# Patient Record
Sex: MALE | Race: WHITE | NOT HISPANIC OR LATINO | Employment: FULL TIME | URBAN - METROPOLITAN AREA
[De-identification: names, ages, dates, MRNs, and addresses within clinical notes are randomized per-mention and may not be internally consistent; named-entity substitution may affect disease eponyms.]

---

## 2017-01-25 ENCOUNTER — ALLSCRIPTS OFFICE VISIT (OUTPATIENT)
Dept: OTHER | Facility: OTHER | Age: 57
End: 2017-01-25

## 2017-04-19 ENCOUNTER — ALLSCRIPTS OFFICE VISIT (OUTPATIENT)
Dept: OTHER | Facility: OTHER | Age: 57
End: 2017-04-19

## 2017-04-19 DIAGNOSIS — I10 ESSENTIAL (PRIMARY) HYPERTENSION: ICD-10-CM

## 2017-04-19 DIAGNOSIS — E78.5 HYPERLIPIDEMIA: ICD-10-CM

## 2017-04-19 DIAGNOSIS — M10.9 GOUT: ICD-10-CM

## 2017-04-19 DIAGNOSIS — I25.10 ATHEROSCLEROTIC HEART DISEASE OF NATIVE CORONARY ARTERY WITHOUT ANGINA PECTORIS: ICD-10-CM

## 2017-04-19 DIAGNOSIS — E55.9 VITAMIN D DEFICIENCY: ICD-10-CM

## 2017-07-20 ENCOUNTER — ALLSCRIPTS OFFICE VISIT (OUTPATIENT)
Dept: OTHER | Facility: OTHER | Age: 57
End: 2017-07-20

## 2017-11-21 ENCOUNTER — GENERIC CONVERSION - ENCOUNTER (OUTPATIENT)
Dept: OTHER | Facility: OTHER | Age: 57
End: 2017-11-21

## 2017-12-19 ENCOUNTER — ALLSCRIPTS OFFICE VISIT (OUTPATIENT)
Dept: OTHER | Facility: OTHER | Age: 57
End: 2017-12-19

## 2017-12-20 NOTE — PROGRESS NOTES
Assessment  Assessed    1  Arteriosclerosis of coronary artery (414 00) (I25 10)   2  Benign essential hypertension (401 1) (I10)   3  Essential hypertriglyceridemia (272 1) (E78 1)   4  Premature ventricular contraction (427 69) (I49 3)    Plan  Arteriosclerosis of coronary artery    · ECHO STRESS TEST W CONTRAST IF INDICATED; Status:Need Information - FinancialAuthorization; Requested for:[id number]; Perform:[facility]; KPS:[id number]; For:Arteriosclerosis of coronary artery; Ordered By:Ward Cerda; Arteriosclerosis of coronary artery, Premature ventricular contraction    · EKG/ECG- POC; Status:Complete;   Done: 97HPT6100 08:06AM   Perform: In Office; Last Updated Henri Schmidt; 12/19/2017 8:06:29 AM;Ordered; For:Arteriosclerosis of coronary artery, Premature ventricular contraction; Ordered By:Maame Cerda;    Discussion/Summary  Cardiology Discussion Summary Free Text Note Form [facility]: It is my impression that the patient is doing well in the aftermath of coronary stenting (LAD) in August of 2012  He does have some CANNON but this has not advanced  He had a negative stress echo in 2015  His blood pressure is acceptable on his current medical regimen (valsartan/HCTZ and metoprolol)  I reviewed his lipids which were excellent on his current dosage of atorvastatin except HDL of 30  He will continue taking aspirin long term  Note he does have PVCs but these are not symptomatic and do no warrant treatment  I have ordered a stress echo in one year which will be three years out from his last test  We will look at his LV function to make sure it has not changed in light of PVCs and screen him for ischemia  Chief Complaint  Chief Complaint Free Text Note Form: 12 month FU for CAD s/p NTMI August 2012 with stenting of the LAD, HTN and HPL  Emiliana Mcbride also history of PVCS   Chief Complaint Chronic Condition [facility]: Patient is here today for follow up of chronic conditions described in HPI        History of Present Illness  Cardiology [facility] [place] Free Text Note Form [facility]: Since his last visit he gets CANNON walking [place] with his dog  This has not increased  He denies anginal chest pain  He denies edema or palpitations  He does get lightheadedness with rapid change of position  Review of Systems  Cardiology Male ROS:    Cardiac: no chest pain,-- no rhythm problems,-- no fainting/blackouts,-- no heart murmur present,-- no signs of swelling-- and-- no palpitations present  Skin: No complaints of nonhealing sores or skin rash  Genitourinary: No complaints of recurrent urinary tract infections, frequent urination at night, difficult urination, blood in urine, kidney stones, loss of bladder control, no kidney or prostate problems, no erectile dysfunction  Psychological: No complaints of feeling depressed, anxiety, panic attacks, or difficulty concentrating  General: no trouble sleeping,-- no appetite changes-- and-- no lack of energy/fatigue  Respiratory: shortness of breath  HEENT: no serious eye problems,-- no hearing problems,-- no nose problems-- and-- no throat problems  Gastrointestinal: No complaints of liver problems, nausea, vomiting, heartburn, constipation, bloody stools, diarrhea, problems swallowing, adbominal pain, or rectal bleeding  Neurological: No complaints of numbness, tingling, dizziness, weakness, seizures, headaches, syncope or fainting, AM fatigue, daytime sleepiness, no witnessed apnea episodes  Musculoskeletal: arthritis, but-- no back pain   ROS Reviewed:   ROS reviewed  Active Problems  Problems    1  Arteriosclerosis of coronary artery (414 00) (I25 10)   2  Benign essential hypertension (401 1) (I10)   3  Dyslipidemia (272 4) (E78 5)   4  Erectile dysfunction of non-organic origin (302 72) (F52 21)   5  Essential hypertriglyceridemia (272 1) (E78 1)   6  Facial skin lesion (709 9) (L98 9)   7  Fatigue (780 79) (R53 83)   8  Flu vaccine need (V04 81) (Z23)   9   Gout (274 9) (M10 9)   10  Obesity (278 00) (E66 9)   11  Peripheral vascular disease (443 9) (I73 9)   12  Premature ventricular contraction (427 69) (I49 3)   13  Seborrheic keratosis (702 19) (L82 1)   14  Vitamin D deficiency (268 9) (E55 9)    Past Medical History  Problems    1  History of A Fall (E888 9)   2  History of Acute myocardial infarction (410 90) (I21 9)   3  History of Arthralgia (719 40)   4  History of Contusion With Intact Skin Surface Of The Left Medial Thigh (924 00)   5  History of Cough (786 2) (R05)   6  History of fever (V13 89) (Z87 898)   7  History of Lump of skin (782 2) (R22 9)   8  Personal history of gout (V12 29) (Z86 39)  Active Problems And Past Medical History Reviewed: The active problems and past medical history were reviewed and updated today  Surgical History  Problems    1  History of Back Surgery   2  History of Cath Placement Of Stent 1   3  History of Complete Colonoscopy   4  History of Knee Surgery  Surgical History Reviewed: The surgical history was reviewed and updated today  Family History  Mother    1  Family history of Cerebral Edema  Father    2  Family history of Aneurysm Of The Cerebellar Artery  Brother    3  Family history of Aneurysm Of The Cerebellar Artery  Family History    4  Family history of Father  At Age ___  Family History Reviewed: The family history was reviewed and updated today  Social History  Problems    · Denied: History of Alcohol Use (History)   · Being A Social Drinker   · Denied: History of Drug Use   · Never A Smoker  Social History Reviewed: The social history was reviewed and updated today  The social history was reviewed and is unchanged  Current Meds   1  Allopurinol 100 MG Oral Tablet; TAKE ONE TABLET BY MOUTH EVERY DAY AS DIRECTED; Therapy: 34MHW5044 to (Tj Palomo)  Requested for: 2017; Last Rx:2017; Status: ACTIVE - Transmit to Pharmacy - Awaiting Verification Ordered   2   Allopurinol 300 MG Oral Tablet; take one tablet by mouth every day; Therapy: 97DED8511 to (Jazzy Gonzalez)  Requested for: 21Nov2017; Last Rx:21Nov2017 Ordered   3  Aspir-81 TBEC; Take 1 tablet daily Recorded   4  Atorvastatin Calcium 20 MG Oral Tablet; take one tablet by mouth every day; Therapy: 70Iij5134 to (Evaluate:[id number])  Requested for: 21Nov2017; Last Rx:21Nov2017 Ordered   5  Metoprolol Tartrate 25 MG Oral Tablet; take 1/2 tablet by mouth twice daily; Therapy: 50Zig2430 to (Evaluate:14Dde8693)  Requested for: 21Nov2017; Last Rx:21Nov2017 Ordered   6  Valsartan-Hydrochlorothiazide 160-12 5 MG Oral Tablet; take one tablet by mouth every day; Therapy: 20KIE7290 to (Jazzy Gonzalez)  Requested for: 21Nov2017; Last Rx:21Nov2017; Status: ACTIVE - Transmit to Pharmacy - Awaiting Verification Ordered  Medication List Reviewed: The medication list was reviewed and updated today  Allergies  Medication    1  No Known Drug Allergies  Non-Medication    2  No Known Environmental Allergies   3  No Known Food Allergies    Vitals  Vital Signs    Recorded: 64DWK4038 08:06AM   Heart Rate 56, Apical   Pulse Quality Regular, Apical   Respiration 16   Systolic 513, LUE, Sitting   Diastolic 80, LUE, Sitting   Height 5 ft 7 in   Weight 207 lb 6 oz   BMI Calculated 32 48   BSA Calculated 2 05     Physical Exam   Constitutional  General appearance: No acute distress, well appearing and well nourished  Eyes  Conjunctiva and Sclera examination: Conjunctiva pink, sclera anicteric  Ears, Nose, Mouth, and Throat - Oropharynx: Clear, nares are clear, mucous membranes are moist   Neck  Neck and thyroid: Normal, supple, trachea midline, no thyromegaly  Pulmonary  Respiratory effort: No increased work of breathing or signs of respiratory distress  Cardiovascular  Auscultation of heart: Abnormal  -- Regular with premature beats  no murmur rub or gallop  Carotid pulses: Normal, 2+ bilaterally     Peripheral vascular exam: Normal pulses throughout, no tenderness, erythema or swelling  Pedal pulses: Normal, 2+ bilaterally  Examination of extremities for edema and/or varicosities: Normal    Chest - Chest: Normal   Abdomen  Abdomen: Non-tender and no distention  Liver and spleen: No hepatomegaly or splenomegaly         Future Appointments    Date/Time Provider Specialty Site   02/14/2018 04:00 PM Ninfa Orourke DO Family Medicine [facility]     Signatures   Electronically signed by : MU Ortiz ; Dec 19 2017  8:33AM EST                       (Author)

## 2018-01-12 VITALS
SYSTOLIC BLOOD PRESSURE: 128 MMHG | WEIGHT: 202.38 LBS | HEIGHT: 68 IN | BODY MASS INDEX: 30.67 KG/M2 | DIASTOLIC BLOOD PRESSURE: 80 MMHG

## 2018-01-13 VITALS
HEIGHT: 68 IN | BODY MASS INDEX: 31.39 KG/M2 | WEIGHT: 207.13 LBS | SYSTOLIC BLOOD PRESSURE: 112 MMHG | DIASTOLIC BLOOD PRESSURE: 70 MMHG

## 2018-01-14 VITALS
DIASTOLIC BLOOD PRESSURE: 62 MMHG | HEIGHT: 68 IN | BODY MASS INDEX: 29.16 KG/M2 | WEIGHT: 192.38 LBS | SYSTOLIC BLOOD PRESSURE: 128 MMHG

## 2018-01-22 VITALS
DIASTOLIC BLOOD PRESSURE: 62 MMHG | SYSTOLIC BLOOD PRESSURE: 130 MMHG | BODY MASS INDEX: 30.84 KG/M2 | WEIGHT: 196.5 LBS | HEIGHT: 67 IN

## 2018-01-23 VITALS
HEART RATE: 56 BPM | BODY MASS INDEX: 32.55 KG/M2 | HEIGHT: 67 IN | DIASTOLIC BLOOD PRESSURE: 80 MMHG | RESPIRATION RATE: 16 BRPM | WEIGHT: 207.38 LBS | SYSTOLIC BLOOD PRESSURE: 116 MMHG

## 2018-04-06 RX ORDER — ALLOPURINOL 100 MG/1
1 TABLET ORAL DAILY
COMMUNITY
Start: 2016-10-16 | End: 2018-07-08

## 2018-04-06 RX ORDER — VALSARTAN AND HYDROCHLOROTHIAZIDE 160; 12.5 MG/1; MG/1
1 TABLET, FILM COATED ORAL DAILY
COMMUNITY
Start: 2015-11-03 | End: 2018-06-27 | Stop reason: SDUPTHER

## 2018-04-06 RX ORDER — ATORVASTATIN CALCIUM 20 MG/1
1 TABLET, FILM COATED ORAL DAILY
COMMUNITY
Start: 2016-08-08 | End: 2018-06-27 | Stop reason: SDUPTHER

## 2018-04-06 RX ORDER — ASPIRIN 81 MG/1
1 TABLET ORAL DAILY
COMMUNITY

## 2018-04-06 RX ORDER — ALLOPURINOL 300 MG/1
1 TABLET ORAL DAILY
COMMUNITY
Start: 2012-07-17 | End: 2018-06-27 | Stop reason: SDUPTHER

## 2018-04-10 ENCOUNTER — OFFICE VISIT (OUTPATIENT)
Dept: FAMILY MEDICINE CLINIC | Facility: CLINIC | Age: 58
End: 2018-04-10
Payer: COMMERCIAL

## 2018-04-10 VITALS
BODY MASS INDEX: 31.25 KG/M2 | WEIGHT: 206.2 LBS | DIASTOLIC BLOOD PRESSURE: 80 MMHG | SYSTOLIC BLOOD PRESSURE: 126 MMHG | HEIGHT: 68 IN

## 2018-04-10 DIAGNOSIS — I25.10 ARTERIOSCLEROSIS OF CORONARY ARTERY: ICD-10-CM

## 2018-04-10 DIAGNOSIS — E78.5 HYPERLIPIDEMIA, UNSPECIFIED HYPERLIPIDEMIA TYPE: ICD-10-CM

## 2018-04-10 DIAGNOSIS — I10 ESSENTIAL HYPERTENSION: Primary | ICD-10-CM

## 2018-04-10 DIAGNOSIS — M10.9 GOUT, UNSPECIFIED CAUSE, UNSPECIFIED CHRONICITY, UNSPECIFIED SITE: ICD-10-CM

## 2018-04-10 DIAGNOSIS — E55.9 VITAMIN D DEFICIENCY: ICD-10-CM

## 2018-04-10 PROCEDURE — 99214 OFFICE O/P EST MOD 30 MIN: CPT | Performed by: FAMILY MEDICINE

## 2018-04-10 NOTE — PROGRESS NOTES
Assessment/Plan:  Chief Complaint   Patient presents with    Hypertension     patient here for follow up of chronic medical problems  Patient Instructions   He gained weight and his BP is stable  Continue present meds as directed for hx of gout and hyperlipidemia and HTN  F-up with Cardiology as directed and monitor labs  Recheck in 3 months for HTN  Take Vitamin D3 as directed  Low cholesterol diet encouraged  No problem-specific Assessment & Plan notes found for this encounter  Diagnoses and all orders for this visit:    Essential hypertension  -     Comprehensive metabolic panel  -     PSA, total and free; Future    Vitamin D deficiency  -     PSA, total and free; Future    Hyperlipidemia, unspecified hyperlipidemia type  -     Lipid Panel with Direct LDL reflex; Future  -     PSA, total and free; Future    Gout, unspecified cause, unspecified chronicity, unspecified site  -     Uric acid; Future  -     PSA, total and free; Future    Arteriosclerosis of coronary artery  -     PSA, total and free; Future          Subjective:      Patient ID: Thomas Pierre is a 61 y o  male  Here for HTN and chronic medical problems  No cp or sob, or ha  Sees Cardiology as directed for hx of arteriosclerosis of coronary artery and also has a stress test soon for cardiology  Gout stable  The following portions of the patient's history were reviewed and updated as appropriate: allergies, current medications, past social history and problem list     Review of Systems   Constitutional: Negative  HENT: Negative  Eyes: Negative  Respiratory: Negative  Cardiovascular: Negative  Gastrointestinal: Negative  Endocrine: Negative  Genitourinary: Negative  Musculoskeletal: Negative  Gout stable   Skin: Negative  Allergic/Immunologic: Negative  Neurological: Negative  Hematological: Negative  Psychiatric/Behavioral: Negative            Objective:      /80 (BP Location: Left arm, Patient Position: Sitting, Cuff Size: Standard)   Ht 5' 8" (1 727 m)   Wt 93 5 kg (206 lb 3 2 oz)   BMI 31 35 kg/m²          Physical Exam   Constitutional: He is oriented to person, place, and time  He appears well-developed and well-nourished  HENT:   Head: Normocephalic and atraumatic  Right Ear: External ear normal    Left Ear: External ear normal    Nose: Nose normal    Mouth/Throat: Oropharynx is clear and moist    Eyes: Conjunctivae and EOM are normal  Pupils are equal, round, and reactive to light  Neck: Normal range of motion  Neck supple  Cardiovascular: Normal rate, regular rhythm, normal heart sounds and intact distal pulses  Pulmonary/Chest: Effort normal and breath sounds normal    Musculoskeletal: Normal range of motion  Neurological: He is alert and oriented to person, place, and time  He has normal reflexes  Skin: Skin is warm and dry  Psychiatric: He has a normal mood and affect   His behavior is normal

## 2018-04-10 NOTE — PATIENT INSTRUCTIONS
He gained weight and his BP is stable  Continue present meds as directed for hx of gout and hyperlipidemia and HTN  F-up with Cardiology as directed and monitor labs  Recheck in 3 months for HTN  Take Vitamin D3 as directed  Low cholesterol diet encouraged

## 2018-06-22 LAB
ALBUMIN SERPL BCP-MCNC: 4.3 G/DL (ref 3–5.2)
ALP SERPL-CCNC: 72 U/L (ref 43–122)
ALT SERPL W P-5'-P-CCNC: 33 U/L (ref 9–52)
ANION GAP SERPL CALCULATED.3IONS-SCNC: 10 MMOL/L (ref 5–14)
AST SERPL W P-5'-P-CCNC: 29 U/L (ref 17–59)
BILIRUB SERPL-MCNC: 2 MG/DL
BUN SERPL-MCNC: 22 MG/DL (ref 5–25)
CALCIUM SERPL-MCNC: 9.9 MG/DL (ref 8.4–10.2)
CHLORIDE SERPL-SCNC: 101 MEQ/L (ref 97–108)
CHOLEST SERPL-MCNC: 116 MG/DL
CHOLEST/HDLC SERPL: 3.6 {RATIO}
CO2 SERPL-SCNC: 31 MMOL/L (ref 22–30)
CREATINE, SERUM (HISTORICAL): 1 MG/DL (ref 0.7–1.5)
EGFR (HISTORICAL): >60 ML/MIN/1.73 M2
GLUCOSE FASTING (HISTORICAL): 106 MG/DL (ref 70–99)
HDLC SERPL-MCNC: 32 MG/DL
LDL/HDL RATIO (HISTORICAL): 1.8
LDLC SERPL CALC-MCNC: 57 MG/DL
POTASSIUM SERPL-SCNC: 4 MEQ/L (ref 3.6–5)
SODIUM SERPL-SCNC: 142 MEQ/L (ref 137–147)
TOTAL PROTEIN (HISTORICAL): 7.5 G/DL (ref 5.9–8.4)
TRIGL SERPL-MCNC: 135 MG/DL
URIC ACID (HISTORICAL): 5 MG/DL (ref 3.5–8.5)
VLDLC SERPL CALC-MCNC: 27 MG/DL (ref 0–40)

## 2018-06-23 LAB
PROSTATE SPECIFIC ANTIGEN (HISTORICAL): 0.6
PROSTATE SPECIFIC ANTIGEN PERCENT FREE (HISTORICAL): 50
PSA (HISTORICAL): 0.3

## 2018-06-27 DIAGNOSIS — E78.5 HYPERLIPIDEMIA, UNSPECIFIED HYPERLIPIDEMIA TYPE: ICD-10-CM

## 2018-06-27 DIAGNOSIS — M10.9 GOUT, UNSPECIFIED CAUSE, UNSPECIFIED CHRONICITY, UNSPECIFIED SITE: Primary | ICD-10-CM

## 2018-06-27 DIAGNOSIS — I10 ESSENTIAL HYPERTENSION: ICD-10-CM

## 2018-06-28 RX ORDER — ATORVASTATIN CALCIUM 20 MG/1
TABLET, FILM COATED ORAL
Qty: 30 TABLET | Refills: 4 | Status: SHIPPED | OUTPATIENT
Start: 2018-06-28 | End: 2018-11-13 | Stop reason: SDUPTHER

## 2018-06-28 RX ORDER — VALSARTAN AND HYDROCHLOROTHIAZIDE 160; 12.5 MG/1; MG/1
TABLET, FILM COATED ORAL
Qty: 30 TABLET | Refills: 4 | Status: SHIPPED | OUTPATIENT
Start: 2018-06-28 | End: 2018-08-01

## 2018-06-28 RX ORDER — ALLOPURINOL 100 MG/1
100 TABLET ORAL DAILY
Qty: 90 TABLET | Refills: 3 | Status: SHIPPED | OUTPATIENT
Start: 2018-06-28 | End: 2018-07-08

## 2018-07-07 DIAGNOSIS — M10.9 GOUT, UNSPECIFIED CAUSE, UNSPECIFIED CHRONICITY, UNSPECIFIED SITE: Primary | ICD-10-CM

## 2018-07-08 RX ORDER — ALLOPURINOL 300 MG/1
TABLET ORAL
Qty: 30 TABLET | Refills: 4 | Status: SHIPPED | OUTPATIENT
Start: 2018-07-08 | End: 2018-11-13 | Stop reason: SDUPTHER

## 2018-07-30 ENCOUNTER — TELEPHONE (OUTPATIENT)
Dept: FAMILY MEDICINE CLINIC | Facility: CLINIC | Age: 58
End: 2018-07-30

## 2018-07-30 NOTE — TELEPHONE ENCOUNTER
Left message for patient to call the office  We need to change his valsartan/hctz to losartan/hctz 100-12 5 #30 once daily with 5 refills  Patient should also have an appointment to be seen in a month or two at the lastest for a blood pressure check

## 2018-08-01 ENCOUNTER — OFFICE VISIT (OUTPATIENT)
Dept: FAMILY MEDICINE CLINIC | Facility: CLINIC | Age: 58
End: 2018-08-01
Payer: COMMERCIAL

## 2018-08-01 VITALS
BODY MASS INDEX: 30.83 KG/M2 | HEIGHT: 68 IN | SYSTOLIC BLOOD PRESSURE: 130 MMHG | WEIGHT: 203.4 LBS | DIASTOLIC BLOOD PRESSURE: 80 MMHG

## 2018-08-01 DIAGNOSIS — I10 ESSENTIAL HYPERTENSION: Primary | ICD-10-CM

## 2018-08-01 DIAGNOSIS — E78.5 HYPERLIPIDEMIA, UNSPECIFIED HYPERLIPIDEMIA TYPE: ICD-10-CM

## 2018-08-01 DIAGNOSIS — E55.9 VITAMIN D DEFICIENCY: ICD-10-CM

## 2018-08-01 DIAGNOSIS — I25.10 ARTERIOSCLEROSIS OF CORONARY ARTERY: ICD-10-CM

## 2018-08-01 DIAGNOSIS — M10.9 GOUT, UNSPECIFIED CAUSE, UNSPECIFIED CHRONICITY, UNSPECIFIED SITE: ICD-10-CM

## 2018-08-01 PROCEDURE — 99214 OFFICE O/P EST MOD 30 MIN: CPT | Performed by: FAMILY MEDICINE

## 2018-08-01 RX ORDER — LOSARTAN POTASSIUM AND HYDROCHLOROTHIAZIDE 12.5; 1 MG/1; MG/1
1 TABLET ORAL DAILY
Qty: 30 TABLET | Refills: 5 | Status: SHIPPED | OUTPATIENT
Start: 2018-08-01 | End: 2018-12-04

## 2018-08-01 NOTE — PATIENT INSTRUCTIONS
Here for routine office visit for medical issues  BP is stable  Continue present meds as directed for hx of gout and hyperlipidemia  F-up with Cardiology as directed and monitor labs  Recheck in 3 months for HTN  Take Vitamin D3 as directed  Low cholesterol diet encouraged   Changed valsartan HCT to losartan /12 5 once daily and recheck in 1 month for BP check

## 2018-08-01 NOTE — PROGRESS NOTES
Assessment/Plan:  Chief Complaint   Patient presents with    Follow-up    Hypertension    Hyperlipidemia     Patient Instructions   Here for routine office visit for medical issues  BP is stable  Continue present meds as directed for hx of gout and hyperlipidemia  F-up with Cardiology as directed and monitor labs  Recheck in 3 months for HTN  Take Vitamin D3 as directed  Low cholesterol diet encouraged  Changed valsartan HCT to losartan /12 5 once daily and recheck in 1 month for BP check             No problem-specific Assessment & Plan notes found for this encounter  Diagnoses and all orders for this visit:    Essential hypertension  -     losartan-hydrochlorothiazide (HYZAAR) 100-12 5 MG per tablet; Take 1 tablet by mouth daily    Hyperlipidemia, unspecified hyperlipidemia type    Gout, unspecified cause, unspecified chronicity, unspecified site    Vitamin D deficiency    Arteriosclerosis of coronary artery          Subjective:      Patient ID: Keyonna Salgado is a 60 y o  male  Follow-up   Hypertension   Hyperlipidemia     No cp or sob, or ha  Hx of gout - stable, and hx of heart disease and sees Cardiology as directed  Takes Vitamin D as directed  Hypertension     Hyperlipidemia         The following portions of the patient's history were reviewed and updated as appropriate: allergies, current medications, past family history, past medical history, past social history, past surgical history and problem list     Review of Systems   Constitutional: Negative  HENT: Negative  Eyes: Negative  Respiratory: Negative  Cardiovascular: Negative  Gastrointestinal: Negative  Endocrine: Negative  Genitourinary: Negative  Musculoskeletal: Negative  Skin: Negative  Allergic/Immunologic: Negative  Neurological: Negative  Hematological: Negative  Psychiatric/Behavioral: Negative            Objective:      /80   Ht 5' 8" (1 727 m)   Wt 92 3 kg (203 lb 6 4 oz)   BMI 30 93 kg/m²          Physical Exam   Constitutional: He is oriented to person, place, and time  He appears well-developed and well-nourished  HENT:   Head: Normocephalic and atraumatic  Right Ear: External ear normal    Left Ear: External ear normal    Nose: Nose normal    Mouth/Throat: Oropharynx is clear and moist    Eyes: Conjunctivae and EOM are normal  Pupils are equal, round, and reactive to light  Neck: Normal range of motion  Neck supple  Cardiovascular: Normal rate, regular rhythm, normal heart sounds and intact distal pulses  Pulmonary/Chest: Effort normal and breath sounds normal    Musculoskeletal: Normal range of motion  Neurological: He is alert and oriented to person, place, and time  He has normal reflexes  Skin: Skin is warm and dry  Psychiatric: He has a normal mood and affect   His behavior is normal

## 2018-09-05 ENCOUNTER — OFFICE VISIT (OUTPATIENT)
Dept: FAMILY MEDICINE CLINIC | Facility: CLINIC | Age: 58
End: 2018-09-05
Payer: COMMERCIAL

## 2018-09-05 VITALS
WEIGHT: 203.8 LBS | HEIGHT: 68 IN | SYSTOLIC BLOOD PRESSURE: 134 MMHG | DIASTOLIC BLOOD PRESSURE: 80 MMHG | BODY MASS INDEX: 30.89 KG/M2

## 2018-09-05 DIAGNOSIS — E78.5 HYPERLIPIDEMIA, UNSPECIFIED HYPERLIPIDEMIA TYPE: ICD-10-CM

## 2018-09-05 DIAGNOSIS — M10.9 GOUT, UNSPECIFIED CAUSE, UNSPECIFIED CHRONICITY, UNSPECIFIED SITE: ICD-10-CM

## 2018-09-05 DIAGNOSIS — I10 ESSENTIAL HYPERTENSION: Primary | ICD-10-CM

## 2018-09-05 PROCEDURE — 99213 OFFICE O/P EST LOW 20 MIN: CPT | Performed by: FAMILY MEDICINE

## 2018-09-05 PROCEDURE — 3008F BODY MASS INDEX DOCD: CPT | Performed by: FAMILY MEDICINE

## 2018-09-05 PROCEDURE — 3075F SYST BP GE 130 - 139MM HG: CPT | Performed by: FAMILY MEDICINE

## 2018-09-05 PROCEDURE — 3079F DIAST BP 80-89 MM HG: CPT | Performed by: FAMILY MEDICINE

## 2018-09-05 PROCEDURE — 1036F TOBACCO NON-USER: CPT | Performed by: FAMILY MEDICINE

## 2018-09-05 RX ORDER — ALLOPURINOL 100 MG/1
TABLET ORAL
COMMUNITY
Start: 2018-09-02 | End: 2018-11-14

## 2018-09-05 NOTE — PROGRESS NOTES
Assessment/Plan:  Chief Complaint   Patient presents with    Follow-up    Hypertension     on Losartan-HCTZ for a month with no problems  Patient Instructions   Recheck in 3 months for BP check and gout stable and take med as directed, take cholesterol med as directed  No problem-specific Assessment & Plan notes found for this encounter  Diagnoses and all orders for this visit:    Essential hypertension    Hyperlipidemia, unspecified hyperlipidemia type    Gout, unspecified cause, unspecified chronicity, unspecified site    Other orders  -     allopurinol (ZYLOPRIM) 100 mg tablet;           Subjective:      Patient ID: Haily Minor is a 60 y o  male  Here for BP check and doing well  Hypertension         The following portions of the patient's history were reviewed and updated as appropriate: allergies, current medications, past family history, past medical history, past social history, past surgical history and problem list     Review of Systems   Constitutional: Negative  HENT: Negative  Eyes: Negative  Respiratory: Negative  Cardiovascular: Negative  Gastrointestinal: Negative  Endocrine: Negative  Genitourinary: Negative  Musculoskeletal: Negative  Skin: Negative  Allergic/Immunologic: Negative  Neurological: Negative  Hematological: Negative  Psychiatric/Behavioral: Negative  Objective:      /80   Ht 5' 8" (1 727 m)   Wt 92 4 kg (203 lb 12 8 oz)   BMI 30 99 kg/m²          Physical Exam   Constitutional: He is oriented to person, place, and time  He appears well-developed and well-nourished  HENT:   Head: Normocephalic and atraumatic  Right Ear: External ear normal    Left Ear: External ear normal    Nose: Nose normal    Mouth/Throat: Oropharynx is clear and moist    Eyes: Conjunctivae and EOM are normal  Pupils are equal, round, and reactive to light  Neck: Normal range of motion  Neck supple     Cardiovascular: Normal rate, regular rhythm, normal heart sounds and intact distal pulses  Pulmonary/Chest: Effort normal and breath sounds normal    Musculoskeletal: Normal range of motion  Neurological: He is alert and oriented to person, place, and time  He has normal reflexes  Skin: Skin is warm and dry  Psychiatric: He has a normal mood and affect   His behavior is normal

## 2018-09-05 NOTE — PATIENT INSTRUCTIONS
Recheck in 3 months for BP check and gout stable and take med as directed, take cholesterol med as directed

## 2018-11-13 DIAGNOSIS — E78.5 HYPERLIPIDEMIA, UNSPECIFIED HYPERLIPIDEMIA TYPE: ICD-10-CM

## 2018-11-13 DIAGNOSIS — I10 ESSENTIAL HYPERTENSION: ICD-10-CM

## 2018-11-13 DIAGNOSIS — M10.9 GOUT, UNSPECIFIED CAUSE, UNSPECIFIED CHRONICITY, UNSPECIFIED SITE: ICD-10-CM

## 2018-11-14 RX ORDER — ATORVASTATIN CALCIUM 20 MG/1
TABLET, FILM COATED ORAL
Qty: 30 TABLET | Refills: 3 | Status: SHIPPED | OUTPATIENT
Start: 2018-11-14 | End: 2019-03-21 | Stop reason: SDUPTHER

## 2018-11-14 RX ORDER — ALLOPURINOL 300 MG/1
TABLET ORAL
Qty: 30 TABLET | Refills: 3 | Status: SHIPPED | OUTPATIENT
Start: 2018-11-14 | End: 2018-12-04 | Stop reason: DRUGHIGH

## 2018-12-04 ENCOUNTER — OFFICE VISIT (OUTPATIENT)
Dept: FAMILY MEDICINE CLINIC | Facility: CLINIC | Age: 58
End: 2018-12-04
Payer: COMMERCIAL

## 2018-12-04 VITALS
BODY MASS INDEX: 31.58 KG/M2 | HEIGHT: 68 IN | SYSTOLIC BLOOD PRESSURE: 132 MMHG | WEIGHT: 208.4 LBS | DIASTOLIC BLOOD PRESSURE: 80 MMHG

## 2018-12-04 DIAGNOSIS — E78.5 HYPERLIPIDEMIA, UNSPECIFIED HYPERLIPIDEMIA TYPE: ICD-10-CM

## 2018-12-04 DIAGNOSIS — M10.9 GOUT, UNSPECIFIED CAUSE, UNSPECIFIED CHRONICITY, UNSPECIFIED SITE: ICD-10-CM

## 2018-12-04 DIAGNOSIS — E55.9 VITAMIN D DEFICIENCY: ICD-10-CM

## 2018-12-04 DIAGNOSIS — I10 ESSENTIAL HYPERTENSION: Primary | ICD-10-CM

## 2018-12-04 DIAGNOSIS — Z11.59 NEED FOR HEPATITIS C SCREENING TEST: ICD-10-CM

## 2018-12-04 PROCEDURE — 3075F SYST BP GE 130 - 139MM HG: CPT | Performed by: FAMILY MEDICINE

## 2018-12-04 PROCEDURE — 99214 OFFICE O/P EST MOD 30 MIN: CPT | Performed by: FAMILY MEDICINE

## 2018-12-04 PROCEDURE — 3079F DIAST BP 80-89 MM HG: CPT | Performed by: FAMILY MEDICINE

## 2018-12-04 PROCEDURE — 3008F BODY MASS INDEX DOCD: CPT | Performed by: FAMILY MEDICINE

## 2018-12-04 RX ORDER — ALLOPURINOL 300 MG/1
300 TABLET ORAL DAILY
COMMUNITY
End: 2019-03-21

## 2018-12-04 RX ORDER — ALLOPURINOL 100 MG/1
TABLET ORAL
COMMUNITY
Start: 2018-11-23 | End: 2019-08-14 | Stop reason: SDUPTHER

## 2018-12-04 RX ORDER — OLMESARTAN MEDOXOMIL AND HYDROCHLOROTHIAZIDE 40/12.5 40; 12.5 MG/1; MG/1
1 TABLET ORAL DAILY
Qty: 30 TABLET | Refills: 0 | Status: SHIPPED | OUTPATIENT
Start: 2018-12-04 | End: 2019-01-21 | Stop reason: SDUPTHER

## 2018-12-04 NOTE — PATIENT INSTRUCTIONS
Here for routine office visit for medical issues  BP is stable  Continue present meds as directed for hx of gout and hyperlipidemia  F-up with Cardiology as directed and monitor labs  Recheck in 1 months for HTN onnew BP med  Take Vitamin D3 as directed  Low cholesterol diet encouraged  Changed losartan HCT to olmesartan HCT 40/12 5 once daily and recheck in 1 month for BP check

## 2018-12-04 NOTE — PROGRESS NOTES
Assessment/Plan:  Chief Complaint   Patient presents with    Follow-up    Hypertension    Hyperlipidemia     Patient Instructions   Here for routine office visit for medical issues  BP is stable  Continue present meds as directed for hx of gout and hyperlipidemia  F-up with Cardiology as directed and monitor labs  Recheck in 1 months for HTN onnew BP med  Take Vitamin D3 as directed  Low cholesterol diet encouraged  Changed losartan HCT to olmesartan HCT 40/12 5 once daily and recheck in 1 month for BP check  No problem-specific Assessment & Plan notes found for this encounter  Diagnoses and all orders for this visit:    Essential hypertension  -     olmesartan-hydrochlorothiazide (BENICAR HCT) 40-12 5 MG per tablet; Take 1 tablet by mouth daily    Need for hepatitis C screening test  -     Hepatitis C antibody; Future    Hyperlipidemia, unspecified hyperlipidemia type    Gout, unspecified cause, unspecified chronicity, unspecified site    Vitamin D deficiency    Other orders  -     allopurinol (ZYLOPRIM) 300 mg tablet; Take 300 mg by mouth daily          Subjective:      Patient ID: Vadim Rangel is a 60 y o  male  Follow-up   Hypertension   Hyperlipidemia     No cp or sob, or ha  No other complaints, needs new BP med as he is on losartan HCT  Hypertension     Hyperlipidemia         The following portions of the patient's history were reviewed and updated as appropriate: allergies, current medications, past family history, past medical history, past social history, past surgical history and problem list     Review of Systems   Constitutional: Negative  HENT: Negative  Eyes: Negative  Respiratory: Negative  Cardiovascular: Negative  Gastrointestinal: Negative  Endocrine: Negative  Genitourinary: Negative  Musculoskeletal: Negative  Skin: Negative  Allergic/Immunologic: Negative  Neurological: Negative  Hematological: Negative  Psychiatric/Behavioral: Negative  Objective:      /80   Ht 5' 8" (1 727 m)   Wt 94 5 kg (208 lb 6 4 oz)   BMI 31 69 kg/m²          Physical Exam   Constitutional: He is oriented to person, place, and time  He appears well-developed and well-nourished  HENT:   Head: Normocephalic and atraumatic  Right Ear: External ear normal    Left Ear: External ear normal    Nose: Nose normal    Mouth/Throat: Oropharynx is clear and moist    Eyes: Pupils are equal, round, and reactive to light  Conjunctivae and EOM are normal    Neck: Normal range of motion  Neck supple  Cardiovascular: Normal rate, regular rhythm, normal heart sounds and intact distal pulses  Pulmonary/Chest: Effort normal and breath sounds normal    Musculoskeletal: Normal range of motion  Neurological: He is alert and oriented to person, place, and time  He has normal reflexes  Skin: Skin is warm and dry  Psychiatric: He has a normal mood and affect   His behavior is normal

## 2018-12-11 ENCOUNTER — TRANSCRIBE ORDERS (OUTPATIENT)
Dept: ADMINISTRATIVE | Facility: HOSPITAL | Age: 58
End: 2018-12-11

## 2018-12-11 DIAGNOSIS — I25.119 ATHEROSCLEROSIS OF NATIVE CORONARY ARTERY OF NATIVE HEART WITH ANGINA PECTORIS (HCC): Primary | ICD-10-CM

## 2019-01-09 ENCOUNTER — OFFICE VISIT (OUTPATIENT)
Dept: FAMILY MEDICINE CLINIC | Facility: CLINIC | Age: 59
End: 2019-01-09
Payer: COMMERCIAL

## 2019-01-09 VITALS
WEIGHT: 210.2 LBS | BODY MASS INDEX: 31.13 KG/M2 | DIASTOLIC BLOOD PRESSURE: 86 MMHG | HEIGHT: 69 IN | SYSTOLIC BLOOD PRESSURE: 130 MMHG

## 2019-01-09 DIAGNOSIS — Z11.59 ENCOUNTER FOR HEPATITIS C SCREENING TEST FOR LOW RISK PATIENT: ICD-10-CM

## 2019-01-09 DIAGNOSIS — J06.9 UPPER RESPIRATORY TRACT INFECTION, UNSPECIFIED TYPE: ICD-10-CM

## 2019-01-09 DIAGNOSIS — R05.9 COUGH: ICD-10-CM

## 2019-01-09 DIAGNOSIS — J32.9 SINUSITIS, UNSPECIFIED CHRONICITY, UNSPECIFIED LOCATION: ICD-10-CM

## 2019-01-09 DIAGNOSIS — Z23 ENCOUNTER FOR IMMUNIZATION: Primary | ICD-10-CM

## 2019-01-09 DIAGNOSIS — I10 ESSENTIAL HYPERTENSION: ICD-10-CM

## 2019-01-09 PROCEDURE — 3075F SYST BP GE 130 - 139MM HG: CPT | Performed by: FAMILY MEDICINE

## 2019-01-09 PROCEDURE — 3079F DIAST BP 80-89 MM HG: CPT | Performed by: FAMILY MEDICINE

## 2019-01-09 PROCEDURE — 99214 OFFICE O/P EST MOD 30 MIN: CPT | Performed by: FAMILY MEDICINE

## 2019-01-09 PROCEDURE — 1036F TOBACCO NON-USER: CPT | Performed by: FAMILY MEDICINE

## 2019-01-09 PROCEDURE — 3008F BODY MASS INDEX DOCD: CPT | Performed by: FAMILY MEDICINE

## 2019-01-09 RX ORDER — LOSARTAN POTASSIUM AND HYDROCHLOROTHIAZIDE 12.5; 1 MG/1; MG/1
TABLET ORAL
COMMUNITY
Start: 2018-12-22 | End: 2019-01-09

## 2019-01-09 RX ORDER — PROMETHAZINE HYDROCHLORIDE AND CODEINE PHOSPHATE 6.25; 1 MG/5ML; MG/5ML
5 SYRUP ORAL EVERY 12 HOURS PRN
Qty: 120 ML | Refills: 0 | Status: SHIPPED | OUTPATIENT
Start: 2019-01-09 | End: 2019-08-06

## 2019-01-09 RX ORDER — AZITHROMYCIN 250 MG/1
TABLET, FILM COATED ORAL
Qty: 6 TABLET | Refills: 0 | Status: SHIPPED | OUTPATIENT
Start: 2019-01-09 | End: 2019-01-14

## 2019-01-09 NOTE — PROGRESS NOTES
Assessment/Plan:  Chief Complaint   Patient presents with    Follow-up     1 month: BP    Cough     Symtoms ongoing for 3 weeks     Patient Instructions   Rest and fluids, start abx and promethazine with codeine or Dimetapp DM cold and cough and use olmesartan HCT as directed for HTN  Recheck 3 months  No problem-specific Assessment & Plan notes found for this encounter  Diagnoses and all orders for this visit:    Encounter for immunization  -     TDAP VACCINE GREATER THAN OR EQUAL TO 6YO IM    Essential hypertension    Cough    Upper respiratory tract infection, unspecified type  -     azithromycin (ZITHROMAX) 250 mg tablet; Take 2 tablets today then 1 tablet daily x 4 days  -     promethazine-codeine (PHENERGAN WITH CODEINE) 6 25-10 mg/5 mL syrup; Take 5 mL by mouth every 12 (twelve) hours as needed for cough    Sinusitis, unspecified chronicity, unspecified location  -     azithromycin (ZITHROMAX) 250 mg tablet; Take 2 tablets today then 1 tablet daily x 4 days    Encounter for hepatitis C screening test for low risk patient  -     Cancel: Hepatitis C antibody; Future          Subjective:      Patient ID: Rebecca Carreno is a 60 y o  male  Follow-up (1 month: BP)  Cough (Symtoms ongoing for 3 weeks)    Taking BP med as directed  Lingering cough for 3 weeks  No phlegm  Took Nyquil prn at night  Getting stress test next Friday 1/18/19 at cardiology  The following portions of the patient's history were reviewed and updated as appropriate: allergies, current medications, past family history, past medical history, past social history, past surgical history and problem list     Review of Systems   Constitutional: Negative  HENT: Positive for congestion, postnasal drip and rhinorrhea  Eyes: Negative  Respiratory: Positive for cough  Cardiovascular: Negative  Gastrointestinal: Negative  Endocrine: Negative  Genitourinary: Negative  Musculoskeletal: Negative      Skin: Negative  Allergic/Immunologic: Negative  Neurological: Negative  Hematological: Negative  Psychiatric/Behavioral: Negative  Objective:      /86 (BP Location: Left arm, Patient Position: Sitting, Cuff Size: Standard)   Ht 5' 8 5" (1 74 m)   Wt 95 3 kg (210 lb 3 2 oz)   BMI 31 50 kg/m²          Physical Exam   Constitutional: He is oriented to person, place, and time  He appears well-developed and well-nourished  HENT:   Head: Normocephalic and atraumatic  Right Ear: External ear normal    Left Ear: External ear normal    Nasal congestion, pnd and rhinorrhea, sinusitis   Eyes: Pupils are equal, round, and reactive to light  Conjunctivae and EOM are normal    Neck: Normal range of motion  Neck supple  Cardiovascular: Normal rate, regular rhythm, normal heart sounds and intact distal pulses  Pulmonary/Chest: Effort normal and breath sounds normal    Cough     Musculoskeletal: Normal range of motion  Neurological: He is alert and oriented to person, place, and time  He has normal reflexes  Skin: Skin is warm and dry  Psychiatric: He has a normal mood and affect   His behavior is normal

## 2019-01-09 NOTE — PATIENT INSTRUCTIONS
Rest and fluids, start abx and promethazine with codeine or Dimetapp DM cold and cough and use olmesartan HCT as directed for HTN  Recheck 3 months

## 2019-01-11 DIAGNOSIS — I10 ESSENTIAL HYPERTENSION: ICD-10-CM

## 2019-01-11 RX ORDER — LOSARTAN POTASSIUM AND HYDROCHLOROTHIAZIDE 12.5; 1 MG/1; MG/1
TABLET ORAL
Qty: 30 TABLET | Refills: 4 | OUTPATIENT
Start: 2019-01-11

## 2019-01-18 ENCOUNTER — HOSPITAL ENCOUNTER (OUTPATIENT)
Dept: NON INVASIVE DIAGNOSTICS | Facility: CLINIC | Age: 59
Discharge: HOME | End: 2019-01-18
Payer: COMMERCIAL

## 2019-01-18 DIAGNOSIS — I25.119 ATHEROSCLEROSIS OF NATIVE CORONARY ARTERY OF NATIVE HEART WITH ANGINA PECTORIS (HCC): ICD-10-CM

## 2019-01-18 LAB
ARRHY DURING EX: NORMAL
CHEST PAIN STATEMENT: NORMAL
MAX DIASTOLIC BP: 80 MMHG
MAX HEART RATE: 134 BPM
MAX PREDICTED HEART RATE: 161 BPM
MAX. SYSTOLIC BP: 162 MMHG
PROTOCOL NAME: NORMAL
TARGET HR FORMULA: NORMAL
TEST INDICATION: NORMAL
TIME IN EXERCISE PHASE: NORMAL

## 2019-01-18 PROCEDURE — 93350 STRESS TTE ONLY: CPT

## 2019-01-18 PROCEDURE — 93351 STRESS TTE COMPLETE: CPT | Performed by: INTERNAL MEDICINE

## 2019-01-21 DIAGNOSIS — I10 ESSENTIAL HYPERTENSION: ICD-10-CM

## 2019-01-21 RX ORDER — OLMESARTAN MEDOXOMIL AND HYDROCHLOROTHIAZIDE 40/12.5 40; 12.5 MG/1; MG/1
1 TABLET ORAL DAILY
Qty: 30 TABLET | Refills: 3 | Status: SHIPPED | OUTPATIENT
Start: 2019-01-21 | End: 2019-05-15 | Stop reason: SDUPTHER

## 2019-01-21 RX ORDER — OLMESARTAN MEDOXOMIL AND HYDROCHLOROTHIAZIDE 40/12.5 40; 12.5 MG/1; MG/1
TABLET ORAL
Qty: 30 TABLET | Refills: 0 | Status: CANCELLED | OUTPATIENT
Start: 2019-01-21

## 2019-01-21 RX ORDER — LOSARTAN POTASSIUM AND HYDROCHLOROTHIAZIDE 12.5; 1 MG/1; MG/1
TABLET ORAL
Qty: 30 TABLET | Refills: 4 | OUTPATIENT
Start: 2019-01-21

## 2019-01-21 NOTE — TELEPHONE ENCOUNTER
Patient called and said that he has olmesartan and he will go to the pharmacy to figure out the confusion  He is aware that he is not to be taking the losartan any more

## 2019-02-20 DIAGNOSIS — I10 ESSENTIAL HYPERTENSION: ICD-10-CM

## 2019-02-21 RX ORDER — LOSARTAN POTASSIUM AND HYDROCHLOROTHIAZIDE 12.5; 1 MG/1; MG/1
TABLET ORAL
Qty: 30 TABLET | Refills: 4 | OUTPATIENT
Start: 2019-02-21

## 2019-03-21 DIAGNOSIS — E78.5 HYPERLIPIDEMIA, UNSPECIFIED HYPERLIPIDEMIA TYPE: ICD-10-CM

## 2019-03-21 DIAGNOSIS — I10 ESSENTIAL HYPERTENSION: ICD-10-CM

## 2019-03-21 DIAGNOSIS — M10.9 GOUT, UNSPECIFIED CAUSE, UNSPECIFIED CHRONICITY, UNSPECIFIED SITE: ICD-10-CM

## 2019-03-21 RX ORDER — ATORVASTATIN CALCIUM 20 MG/1
TABLET, FILM COATED ORAL
Qty: 30 TABLET | Refills: 2 | Status: SHIPPED | OUTPATIENT
Start: 2019-03-21 | End: 2019-06-11 | Stop reason: SDUPTHER

## 2019-03-21 RX ORDER — ALLOPURINOL 100 MG/1
100 TABLET ORAL DAILY
Qty: 30 TABLET | Refills: 5 | Status: SHIPPED | OUTPATIENT
Start: 2019-03-21 | End: 2019-05-22 | Stop reason: SDUPTHER

## 2019-03-24 DIAGNOSIS — M10.9 GOUT, UNSPECIFIED CAUSE, UNSPECIFIED CHRONICITY, UNSPECIFIED SITE: ICD-10-CM

## 2019-03-25 RX ORDER — ALLOPURINOL 300 MG/1
TABLET ORAL
Qty: 30 TABLET | Refills: 2 | Status: SHIPPED | OUTPATIENT
Start: 2019-03-25 | End: 2019-06-11 | Stop reason: SDUPTHER

## 2019-05-15 ENCOUNTER — TELEPHONE (OUTPATIENT)
Dept: FAMILY MEDICINE CLINIC | Facility: CLINIC | Age: 59
End: 2019-05-15

## 2019-05-15 DIAGNOSIS — I10 ESSENTIAL HYPERTENSION: ICD-10-CM

## 2019-05-15 RX ORDER — OLMESARTAN MEDOXOMIL AND HYDROCHLOROTHIAZIDE 40/12.5 40; 12.5 MG/1; MG/1
TABLET ORAL
Qty: 30 TABLET | Refills: 2 | Status: SHIPPED | OUTPATIENT
Start: 2019-05-15 | End: 2019-05-22

## 2019-05-22 ENCOUNTER — OFFICE VISIT (OUTPATIENT)
Dept: FAMILY MEDICINE CLINIC | Facility: CLINIC | Age: 59
End: 2019-05-22
Payer: COMMERCIAL

## 2019-05-22 VITALS
HEART RATE: 54 BPM | OXYGEN SATURATION: 98 % | HEIGHT: 68 IN | BODY MASS INDEX: 31.22 KG/M2 | TEMPERATURE: 97.9 F | DIASTOLIC BLOOD PRESSURE: 74 MMHG | SYSTOLIC BLOOD PRESSURE: 98 MMHG | WEIGHT: 206 LBS

## 2019-05-22 DIAGNOSIS — M54.9 BACK PAIN, UNSPECIFIED BACK LOCATION, UNSPECIFIED BACK PAIN LATERALITY, UNSPECIFIED CHRONICITY: ICD-10-CM

## 2019-05-22 DIAGNOSIS — Z23 ENCOUNTER FOR IMMUNIZATION: ICD-10-CM

## 2019-05-22 DIAGNOSIS — M54.31 SCIATICA OF RIGHT SIDE: ICD-10-CM

## 2019-05-22 DIAGNOSIS — E78.5 HYPERLIPIDEMIA, UNSPECIFIED HYPERLIPIDEMIA TYPE: ICD-10-CM

## 2019-05-22 DIAGNOSIS — Z12.5 SCREENING FOR PROSTATE CANCER: ICD-10-CM

## 2019-05-22 DIAGNOSIS — I10 ESSENTIAL HYPERTENSION: Primary | ICD-10-CM

## 2019-05-22 DIAGNOSIS — M10.9 GOUT, UNSPECIFIED CAUSE, UNSPECIFIED CHRONICITY, UNSPECIFIED SITE: ICD-10-CM

## 2019-05-22 PROCEDURE — 3008F BODY MASS INDEX DOCD: CPT | Performed by: FAMILY MEDICINE

## 2019-05-22 PROCEDURE — 1036F TOBACCO NON-USER: CPT | Performed by: FAMILY MEDICINE

## 2019-05-22 PROCEDURE — 3074F SYST BP LT 130 MM HG: CPT | Performed by: FAMILY MEDICINE

## 2019-05-22 PROCEDURE — 3078F DIAST BP <80 MM HG: CPT | Performed by: FAMILY MEDICINE

## 2019-05-22 PROCEDURE — 90715 TDAP VACCINE 7 YRS/> IM: CPT | Performed by: FAMILY MEDICINE

## 2019-05-22 PROCEDURE — 99214 OFFICE O/P EST MOD 30 MIN: CPT | Performed by: FAMILY MEDICINE

## 2019-05-22 PROCEDURE — 90471 IMMUNIZATION ADMIN: CPT | Performed by: FAMILY MEDICINE

## 2019-06-11 DIAGNOSIS — M10.9 GOUT, UNSPECIFIED CAUSE, UNSPECIFIED CHRONICITY, UNSPECIFIED SITE: ICD-10-CM

## 2019-06-11 DIAGNOSIS — E78.5 HYPERLIPIDEMIA, UNSPECIFIED HYPERLIPIDEMIA TYPE: ICD-10-CM

## 2019-06-11 DIAGNOSIS — I10 ESSENTIAL HYPERTENSION: ICD-10-CM

## 2019-06-11 RX ORDER — ALLOPURINOL 300 MG/1
TABLET ORAL
Qty: 30 TABLET | Refills: 1 | Status: SHIPPED | OUTPATIENT
Start: 2019-06-11 | End: 2019-08-14 | Stop reason: SDUPTHER

## 2019-06-11 RX ORDER — ATORVASTATIN CALCIUM 20 MG/1
TABLET, FILM COATED ORAL
Qty: 30 TABLET | Refills: 1 | Status: SHIPPED | OUTPATIENT
Start: 2019-06-11 | End: 2019-08-14 | Stop reason: SDUPTHER

## 2019-08-05 ENCOUNTER — APPOINTMENT (OUTPATIENT)
Dept: LAB | Age: 59
End: 2019-08-05
Payer: COMMERCIAL

## 2019-08-05 DIAGNOSIS — Z12.5 SCREENING FOR PROSTATE CANCER: ICD-10-CM

## 2019-08-05 DIAGNOSIS — E78.5 HYPERLIPIDEMIA, UNSPECIFIED HYPERLIPIDEMIA TYPE: ICD-10-CM

## 2019-08-05 DIAGNOSIS — M10.9 GOUT, UNSPECIFIED CAUSE, UNSPECIFIED CHRONICITY, UNSPECIFIED SITE: ICD-10-CM

## 2019-08-05 DIAGNOSIS — I10 ESSENTIAL HYPERTENSION: ICD-10-CM

## 2019-08-05 LAB
ALBUMIN SERPL BCP-MCNC: 4.7 G/DL (ref 3.5–5)
ALP SERPL-CCNC: 75 U/L (ref 46–116)
ALT SERPL W P-5'-P-CCNC: 47 U/L (ref 12–78)
ANION GAP SERPL CALCULATED.3IONS-SCNC: 7 MMOL/L (ref 4–13)
AST SERPL W P-5'-P-CCNC: 41 U/L (ref 5–45)
BILIRUB SERPL-MCNC: 1.53 MG/DL (ref 0.2–1)
BUN SERPL-MCNC: 20 MG/DL (ref 5–25)
CALCIUM SERPL-MCNC: 9.4 MG/DL (ref 8.3–10.1)
CHLORIDE SERPL-SCNC: 107 MMOL/L (ref 100–108)
CHOLEST SERPL-MCNC: 107 MG/DL (ref 50–200)
CO2 SERPL-SCNC: 28 MMOL/L (ref 21–32)
CREAT SERPL-MCNC: 1.17 MG/DL (ref 0.6–1.3)
GFR SERPL CREATININE-BSD FRML MDRD: 67 ML/MIN/1.73SQ M
GLUCOSE P FAST SERPL-MCNC: 97 MG/DL (ref 65–99)
HDLC SERPL-MCNC: 33 MG/DL (ref 40–60)
LDLC SERPL CALC-MCNC: 49 MG/DL (ref 0–100)
POTASSIUM SERPL-SCNC: 4.1 MMOL/L (ref 3.5–5.3)
PROT SERPL-MCNC: 7.7 G/DL (ref 6.4–8.2)
SODIUM SERPL-SCNC: 142 MMOL/L (ref 136–145)
TRIGL SERPL-MCNC: 127 MG/DL
URATE SERPL-MCNC: 4.2 MG/DL (ref 4.2–8)

## 2019-08-05 PROCEDURE — 36415 COLL VENOUS BLD VENIPUNCTURE: CPT

## 2019-08-05 PROCEDURE — 80061 LIPID PANEL: CPT

## 2019-08-05 PROCEDURE — 80053 COMPREHEN METABOLIC PANEL: CPT

## 2019-08-05 PROCEDURE — 84154 ASSAY OF PSA FREE: CPT

## 2019-08-05 PROCEDURE — 84153 ASSAY OF PSA TOTAL: CPT

## 2019-08-05 PROCEDURE — 84550 ASSAY OF BLOOD/URIC ACID: CPT

## 2019-08-06 ENCOUNTER — OFFICE VISIT (OUTPATIENT)
Dept: FAMILY MEDICINE CLINIC | Facility: CLINIC | Age: 59
End: 2019-08-06
Payer: COMMERCIAL

## 2019-08-06 VITALS
WEIGHT: 204.6 LBS | TEMPERATURE: 98 F | HEART RATE: 70 BPM | SYSTOLIC BLOOD PRESSURE: 100 MMHG | HEIGHT: 69 IN | BODY MASS INDEX: 30.3 KG/M2 | DIASTOLIC BLOOD PRESSURE: 52 MMHG | OXYGEN SATURATION: 98 %

## 2019-08-06 DIAGNOSIS — M54.41 ACUTE RIGHT-SIDED LOW BACK PAIN WITH RIGHT-SIDED SCIATICA: Primary | ICD-10-CM

## 2019-08-06 LAB
PSA FREE MFR SERPL: 35 %
PSA FREE SERPL-MCNC: 0.21 NG/ML
PSA SERPL-MCNC: 0.6 NG/ML (ref 0–4)

## 2019-08-06 PROCEDURE — 99213 OFFICE O/P EST LOW 20 MIN: CPT | Performed by: FAMILY MEDICINE

## 2019-08-06 PROCEDURE — 3008F BODY MASS INDEX DOCD: CPT | Performed by: FAMILY MEDICINE

## 2019-08-06 PROCEDURE — 1036F TOBACCO NON-USER: CPT | Performed by: FAMILY MEDICINE

## 2019-08-06 RX ORDER — HYDROCHLOROTHIAZIDE 12.5 MG/1
12.5 CAPSULE, GELATIN COATED ORAL DAILY
COMMUNITY
End: 2020-01-21 | Stop reason: ALTCHOICE

## 2019-08-06 RX ORDER — PREDNISONE 10 MG/1
TABLET ORAL
Qty: 21 TABLET | Refills: 0 | Status: SHIPPED | OUTPATIENT
Start: 2019-08-06 | End: 2020-01-21 | Stop reason: ALTCHOICE

## 2019-08-06 NOTE — PROGRESS NOTES
Assessment/Plan:  Recommend start prednisone in a tapering fashion over 6 days  Recommend heat and Thera-Band treatment  Can also use salonpas  Range-of-motion exercises reviewed  Patient will call a friend of his who is a chiropractor  If not improving by the end of the week would recommend some therapy before he goes on vacation in 2 weeks  Patient has follow-up with Dr Jeannine Landaverde next week     Diagnoses and all orders for this visit:    Acute right-sided low back pain with right-sided sciatica  -     predniSONE 10 mg tablet; 6 tabs Day 1, 5 tabs Day 2, 4 tabs Day 3, 3 tabs Day 4, 2 tabs Day 5, 1 tab Day 6  Other orders  -     hydrochlorothiazide (MICROZIDE) 12 5 mg capsule; Take 12 5 mg by mouth daily        1  Acute right-sided low back pain with right-sided sciatica  predniSONE 10 mg tablet       Subjective:        Patient ID: Charity Copeland is a 60 y o  male  Chief Complaint   Patient presents with    Back Pain     pain for about a week, pt is not sure how it was done, lower R back pain, pt states when he is sitting he is fine but he has a hard time walking, pt states it has gotten worse       Patient with acute right-sided low back pain for couple of days  Think he may have got out of a chair awkwardly  Mentions occasional numbness into the upper thigh  Occasionally on a chronic basis he can sometimes get something in his right lower extremity  The following portions of the patient's history were reviewed and updated as appropriate: past medical history, past surgical history and problem list       Review of Systems   Constitutional: Negative for fatigue and fever  Respiratory: Negative for shortness of breath  Cardiovascular: Negative for chest pain and leg swelling  Genitourinary: Negative for difficulty urinating, dysuria and hematuria  Musculoskeletal: Positive for back pain  Skin: Negative for rash           Objective:  /52 (BP Location: Left arm, Patient Position: Sitting, Cuff Size: Standard)   Pulse 70   Temp 98 °F (36 7 °C)   Ht 5' 8 5" (1 74 m)   Wt 92 8 kg (204 lb 9 6 oz)   SpO2 98%   BMI 30 66 kg/m²        Physical Exam   Constitutional: He is oriented to person, place, and time  He appears well-nourished  Mild distress with ambulation   HENT:   Head: Normocephalic  Musculoskeletal: He exhibits no edema  Decent lumbar and thoracic range of motion  Minimal tenderness with palpation of the right lumbar area  Neurological: He is alert and oriented to person, place, and time  He displays normal reflexes  Coordination normal    Psychiatric: He has a normal mood and affect  Nursing note and vitals reviewed

## 2019-08-14 ENCOUNTER — OFFICE VISIT (OUTPATIENT)
Dept: FAMILY MEDICINE CLINIC | Facility: CLINIC | Age: 59
End: 2019-08-14
Payer: COMMERCIAL

## 2019-08-14 VITALS
WEIGHT: 202.8 LBS | HEART RATE: 57 BPM | HEIGHT: 69 IN | BODY MASS INDEX: 30.04 KG/M2 | OXYGEN SATURATION: 98 % | DIASTOLIC BLOOD PRESSURE: 80 MMHG | SYSTOLIC BLOOD PRESSURE: 122 MMHG

## 2019-08-14 DIAGNOSIS — M54.41 ACUTE RIGHT-SIDED LOW BACK PAIN WITH RIGHT-SIDED SCIATICA: ICD-10-CM

## 2019-08-14 DIAGNOSIS — I10 ESSENTIAL HYPERTENSION: ICD-10-CM

## 2019-08-14 DIAGNOSIS — E78.5 HYPERLIPIDEMIA, UNSPECIFIED HYPERLIPIDEMIA TYPE: ICD-10-CM

## 2019-08-14 DIAGNOSIS — E78.6 LOW HDL (UNDER 40): ICD-10-CM

## 2019-08-14 DIAGNOSIS — M54.31 SCIATICA OF RIGHT SIDE: ICD-10-CM

## 2019-08-14 DIAGNOSIS — E66.9 OBESITY (BMI 30-39.9): ICD-10-CM

## 2019-08-14 DIAGNOSIS — M10.9 GOUT, UNSPECIFIED CAUSE, UNSPECIFIED CHRONICITY, UNSPECIFIED SITE: Primary | ICD-10-CM

## 2019-08-14 PROCEDURE — 99214 OFFICE O/P EST MOD 30 MIN: CPT | Performed by: FAMILY MEDICINE

## 2019-08-14 PROCEDURE — 3074F SYST BP LT 130 MM HG: CPT | Performed by: FAMILY MEDICINE

## 2019-08-14 RX ORDER — ALLOPURINOL 100 MG/1
100 TABLET ORAL DAILY
Qty: 30 TABLET | Refills: 5 | Status: SHIPPED | OUTPATIENT
Start: 2019-08-14 | End: 2020-02-26

## 2019-08-14 RX ORDER — MELOXICAM 7.5 MG/1
7.5 TABLET ORAL DAILY
Qty: 14 TABLET | Refills: 0 | Status: SHIPPED | OUTPATIENT
Start: 2019-08-14 | End: 2020-01-21 | Stop reason: ALTCHOICE

## 2019-08-14 RX ORDER — ATORVASTATIN CALCIUM 20 MG/1
20 TABLET, FILM COATED ORAL DAILY
Qty: 30 TABLET | Refills: 5 | Status: SHIPPED | OUTPATIENT
Start: 2019-08-14 | End: 2020-02-26

## 2019-08-14 RX ORDER — OLMESARTAN MEDOXOMIL AND HYDROCHLOROTHIAZIDE 40/12.5 40; 12.5 MG/1; MG/1
0.5 TABLET ORAL DAILY
COMMUNITY
End: 2019-11-13 | Stop reason: SDUPTHER

## 2019-08-14 RX ORDER — ALLOPURINOL 300 MG/1
300 TABLET ORAL DAILY
Qty: 30 TABLET | Refills: 5 | Status: SHIPPED | OUTPATIENT
Start: 2019-08-14 | End: 2020-02-26

## 2019-08-14 NOTE — PATIENT INSTRUCTIONS
Take all meds as directed for gout and HTN and hyperlipidemia, LDL stable but HDL is low  HTN stable  GOUT stable  Low cholesterol diet encouraged  Avoid purine rich foods and alcohol  Monitor BP as directed, stable today and recheck in 3 months  Consult [facility] for right low back pain and right sciatica and use Meloxicam 7 5 mg daily prn pain with food

## 2019-08-14 NOTE — PROGRESS NOTES
Assessment/Plan:  Chief Complaint   Patient presents with    Follow-up     Here for 3 month follow up of BP  Complaining of back pain  Patient Instructions   Take all meds as directed for gout and HTN and hyperlipidemia, LDL stable but HDL is low  HTN stable  GOUT stable  Low cholesterol diet encouraged  Avoid purine rich foods and alcohol  Monitor BP as directed, stable today and recheck in 3 months  No problem-specific Assessment & Plan notes found for this encounter  Diagnoses and all orders for this visit:    Gout, unspecified cause, unspecified chronicity, unspecified site  -     allopurinol (ZYLOPRIM) 300 mg tablet; Take 1 tablet (300 mg total) by mouth daily  -     allopurinol (ZYLOPRIM) 100 mg tablet; Take 1 tablet (100 mg total) by mouth daily    Sciatica of right side    Acute right-sided low back pain with right-sided sciatica    Essential hypertension  -     metoprolol tartrate (LOPRESSOR) 25 mg tablet; Take 1 tablet (25 mg total) by mouth daily    Hyperlipidemia, unspecified hyperlipidemia type  -     atorvastatin (LIPITOR) 20 mg tablet; Take 1 tablet (20 mg total) by mouth daily    Obesity (BMI 30-39  9)    Low HDL (under 40)    Other orders  -     olmesartan-hydrochlorothiazide (BENICAR HCT) 40-12 5 MG per tablet; Take 0 5 tablets by mouth daily          Subjective:      Patient ID: Pk Minor is a 60 y o  male  Follow-up (Here for 3 month follow up of BP  Complaining of back pain ) Was in here last week  No cp or sob, or ha  The following portions of the patient's history were reviewed and updated as appropriate: allergies, current medications, past family history, past medical history, past social history, past surgical history and problem list     Review of Systems   Constitutional: Negative  HENT: Negative  Eyes: Negative  Respiratory: Negative  Cardiovascular: Negative  Gastrointestinal: Negative  Endocrine: Negative  Genitourinary: Negative  Musculoskeletal:        Right low back pain and right sciatica   Skin: Negative  Allergic/Immunologic: Negative  Neurological: Negative  Hematological: Negative  Psychiatric/Behavioral: Negative  Objective:      /80   Pulse 57   Ht 5' 8 5" (1 74 m)   Wt 92 kg (202 lb 12 8 oz)   SpO2 98%   BMI 30 39 kg/m²          Physical Exam   Constitutional: He is oriented to person, place, and time  He appears well-developed and well-nourished  HENT:   Head: Normocephalic and atraumatic  Right Ear: External ear normal    Left Ear: External ear normal    Nose: Nose normal    Mouth/Throat: Oropharynx is clear and moist    Eyes: Pupils are equal, round, and reactive to light  Conjunctivae and EOM are normal    Neck: Normal range of motion  Neck supple  Cardiovascular: Normal rate, regular rhythm, normal heart sounds and intact distal pulses  Pulmonary/Chest: Effort normal and breath sounds normal    Musculoskeletal: Normal range of motion  Chronic right low back pain and right sciatica   Neurological: He is alert and oriented to person, place, and time  He has normal reflexes  Skin: Skin is warm and dry  Psychiatric: He has a normal mood and affect   His behavior is normal

## 2019-09-18 ENCOUNTER — TELEPHONE (OUTPATIENT)
Dept: CARDIOLOGY CLINIC | Facility: CLINIC | Age: 59
End: 2019-09-18

## 2019-09-18 NOTE — TELEPHONE ENCOUNTER
Pt called needing type of stent placed in August 2012  Reviewed chart found was Xience V Rx 3  X23cm   relayed info to the patient so he can provide to MRI providers  He states the card he carried was unreadable in his wallert

## 2019-11-13 ENCOUNTER — OFFICE VISIT (OUTPATIENT)
Dept: FAMILY MEDICINE CLINIC | Facility: CLINIC | Age: 59
End: 2019-11-13
Payer: COMMERCIAL

## 2019-11-13 VITALS
HEART RATE: 84 BPM | SYSTOLIC BLOOD PRESSURE: 112 MMHG | TEMPERATURE: 97.7 F | OXYGEN SATURATION: 97 % | HEIGHT: 69 IN | DIASTOLIC BLOOD PRESSURE: 70 MMHG | WEIGHT: 205.4 LBS | BODY MASS INDEX: 30.42 KG/M2

## 2019-11-13 DIAGNOSIS — Z23 ENCOUNTER FOR IMMUNIZATION: Primary | ICD-10-CM

## 2019-11-13 DIAGNOSIS — E66.9 OBESITY (BMI 30-39.9): ICD-10-CM

## 2019-11-13 DIAGNOSIS — M10.9 GOUT, UNSPECIFIED CAUSE, UNSPECIFIED CHRONICITY, UNSPECIFIED SITE: ICD-10-CM

## 2019-11-13 DIAGNOSIS — M54.50 LOW BACK PAIN, UNSPECIFIED BACK PAIN LATERALITY, UNSPECIFIED CHRONICITY, UNSPECIFIED WHETHER SCIATICA PRESENT: ICD-10-CM

## 2019-11-13 DIAGNOSIS — I10 ESSENTIAL HYPERTENSION: ICD-10-CM

## 2019-11-13 DIAGNOSIS — E78.5 HYPERLIPIDEMIA, UNSPECIFIED HYPERLIPIDEMIA TYPE: ICD-10-CM

## 2019-11-13 PROCEDURE — 99214 OFFICE O/P EST MOD 30 MIN: CPT | Performed by: FAMILY MEDICINE

## 2019-11-13 PROCEDURE — 1036F TOBACCO NON-USER: CPT | Performed by: FAMILY MEDICINE

## 2019-11-13 RX ORDER — OLMESARTAN MEDOXOMIL AND HYDROCHLOROTHIAZIDE 40/12.5 40; 12.5 MG/1; MG/1
0.5 TABLET ORAL DAILY
Qty: 90 TABLET | Refills: 5 | Status: SHIPPED | OUTPATIENT
Start: 2019-11-13 | End: 2020-07-06 | Stop reason: HOSPADM

## 2019-11-13 NOTE — PATIENT INSTRUCTIONS
Take all meds as directed for gout and HTN and hyperlipidemia, LDL stable but HDL is low and exercise helps increase HDL  HTN stable on 1/2 tab of BP med  GOUT stable  Low cholesterol diet encouraged  Avoid purine rich foods and alcohol  Monitor BP as directed, stable today and recheck in 3 months  F-up with orthopedics for low back pain and monitor osseous hemangioma and multilevel lumbar spondylosis and severe b/l facet arthropathy at L4-5

## 2019-11-13 NOTE — PROGRESS NOTES
Assessment/Plan:  Chief Complaint   Patient presents with    Follow-up     Here for 3 month follow up of chronic medical conditions  has been seeing orthopedics for back pain  Last colonoscopy 10 years ago, due for follow up, does not remember who he saw in the past      Patient Instructions   Take all meds as directed for gout and HTN and hyperlipidemia, LDL stable but HDL is low and exercise helps increase HDL  HTN stable on 1/2 tab of BP med  GOUT stable  Low cholesterol diet encouraged  Avoid purine rich foods and alcohol  Monitor BP as directed, stable today and recheck in 3 months  F-up with orthopedics for low back pain and monitor osseous hemangioma and multilevel lumbar spondylosis and severe b/l facet arthropathy at L4-5  No problem-specific Assessment & Plan notes found for this encounter  Diagnoses and all orders for this visit:    Encounter for immunization  -     influenza vaccine, [id number], quadrivalent, recombinant, PF, 0 5 mL, for patients 18 yr+ (FLUBLOK)    Essential hypertension  -     olmesartan-hydrochlorothiazide (BENICAR HCT) 40-12 5 MG per tablet; Take 0 5 tablets by mouth daily    Gout, unspecified cause, unspecified chronicity, unspecified site    Hyperlipidemia, unspecified hyperlipidemia type    Obesity (BMI 30-39  9)    Low back pain, unspecified back pain laterality, unspecified chronicity, unspecified whether sciatica present          Subjective:      Patient ID: Severa Cockayne is a 60 y o  male  Follow-up (Here for 3 month follow up of chronic medical conditions  has been seeing orthopedics for back pain  Last colonoscopy 10 years ago, due for follow up, does not remember who he saw in the past ) No cp or sob, or ha, refuses flu shot today  Gout stable  Hx of hyperlipidemia  Hx of low back pain and sees orthopedics  Get colon screening as directed         The following portions of the patient's history were reviewed and updated as appropriate: allergies, current medications, past family history, past medical history, past social history, past surgical history and problem list     Review of Systems   Constitutional:        Obesity   HENT: Negative  Eyes: Negative  Respiratory: Negative  Cardiovascular: Negative  Gastrointestinal: Negative  Endocrine: Negative  Genitourinary: Negative  Musculoskeletal: Negative  Skin: Negative  Allergic/Immunologic: Negative  Neurological: Negative  Hematological: Negative  Psychiatric/Behavioral: Negative  Objective:      /70   Pulse 84   Temp 97 7 °F (36 5 °C) (Temporal)   Ht 5' 8 5" (1 74 m)   Wt 93 2 kg (205 lb 6 4 oz)   SpO2 97%   BMI 30 78 kg/m²          Physical Exam   Constitutional: He is oriented to person, place, and time  He appears well-developed and well-nourished  Obesity     HENT:   Head: Normocephalic and atraumatic  Right Ear: External ear normal    Left Ear: External ear normal    Nose: Nose normal    Mouth/Throat: Oropharynx is clear and moist    Eyes: Pupils are equal, round, and reactive to light  Conjunctivae and EOM are normal    Neck: Normal range of motion  Neck supple  Cardiovascular: Normal rate, regular rhythm, normal heart sounds and intact distal pulses  Pulmonary/Chest: Effort normal and breath sounds normal    Musculoskeletal: Normal range of motion  Neurological: He is alert and oriented to person, place, and time  He has normal reflexes  Skin: Skin is warm and dry  Psychiatric: He has a normal mood and affect   His behavior is normal

## 2020-01-19 PROBLEM — I25.10 CORONARY ARTERY DISEASE INVOLVING NATIVE CORONARY ARTERY OF NATIVE HEART WITHOUT ANGINA PECTORIS: Status: ACTIVE | Noted: 2020-01-19

## 2020-01-19 PROBLEM — I49.3 PVC'S (PREMATURE VENTRICULAR CONTRACTIONS): Status: ACTIVE | Noted: 2020-01-19

## 2020-01-21 ENCOUNTER — OFFICE VISIT (OUTPATIENT)
Dept: CARDIOLOGY CLINIC | Facility: CLINIC | Age: 60
End: 2020-01-21
Payer: COMMERCIAL

## 2020-01-21 VITALS
SYSTOLIC BLOOD PRESSURE: 112 MMHG | WEIGHT: 216 LBS | HEIGHT: 68 IN | DIASTOLIC BLOOD PRESSURE: 72 MMHG | HEART RATE: 58 BPM | BODY MASS INDEX: 32.74 KG/M2

## 2020-01-21 DIAGNOSIS — I25.10 CORONARY ARTERY DISEASE INVOLVING NATIVE CORONARY ARTERY OF NATIVE HEART WITHOUT ANGINA PECTORIS: Primary | ICD-10-CM

## 2020-01-21 DIAGNOSIS — I49.3 PVC'S (PREMATURE VENTRICULAR CONTRACTIONS): ICD-10-CM

## 2020-01-21 DIAGNOSIS — E78.2 MIXED HYPERLIPIDEMIA: ICD-10-CM

## 2020-01-21 DIAGNOSIS — I10 ESSENTIAL HYPERTENSION: ICD-10-CM

## 2020-01-21 PROCEDURE — 3078F DIAST BP <80 MM HG: CPT | Performed by: INTERNAL MEDICINE

## 2020-01-21 PROCEDURE — 3074F SYST BP LT 130 MM HG: CPT | Performed by: INTERNAL MEDICINE

## 2020-01-21 PROCEDURE — 99213 OFFICE O/P EST LOW 20 MIN: CPT | Performed by: INTERNAL MEDICINE

## 2020-01-21 PROCEDURE — 93000 ELECTROCARDIOGRAM COMPLETE: CPT | Performed by: INTERNAL MEDICINE

## 2020-01-21 NOTE — PROGRESS NOTES
Cardiology Follow Up    Schuyler Schwarz  1960  [id number]  [address] [facility] [address] [phone]    Reason for visit:   One year follow-up for CAD status post nontransmural myocardial infarction in August 2012 with stenting of the left anterior descending artery  Also has hypertension, hyperlipidemia and PVCs  1  Coronary artery disease involving native coronary artery of native heart without angina pectoris  POCT ECG   2  Essential hypertension     3  Mixed hyperlipidemia     4  PVC's (premature ventricular contractions)         Interval History:   Since his last visit, he denies chest discomfort  He gets some dyspnea with over exertion which is unchanged  He gets rare positional lightheadedness    He has had no palpitations or lower extremity edema    Patient Active Problem List   Diagnosis    Gout    Essential hypertension    Hyperlipidemia    Coronary artery disease involving native coronary artery of native heart without angina pectoris    PVC's (premature ventricular contractions)     Past Medical History:   Diagnosis Date    Acute myocardial infarction ([facility] [address] )     Arthralgia     last assessed 7/8/13    Contusion of skin with intact surface     of the left medial thigh,last assessed 6/28/13    Gout     last assessed 10/29/13    Lump of skin     last assessed 7/19/13     Social History     Socioeconomic History    Marital status: /Civil Union     Spouse name: Not on file    Number of children: Not on file    Years of education: Not on file    Highest education level: Not on file   Occupational History    Not on file   Social Needs    Financial resource strain: Not on file    Food insecurity:     Worry: Not on file     Inability: Not on file    Transportation needs:     Medical: Not on file     Non-medical: Not on file   Tobacco Use    Smoking status: Never Smoker    Smokeless tobacco: Never Used   Substance and Sexual Activity    Alcohol use:  Yes    Drug use: No    Sexual activity: Not on file   Lifestyle    Physical activity:     Days per week: Not on file     Minutes per session: Not on file    Stress: Not on file   Relationships    Social connections:     Talks on phone: Not on file     Gets together: Not on file     Attends Confucianist service: Not on file     Active member of club or organization: Not on file     Attends meetings of clubs or organizations: Not on file     Relationship status: Not on file    Intimate partner violence:     Fear of current or ex partner: Not on file     Emotionally abused: Not on file     Physically abused: Not on file     Forced sexual activity: Not on file   Other Topics Concern    Not on file   Social History Narrative    Not on file      Family History   Problem Relation Age of Onset    Edema Mother         Cerebral    Aneurysm Father         of the cerebellar artery    Aneurysm Brother         of the cerebellar artery     Past Surgical History:   Procedure Laterality Date    BACK SURGERY      COLONOSCOPY  12/10/2009    Complete    CORONARY STENT PLACEMENT  08/2012    stenting of the LAD artery 95% lesion to 0% residual stenosis    KNEE SURGERY         Current Outpatient Medications:     allopurinol (ZYLOPRIM) 100 mg tablet, Take 1 tablet (100 mg total) by mouth daily, Disp: 30 tablet, Rfl: 5    allopurinol (ZYLOPRIM) 300 mg tablet, Take 1 tablet (300 mg total) by mouth daily, Disp: 30 tablet, Rfl: 5    aspirin (ASPIR-81) 81 mg EC tablet, Take 1 tablet by mouth daily, Disp: , Rfl:     atorvastatin (LIPITOR) 20 mg tablet, Take 1 tablet (20 mg total) by mouth daily, Disp: 30 tablet, Rfl: 5    metoprolol tartrate (LOPRESSOR) 25 mg tablet, Take 1 tablet (25 mg total) by mouth daily, Disp: 30 tablet, Rfl: 5    olmesartan-hydrochlorothiazide (BENICAR HCT) 40-12 5 MG per tablet, Take 0 5 tablets by mouth daily, Disp: 90 tablet, Rfl: 5  No Known Allergies    Review of Systems:  Review of Systems   Constitutional: Negative for appetite change, fatigue and unexpected weight change  Respiratory: Positive for shortness of breath (overexertion)  Negative for cough and wheezing  Cardiovascular: Negative for chest pain, palpitations and leg swelling  Gastrointestinal: Negative for blood in stool, constipation and diarrhea  Genitourinary: Negative for frequency and hematuria  Musculoskeletal: Positive for back pain  Negative for arthralgias  Neurological: Positive for light-headedness (positional rare)  Physical Exam:  Vitals:    01/21/20 0755   BP: 112/72   BP Location: Right arm   Patient Position: Sitting   Cuff Size: Large   Pulse: 58   Weight: 98 kg (216 lb)   Height: 5' 8" (1 727 m)       Physical Exam   Constitutional: He appears well-developed and well-nourished  No distress  HENT:   Head: Normocephalic and atraumatic  Mouth/Throat: Oropharynx is clear and moist  No oropharyngeal exudate  Eyes: Conjunctivae are normal  No scleral icterus  Neck: Neck supple  Normal carotid pulses and no JVD present  Carotid bruit is not present  No thyromegaly present  Cardiovascular: Frequent extrasystoles are present  Bradycardia present  Exam reveals no gallop and no friction rub  No murmur heard  Pulses:       Dorsalis pedis pulses are 2+ on the right side, and 2+ on the left side  Posterior tibial pulses are 2+ on the right side, and 2+ on the left side  Pulmonary/Chest: Breath sounds normal  He has no wheezes  He has no rhonchi  He has no rales  Abdominal: Soft  He exhibits no mass  There is no hepatosplenomegaly  There is no tenderness  Musculoskeletal: He exhibits no edema  Discussion/Summary:  1  CAD status post stenting in August 2012  Having no angina  Patient on aspirin 81 mg daily  Had negative stress test a good level of exercise last year  2  Hypertension    Excellent control on ARB/hydrochlorothiazide combination  Continue same  3  Mixed hyperlipidemia  LDL cholesterol 49 with HDL cholesterol 33 and triglycerides 127 on current dose of atorvastatin 20 mg daily  Continue same  4  PVCs  Suspect burden about 10%  No evidence for left ventricular deterioration    Will check echo with next year's office visit to check ejection fraction      FU one year      Merna Garcia MD

## 2020-02-26 DIAGNOSIS — I10 ESSENTIAL HYPERTENSION: ICD-10-CM

## 2020-02-26 DIAGNOSIS — E78.5 HYPERLIPIDEMIA, UNSPECIFIED HYPERLIPIDEMIA TYPE: ICD-10-CM

## 2020-02-26 DIAGNOSIS — M10.9 GOUT, UNSPECIFIED CAUSE, UNSPECIFIED CHRONICITY, UNSPECIFIED SITE: ICD-10-CM

## 2020-02-26 RX ORDER — ATORVASTATIN CALCIUM 20 MG/1
TABLET, FILM COATED ORAL
Qty: 30 TABLET | Refills: 4 | Status: SHIPPED | OUTPATIENT
Start: 2020-02-26 | End: 2020-07-06 | Stop reason: HOSPADM

## 2020-02-26 RX ORDER — ALLOPURINOL 100 MG/1
TABLET ORAL
Qty: 30 TABLET | Refills: 4 | Status: SHIPPED | OUTPATIENT
Start: 2020-02-26 | End: 2020-07-10

## 2020-02-26 RX ORDER — ALLOPURINOL 300 MG/1
TABLET ORAL
Qty: 30 TABLET | Refills: 4 | Status: SHIPPED | OUTPATIENT
Start: 2020-02-26 | End: 2020-07-06 | Stop reason: HOSPADM

## 2020-06-27 ENCOUNTER — APPOINTMENT (EMERGENCY)
Dept: CT IMAGING | Facility: HOSPITAL | Age: 60
End: 2020-06-27
Payer: COMMERCIAL

## 2020-06-27 ENCOUNTER — HOSPITAL ENCOUNTER (EMERGENCY)
Facility: HOSPITAL | Age: 60
Discharge: HOME | End: 2020-06-27
Attending: EMERGENCY MEDICINE | Admitting: EMERGENCY MEDICINE
Payer: COMMERCIAL

## 2020-06-27 VITALS
TEMPERATURE: 96.5 F | OXYGEN SATURATION: 99 % | SYSTOLIC BLOOD PRESSURE: 147 MMHG | DIASTOLIC BLOOD PRESSURE: 93 MMHG | WEIGHT: 207.89 LBS | RESPIRATION RATE: 18 BRPM | HEART RATE: 90 BPM | BODY MASS INDEX: 31.61 KG/M2

## 2020-06-27 DIAGNOSIS — N23 URETERAL COLIC: Primary | ICD-10-CM

## 2020-06-27 DIAGNOSIS — N20.1 URETEROLITHIASIS: ICD-10-CM

## 2020-06-27 DIAGNOSIS — R10.9 LEFT FLANK PAIN: ICD-10-CM

## 2020-06-27 LAB
ANION GAP SERPL CALCULATED.3IONS-SCNC: 10 MMOL/L (ref 4–13)
BACTERIA UR QL AUTO: ABNORMAL /HPF
BASOPHILS # BLD AUTO: 0.04 THOUSANDS/ΜL (ref 0–0.1)
BASOPHILS NFR BLD AUTO: 1 % (ref 0–1)
BILIRUB UR QL STRIP: NEGATIVE
BUN SERPL-MCNC: 18 MG/DL (ref 5–25)
CALCIUM SERPL-MCNC: 9.1 MG/DL (ref 8.3–10.1)
CHLORIDE SERPL-SCNC: 102 MMOL/L (ref 100–108)
CLARITY UR: ABNORMAL
CO2 SERPL-SCNC: 26 MMOL/L (ref 21–32)
COLOR UR: YELLOW
CREAT SERPL-MCNC: 1.02 MG/DL (ref 0.6–1.3)
EOSINOPHIL # BLD AUTO: 0.21 THOUSAND/ΜL (ref 0–0.61)
EOSINOPHIL NFR BLD AUTO: 3 % (ref 0–6)
ERYTHROCYTE [DISTWIDTH] IN BLOOD BY AUTOMATED COUNT: 12.5 % (ref 11.6–15.1)
GFR SERPL CREATININE-BSD FRML MDRD: 79 ML/MIN/1.73SQ M
GLUCOSE SERPL-MCNC: 136 MG/DL (ref 65–140)
GLUCOSE UR STRIP-MCNC: NEGATIVE MG/DL
HCT VFR BLD AUTO: 44.1 % (ref 36.5–49.3)
HGB BLD-MCNC: 15.7 G/DL (ref 12–17)
HGB UR QL STRIP.AUTO: ABNORMAL
IMM GRANULOCYTES # BLD AUTO: 0.04 THOUSAND/UL (ref 0–0.2)
IMM GRANULOCYTES NFR BLD AUTO: 1 % (ref 0–2)
KETONES UR STRIP-MCNC: ABNORMAL MG/DL
LEUKOCYTE ESTERASE UR QL STRIP: NEGATIVE
LYMPHOCYTES # BLD AUTO: 0.98 THOUSANDS/ΜL (ref 0.6–4.47)
LYMPHOCYTES NFR BLD AUTO: 12 % (ref 14–44)
MCH RBC QN AUTO: 31.9 PG (ref 26.8–34.3)
MCHC RBC AUTO-ENTMCNC: 35.6 G/DL (ref 31.4–37.4)
MCV RBC AUTO: 90 FL (ref 82–98)
MONOCYTES # BLD AUTO: 0.42 THOUSAND/ΜL (ref 0.17–1.22)
MONOCYTES NFR BLD AUTO: 5 % (ref 4–12)
MUCOUS THREADS UR QL AUTO: ABNORMAL
NEUTROPHILS # BLD AUTO: 6.68 THOUSANDS/ΜL (ref 1.85–7.62)
NEUTS SEG NFR BLD AUTO: 78 % (ref 43–75)
NITRITE UR QL STRIP: NEGATIVE
NON-SQ EPI CELLS URNS QL MICRO: ABNORMAL /HPF
NRBC BLD AUTO-RTO: 0 /100 WBCS
PH UR STRIP.AUTO: 5.5 [PH] (ref 4.5–8)
PLATELET # BLD AUTO: 176 THOUSANDS/UL (ref 149–390)
PMV BLD AUTO: 10.2 FL (ref 8.9–12.7)
POTASSIUM SERPL-SCNC: 3.9 MMOL/L (ref 3.5–5.3)
PROT UR STRIP-MCNC: NEGATIVE MG/DL
RBC # BLD AUTO: 4.92 MILLION/UL (ref 3.88–5.62)
RBC #/AREA URNS AUTO: ABNORMAL /HPF
SODIUM SERPL-SCNC: 138 MMOL/L (ref 136–145)
SP GR UR STRIP.AUTO: >=1.03 (ref 1–1.03)
UROBILINOGEN UR QL STRIP.AUTO: 0.2 E.U./DL
WBC # BLD AUTO: 8.37 THOUSAND/UL (ref 4.31–10.16)
WBC #/AREA URNS AUTO: ABNORMAL /HPF

## 2020-06-27 PROCEDURE — 36415 COLL VENOUS BLD VENIPUNCTURE: CPT | Performed by: EMERGENCY MEDICINE

## 2020-06-27 PROCEDURE — 85025 COMPLETE CBC W/AUTO DIFF WBC: CPT | Performed by: EMERGENCY MEDICINE

## 2020-06-27 PROCEDURE — 87086 URINE CULTURE/COLONY COUNT: CPT | Performed by: EMERGENCY MEDICINE

## 2020-06-27 PROCEDURE — 99284 EMERGENCY DEPT VISIT MOD MDM: CPT | Performed by: EMERGENCY MEDICINE

## 2020-06-27 PROCEDURE — 96374 THER/PROPH/DIAG INJ IV PUSH: CPT

## 2020-06-27 PROCEDURE — 99284 EMERGENCY DEPT VISIT MOD MDM: CPT

## 2020-06-27 PROCEDURE — 74176 CT ABD & PELVIS W/O CONTRAST: CPT

## 2020-06-27 PROCEDURE — 81001 URINALYSIS AUTO W/SCOPE: CPT

## 2020-06-27 PROCEDURE — 80048 BASIC METABOLIC PNL TOTAL CA: CPT | Performed by: EMERGENCY MEDICINE

## 2020-06-27 RX ORDER — KETOROLAC TROMETHAMINE 30 MG/ML
15 INJECTION, SOLUTION INTRAMUSCULAR; INTRAVENOUS ONCE
Status: COMPLETED | OUTPATIENT
Start: 2020-06-27 | End: 2020-06-27

## 2020-06-27 RX ADMIN — KETOROLAC TROMETHAMINE 15 MG: 30 INJECTION, SOLUTION INTRAMUSCULAR at 14:17

## 2020-06-28 LAB — BACTERIA UR CULT: NORMAL

## 2020-06-30 ENCOUNTER — TELEPHONE (OUTPATIENT)
Dept: UROLOGY | Facility: MEDICAL CENTER | Age: 60
End: 2020-06-30

## 2020-06-30 ENCOUNTER — OFFICE VISIT (OUTPATIENT)
Dept: UROLOGY | Facility: CLINIC | Age: 60
End: 2020-06-30
Payer: COMMERCIAL

## 2020-06-30 VITALS
DIASTOLIC BLOOD PRESSURE: 76 MMHG | WEIGHT: 204 LBS | BODY MASS INDEX: 30.92 KG/M2 | RESPIRATION RATE: 20 BRPM | SYSTOLIC BLOOD PRESSURE: 118 MMHG | TEMPERATURE: 97.6 F | HEIGHT: 68 IN | HEART RATE: 100 BPM

## 2020-06-30 DIAGNOSIS — N20.0 NEPHROLITHIASIS: Primary | ICD-10-CM

## 2020-06-30 LAB
SL AMB  POCT GLUCOSE, UA: NORMAL
SL AMB LEUKOCYTE ESTERASE,UA: NORMAL
SL AMB POCT BILIRUBIN,UA: NORMAL
SL AMB POCT BLOOD,UA: NORMAL
SL AMB POCT CLARITY,UA: CLEAR
SL AMB POCT COLOR,UA: NORMAL
SL AMB POCT KETONES,UA: NORMAL
SL AMB POCT NITRITE,UA: NORMAL
SL AMB POCT PH,UA: 5
SL AMB POCT SPECIFIC GRAVITY,UA: 1.02
SL AMB POCT URINE PROTEIN: NORMAL
SL AMB POCT UROBILINOGEN: NORMAL

## 2020-06-30 PROCEDURE — 3074F SYST BP LT 130 MM HG: CPT | Performed by: PHYSICIAN ASSISTANT

## 2020-06-30 PROCEDURE — 3008F BODY MASS INDEX DOCD: CPT | Performed by: PHYSICIAN ASSISTANT

## 2020-06-30 PROCEDURE — 3078F DIAST BP <80 MM HG: CPT | Performed by: PHYSICIAN ASSISTANT

## 2020-06-30 PROCEDURE — 99202 OFFICE O/P NEW SF 15 MIN: CPT | Performed by: PHYSICIAN ASSISTANT

## 2020-06-30 PROCEDURE — 81002 URINALYSIS NONAUTO W/O SCOPE: CPT | Performed by: PHYSICIAN ASSISTANT

## 2020-06-30 PROCEDURE — 1036F TOBACCO NON-USER: CPT | Performed by: PHYSICIAN ASSISTANT

## 2020-06-30 RX ORDER — HYDROCODONE BITARTRATE AND ACETAMINOPHEN 5; 325 MG/1; MG/1
1 TABLET ORAL EVERY 6 HOURS PRN
Qty: 15 TABLET | Refills: 0 | Status: SHIPPED | OUTPATIENT
Start: 2020-06-30 | End: 2020-08-05 | Stop reason: HOSPADM

## 2020-06-30 RX ORDER — TAMSULOSIN HYDROCHLORIDE 0.4 MG/1
0.4 CAPSULE ORAL
Qty: 30 CAPSULE | Refills: 1 | Status: SHIPPED | OUTPATIENT
Start: 2020-06-30 | End: 2020-08-21

## 2020-06-30 NOTE — TELEPHONE ENCOUNTER
Spoke with patient  Patient seen in the ER on 6/27/20 for flank pain, CT showed 3 mm distal left ureteral stone  No hydronephrosis  Patient reports worsening pain  Reports low grade fever 99 0 over the weekend, passed COVID screening questions  [facility] Raven Hudson PA-C (R)  Patient provided with office address and location  Patient instructed to wear mask to appointment

## 2020-06-30 NOTE — TELEPHONE ENCOUNTER
Patient called and advised that he was seen in the ER on 6/27/2020 for a kidneyu stone  Patient advised that his pain level is a 7 out of 10 and has been increasing and not getting better  Patient would like to know what he can do and if he can be seen as soon as possible  Please advise      Complaint/diagnosis:Kidney stone    Insurance:Intrinsity    History of Cancer:no    Previous Urologist:no    Outside testing/where:no    Records requested/where:no    Preferred Location:[facility]

## 2020-07-04 ENCOUNTER — ANESTHESIA (INPATIENT)
Dept: PERIOP | Facility: HOSPITAL | Age: 60
DRG: 854 | End: 2020-07-04
Payer: COMMERCIAL

## 2020-07-04 ENCOUNTER — APPOINTMENT (EMERGENCY)
Dept: RADIOLOGY | Facility: HOSPITAL | Age: 60
DRG: 854 | End: 2020-07-04
Payer: COMMERCIAL

## 2020-07-04 ENCOUNTER — APPOINTMENT (INPATIENT)
Dept: RADIOLOGY | Facility: HOSPITAL | Age: 60
DRG: 854 | End: 2020-07-04
Payer: COMMERCIAL

## 2020-07-04 ENCOUNTER — HOSPITAL ENCOUNTER (INPATIENT)
Facility: HOSPITAL | Age: 60
LOS: 2 days | Discharge: HOME | DRG: 854 | End: 2020-07-06
Attending: EMERGENCY MEDICINE | Admitting: INTERNAL MEDICINE
Payer: COMMERCIAL

## 2020-07-04 ENCOUNTER — NURSE TRIAGE (OUTPATIENT)
Dept: OTHER | Facility: OTHER | Age: 60
End: 2020-07-04

## 2020-07-04 ENCOUNTER — APPOINTMENT (EMERGENCY)
Dept: CT IMAGING | Facility: HOSPITAL | Age: 60
DRG: 854 | End: 2020-07-04
Payer: COMMERCIAL

## 2020-07-04 ENCOUNTER — ANESTHESIA EVENT (INPATIENT)
Dept: PERIOP | Facility: HOSPITAL | Age: 60
DRG: 854 | End: 2020-07-04
Payer: COMMERCIAL

## 2020-07-04 ENCOUNTER — TELEPHONE (OUTPATIENT)
Dept: OTHER | Facility: OTHER | Age: 60
End: 2020-07-04

## 2020-07-04 DIAGNOSIS — N13.30 HYDROURETERONEPHROSIS: ICD-10-CM

## 2020-07-04 DIAGNOSIS — R79.89 ABNORMAL LFTS: ICD-10-CM

## 2020-07-04 DIAGNOSIS — R74.01 TRANSAMINITIS: ICD-10-CM

## 2020-07-04 DIAGNOSIS — N20.1 URETERIC STONE: Primary | ICD-10-CM

## 2020-07-04 DIAGNOSIS — R50.9 FEVER: ICD-10-CM

## 2020-07-04 DIAGNOSIS — N17.9 AKI (ACUTE KIDNEY INJURY) (HCC): ICD-10-CM

## 2020-07-04 DIAGNOSIS — N20.1 URETERAL STONE: ICD-10-CM

## 2020-07-04 DIAGNOSIS — D64.9 ANEMIA: ICD-10-CM

## 2020-07-04 DIAGNOSIS — N12 PYELONEPHRITIS: ICD-10-CM

## 2020-07-04 PROBLEM — A41.9 SEPSIS (HCC): Status: ACTIVE | Noted: 2020-07-04

## 2020-07-04 LAB
ALBUMIN SERPL BCP-MCNC: 3.3 G/DL (ref 3.5–5)
ALP SERPL-CCNC: 194 U/L (ref 46–116)
ALT SERPL W P-5'-P-CCNC: 181 U/L (ref 12–78)
ANION GAP SERPL CALCULATED.3IONS-SCNC: 11 MMOL/L (ref 4–13)
APTT PPP: 31 SECONDS (ref 23–37)
AST SERPL W P-5'-P-CCNC: 121 U/L (ref 5–45)
BACTERIA UR QL AUTO: ABNORMAL /HPF
BASOPHILS # BLD AUTO: 0.03 THOUSANDS/ΜL (ref 0–0.1)
BASOPHILS NFR BLD AUTO: 0 % (ref 0–1)
BILIRUB SERPL-MCNC: 1.15 MG/DL (ref 0.2–1)
BILIRUB UR QL STRIP: NEGATIVE
BUN SERPL-MCNC: 28 MG/DL (ref 5–25)
CALCIUM SERPL-MCNC: 9.4 MG/DL (ref 8.3–10.1)
CHLORIDE SERPL-SCNC: 101 MMOL/L (ref 100–108)
CLARITY UR: CLEAR
CO2 SERPL-SCNC: 26 MMOL/L (ref 21–32)
COLOR UR: YELLOW
CREAT SERPL-MCNC: 2.31 MG/DL (ref 0.6–1.3)
EOSINOPHIL # BLD AUTO: 0.22 THOUSAND/ΜL (ref 0–0.61)
EOSINOPHIL NFR BLD AUTO: 2 % (ref 0–6)
ERYTHROCYTE [DISTWIDTH] IN BLOOD BY AUTOMATED COUNT: 11.9 % (ref 11.6–15.1)
GFR SERPL CREATININE-BSD FRML MDRD: 29 ML/MIN/1.73SQ M
GLUCOSE SERPL-MCNC: 102 MG/DL (ref 65–140)
GLUCOSE UR STRIP-MCNC: NEGATIVE MG/DL
HCT VFR BLD AUTO: 39.3 % (ref 36.5–49.3)
HGB BLD-MCNC: 13.7 G/DL (ref 12–17)
HGB UR QL STRIP.AUTO: NEGATIVE
IMM GRANULOCYTES # BLD AUTO: 0.04 THOUSAND/UL (ref 0–0.2)
IMM GRANULOCYTES NFR BLD AUTO: 0 % (ref 0–2)
INR PPP: 0.92 (ref 0.84–1.19)
KETONES UR STRIP-MCNC: NEGATIVE MG/DL
LACTATE SERPL-SCNC: 1 MMOL/L (ref 0.5–2)
LEUKOCYTE ESTERASE UR QL STRIP: NEGATIVE
LYMPHOCYTES # BLD AUTO: 0.81 THOUSANDS/ΜL (ref 0.6–4.47)
LYMPHOCYTES NFR BLD AUTO: 9 % (ref 14–44)
MCH RBC QN AUTO: 31.5 PG (ref 26.8–34.3)
MCHC RBC AUTO-ENTMCNC: 34.9 G/DL (ref 31.4–37.4)
MCV RBC AUTO: 90 FL (ref 82–98)
MONOCYTES # BLD AUTO: 0.72 THOUSAND/ΜL (ref 0.17–1.22)
MONOCYTES NFR BLD AUTO: 8 % (ref 4–12)
NEUTROPHILS # BLD AUTO: 7.26 THOUSANDS/ΜL (ref 1.85–7.62)
NEUTS SEG NFR BLD AUTO: 81 % (ref 43–75)
NITRITE UR QL STRIP: NEGATIVE
NON-SQ EPI CELLS URNS QL MICRO: ABNORMAL /HPF
NRBC BLD AUTO-RTO: 0 /100 WBCS
PH UR STRIP.AUTO: 5.5 [PH]
PLATELET # BLD AUTO: 198 THOUSANDS/UL (ref 149–390)
PMV BLD AUTO: 9.8 FL (ref 8.9–12.7)
POTASSIUM SERPL-SCNC: 3.5 MMOL/L (ref 3.5–5.3)
PROCALCITONIN SERPL-MCNC: 0.09 NG/ML
PROT SERPL-MCNC: 8 G/DL (ref 6.4–8.2)
PROT UR STRIP-MCNC: NEGATIVE MG/DL
PROTHROMBIN TIME: 12.5 SECONDS (ref 11.6–14.5)
RBC # BLD AUTO: 4.35 MILLION/UL (ref 3.88–5.62)
RBC #/AREA URNS AUTO: ABNORMAL /HPF
SARS-COV-2 RNA RESP QL NAA+PROBE: NEGATIVE
SODIUM SERPL-SCNC: 138 MMOL/L (ref 136–145)
SP GR UR STRIP.AUTO: 1.02 (ref 1–1.03)
TROPONIN I SERPL-MCNC: <0.02 NG/ML
UROBILINOGEN UR QL STRIP.AUTO: 0.2 E.U./DL
WBC # BLD AUTO: 9.08 THOUSAND/UL (ref 4.31–10.16)
WBC #/AREA URNS AUTO: ABNORMAL /HPF

## 2020-07-04 PROCEDURE — 96360 HYDRATION IV INFUSION INIT: CPT

## 2020-07-04 PROCEDURE — 80053 COMPREHEN METABOLIC PANEL: CPT | Performed by: EMERGENCY MEDICINE

## 2020-07-04 PROCEDURE — 87040 BLOOD CULTURE FOR BACTERIA: CPT | Performed by: EMERGENCY MEDICINE

## 2020-07-04 PROCEDURE — 85730 THROMBOPLASTIN TIME PARTIAL: CPT | Performed by: EMERGENCY MEDICINE

## 2020-07-04 PROCEDURE — 83605 ASSAY OF LACTIC ACID: CPT | Performed by: EMERGENCY MEDICINE

## 2020-07-04 PROCEDURE — 0T778DZ DILATION OF LEFT URETER WITH INTRALUMINAL DEVICE, VIA NATURAL OR ARTIFICIAL OPENING ENDOSCOPIC: ICD-10-PCS | Performed by: INTERNAL MEDICINE

## 2020-07-04 PROCEDURE — 71045 X-RAY EXAM CHEST 1 VIEW: CPT

## 2020-07-04 PROCEDURE — 93005 ELECTROCARDIOGRAM TRACING: CPT

## 2020-07-04 PROCEDURE — 85025 COMPLETE CBC W/AUTO DIFF WBC: CPT | Performed by: EMERGENCY MEDICINE

## 2020-07-04 PROCEDURE — 36415 COLL VENOUS BLD VENIPUNCTURE: CPT | Performed by: EMERGENCY MEDICINE

## 2020-07-04 PROCEDURE — 84484 ASSAY OF TROPONIN QUANT: CPT | Performed by: EMERGENCY MEDICINE

## 2020-07-04 PROCEDURE — 99285 EMERGENCY DEPT VISIT HI MDM: CPT | Performed by: EMERGENCY MEDICINE

## 2020-07-04 PROCEDURE — 87086 URINE CULTURE/COLONY COUNT: CPT | Performed by: UROLOGY

## 2020-07-04 PROCEDURE — 99254 IP/OBS CNSLTJ NEW/EST MOD 60: CPT | Performed by: UROLOGY

## 2020-07-04 PROCEDURE — NC001 PR NO CHARGE: Performed by: PHYSICIAN ASSISTANT

## 2020-07-04 PROCEDURE — 74176 CT ABD & PELVIS W/O CONTRAST: CPT

## 2020-07-04 PROCEDURE — 99285 EMERGENCY DEPT VISIT HI MDM: CPT

## 2020-07-04 PROCEDURE — 52332 CYSTOSCOPY AND TREATMENT: CPT | Performed by: UROLOGY

## 2020-07-04 PROCEDURE — 85610 PROTHROMBIN TIME: CPT | Performed by: EMERGENCY MEDICINE

## 2020-07-04 PROCEDURE — 99223 1ST HOSP IP/OBS HIGH 75: CPT | Performed by: PHYSICIAN ASSISTANT

## 2020-07-04 PROCEDURE — 74018 RADEX ABDOMEN 1 VIEW: CPT

## 2020-07-04 PROCEDURE — 84145 PROCALCITONIN (PCT): CPT | Performed by: EMERGENCY MEDICINE

## 2020-07-04 PROCEDURE — C2617 STENT, NON-COR, TEM W/O DEL: HCPCS | Performed by: UROLOGY

## 2020-07-04 PROCEDURE — 81001 URINALYSIS AUTO W/SCOPE: CPT | Performed by: EMERGENCY MEDICINE

## 2020-07-04 PROCEDURE — 87635 SARS-COV-2 COVID-19 AMP PRB: CPT | Performed by: EMERGENCY MEDICINE

## 2020-07-04 PROCEDURE — 87086 URINE CULTURE/COLONY COUNT: CPT | Performed by: EMERGENCY MEDICINE

## 2020-07-04 PROCEDURE — 96361 HYDRATE IV INFUSION ADD-ON: CPT

## 2020-07-04 PROCEDURE — C1769 GUIDE WIRE: HCPCS | Performed by: UROLOGY

## 2020-07-04 DEVICE — STENT URETERAL 6 FR 26CM INLAY OPTIMA: Type: IMPLANTABLE DEVICE | Site: URETER | Status: FUNCTIONAL

## 2020-07-04 RX ORDER — PROPOFOL 10 MG/ML
INJECTION, EMULSION INTRAVENOUS AS NEEDED
Status: DISCONTINUED | OUTPATIENT
Start: 2020-07-04 | End: 2020-07-04 | Stop reason: SURG

## 2020-07-04 RX ORDER — METOPROLOL TARTRATE 5 MG/5ML
INJECTION INTRAVENOUS AS NEEDED
Status: DISCONTINUED | OUTPATIENT
Start: 2020-07-04 | End: 2020-07-04 | Stop reason: SURG

## 2020-07-04 RX ORDER — MAGNESIUM HYDROXIDE 1200 MG/15ML
LIQUID ORAL AS NEEDED
Status: DISCONTINUED | OUTPATIENT
Start: 2020-07-04 | End: 2020-07-04 | Stop reason: HOSPADM

## 2020-07-04 RX ORDER — DEXAMETHASONE SODIUM PHOSPHATE 10 MG/ML
INJECTION, SOLUTION INTRAMUSCULAR; INTRAVENOUS AS NEEDED
Status: DISCONTINUED | OUTPATIENT
Start: 2020-07-04 | End: 2020-07-04 | Stop reason: SURG

## 2020-07-04 RX ORDER — ONDANSETRON 2 MG/ML
INJECTION INTRAMUSCULAR; INTRAVENOUS AS NEEDED
Status: DISCONTINUED | OUTPATIENT
Start: 2020-07-04 | End: 2020-07-04 | Stop reason: SURG

## 2020-07-04 RX ORDER — TAMSULOSIN HYDROCHLORIDE 0.4 MG/1
0.4 CAPSULE ORAL
Status: DISCONTINUED | OUTPATIENT
Start: 2020-07-05 | End: 2020-07-06 | Stop reason: HOSPADM

## 2020-07-04 RX ORDER — OXYCODONE HYDROCHLORIDE 10 MG/1
10 TABLET ORAL EVERY 4 HOURS PRN
Status: DISCONTINUED | OUTPATIENT
Start: 2020-07-04 | End: 2020-07-06 | Stop reason: HOSPADM

## 2020-07-04 RX ORDER — LIDOCAINE HYDROCHLORIDE 10 MG/ML
INJECTION, SOLUTION EPIDURAL; INFILTRATION; INTRACAUDAL; PERINEURAL AS NEEDED
Status: DISCONTINUED | OUTPATIENT
Start: 2020-07-04 | End: 2020-07-04 | Stop reason: SURG

## 2020-07-04 RX ORDER — ACETAMINOPHEN 325 MG/1
325 TABLET ORAL EVERY 6 HOURS PRN
Status: DISCONTINUED | OUTPATIENT
Start: 2020-07-04 | End: 2020-07-06 | Stop reason: HOSPADM

## 2020-07-04 RX ORDER — SODIUM CHLORIDE 9 MG/ML
125 INJECTION, SOLUTION INTRAVENOUS CONTINUOUS
Status: DISCONTINUED | OUTPATIENT
Start: 2020-07-04 | End: 2020-07-05

## 2020-07-04 RX ORDER — FENTANYL CITRATE 50 UG/ML
INJECTION, SOLUTION INTRAMUSCULAR; INTRAVENOUS AS NEEDED
Status: DISCONTINUED | OUTPATIENT
Start: 2020-07-04 | End: 2020-07-04 | Stop reason: SURG

## 2020-07-04 RX ORDER — SODIUM CHLORIDE 9 MG/ML
125 INJECTION, SOLUTION INTRAVENOUS CONTINUOUS
Status: DISCONTINUED | OUTPATIENT
Start: 2020-07-04 | End: 2020-07-06 | Stop reason: HOSPADM

## 2020-07-04 RX ORDER — MIDAZOLAM HYDROCHLORIDE 2 MG/2ML
INJECTION, SOLUTION INTRAMUSCULAR; INTRAVENOUS AS NEEDED
Status: DISCONTINUED | OUTPATIENT
Start: 2020-07-04 | End: 2020-07-04 | Stop reason: SURG

## 2020-07-04 RX ORDER — ASPIRIN 81 MG/1
81 TABLET ORAL DAILY
Status: DISCONTINUED | OUTPATIENT
Start: 2020-07-05 | End: 2020-07-06 | Stop reason: HOSPADM

## 2020-07-04 RX ORDER — HYDROMORPHONE HCL/PF 1 MG/ML
0.5 SYRINGE (ML) INJECTION EVERY 4 HOURS PRN
Status: DISCONTINUED | OUTPATIENT
Start: 2020-07-04 | End: 2020-07-06 | Stop reason: HOSPADM

## 2020-07-04 RX ORDER — HEPARIN SODIUM 5000 [USP'U]/ML
5000 INJECTION, SOLUTION INTRAVENOUS; SUBCUTANEOUS EVERY 8 HOURS SCHEDULED
Status: DISCONTINUED | OUTPATIENT
Start: 2020-07-04 | End: 2020-07-06 | Stop reason: HOSPADM

## 2020-07-04 RX ORDER — ONDANSETRON 2 MG/ML
4 INJECTION INTRAMUSCULAR; INTRAVENOUS ONCE AS NEEDED
Status: DISCONTINUED | OUTPATIENT
Start: 2020-07-04 | End: 2020-07-05 | Stop reason: HOSPADM

## 2020-07-04 RX ORDER — ONDANSETRON 2 MG/ML
4 INJECTION INTRAMUSCULAR; INTRAVENOUS EVERY 6 HOURS PRN
Status: DISCONTINUED | OUTPATIENT
Start: 2020-07-04 | End: 2020-07-06 | Stop reason: HOSPADM

## 2020-07-04 RX ORDER — CIPROFLOXACIN 2 MG/ML
INJECTION, SOLUTION INTRAVENOUS CONTINUOUS PRN
Status: DISCONTINUED | OUTPATIENT
Start: 2020-07-04 | End: 2020-07-04 | Stop reason: SURG

## 2020-07-04 RX ORDER — FENTANYL CITRATE/PF 50 MCG/ML
50 SYRINGE (ML) INJECTION
Status: DISCONTINUED | OUTPATIENT
Start: 2020-07-04 | End: 2020-07-05 | Stop reason: HOSPADM

## 2020-07-04 RX ADMIN — PROPOFOL 200 MG: 10 INJECTION, EMULSION INTRAVENOUS at 22:38

## 2020-07-04 RX ADMIN — METOPROLOL TARTRATE 2.5 MG: 5 INJECTION INTRAVENOUS at 22:44

## 2020-07-04 RX ADMIN — CIPROFLOXACIN: 2 INJECTION INTRAVENOUS at 22:27

## 2020-07-04 RX ADMIN — LIDOCAINE HYDROCHLORIDE 60 MG: 10 INJECTION, SOLUTION EPIDURAL; INFILTRATION; INTRACAUDAL; PERINEURAL at 22:38

## 2020-07-04 RX ADMIN — SODIUM CHLORIDE: 0.9 INJECTION, SOLUTION INTRAVENOUS at 22:27

## 2020-07-04 RX ADMIN — FENTANYL CITRATE 100 MCG: 50 INJECTION, SOLUTION INTRAMUSCULAR; INTRAVENOUS at 22:38

## 2020-07-04 RX ADMIN — CEFEPIME HYDROCHLORIDE 2000 MG: 2 INJECTION, POWDER, FOR SOLUTION INTRAVENOUS at 20:33

## 2020-07-04 RX ADMIN — PROPOFOL 200 MG: 10 INJECTION, EMULSION INTRAVENOUS at 22:39

## 2020-07-04 RX ADMIN — SODIUM CHLORIDE 1000 ML: 0.9 INJECTION, SOLUTION INTRAVENOUS at 16:54

## 2020-07-04 RX ADMIN — MIDAZOLAM 2 MG: 1 INJECTION INTRAMUSCULAR; INTRAVENOUS at 22:30

## 2020-07-04 RX ADMIN — DEXAMETHASONE SODIUM PHOSPHATE 4 MG: 10 INJECTION, SOLUTION INTRAMUSCULAR; INTRAVENOUS at 22:48

## 2020-07-04 RX ADMIN — PHENYLEPHRINE HYDROCHLORIDE 150 MCG: 10 INJECTION INTRAVENOUS at 23:11

## 2020-07-04 RX ADMIN — ONDANSETRON 4 MG: 2 INJECTION INTRAMUSCULAR; INTRAVENOUS at 22:48

## 2020-07-04 NOTE — TELEPHONE ENCOUNTER
Reason for Disposition   Fever > 100 5 F (38 1 C)    Answer Assessment - Initial Assessment Questions  1  LOCATION: "Where does it hurt?" (e g , left, right)      Left side     2  ONSET: "When did the pain start?"      6/30    3  SEVERITY: "How bad is the pain?" (e g , Scale 1-10; mild, moderate, or severe)    - MILD (1-3): doesn't interfere with normal activities     - MODERATE (4-7): interferes with normal activities or awakens from sleep     - SEVERE (8-10): excruciating pain and patient unable to do normal activities (stays in bed)        3/10    4  PATTERN: "Does the pain come and go, or is it constant?"       Depends on activity  More constant      5  CAUSE: "What do you think is causing the pain?"      Kidney stone     6  OTHER SYMPTOMS:  "Do you have any other symptoms?" (e g , fever, abdominal pain, vomiting, leg weakness, burning with urination, blood in urine)      101 3 orally @ 1530    Protocols used:  FLANK PAIN-ADULT-

## 2020-07-04 NOTE — LETTER
[address]  Dept: [phone]    July 6, 2020     Patient: Daisy HARTLEY [facility]   YOB: 1960   Date of Visit: 7/4/2020       To Whom it May Concern:    Fiona Ocampo is under my professional care  He was seen in the hospital from 7/4/2020   to 07/06/20  He may return to work on monday july 13th without limitations  If you have any questions or concerns, please don't hesitate to call           Sincerely,          Sarah Zamudio MD

## 2020-07-04 NOTE — ED PROVIDER NOTES
History  Chief Complaint   Patient presents with    Fever - 9 weeks to 74 years     Fever today  Intermittent cough for about one week  Reports was diagnosed with kidney stone last week  Minimal L flank, abdominal pain  Denies urinary symptoms  Ibuprofen taken at 17       60y M here with episodic fever, cough x 1 wk  Seen here last week and diagnosed with left sided kidney stone (3mm)  Given flomax and pain meds  Reports fevers started after that visit and were all low grade - less than 101  Also noted occas dry cough  Over the last couple of days, fever increased  Today temp to 101 5, +fatigue/malaise, no ha, no runny nose/congestion, +continued cough, non-productive, no cp/pressure, no sob/edgar, +anorexia, +nausea, no vomiting  Has had continued intermittent flank pain - mild w/ occas pain radiating into the left testicle region  Denies gross hematuria, no dysuria  Has had some continued frequency  Feels he is urinating less and wife feels he is not drinking enough fluids  No hx of previous  procedures/stents, no hx of recurrent UTI  Unsure if he actually passed the stone    No sick contacts, denies other c/o at this time      History provided by:  Patient and spouse   used: No    Fever - 9 weeks to 74 years   Max temp prior to arrival:  101 5  Temp source:  Oral  Severity:  Severe  Onset quality:  Gradual  Timing:  Intermittent  Progression:  Waxing and waning  Chronicity:  New  Relieved by:  Nothing  Worsened by:  Nothing  Ineffective treatments:  None tried  Associated symptoms: cough, myalgias and nausea    Associated symptoms: no chest pain, no chills, no congestion, no diarrhea, no dysuria, no headaches, no rash, no rhinorrhea, no somnolence, no sore throat and no vomiting    Cough:     Cough characteristics:  Non-productive    Sputum characteristics:  Nondescript    Severity:  Moderate    Onset quality:  Gradual    Timing:  Intermittent    Progression:  Waxing and waning Chronicity:  New  Risk factors: no sick contacts        Prior to Admission Medications   Prescriptions Last Dose Informant Patient Reported? Taking? HYDROcodone-acetaminophen (NORCO) 5-325 mg per tablet   No Yes   Sig: Take 1 tablet by mouth every 6 (six) hours as needed for painMax Daily Amount: 4 tablets   allopurinol (ZYLOPRIM) 100 mg tablet   No No   Sig: TAKE ONE TABLET BY MOUTH EVERY DAY    allopurinol (ZYLOPRIM) 300 mg tablet   No Yes   Sig: TAKE ONE TABLET BY MOUTH EVERY DAY    aspirin (ASPIR-81) 81 mg EC tablet  Self Yes Yes   Sig: Take 1 tablet by mouth daily   atorvastatin (LIPITOR) 20 mg tablet   No Yes   Sig: TAKE ONE TABLET BY MOUTH EVERY DAY    metoprolol tartrate (LOPRESSOR) 25 mg tablet   No Yes   Sig: TAKE ONE TABLET BY MOUTH EVERY DAY    olmesartan-hydrochlorothiazide (BENICAR HCT) 40-12 5 MG per tablet  Self No Yes   Sig: Take 0 5 tablets by mouth daily   tamsulosin (FLOMAX) 0 4 mg   No Yes   Sig: Take 1 capsule (0 4 mg total) by mouth daily with dinner      Facility-Administered Medications: None       Past Medical History:   Diagnosis Date    Acute myocardial infarction ([facility] [address] )     Arthralgia     last assessed 7/8/13    Contusion of skin with intact surface     of the left medial thigh,last assessed 6/28/13    Gout     last assessed 10/29/13    Lump of skin     last assessed 7/19/13       Past Surgical History:   Procedure Laterality Date    BACK SURGERY      COLONOSCOPY  12/10/2009    Complete    CORONARY STENT PLACEMENT  08/2012    stenting of the LAD artery 95% lesion to 0% residual stenosis    KNEE SURGERY         Family History   Problem Relation Age of Onset    Edema Mother         Cerebral    Aneurysm Father         of the cerebellar artery    Aneurysm Brother         of the cerebellar artery     I have reviewed and agree with the history as documented      E-Cigarette/Vaping     E-Cigarette/Vaping Substances     Social History     Tobacco Use    Smoking status: Never Smoker  Smokeless tobacco: Never Used   Substance Use Topics    Alcohol use: Yes     Binge frequency: Weekly    Drug use: No       Review of Systems   Constitutional: Positive for fever  Negative for chills  HENT: Negative for congestion, rhinorrhea and sore throat  Respiratory: Positive for cough  Cardiovascular: Negative for chest pain  Gastrointestinal: Positive for nausea  Negative for diarrhea and vomiting  Genitourinary: Negative for dysuria  Musculoskeletal: Positive for myalgias  Skin: Negative for rash  Neurological: Negative for headaches  All other systems reviewed and are negative  Physical Exam  Physical Exam   Constitutional: He appears well-developed and well-nourished  HENT:   Nose: Nose normal    Mouth/Throat: Oropharynx is clear and moist    Eyes: Pupils are equal, round, and reactive to light  Conjunctivae are normal    Neck: Neck supple  No JVD present  Cardiovascular: Regular rhythm  Tachycardia present  No murmur heard  Pulmonary/Chest: Effort normal and breath sounds normal    Abdominal: Soft  He exhibits no distension  There is no tenderness  Musculoskeletal: He exhibits no edema or deformity  Neurological: He is alert  Skin: Skin is warm  Psychiatric: He has a normal mood and affect  Nursing note and vitals reviewed        Vital Signs  ED Triage Vitals [07/04/20 1611]   Temperature Pulse Respirations Blood Pressure SpO2   (!) 100 8 °F (38 2 °C) (!) 119 16 128/90 97 %      Temp Source Heart Rate Source Patient Position - Orthostatic VS BP Location FiO2 (%)   Temporal Monitor Sitting Right arm --      Pain Score       3           Vitals:    07/05/20 0036 07/05/20 0431 07/05/20 0804 07/05/20 1500   BP: 119/68 124/83 110/79 104/67   Pulse: 84 75 66 61   Patient Position - Orthostatic VS: Lying Lying Lying Lying         Visual Acuity      ED Medications  Medications   aspirin (ECOTRIN LOW STRENGTH) EC tablet 81 mg (81 mg Oral Given 7/5/20 0904)   metoprolol tartrate (LOPRESSOR) tablet 25 mg (25 mg Oral Given 7/5/20 0904)   tamsulosin (FLOMAX) capsule 0 4 mg (has no administration in time range)   ondansetron (ZOFRAN) injection 4 mg (has no administration in time range)   heparin (porcine) subcutaneous injection 5,000 Units (5,000 Units Subcutaneous Given 7/5/20 1510)   acetaminophen (TYLENOL) tablet 325 mg (has no administration in time range)   cefTRIAXone (ROCEPHIN) 1,000 mg in dextrose 5 % 50 mL IVPB (1,000 mg Intravenous New Bag 7/5/20 0904)   oxyCODONE (ROXICODONE) immediate release tablet 10 mg ( Oral MAR Unhold 7/4/20 2344)   HYDROmorphone (DILAUDID) injection 0 5 mg ( Intravenous MAR Unhold 7/4/20 2344)   sodium chloride 0 9 % infusion (125 mL/hr Intravenous Continued from OR 7/4/20 2321)   melatonin tablet 6 mg (6 mg Oral Given 7/5/20 0122)   sodium chloride 0 9 % bolus 1,000 mL (0 mL Intravenous Stopped 7/4/20 1900)   cefepime (MAXIPIME) 2 g/50 mL dextrose IVPB (2,000 mg Intravenous New Bag 7/4/20 2033)       Diagnostic Studies  Results Reviewed     Procedure Component Value Units Date/Time    Blood culture #1 [[id number]] Collected:  07/04/20 1702    Lab Status:  Preliminary result Specimen:  Blood from Hand, Left Updated:  07/05/20 0002     Blood Culture Received in Microbiology Lab  Culture in Progress  Blood culture #2 [[id number]] Collected:  07/04/20 1706    Lab Status:  Preliminary result Specimen:  Blood from Arm, Left Updated:  07/05/20 0002     Blood Culture Received in Microbiology Lab  Culture in Progress  Procalcitonin [[id number]]  (Normal) Collected:  07/04/20 1652    Lab Status:  Final result Specimen:  Blood from Hand, Right Updated:  07/04/20 2226     Procalcitonin 0 09 ng/ml     Urine culture [[id number]] Collected:  07/04/20 2028    Lab Status:   In process Specimen:  Urine, Clean Catch Updated:  07/04/20 2029    Novel Coronavirus (Covid-19),PCR [facility] [[id number]]  (Normal) Collected:  07/04/20 8299    Lab Status:  Final result Specimen: Nares from Nose Updated:  07/04/20 1813     SARS-CoV-2 Negative    Narrative: The specimen collection materials, transport medium, and/or testing methodology utilized in the production of these test results have been proven to be reliable in a limited validation with an abbreviated program under the Emergency Utilization Authorization provided by the FDA  Testing reported as "Presumptive positive" will be confirmed with secondary testing with a reference laboratory to ensure result accuracy  Clinical caution and judgement should be used with the interpretation of these results with consideration of the clinical impression and other laboratory testing  Testing reported as "Positive" or "Negative" has been proven to be accurate according to standard laboratory validation requirements  All testing is performed with control materials showing appropriate reactivity at standard intervals  Urinalysis with microscopic [[id number]]  (Abnormal) Collected:  07/04/20 1720    Lab Status:  Final result Specimen:  Urine, Clean Catch Updated:  07/04/20 1755     Clarity, UA Clear     Color, UA Yellow     Specific Gravity, UA 1 025     pH, UA 5 5     Glucose, UA Negative mg/dl      Ketones, UA Negative mg/dl      Blood, UA Negative     Protein, UA Negative mg/dl      Nitrite, UA Negative     Bilirubin, UA Negative     Urobilinogen, UA 0 2 E U /dl      Leukocytes, UA Negative     WBC, UA 2-4 /hpf      RBC, UA None Seen /hpf      Bacteria, UA None Seen /hpf      Epithelial Cells Occasional /hpf     Lactic acid [[id number]]  (Normal) Collected:  07/04/20 1652    Lab Status:  Final result Specimen:  Blood from Hand, Right Updated:  07/04/20 1722     LACTIC ACID 1 0 mmol/L     Narrative:       Result may be elevated if tourniquet was used during collection      Troponin I [[id number]]  (Normal) Collected:  07/04/20 1652    Lab Status:  Final result Specimen:  Blood from Hand, Right Updated:  07/04/20 1721     Troponin I <0 02 ng/mL     Comprehensive metabolic panel [[id number]]  (Abnormal) Collected:  07/04/20 1652    Lab Status:  Final result Specimen:  Blood from Hand, Right Updated:  07/04/20 1720     Sodium 138 mmol/L      Potassium 3 5 mmol/L      Chloride 101 mmol/L      CO2 26 mmol/L      ANION GAP 11 mmol/L      BUN 28 mg/dL      Creatinine 2 31 mg/dL      Glucose 102 mg/dL      Calcium 9 4 mg/dL       U/L       U/L      Alkaline Phosphatase 194 U/L      Total Protein 8 0 g/dL      Albumin 3 3 g/dL      Total Bilirubin 1 15 mg/dL      eGFR 29 ml/min/1 73sq m     Narrative:       National Kidney Disease Foundation guidelines for Chronic Kidney Disease (CKD):     Stage 1 with normal or high GFR (GFR > 90 mL/min/1 73 square meters)    Stage 2 Mild CKD (GFR = 60-89 mL/min/1 73 square meters)    Stage 3A Moderate CKD (GFR = 45-59 mL/min/1 73 square meters)    Stage 3B Moderate CKD (GFR = 30-44 mL/min/1 73 square meters)    Stage 4 Severe CKD (GFR = 15-29 mL/min/1 73 square meters)    Stage 5 End Stage CKD (GFR <15 mL/min/1 73 square meters)  Note: GFR calculation is accurate only with a steady state creatinine    Protime-INR [[id number]]  (Normal) Collected:  07/04/20 1652    Lab Status:  Final result Specimen:  Blood from Hand, Right Updated:  07/04/20 1713     Protime 12 5 seconds      INR 0 92    APTT [[id number]]  (Normal) Collected:  07/04/20 1652    Lab Status:  Final result Specimen:  Blood from Hand, Right Updated:  07/04/20 1713     PTT 31 seconds     CBC and differential [[id number]]  (Abnormal) Collected:  07/04/20 1652    Lab Status:  Final result Specimen:  Blood from Hand, Right Updated:  07/04/20 1704     WBC 9 08 Thousand/uL      RBC 4 35 Million/uL      Hemoglobin 13 7 g/dL      Hematocrit 39 3 %      MCV 90 fL      MCH 31 5 pg      MCHC 34 9 g/dL      RDW 11 9 %      MPV 9 8 fL      Platelets 309 Thousands/uL      nRBC 0 /100 WBCs      Neutrophils Relative 81 %      Immat GRANS % 0 %      Lymphocytes Relative 9 %      Monocytes Relative 8 %      Eosinophils Relative 2 %      Basophils Relative 0 %      Neutrophils Absolute 7 26 Thousands/µL      Immature Grans Absolute 0 04 Thousand/uL      Lymphocytes Absolute 0 81 Thousands/µL      Monocytes Absolute 0 72 Thousand/µL      Eosinophils Absolute 0 22 Thousand/µL      Basophils Absolute 0 03 Thousands/µL                  XR chest 1 view portable   ED Interpretation by Jacinda Guillen DO (07/04 1821)   Poor inspiration, right side underinflated / atelectasis, no focal infiltrates      Final Result by Heladio Inman MD (07/05 3374)      No acute cardiopulmonary disease  Suboptimal inspiration  Workstation performed: [id number]         CT renal stone study abdomen pelvis without contrast   ED Interpretation by Jacinda Guillen DO (07/04 1912)   See below      Final Result by Yoseiln Wynn MD (07/04 1908)      3 new calculi in the distal left ureter with new mild upstream hydronephroureterosis  While fat stranding adjacent to the left renal pelvis and ureter likely due to acute obstruction, pyelonephrosis is not excluded  The most distal and largest calculus    measures 4 mm located immediately proximal to the UVJ  No perinephric collection  See above discussion  Bilateral renal calculi measuring up to 3 mm on the right and 4 mm on the left  Cholelithiasis  Colonic diverticulosis  The study was marked in [facility] for immediate notification                    Workstation performed: [id number]         XR abdomen 1 view kub    (Results Pending)              Procedures  Procedures         ED Course  ED Course as of Jul 05 1618   Sat Jul 04, 2020   1735 WBC: 9 08   1735 Increased from baseline of 1 02   Creatinine(!): 2 31   1758 Nitrite, UA: Negative   1758 Leukocytes, UA: Negative   1820 EXT SARS-COV-2: Negative   1410 [address] Urology to discuss                              Initial Sepsis Screening     [address] Name 07/04/20 1734 Is the patient's history suggestive of a new or worsening infection? (!) Yes (Proceed)  -VL        Suspected source of infection  pneumonia;urinary tract infection  -VL        Are two or more of the following signs & symptoms of infection both present and new to the patient? No  -VL        Indicate SIRS criteria  Tachycardia > 90 bpm  -VL        If the answer is yes to both questions, suspicion of sepsis is present          If severe sepsis is present AND tissue hypoperfusion perists in the hour after fluid resuscitation or lactate > 4, the patient meets criteria for SEPTIC SHOCK          Are any of the following organ dysfunction criteria present within 6 hours of suspected infection and SIRS criteria that are NOT considered to be chronic conditions?         Organ dysfunction          Date of presentation of severe sepsis          Time of presentation of severe sepsis          Tissue hypoperfusion persists in the hour after crystalloid fluid administration, evidenced, by either:          Was hypotension present within one hour of the conclusion of crystalloid fluid administration?         Date of presentation of septic shock          Time of presentation of septic shock            User Key  (r) = Recorded By, (t) = Taken By, (c) = Cosigned By    Initials Name Provider Type    TRUE Cartagena DO Physician                        MDM  Number of Diagnoses or Management Options  JENNIFER (acute kidney injury) [facility]): new and requires workup  Fever: new and requires workup  Hydroureteronephrosis: new and requires workup  Ureteric stone: new and requires workup  Diagnosis management comments: Fever, continued flank pain and cough w/ known kidney stone  ddx includes but not limited to infected stone, pyelo/uti, pna, covid-19    Will ck labs,ct, ua, cxr and re-eval       Amount and/or Complexity of Data Reviewed  Clinical lab tests: reviewed and ordered  Tests in the radiology section of CPT®: reviewed and ordered  Tests in the medicine section of CPT®: ordered and reviewed  Decide to obtain previous medical records or to obtain history from someone other than the patient: yes  Obtain history from someone other than the patient: yes  Independent visualization of images, tracings, or specimens: yes          Disposition  Final diagnoses:   Ureteric stone   Hydroureteronephrosis   JENNIFER (acute kidney injury) ([facility] )   Fever     Time reflects when diagnosis was documented in both MDM as applicable and the Disposition within this note     Time User Action Codes Description Comment    7/4/2020  7:44 PM Bela Tamayo Add [N20 1] Ureteric stone     7/4/2020  7:44 PM Sha Olmose Add [N13 30] Hydroureteronephrosis     7/4/2020  7:44 PM Bela Tamayo Add [N17 9] JENNIFER (acute kidney injury) ([facility] )     7/4/2020  7:45 PM Belkis, [address] [place] [address] [R50 9] Fever     7/4/2020  8:31 PM Gracie DOBBINS Add [N20 1] Ureteral stone     7/4/2020 10:59 PM Tiago NARANJO Modify [N20 1] Ureteric stone     7/4/2020 11:23 PM Marti Begum Modify [N20 1] Ureteric stone       ED Disposition     ED Disposition Condition Date/Time Comment    Admit Stable Sat Jul 4, 2020  7:44 PM Case was discussed with VERA Victor and the patient's admission status was agreed to be Admission Status: inpatient status to the service of Dr Dhillon [facility]           Follow-up Information    None         Current Discharge Medication List      CONTINUE these medications which have NOT CHANGED    Details   !! allopurinol (ZYLOPRIM) 300 mg tablet TAKE ONE TABLET BY MOUTH EVERY DAY   Qty: 30 tablet, Refills: 4    Associated Diagnoses: Gout, unspecified cause, unspecified chronicity, unspecified site      aspirin (ASPIR-81) 81 mg EC tablet Take 1 tablet by mouth daily      atorvastatin (LIPITOR) 20 mg tablet TAKE ONE TABLET BY MOUTH EVERY DAY   Qty: 30 tablet, Refills: 4    Associated Diagnoses: Hyperlipidemia, unspecified hyperlipidemia type HYDROcodone-acetaminophen (NORCO) 5-325 mg per tablet Take 1 tablet by mouth every 6 (six) hours as needed for painMax Daily Amount: 4 tablets  Qty: 15 tablet, Refills: 0    Associated Diagnoses: Nephrolithiasis      metoprolol tartrate (LOPRESSOR) 25 mg tablet TAKE ONE TABLET BY MOUTH EVERY DAY   Qty: 30 tablet, Refills: 4    Associated Diagnoses: Essential hypertension      olmesartan-hydrochlorothiazide (BENICAR HCT) 40-12 5 MG per tablet Take 0 5 tablets by mouth daily  Qty: 90 tablet, Refills: 5    Associated Diagnoses: Essential hypertension      tamsulosin (FLOMAX) 0 4 mg Take 1 capsule (0 4 mg total) by mouth daily with dinner  Qty: 30 capsule, Refills: 1    Associated Diagnoses: Nephrolithiasis      !! allopurinol (ZYLOPRIM) 100 mg tablet TAKE ONE TABLET BY MOUTH EVERY DAY   Qty: 30 tablet, Refills: 4    Associated Diagnoses: Gout, unspecified cause, unspecified chronicity, unspecified site       !! - Potential duplicate medications found  Please discuss with provider  No discharge procedures on file      PDMP Review     None          ED Provider  Electronically Signed by           [employer],   07/05/20 9776

## 2020-07-04 NOTE — SEPSIS NOTE
Sepsis Note   Alicia Schwarz 63 y o  male MRN: [id number]  Unit/Bed#: ED 22 Encounter: [id number]      qSOFA     Row Name 07/04/20 1611                Altered mental status GCS < 15          Respiratory Rate > / =84  0        Systolic BP < / =334  0        Q Sofa Score  0            Initial Sepsis Screening     Row Name 07/04/20 1736                Is the patient's history suggestive of a new or worsening infection? (!) Yes (Proceed)  -VL        Suspected source of infection  pneumonia;urinary tract infection  -VL        Are two or more of the following signs & symptoms of infection both present and new to the patient? No  -VL        Indicate SIRS criteria  Tachycardia > 90 bpm  -VL        If the answer is yes to both questions, suspicion of sepsis is present          If severe sepsis is present AND tissue hypoperfusion perists in the hour after fluid resuscitation or lactate > 4, the patient meets criteria for SEPTIC SHOCK          Are any of the following organ dysfunction criteria present within 6 hours of suspected infection and SIRS criteria that are NOT considered to be chronic conditions?         Organ dysfunction          Date of presentation of severe sepsis          Time of presentation of severe sepsis          Tissue hypoperfusion persists in the hour after crystalloid fluid administration, evidenced, by either:          Was hypotension present within one hour of the conclusion of crystalloid fluid administration?           Date of presentation of septic shock          Time of presentation of septic shock            User Key  (r) = Recorded By, (t) = Taken By, (c) = Cosigned By    Initials Name Provider Type    VL Carol Red DO Physician

## 2020-07-04 NOTE — ASSESSMENT & PLAN NOTE
Ct a/p demonstrates w/3 new calculi in the distal left ureter with new mild upstream hydronephroureterosis  While fat stranding adjacent to the left renal pelvis and ureter likely due to acute obstruction, pyelonephrosis is not excluded   The most distal and largest calculus   measures 4 mm located immediately proximal to the UVJ  Npo ivf, flomax

## 2020-07-04 NOTE — TELEPHONE ENCOUNTER
Regarding: Fever  ----- Message from Angy James sent at 7/4/2020  1:54 PM EDT -----  " I have kidney stones and I am unsure if that's why I have a fever of 100 6   And I am a patient of Everett Adam and that is who I trust for direction what I should do"

## 2020-07-05 LAB
ALBUMIN SERPL BCP-MCNC: 2.7 G/DL (ref 3.5–5)
ALP SERPL-CCNC: 160 U/L (ref 46–116)
ALT SERPL W P-5'-P-CCNC: 143 U/L (ref 12–78)
ANION GAP SERPL CALCULATED.3IONS-SCNC: 10 MMOL/L (ref 4–13)
AST SERPL W P-5'-P-CCNC: 86 U/L (ref 5–45)
BASOPHILS # BLD AUTO: 0.01 THOUSANDS/ΜL (ref 0–0.1)
BASOPHILS NFR BLD AUTO: 0 % (ref 0–1)
BILIRUB SERPL-MCNC: 0.95 MG/DL (ref 0.2–1)
BUN SERPL-MCNC: 26 MG/DL (ref 5–25)
CALCIUM SERPL-MCNC: 8.5 MG/DL (ref 8.3–10.1)
CHLORIDE SERPL-SCNC: 104 MMOL/L (ref 100–108)
CO2 SERPL-SCNC: 24 MMOL/L (ref 21–32)
CREAT SERPL-MCNC: 1.87 MG/DL (ref 0.6–1.3)
EOSINOPHIL # BLD AUTO: 0.01 THOUSAND/ΜL (ref 0–0.61)
EOSINOPHIL NFR BLD AUTO: 0 % (ref 0–6)
ERYTHROCYTE [DISTWIDTH] IN BLOOD BY AUTOMATED COUNT: 12.1 % (ref 11.6–15.1)
GFR SERPL CREATININE-BSD FRML MDRD: 38 ML/MIN/1.73SQ M
GLUCOSE SERPL-MCNC: 149 MG/DL (ref 65–140)
HCT VFR BLD AUTO: 35.4 % (ref 36.5–49.3)
HGB BLD-MCNC: 12.1 G/DL (ref 12–17)
IMM GRANULOCYTES # BLD AUTO: 0.02 THOUSAND/UL (ref 0–0.2)
IMM GRANULOCYTES NFR BLD AUTO: 0 % (ref 0–2)
LIPASE SERPL-CCNC: 119 U/L (ref 73–393)
LYMPHOCYTES # BLD AUTO: 0.51 THOUSANDS/ΜL (ref 0.6–4.47)
LYMPHOCYTES NFR BLD AUTO: 8 % (ref 14–44)
MCH RBC QN AUTO: 31.6 PG (ref 26.8–34.3)
MCHC RBC AUTO-ENTMCNC: 34.2 G/DL (ref 31.4–37.4)
MCV RBC AUTO: 92 FL (ref 82–98)
MONOCYTES # BLD AUTO: 0.2 THOUSAND/ΜL (ref 0.17–1.22)
MONOCYTES NFR BLD AUTO: 3 % (ref 4–12)
NEUTROPHILS # BLD AUTO: 5.82 THOUSANDS/ΜL (ref 1.85–7.62)
NEUTS SEG NFR BLD AUTO: 89 % (ref 43–75)
NRBC BLD AUTO-RTO: 0 /100 WBCS
PLATELET # BLD AUTO: 193 THOUSANDS/UL (ref 149–390)
PMV BLD AUTO: 9.5 FL (ref 8.9–12.7)
POTASSIUM SERPL-SCNC: 4.3 MMOL/L (ref 3.5–5.3)
PROT SERPL-MCNC: 6.8 G/DL (ref 6.4–8.2)
RBC # BLD AUTO: 3.83 MILLION/UL (ref 3.88–5.62)
SODIUM SERPL-SCNC: 138 MMOL/L (ref 136–145)
WBC # BLD AUTO: 6.57 THOUSAND/UL (ref 4.31–10.16)

## 2020-07-05 PROCEDURE — 85025 COMPLETE CBC W/AUTO DIFF WBC: CPT | Performed by: PHYSICIAN ASSISTANT

## 2020-07-05 PROCEDURE — 99232 SBSQ HOSP IP/OBS MODERATE 35: CPT | Performed by: UROLOGY

## 2020-07-05 PROCEDURE — 80053 COMPREHEN METABOLIC PANEL: CPT | Performed by: PHYSICIAN ASSISTANT

## 2020-07-05 PROCEDURE — 99232 SBSQ HOSP IP/OBS MODERATE 35: CPT | Performed by: HOSPITALIST

## 2020-07-05 PROCEDURE — 83690 ASSAY OF LIPASE: CPT | Performed by: UROLOGY

## 2020-07-05 RX ORDER — LANOLIN ALCOHOL/MO/W.PET/CERES
6 CREAM (GRAM) TOPICAL
Status: DISCONTINUED | OUTPATIENT
Start: 2020-07-05 | End: 2020-07-06 | Stop reason: HOSPADM

## 2020-07-05 RX ADMIN — HEPARIN SODIUM 5000 UNITS: 5000 INJECTION INTRAVENOUS; SUBCUTANEOUS at 21:21

## 2020-07-05 RX ADMIN — MELATONIN 6 MG: 3 TAB ORAL at 01:22

## 2020-07-05 RX ADMIN — HEPARIN SODIUM 5000 UNITS: 5000 INJECTION INTRAVENOUS; SUBCUTANEOUS at 05:17

## 2020-07-05 RX ADMIN — HEPARIN SODIUM 5000 UNITS: 5000 INJECTION INTRAVENOUS; SUBCUTANEOUS at 00:45

## 2020-07-05 RX ADMIN — MELATONIN 6 MG: 3 TAB ORAL at 21:21

## 2020-07-05 RX ADMIN — METOPROLOL TARTRATE 25 MG: 25 TABLET, FILM COATED ORAL at 09:04

## 2020-07-05 RX ADMIN — TAMSULOSIN HYDROCHLORIDE 0.4 MG: 0.4 CAPSULE ORAL at 16:27

## 2020-07-05 RX ADMIN — ASPIRIN 81 MG: 81 TABLET, COATED ORAL at 09:04

## 2020-07-05 RX ADMIN — SODIUM CHLORIDE 125 ML/HR: 0.9 INJECTION, SOLUTION INTRAVENOUS at 09:04

## 2020-07-05 RX ADMIN — SODIUM CHLORIDE 125 ML/HR: 0.9 INJECTION, SOLUTION INTRAVENOUS at 00:46

## 2020-07-05 RX ADMIN — HEPARIN SODIUM 5000 UNITS: 5000 INJECTION INTRAVENOUS; SUBCUTANEOUS at 15:10

## 2020-07-05 RX ADMIN — CEFTRIAXONE SODIUM 1000 MG: 10 INJECTION, POWDER, FOR SOLUTION INTRAVENOUS at 09:04

## 2020-07-05 NOTE — H&P
H&P- Patria Schwarz 1960, 63 y o  male MRN: [id number]    Unit/Bed#: ED 22 Encounter: [id number]    Primary Care Provider: Daniel Ruiz DO   Date and time admitted to hospital: 7/4/2020  4:16 PM        * Ureteral stone  Assessment & Plan  Ct a/p demonstrates w/3 new calculi in the distal left ureter with new mild upstream hydronephroureterosis  While fat stranding adjacent to the left renal pelvis and ureter likely due to acute obstruction, pyelonephrosis is not excluded  The most distal and largest calculus   measures 4 mm located immediately proximal to the UVJ  Npo ivf, flomax    Pyelonephritis  Assessment & Plan  Suspected pyelonephritis by ct a/p and worsening nocturia and urgency in pt w/fevers after recent diagnosis of ureteral stone  ua bland, however would not be surprised if pt has upstream infection proximal to occlusive ureteral stones  rec'd cefpime in ed continue w/rocephin    Sepsis [facility])  Assessment & Plan  Poa  Temp of 100 8 bt per pt/wife was 101 5 prehospital and tachycardia  Lactic acid wnl  Consider 2* pyelonephritis given recent stone and now low grade temps despite normal UA or bacteremia  transaminitis noted in setting of statin/allopurinol and obesity, but no RUQ pain or beard's sign to suggest active biliary infection  cxr demonstrates lung crowding in right lung but clear to auscultation b/l and covid negative  rec'd cefepime in ed  Continue w/rocephin  F/u bcx    Transaminitis  Assessment & Plan  AST:ALT <1    May be due to fatty liver  Hold allopurinol statin  Continue to monitor      JENNIFER (acute kidney injury) [facility])  Assessment & Plan  Likely multifactorial from arb/hctz, poor po intake, hydronephrosis and nsaids  Check pvr x 1 urinary retention protocol  Hold offending agents, ivf hydration repeat bmp in am      CAD   hols statin, continue asa/bb    HTN   Bp acceptable continue bb hold arb/hctz    VTE Prophylaxis: Heparin  / sequential compression device   Code Status: fc  POLST: There is no POLST form on file for this patient (pre-hospital)  Discussion with family: wife    Anticipated Length of Stay:  Patient will be admitted on an Inpatient basis with an anticipated length of stay of  Greater than 2 midnights  Justification for Hospital Stay: ureteral stone and sepsis    Total Time for Visit, including Counseling / Coordination of Care: 45 minutes  Greater than 50% of this total time spent on direct patient counseling and coordination of care  Chief Complaint:   Left flank pain and groin pain w/fevers    History of Present Illness:    Mirna Harris is a 63 y o  male who presents with pmh of cad, ureteral stone, htn, hld obesity coming to the hospital for left flank pain and groin pain with fever  Patient was just seen in in the [facility] ED on 6/27/2020  He was diagnosed with distal ureteral stone was given Toradol and then discharged home  He followed up with urology and was started on Flomax as well as Percocet  His wife reports that he has not been taking many analgesics and the patient reports that his pain is in the left flank and nonradiating but bearable  He also has some groin pain with increasing nocturia and some urgency which is new  He also has been having fevers and malaise and anorexia in myalgias  Has a cough but no sore throat congestion or sick contacts or COVID exposure  No diarrhea no rash no right upper quadrant abdominal pain    On the day of admission the patient had a fever 101 5  He has been having low-grade temperatures in the upper 90s per the patient's wife, and due to the going malaise and poor oral intake they came to the ED for evaluation  There was concern for sepsis with likely urologic source although UA is bland  Case was d/w urology by ED provider and admission recommended and to maintain NPO status for anticipated urologic procedure      Review of Systems:    Review of Systems   Constitutional: Positive for appetite change, chills, fatigue and fever  HENT: Negative for congestion and sore throat  Respiratory: Positive for cough  Negative for shortness of breath  Cardiovascular: Negative for chest pain  Gastrointestinal: Positive for abdominal pain and constipation  Negative for diarrhea, nausea and vomiting  Genitourinary: Positive for flank pain, frequency and urgency  Negative for dysuria  Neurological: Negative for light-headedness  All other systems reviewed and are negative  Past Medical and Surgical History:     Past Medical History:   Diagnosis Date    Acute myocardial infarction ([facility] [address] )     Arthralgia     last assessed 7/8/13    Contusion of skin with intact surface     of the left medial thigh,last assessed 6/28/13    Gout     last assessed 10/29/13    Lump of skin     last assessed 7/19/13       Past Surgical History:   Procedure Laterality Date    BACK SURGERY      COLONOSCOPY  12/10/2009    Complete    CORONARY STENT PLACEMENT  08/2012    stenting of the LAD artery 95% lesion to 0% residual stenosis    KNEE SURGERY         Meds/Allergies:    Prior to Admission medications    Medication Sig Start Date End Date Taking?  Authorizing Provider   allopurinol (ZYLOPRIM) 300 mg tablet TAKE ONE TABLET BY MOUTH EVERY DAY  2/26/20  Yes Clemente Marte DO   aspirin (ASPIR-81) 81 mg EC tablet Take 1 tablet by mouth daily   Yes Historical Provider, MD   atorvastatin (LIPITOR) 20 mg tablet TAKE ONE TABLET BY MOUTH EVERY DAY  2/26/20  Yes Clemente Marte DO   HYDROcodone-acetaminophen (NORCO) 5-325 mg per tablet Take 1 tablet by mouth every 6 (six) hours as needed for painMax Daily Amount: 4 tablets 6/30/20  Yes Jenna Patiño PA-C   metoprolol tartrate (LOPRESSOR) 25 mg tablet TAKE ONE TABLET BY MOUTH EVERY DAY  2/26/20  Yes Clemente Marte DO   olmesartan-hydrochlorothiazide (BENICAR HCT) 40-12 5 MG per tablet Take 0 5 tablets by mouth daily 11/13/19  Yes Clemente Marte,    tamsulosin (FLOMAX) 0 4 mg Take 1 capsule (0 4 mg total) by mouth daily with dinner 6/30/20  Yes Jenna Patiño PA-C   allopurinol (ZYLOPRIM) 100 mg tablet TAKE ONE TABLET BY MOUTH EVERY DAY  2/26/20   Judson Ohara DO     I have reviewed home medications with patient personally  Allergies: No Known Allergies    Social History:     Marital Status: /Civil Union   Occupation:   Patient Pre-hospital Living Situation:   Patient Pre-hospital Level of Mobility:   Patient Pre-hospital Diet Restrictions:   Substance Use History:   Social History     Substance and Sexual Activity   Alcohol Use Yes    Binge frequency: Weekly     Social History     Tobacco Use   Smoking Status Never Smoker   Smokeless Tobacco Never Used     Social History     Substance and Sexual Activity   Drug Use No       Family History:    Family History   Problem Relation Age of Onset    Edema Mother         Cerebral    Aneurysm Father         of the cerebellar artery    Aneurysm Brother         of the cerebellar artery       Physical Exam:     Vitals:   Blood Pressure: 129/58 (07/04/20 1900)  Pulse: (!) 107 (07/04/20 1900)  Temperature: (!) 100 8 °F (38 2 °C) (07/04/20 1611)  Temp Source: Temporal (07/04/20 1611)  Respirations: 18 (07/04/20 1900)  Weight - Scale: 91 8 kg (202 lb 6 1 oz) (07/04/20 1608)  SpO2: 100 % (07/04/20 1900)    Physical Exam   Constitutional: He appears well-developed and well-nourished  No distress  Ill appearing stated age nad   HENT:   Head: Normocephalic and atraumatic  Right Ear: External ear normal    Left Ear: External ear normal    Eyes: EOM are normal    Neck: Normal range of motion  Cardiovascular: Regular rhythm and normal heart sounds  Exam reveals no gallop and no friction rub  No murmur heard  Tachycardiac regular rhythm   Pulmonary/Chest: Effort normal  No respiratory distress  He has no wheezes  He has no rales  Diminished breath sounds w/coarse lung sounds in lung bases   Abdominal: Soft   He exhibits no distension and no mass  There is no tenderness  There is no guarding  No cva tenderness w/fist percussion   Musculoskeletal: He exhibits no edema  Neurological: He is alert  Skin: Skin is warm and dry  He is not diaphoretic  Psychiatric: He has a normal mood and affect  Vitals reviewed  (  Be Sure to Include Physical Exam: Delete this entire line when you have entered your exam)    Additional Data:     Lab Results: I have personally reviewed pertinent reports  Results from last 7 days   Lab Units 07/04/20  1652   WBC Thousand/uL 9 08   HEMOGLOBIN g/dL 13 7   HEMATOCRIT % 39 3   PLATELETS Thousands/uL 198   NEUTROS PCT % 81*   LYMPHS PCT % 9*   MONOS PCT % 8   EOS PCT % 2     Results from last 7 days   Lab Units 07/04/20  1652   SODIUM mmol/L 138   POTASSIUM mmol/L 3 5   CHLORIDE mmol/L 101   CO2 mmol/L 26   BUN mg/dL 28*   CREATININE mg/dL 2 31*   ANION GAP mmol/L 11   CALCIUM mg/dL 9 4   ALBUMIN g/dL 3 3*   TOTAL BILIRUBIN mg/dL 1 15*   ALK PHOS U/L 194*   ALT U/L 181*   AST U/L 121*   GLUCOSE RANDOM mg/dL 102     Results from last 7 days   Lab Units 07/04/20  1652   INR  0 92             Results from last 7 days   Lab Units 07/04/20  1652   LACTIC ACID mmol/L 1 0       Imaging: I have personally reviewed pertinent reports  XR chest 1 view portable   ED Interpretation by Anna Marie Stevenson DO (07/04 1821)   Poor inspiration, right side underinflated / atelectasis, no focal infiltrates      CT renal stone study abdomen pelvis without contrast   ED Interpretation by Anna Marie Stevenson DO (07/04 1912)   See below      Final Result by Jammie Cao MD (07/04 1908)      3 new calculi in the distal left ureter with new mild upstream hydronephroureterosis  While fat stranding adjacent to the left renal pelvis and ureter likely due to acute obstruction, pyelonephrosis is not excluded  The most distal and largest calculus    measures 4 mm located immediately proximal to the UVJ   No perinephric collection  See above discussion  Bilateral renal calculi measuring up to 3 mm on the right and 4 mm on the left  Cholelithiasis  Colonic diverticulosis  The study was marked in [facility] for immediate notification  Workstation performed: [id number]             EKG, Pathology, and Other Studies Reviewed on Admission:   · EKG:     Allscripts / Epic Records Reviewed: Yes     ** Please Note: This note has been constructed using a voice recognition system   **

## 2020-07-05 NOTE — ASSESSMENT & PLAN NOTE
Likely multifactorial from arb/hctz, poor po intake, hydronephrosis and nsaids  Check pvr x 1 urinary retention protocol  Hold offending agents, ivf hydration repeat bmp in am

## 2020-07-05 NOTE — ASSESSMENT & PLAN NOTE
Due to acute pyelonephritis from an obstructing left ureteral stone  Patient had a stent placed last night by urology and there was a significant amount of pus that drained after the stent was placed  Continue empiric abx with Rocephin      Follow urine cultures

## 2020-07-05 NOTE — PLAN OF CARE
Problem: Potential for Falls  Goal: Patient will remain free of falls  Description  INTERVENTIONS:  - Assess patient frequently for physical needs  -  Identify cognitive and physical deficits and behaviors that affect risk of falls    -  [place] fall precautions as indicated by assessment   - Educate patient/family on patient safety including physical limitations  - Instruct patient to call for assistance with activity based on assessment  - Modify environment to reduce risk of injury  - Consider OT/PT consult to assist with strengthening/mobility  Outcome: Progressing     Problem: PAIN - ADULT  Goal: Verbalizes/displays adequate comfort level or baseline comfort level  Description  Interventions:  - Encourage patient to monitor pain and request assistance  - Assess pain using appropriate pain scale  - Administer analgesics based on type and severity of pain and evaluate response  - Implement non-pharmacological measures as appropriate and evaluate response  - Consider cultural and social influences on pain and pain management  - Notify physician/advanced practitioner if interventions unsuccessful or patient reports new pain  Outcome: Progressing     Problem: INFECTION - ADULT  Goal: Absence or prevention of progression during hospitalization  Description  INTERVENTIONS:  - Assess and monitor for signs and symptoms of infection  - Monitor lab/diagnostic results  - Monitor all insertion sites, i e  indwelling lines, tubes, and drains  - Monitor endotracheal if appropriate and nasal secretions for changes in amount and color  - [place] appropriate cooling/warming therapies per order  - Administer medications as ordered  - Instruct and encourage patient and family to use good hand hygiene technique  - Identify and instruct in appropriate isolation precautions for identified infection/condition  Outcome: Progressing  Goal: Absence of fever/infection during neutropenic period  Description  INTERVENTIONS:  - Monitor WBC    Outcome: Progressing     Problem: SAFETY ADULT  Goal: Patient will remain free of falls  Description  INTERVENTIONS:  - Assess patient frequently for physical needs  -  Identify cognitive and physical deficits and behaviors that affect risk of falls    -  [place] fall precautions as indicated by assessment   - Educate patient/family on patient safety including physical limitations  - Instruct patient to call for assistance with activity based on assessment  - Modify environment to reduce risk of injury  - Consider OT/PT consult to assist with strengthening/mobility  Outcome: Progressing  Goal: Maintain or return to baseline ADL function  Description  INTERVENTIONS:  -  Assess patient's ability to carry out ADLs; assess patient's baseline for ADL function and identify physical deficits which impact ability to perform ADLs (bathing, care of mouth/teeth, toileting, grooming, dressing, etc )  - Assess/evaluate cause of self-care deficits   - Assess range of motion  - Assess patient's mobility; develop plan if impaired  - Assess patient's need for assistive devices and provide as appropriate  - Encourage maximum independence but intervene and supervise when necessary  - Involve family in performance of ADLs  - Assess for home care needs following discharge   - Consider OT consult to assist with ADL evaluation and planning for discharge  - Provide patient education as appropriate  Outcome: Progressing  Goal: Maintain or return mobility status to optimal level  Description  INTERVENTIONS:  - Assess patient's baseline mobility status (ambulation, transfers, stairs, etc )    - Identify cognitive and physical deficits and behaviors that affect mobility  - Identify mobility aids required to assist with transfers and/or ambulation (gait belt, sit-to-stand, lift, walker, cane, etc )  - [place] fall precautions as indicated by assessment  - Record patient progress and toleration of activity level on Mobility SBAR; progress patient to next Phase/Stage  - Instruct patient to call for assistance with activity based on assessment  - Consider rehabilitation consult to assist with strengthening/weightbearing, etc   Outcome: Progressing     Problem: DISCHARGE PLANNING  Goal: Discharge to home or other facility with appropriate resources  Description  INTERVENTIONS:  - Identify barriers to discharge w/patient and caregiver  - Arrange for needed discharge resources and transportation as appropriate  - Identify discharge learning needs (meds, wound care, etc )  - Arrange for interpretive services to assist at discharge as needed  - Refer to Case Management Department for coordinating discharge planning if the patient needs post-hospital services based on physician/advanced practitioner order or complex needs related to functional status, cognitive ability, or social support system  Outcome: Progressing     Problem: Knowledge Deficit  Goal: Patient/family/caregiver demonstrates understanding of disease process, treatment plan, medications, and discharge instructions  Description  Complete learning assessment and assess knowledge base  Interventions:  - Provide teaching at level of understanding  - Provide teaching via preferred learning methods  Outcome: Progressing     Problem: Nutrition/Hydration-ADULT  Goal: Nutrient/Hydration intake appropriate for improving, restoring or maintaining nutritional needs  Description  Monitor and assess patient's nutrition/hydration status for malnutrition  Collaborate with interdisciplinary team and initiate plan and interventions as ordered  Monitor patient's weight and dietary intake as ordered or per policy  Utilize nutrition screening tool and intervene as necessary  Determine patient's food preferences and provide high-protein, high-caloric foods as appropriate       INTERVENTIONS:  - Monitor oral intake, urinary output, labs, and treatment plans  - Assess nutrition and hydration status and recommend course of action  - Evaluate amount of meals eaten  - Assist patient with eating if necessary   - Allow adequate time for meals  - Recommend/ encourage appropriate diets, oral nutritional supplements, and vitamin/mineral supplements  - Order, calculate, and assess calorie counts as needed  - Recommend, monitor, and adjust tube feedings and TPN/PPN based on assessed needs  - Assess need for intravenous fluids  - Provide specific nutrition/hydration education as appropriate  - Include patient/family/caregiver in decisions related to nutrition  Outcome: Progressing     Problem: GENITOURINARY - ADULT  Goal: Maintains or returns to baseline urinary function  Description  INTERVENTIONS:  - Assess urinary function  - Encourage oral fluids to ensure adequate hydration if ordered  - Administer IV fluids as ordered to ensure adequate hydration  - Administer ordered medications as needed  - Offer frequent toileting  - Follow urinary retention protocol if ordered  Outcome: Progressing  Goal: Absence of urinary retention  Description  INTERVENTIONS:  - Assess patients ability to void and empty bladder  - Monitor I/O  - Bladder scan as needed  - Discuss with physician/AP medications to alleviate retention as needed  - Discuss catheterization for long term situations as appropriate  Outcome: Progressing     Problem: METABOLIC, FLUID AND ELECTROLYTES - ADULT  Goal: Electrolytes maintained within normal limits  Description  INTERVENTIONS:  - Monitor labs and assess patient for signs and symptoms of electrolyte imbalances  - Administer electrolyte replacement as ordered  - Monitor response to electrolyte replacements, including repeat lab results as appropriate  - Instruct patient on fluid and nutrition as appropriate  Outcome: Progressing  Goal: Fluid balance maintained  Description  INTERVENTIONS:  - Monitor labs   - Monitor I/O and WT  - Instruct patient on fluid and nutrition as appropriate  - Assess for signs & symptoms of volume excess or deficit  Outcome: Progressing  Goal: Glucose maintained within target range  Description  INTERVENTIONS:  - Monitor Blood Glucose as ordered  - Assess for signs and symptoms of hyperglycemia and hypoglycemia  - Administer ordered medications to maintain glucose within target range  - Assess nutritional intake and initiate nutrition service referral as needed  Outcome: Progressing

## 2020-07-05 NOTE — UTILIZATION REVIEW
Initial Clinical Review    Admission: Date/Time/Statement: Admission Orders (From admission, onward)     Ordered        07/04/20 1945  Inpatient Admission  Once                   Orders Placed This Encounter   Procedures    Inpatient Admission     Standing Status:   Standing     Number of Occurrences:   1     Order Specific Question:   Admitting Physician     Answer:   Vesta Huitorn [[id number]]     Order Specific Question:   Level of Care     Answer:   Med Surg [[id number]]     Order Specific Question:   Estimated length of stay     Answer:   More than 2 Midnights     Order Specific Question:   Certification     Answer:   I certify that inpatient services are medically necessary for this patient for a duration of greater than two midnights  See H&P and MD Progress Notes for additional information about the patient's course of treatment  ED Arrival Information     Expected Arrival Acuity Means of Arrival Escorted By Service Admission Type    - 7/4/2020 16:04 Urgent Walk-In Spouse Hospitalist Urgent    Arrival Complaint    fever        Chief Complaint   Patient presents with    Fever - 9 weeks to 74 years     Fever today  Intermittent cough for about one week  Reports was diagnosed with kidney stone last week  Minimal L flank, abdominal pain  Denies urinary symptoms  Ibuprofen taken at 0730  Assessment/Plan: this is a 63year old male from home to ED admitted inpatient due to  Ureteral stone/sepsis with suspected pyelonephritis/JENNIFER  Presented due fever and cough for last week  Seen last week, diagnosed with left kidney stone, now last couple day with increased fever to 101 5, anorexia and nausea  Has urinary frequency  On exam tachycardic and febrile  Bun 28, creatinine 2 31 with baseline 1 02  Lajean Cassette     Blood cultures done  CT abdomen showed calculi in distal left ureter with new hydroureteronephrosis, fat stranding with possible pyelonephritis  NPO, Flomax, IVF, IV antibiotics in progress  Allopurinol and statin on hold with transaminitis  Hold arb and HCTZ on hold with JENNIFER  Urology consulted    Per urology 7/4/2020 patient with ureteral calculi and fevers  Will need OR  7/4/2020 Procedure CYSTOSCOPY WITH INSERTION STENT URETERAL (Left)    On 7/5/2020 Patient has no complaints  On IVF, creatinine improved to 1 87  Urine and blood cultures pending, IV antibiotics continue  ED Triage Vitals [07/04/20 1611]   Temperature Pulse Respirations Blood Pressure SpO2   (!) 100 8 °F (38 2 °C) (!) 119 16 128/90 97 %      Temp Source Heart Rate Source Patient Position - Orthostatic VS BP Location FiO2 (%)   Temporal Monitor Sitting Right arm --      Pain Score       3        Wt Readings from Last 1 Encounters:   07/04/20 91 8 kg (202 lb 6 1 oz)     Additional Vital Signs:   07/05/20 0804  97 2 °F (36 2 °C)Abnormal   66  18  110/79  97 %        None (Room air)    Lying   07/05/20 0431  97 2 °F (36 2 °C)Abnormal   75  18  124/83  96 %        None (Room air)    Lying   07/05/20 0036  98 7 °F (37 1 °C)  84  18  119/68  98 %        None (Room air)    Lying   07/04/20 2338  98 9 °F (37 2 °C)  104  18  127/74  98 %  32    3 L/min  Nasal cannula       07/04/20 2321  99 4 °F (37 4 °C)  106Abnormal   18  106/60  97 %    6 L/min    Simple mask  WDL     07/04/20 2124  99 7 °F (37 6 °C)  114Abnormal   18  147/82  97 %    0 L/min    None (Room air)           Pertinent Labs/Diagnostic Test Results:   7/4/2020 CT renal stone study abdomen 3 new calculi in the distal left ureter with new mild upstream hydronephroureterosis  While fat stranding adjacent to the left renal pelvis and ureter likely due to acute obstruction, pyelonephrosis is not excluded  The most distal and largest calculus   measures 4 mm located immediately proximal to the UVJ  No perinephric collection  See above discussion    Bilateral renal calculi measuring up to 3 mm on the right and 4 mm on the left  Cholelithiasis    Colonic diverticulosis    7/4/2020 CxR No acute cardiopulmonary disease  Suboptimal inspiration    Results from last 7 days   Lab Units 07/04/20  1710   SARS-COV-2  Negative     Results from last 7 days   Lab Units 07/05/20  0543 07/04/20  1652   WBC Thousand/uL 6 57 9 08   HEMOGLOBIN g/dL 12 1 13 7   HEMATOCRIT % 35 4* 39 3   PLATELETS Thousands/uL 193 198   NEUTROS ABS Thousands/µL 5 82 7 26     Results from last 7 days   Lab Units 07/05/20  0543 07/04/20  1652   SODIUM mmol/L 138 138   POTASSIUM mmol/L 4 3 3 5   CHLORIDE mmol/L 104 101   CO2 mmol/L 24 26   ANION GAP mmol/L 10 11   BUN mg/dL 26* 28*   CREATININE mg/dL 1 87* 2 31*   EGFR ml/min/1 73sq m 38 29   CALCIUM mg/dL 8 5 9 4     Results from last 7 days   Lab Units 07/05/20  0543 07/04/20  1652   AST U/L 86* 121*   ALT U/L 143* 181*   ALK PHOS U/L 160* 194*   TOTAL PROTEIN g/dL 6 8 8 0   ALBUMIN g/dL 2 7* 3 3*   TOTAL BILIRUBIN mg/dL 0 95 1 15*     Results from last 7 days   Lab Units 07/05/20  0543 07/04/20  1652   GLUCOSE RANDOM mg/dL 149* 102     Results from last 7 days   Lab Units 07/04/20  1652   TROPONIN I ng/mL <0 02     Results from last 7 days   Lab Units 07/04/20  1652   PROTIME seconds 12 5   INR  0 92   PTT seconds 31     Results from last 7 days   Lab Units 07/04/20  1652   PROCALCITONIN ng/ml 0 09     Results from last 7 days   Lab Units 07/04/20  1652   LACTIC ACID mmol/L 1 0     Results from last 7 days   Lab Units 07/05/20  0543   LIPASE u/L 119     Results from last 7 days   Lab Units 07/04/20  1720 06/30/20  1303   CLARITY UA  Clear clear   COLOR UA  Yellow ana   SPEC GRAV UA  1 025  --    PH UA  5 5  --    GLUCOSE UA mg/dl Negative -   KETONES UA mg/dl Negative ++   BLOOD UA  Negative +   PROTEIN UA mg/dl Negative -   NITRITE UA  Negative -   BILIRUBIN UA  Negative  --    BILIRUBIN UA POC   --  -   UROBILINOGEN UA E U /dl 0 2 -   LEUKOCYTES UA  Negative -   WBC UA /hpf 2-4*  --    RBC UA /hpf None Seen  --    BACTERIA UA /hpf None Seen  -- EPITHELIAL CELLS WET PREP /hpf Occasional  --      Results from last 7 days   Lab Units 07/04/20  1706 07/04/20  1702   BLOOD CULTURE  Received in Microbiology Lab  Culture in Progress  Received in Microbiology Lab  Culture in Progress  ED Treatment:   Medication Administration from 07/04/2020 1603 to 07/04/2020 2100       Date/Time Order Dose Route Action Comments     07/04/2020 1654 sodium chloride 0 9 % bolus 1,000 mL 1,000 mL Intravenous New Bag      07/04/2020 2033 cefepime (MAXIPIME) 2 g/50 mL dextrose IVPB 2,000 mg Intravenous New Bag         Past Medical History:   Diagnosis Date    [facility]     Arthralgia     last assessed 7/8/13    Contusion of skin with intact surface     of the left medial thigh,last assessed 6/28/13    Gout     last assessed 10/29/13    Lump of skin     last assessed 7/19/13     Present on Admission:  **None**      Admitting Diagnosis: Ureteral stone [N20 1]  Ureteric stone [N20 1]  Hydroureteronephrosis [N13 30]  Fever [R50 9]  JENNIFER (acute kidney injury) ([facility] [address] ) [N17 9]  Age/Sex: 63 y o  male  Admission Orders: 7/4/2020 1945 inpatient   Scheduled Medications:  Medications:  aspirin 81 mg Oral Daily   cefTRIAXone 1,000 mg Intravenous Q24H   heparin (porcine) 5,000 Units Subcutaneous Q8H [facility]   melatonin 6 mg Oral HS   metoprolol tartrate 25 mg Oral Daily   tamsulosin 0 4 mg Oral Daily With Dinner     Continuous IV Infusions:  sodium chloride 125 mL/hr Intravenous Continuous     PRN Meds: not used   acetaminophen 325 mg Oral Q6H PRN   HYDROmorphone 0 5 mg Intravenous Q4H PRN   ondansetron 4 mg Intravenous Q6H PRN   oxyCODONE 10 mg Oral Q4H PRN       IP CONSULT TO UROLOGY      Network Utilization Review Department  [email or web address]  ATTENTION: Please call with any questions or concerns to [phone] and carefully listen to the prompts so that you are directed to the right person   All voicemails are confidential   Kristal Skipper all requests for admission clinical reviews, approved or denied determinations and any other requests to dedicated fax number below belonging to the campus where the patient is receiving treatment   List of dedicated fax numbers for the Facilities:  [address] [place] [address] DENIALS (Administrative/Medical Necessity) [phone]   [address]  (Maternity/NICU/Pediatrics) [phone]   Carrie Mercado [phone]   Shaheed Tabor [phone]   Lida Kiser [phone]   Vita Castillo [phone]   [address] [place] [address] [phone]   [facility]  [phone]   [address] [facility]  [address] [facility] [address] [facility] [phone]

## 2020-07-05 NOTE — PLAN OF CARE
Problem: Potential for Falls  Goal: Patient will remain free of falls  Description  INTERVENTIONS:  - Assess patient frequently for physical needs  -  Identify cognitive and physical deficits and behaviors that affect risk of falls    -  [place] fall precautions as indicated by assessment   - Educate patient/family on patient safety including physical limitations  - Instruct patient to call for assistance with activity based on assessment  - Modify environment to reduce risk of injury  - Consider OT/PT consult to assist with strengthening/mobility  Outcome: Progressing     Problem: PAIN - ADULT  Goal: Verbalizes/displays adequate comfort level or baseline comfort level  Description  Interventions:  - Encourage patient to monitor pain and request assistance  - Assess pain using appropriate pain scale  - Administer analgesics based on type and severity of pain and evaluate response  - Implement non-pharmacological measures as appropriate and evaluate response  - Consider cultural and social influences on pain and pain management  - Notify physician/advanced practitioner if interventions unsuccessful or patient reports new pain  Outcome: Progressing     Problem: INFECTION - ADULT  Goal: Absence or prevention of progression during hospitalization  Description  INTERVENTIONS:  - Assess and monitor for signs and symptoms of infection  - Monitor lab/diagnostic results  - Monitor all insertion sites, i e  indwelling lines, tubes, and drains  - Monitor endotracheal if appropriate and nasal secretions for changes in amount and color  - [place] appropriate cooling/warming therapies per order  - Administer medications as ordered  - Instruct and encourage patient and family to use good hand hygiene technique  - Identify and instruct in appropriate isolation precautions for identified infection/condition  Outcome: Progressing  Goal: Absence of fever/infection during neutropenic period  Description  INTERVENTIONS:  - Monitor WBC    Outcome: Progressing     Problem: SAFETY ADULT  Goal: Patient will remain free of falls  Description  INTERVENTIONS:  - Assess patient frequently for physical needs  -  Identify cognitive and physical deficits and behaviors that affect risk of falls    -  [place] fall precautions as indicated by assessment   - Educate patient/family on patient safety including physical limitations  - Instruct patient to call for assistance with activity based on assessment  - Modify environment to reduce risk of injury  - Consider OT/PT consult to assist with strengthening/mobility  Outcome: Progressing  Goal: Maintain or return to baseline ADL function  Description  INTERVENTIONS:  -  Assess patient's ability to carry out ADLs; assess patient's baseline for ADL function and identify physical deficits which impact ability to perform ADLs (bathing, care of mouth/teeth, toileting, grooming, dressing, etc )  - Assess/evaluate cause of self-care deficits   - Assess range of motion  - Assess patient's mobility; develop plan if impaired  - Assess patient's need for assistive devices and provide as appropriate  - Encourage maximum independence but intervene and supervise when necessary  - Involve family in performance of ADLs  - Assess for home care needs following discharge   - Consider OT consult to assist with ADL evaluation and planning for discharge  - Provide patient education as appropriate  Outcome: Progressing  Goal: Maintain or return mobility status to optimal level  Description  INTERVENTIONS:  - Assess patient's baseline mobility status (ambulation, transfers, stairs, etc )    - Identify cognitive and physical deficits and behaviors that affect mobility  - Identify mobility aids required to assist with transfers and/or ambulation (gait belt, sit-to-stand, lift, walker, cane, etc )  - [place] fall precautions as indicated by assessment  - Record patient progress and toleration of activity level on Mobility SBAR; progress patient to next Phase/Stage  - Instruct patient to call for assistance with activity based on assessment  - Consider rehabilitation consult to assist with strengthening/weightbearing, etc   Outcome: Progressing     Problem: DISCHARGE PLANNING  Goal: Discharge to home or other facility with appropriate resources  Description  INTERVENTIONS:  - Identify barriers to discharge w/patient and caregiver  - Arrange for needed discharge resources and transportation as appropriate  - Identify discharge learning needs (meds, wound care, etc )  - Arrange for interpretive services to assist at discharge as needed  - Refer to Case Management Department for coordinating discharge planning if the patient needs post-hospital services based on physician/advanced practitioner order or complex needs related to functional status, cognitive ability, or social support system  Outcome: Progressing     Problem: Knowledge Deficit  Goal: Patient/family/caregiver demonstrates understanding of disease process, treatment plan, medications, and discharge instructions  Description  Complete learning assessment and assess knowledge base  Interventions:  - Provide teaching at level of understanding  - Provide teaching via preferred learning methods  Outcome: Progressing     Problem: Nutrition/Hydration-ADULT  Goal: Nutrient/Hydration intake appropriate for improving, restoring or maintaining nutritional needs  Description  Monitor and assess patient's nutrition/hydration status for malnutrition  Collaborate with interdisciplinary team and initiate plan and interventions as ordered  Monitor patient's weight and dietary intake as ordered or per policy  Utilize nutrition screening tool and intervene as necessary  Determine patient's food preferences and provide high-protein, high-caloric foods as appropriate       INTERVENTIONS:  - Monitor oral intake, urinary output, labs, and treatment plans  - Assess nutrition and hydration status and recommend course of action  - Evaluate amount of meals eaten  - Assist patient with eating if necessary   - Allow adequate time for meals  - Recommend/ encourage appropriate diets, oral nutritional supplements, and vitamin/mineral supplements  - Order, calculate, and assess calorie counts as needed  - Recommend, monitor, and adjust tube feedings and TPN/PPN based on assessed needs  - Assess need for intravenous fluids  - Provide specific nutrition/hydration education as appropriate  - Include patient/family/caregiver in decisions related to nutrition  Outcome: Progressing     Problem: GENITOURINARY - ADULT  Goal: Maintains or returns to baseline urinary function  Description  INTERVENTIONS:  - Assess urinary function  - Encourage oral fluids to ensure adequate hydration if ordered  - Administer IV fluids as ordered to ensure adequate hydration  - Administer ordered medications as needed  - Offer frequent toileting  - Follow urinary retention protocol if ordered  Outcome: Progressing  Goal: Absence of urinary retention  Description  INTERVENTIONS:  - Assess patients ability to void and empty bladder  - Monitor I/O  - Bladder scan as needed  - Discuss with physician/AP medications to alleviate retention as needed  - Discuss catheterization for long term situations as appropriate  Outcome: Progressing     Problem: METABOLIC, FLUID AND ELECTROLYTES - ADULT  Goal: Electrolytes maintained within normal limits  Description  INTERVENTIONS:  - Monitor labs and assess patient for signs and symptoms of electrolyte imbalances  - Administer electrolyte replacement as ordered  - Monitor response to electrolyte replacements, including repeat lab results as appropriate  - Instruct patient on fluid and nutrition as appropriate  Outcome: Progressing  Goal: Fluid balance maintained  Description  INTERVENTIONS:  - Monitor labs   - Monitor I/O and WT  - Instruct patient on fluid and nutrition as appropriate  - Assess for signs & symptoms of volume excess or deficit  Outcome: Progressing  Goal: Glucose maintained within target range  Description  INTERVENTIONS:  - Monitor Blood Glucose as ordered  - Assess for signs and symptoms of hyperglycemia and hypoglycemia  - Administer ordered medications to maintain glucose within target range  - Assess nutritional intake and initiate nutrition service referral as needed  Outcome: Progressing

## 2020-07-05 NOTE — ASSESSMENT & PLAN NOTE
Suspected pyelonephritis by ct a/p and worsening nocturia and urgency in pt w/fevers after recent diagnosis of ureteral stone  ua bland, however would not be surprised if pt has upstream infection proximal to occlusive ureteral stones  rec'd cefpime in ed continue w/rocephin

## 2020-07-05 NOTE — OP NOTE
OPERATIVE REPORT  PATIENT NAME: Real Brunner Schlaffer    :  1960  MRN: [id number]  Pt Location: [facility] OR ROOM 08    SURGERY DATE: 2020    Surgeon(s) and Role:     Beverly Salgado MD - Primary    Preop Diagnosis:  Ureteric stone [N20 1]    Post-Op Diagnosis Codes:     * Ureteric stone [N20 1]    Procedure(s) (LRB):  CYSTOSCOPY WITH INSERTION STENT URETERAL (Left)    Specimen(s):  ID Type Source Tests Collected by Time Destination   A : Culture Urine Urine, Cystoscopic URINE CULTURE Jesenia Gonzales MD 2020 [id number]        Estimated Blood Loss:   0 mL    Drains:  Urethral Catheter Non-latex 16 Fr  (Active)   Number of days: 0       Ureteral Drain/Stent Left ureter 6 Fr  (Active)   Number of days: 0       Anesthesia Type:   Choice    Operative Indications:  Ureteric stone [N20 1]      Operative Findings:  Stent was very difficult to push up  There was a lot of bloody purulent urine behind the stones  Complications:   None    Procedure and Technique:  After informed consent was obtained the patient was brought to the operating room  Preoperative antibiotics were given for infection prophylaxis and bilateral sequential compressive devices placed on lower extremities for DVT prophylaxis  Time-out was performed to identify the patient, surgery to be performed, and correct laterality  The patient was then placed under general anesthesia prepped and draped in standard sterile fashion in dorsal lithotomy position  A 22 [place] rigid cystoscope was inserted per urethra into the bladder and the left ureteral orifice was identified  A guidewire was cannulated through the orifice and up into the renal pelvis under fluoroscopic guidance  There was return of bloody, purulent urine  An access catheter was placed over the wire and a specimen was obtained from inside the renal pelvis for urine culture  The wire was replaced and a 6 x 26 double-J stent was placed over the guidewire    This was very difficult to push up and was buckling but we were able to finally get up into the kidney  Once it was up and the wire had been removed it looked like it was draining the purulent urine  The cystoscope was then removed and a Lenz catheter was placed  He was then woken and taken to the postanesthesia care unit stable condition     I was present for the entire procedure    Patient Disposition:  PACU     SIGNATURE: Sri Sosa MD  DATE: July 4, 2020  TIME: 11:05 PM

## 2020-07-05 NOTE — ASSESSMENT & PLAN NOTE
Poa   Temp of 100 8 bt per pt/wife was 101 5 prehospital and tachycardia  Lactic acid wnl  Consider 2* pyelonephritis given recent stone and now low grade temps despite normal UA or bacteremia  transaminitis noted in setting of statin/allopurinol and obesity, but no RUQ pain or beard's sign to suggest active biliary infection      cxr demonstrates lung crowding in right lung but clear to auscultation b/l and covid negative  rec'd cefepime in ed  Continue w/rocephin  F/u bcx

## 2020-07-05 NOTE — ANESTHESIA PREPROCEDURE EVALUATION
Review of Systems/Medical History  Patient summary reviewed  Chart reviewed      Cardiovascular  Hyperlipidemia, Hypertension , CAD , Cardiac stents    Comment: PVC's,  Pulmonary       GI/Hepatic  Negative GI/hepatic ROS          Kidney stones, Kidney disease ,        Endo/Other    Obesity    GYN  Negative gynecology ROS          Hematology   Musculoskeletal  Gout,        Neurology  Negative neurology ROS      Psychology   Negative psychology ROS              Physical Exam    Airway    Mallampati score: II  TM Distance: >3 FB  Neck ROM: full     Dental   No notable dental hx     Cardiovascular  Rhythm: regular, Rate: normal, Cardiovascular exam normal    Pulmonary  Pulmonary exam normal Breath sounds clear to auscultation,     Other Findings        Anesthesia Plan  ASA Score- 3 Emergent    Anesthesia Type- general with ASA Monitors  Additional Monitors:   Airway Plan: ETT  Plan Factors-    Induction- intravenous  Postoperative Plan- Plan for postoperative opioid use  Informed Consent- Anesthetic plan and risks discussed with patient

## 2020-07-05 NOTE — PROGRESS NOTES
UROLOGY PROGRESS NOTE   Patient Identifiers: Maddie Remy (MRN [id number])  Date of Service: 7/5/2020    Subjective:     24 HR EVENTS:   no significant events  Patient has  no complaints  Objective:     VITALS:    Vitals:    07/05/20 0804   BP: 110/79   Pulse: 66   Resp: 18   Temp: (!) 97 2 °F (36 2 °C)   SpO2: 97%       INS & OUTS:  I/O last 24 hours:   In: 2511 7 [I V :1311 7; IV Piggyback:1200]  Out: 775 [Urine:775]    LABS:  Lab Results   Component Value Date    HGB 12 1 07/05/2020    HCT 35 4 (L) 07/05/2020    WBC 6 57 07/05/2020     07/05/2020   ]    Lab Results   Component Value Date     06/22/2018    K 4 3 07/05/2020     07/05/2020    CO2 24 07/05/2020    BUN 26 (H) 07/05/2020    CREATININE 1 87 (H) 07/05/2020    CALCIUM 8 5 07/05/2020   ]    INPATIENT MEDS:    Current Facility-Administered Medications:     acetaminophen (TYLENOL) tablet 325 mg, 325 mg, Oral, Q6H PRN, Susan Olmstead PA-C    aspirin (ECOTRIN LOW STRENGTH) EC tablet 81 mg, 81 mg, Oral, Daily, Latisha Alcaraz MD, 81 mg at 07/05/20 0904    cefTRIAXone (ROCEPHIN) 1,000 mg in dextrose 5 % 50 mL IVPB, 1,000 mg, Intravenous, Q24H, Susan Olmstead PA-C, Last Rate: 100 mL/hr at 07/05/20 0904, 1,000 mg at 07/05/20 0904    heparin (porcine) subcutaneous injection 5,000 Units, 5,000 Units, Subcutaneous, Q8H [address], 5,000 Units at 07/05/20 0517 **AND** [CANCELED] Platelet count, , , Once, Susan Olmstead PA-C    HYDROmorphone (DILAUDID) injection 0 5 mg, 0 5 mg, Intravenous, Q4H PRN, Latisha Alcaraz MD    melatonin tablet 6 mg, 6 mg, Oral, HS, Estelle Gooden PA-C, 6 mg at 07/05/20 0122    metoprolol tartrate (LOPRESSOR) tablet 25 mg, 25 mg, Oral, Daily, Latisha Alcaraz MD, 25 mg at 07/05/20 0904    ondansetron (ZOFRAN) injection 4 mg, 4 mg, Intravenous, Q6H PRN, Susan Olmstead PA-C    oxyCODONE (ROXICODONE) immediate release tablet 10 mg, 10 mg, Oral, Q4H PRN, Latisha Alcaraz MD    sodium chloride 0 9 % infusion, 125 mL/hr, Intravenous, Continuous, Susan Olmstead PA-C, Last Rate: 125 mL/hr at 07/05/20 0904, 125 mL/hr at 07/05/20 3681    sodium chloride 0 9 % infusion, 125 mL/hr, Intravenous, Continuous, Mar Ochoa MD, Last Rate: 100 mL/hr at 07/04/20 2227    tamsulosin (FLOMAX) capsule 0 4 mg, 0 4 mg, Oral, Daily With Young Conde MD      Physical Exam:     GEN: alert and oriented x 3    RESP: breathing comfortably with no accessory muscle use    ABD: soft, non-tender, non-distended   EXT: no significant peripheral edema   LESTER: In place draining tea-colored urine  no clots      Assessment:   Postop day 1  Status post cystoscopy and left ureteral stent placement    Plan:     Continue antibiotics and await culture results  Will discontinue Lester catheter  Patient will need outpatient follow-up for definitive stone treatment

## 2020-07-05 NOTE — CONSULTS
UROLOGY CONSULTATION NOTE     Patient Identifiers: Nitin Harden (MRN [id number])  Service Requesting Consultation:  Medicine  Service Providing Consultation:  Urology, Payal Jacobson MD    Date of Service: 7/4/2020  Inpatient consult to Urology  Consult performed by: Payal Jacobson MD  Consult ordered by: Gt Ribera PA-C          Reason for Consultation:  Ureteral calculus with fevers    History of Present Illness:     Nitin Harden is a 63 y o  old with a history of nephrolithiasis who presented to the ER complaining of left flank pain, groin pain, and fever  Patient was in the emergency department on the 27th of June with left-sided pain and was diagnosed with a distal stone for which he was trying to pass at home  Patient states that he has been having some on and off fevers for the past week but it has been getting worse  He states today his temperature got up to 101 5 and therefore he decided to come to the ER      Past Medical, Past Surgical History:     Past Medical History:   Diagnosis Date    Acute myocardial infarction ([address] )     Arthralgia     last assessed 7/8/13    Contusion of skin with intact surface     of the left medial thigh,last assessed 6/28/13    Gout     last assessed 10/29/13    Lump of skin     last assessed 7/19/13   :    Past Surgical History:   Procedure Laterality Date    BACK SURGERY      COLONOSCOPY  12/10/2009    Complete    CORONARY STENT PLACEMENT  08/2012    stenting of the LAD artery 95% lesion to 0% residual stenosis    KNEE SURGERY     :    Medications, Allergies:     Current Facility-Administered Medications   Medication Dose Route Frequency    [START ON 7/5/2020] aspirin (ECOTRIN LOW STRENGTH) EC tablet 81 mg  81 mg Oral Daily    HYDROmorphone (DILAUDID) injection 0 5 mg  0 5 mg Intravenous Q4H PRN    [START ON 7/5/2020] metoprolol tartrate (LOPRESSOR) tablet 25 mg  25 mg Oral Daily    oxyCODONE (ROXICODONE) immediate release tablet 10 mg  10 mg Oral Q4H PRN    sodium chloride 0 9 % infusion  125 mL/hr Intravenous Continuous    [START ON 2020] tamsulosin (FLOMAX) capsule 0 4 mg  0 4 mg Oral Daily With Dinner       Allergies:  No Known Allergies:    Social and Family History:   Social History:   Social History     Tobacco Use    Smoking status: Never Smoker    Smokeless tobacco: Never Used   Substance Use Topics    Alcohol use: Yes     Binge frequency: Weekly    Drug use: No        Social History     Tobacco Use   Smoking Status Never Smoker   Smokeless Tobacco Never Used       Family History:  Family History   Problem Relation Age of Onset    Edema Mother         Cerebral    Aneurysm Father         of the cerebellar artery    Aneurysm Brother         of the cerebellar artery   :     Review of Systems:     General: Fever, chills, or night sweats: positive  Cardiac: Negative for chest pain  Pulmonary: Negative for shortness of breath  Gastrointestinal: Abdominal pain positive  Nausea, vomiting, or diarrhea positive,  Genitourinary: See HPI above  Patient does not have hematuria  All other systems queried were negative  Physical Exam:   General: Patient is pleasant and in NAD  Awake and alert  /82 (BP Location: Right arm)   Pulse (!) 114   Temp 99 7 °F (37 6 °C) (Tympanic)   Resp 18   Wt 91 8 kg (202 lb 6 1 oz)   SpO2 97%   BMI 30 77 kg/m² Temp (24hrs), Av 3 °F (37 9 °C), Min:99 7 °F (37 6 °C), Max:100 8 °F (38 2 °C)  current; Temperature: 99 7 °F (37 6 °C)  I/O last 24 hours: In: 1000 [IV Piggyback:1000]  Out: -   Cardiac: Peripheral edema: negative  Pulmonary: Non-labored breathing  Abdomen: Soft, non-tender, non-distended  No surgical scars  No masses, tenderness, hernias noted  Genitourinary: Positive CVA tenderness, negative suprapubic tenderness      Labs:     Lab Results   Component Value Date    HGB 13 7 2020    HCT 39 3 2020    WBC 9 08 2020     2020   ]    Lab Results   Component Value Date     06/22/2018    K 3 5 07/04/2020     07/04/2020    CO2 26 07/04/2020    BUN 28 (H) 07/04/2020    CREATININE 2 31 (H) 07/04/2020    CALCIUM 9 4 07/04/2020   ]    Imaging:   I personally reviewed the images and report of the following studies, and reviewed them with the patient:    CT Abdomen/Pelvis: Three left distal ureteral calculus with associated hydronephrosis  There are nonobstructing stones in both kidneys  ASSESSMENT:     63 y o  old male with  left ureteral calculi and fevers  PLAN:     Will proceed to the OR for cystoscopy and left ureteral stent placement urgently  Risks and benefits were discussed and he was consented  He understands that he will need a for follow-up surgery as an outpatient to treat the stones  He will need to stay admitted until culture results are finalized and he has remained afebrile  We will set up outpatient surgery for stone treatment once infection has resolved  Thank you for allowing me to participate in this patients care  Please do not hesitate to call with any additional questions  Tanner Hernandez MD    Total time of encounter was 80 minutes, of which 50 minutes was spent on counseling

## 2020-07-05 NOTE — PROGRESS NOTES
Progress Note Mode Jean Baptistejohny 1960, 63 y o  male MRN: [id number]    Unit/Bed#: E5 -01 Encounter: [id number]    Primary Care Provider: Lisseth Campos DO   Date and time admitted to hospital: 2020  4:16 PM        Sepsis [facility])  Assessment & Plan  Due to acute pyelonephritis from an obstructing left ureteral stone  Patient had a stent placed last night by urology and there was a significant amount of pus that drained after the stent was placed  Continue empiric abx with Rocephin  Follow urine cultures    Pyelonephritis  Assessment & Plan  As above  Await urine cultures  Continue empiric Rocephin    * Ureteral stone  Assessment & Plan  Had urgent cystoscopy with stent placement last night  appreicate urology help    JENNIFER (acute kidney injury) [facility])  Assessment & Plan  Due to obstructing kidney stone    This is resolving            Subjective:   Feels better  No flank pain  No dysuria  No fever nor chills  Objective:     Vitals:   Temp (24hrs), Av 8 °F (37 1 °C), Min:97 2 °F (36 2 °C), Max:100 8 °F (38 2 °C)    Temp:  [97 2 °F (36 2 °C)-100 8 °F (38 2 °C)] 97 2 °F (36 2 °C)  HR:  [] 66  Resp:  [16-19] 18  BP: (106-147)/(58-90) 110/79  SpO2:  [96 %-100 %] 97 %  Body mass index is 30 77 kg/m²  Input and Output Summary (last 24 hours): Intake/Output Summary (Last 24 hours) at 2020 1442  Last data filed at 2020 1110  Gross per 24 hour   Intake 2511 67 ml   Output 775 ml   Net 1736 67 ml       Physical Exam:     Physical Exam   HENT:   Head: Normocephalic and atraumatic  Eyes: Pupils are equal, round, and reactive to light  EOM are normal    Cardiovascular: Normal rate and regular rhythm  Exam reveals no gallop and no friction rub  No murmur heard  Pulmonary/Chest: Effort normal and breath sounds normal  He has no wheezes  He has no rales  Abdominal: Soft  Bowel sounds are normal  There is no tenderness  Musculoskeletal: He exhibits no edema     No CVA tenderness     Nursing note and vitals reviewed          Additional Data:     Labs:    Results from last 7 days   Lab Units 07/05/20  0543   WBC Thousand/uL 6 57   HEMOGLOBIN g/dL 12 1   HEMATOCRIT % 35 4*   PLATELETS Thousands/uL 193   NEUTROS PCT % 89*   LYMPHS PCT % 8*   MONOS PCT % 3*   EOS PCT % 0     Results from last 7 days   Lab Units 07/05/20  0543   POTASSIUM mmol/L 4 3   CHLORIDE mmol/L 104   CO2 mmol/L 24   BUN mg/dL 26*   CREATININE mg/dL 1 87*   CALCIUM mg/dL 8 5   ALK PHOS U/L 160*   ALT U/L 143*   AST U/L 86*     Results from last 7 days   Lab Units 07/04/20  1652   INR  0 92                   * I Have Reviewed All Lab Data     Recent Cultures (last 7 days):     Results from last 7 days   Lab Units 07/04/20  1706 07/04/20  1702   BLOOD CULTURE  Received in Microbiology Lab  Culture in Progress  Received in Microbiology Lab  Culture in Progress           Last 24 Hours Medication List:     Current Facility-Administered Medications:  acetaminophen 325 mg Oral Q6H PRN Susan Olmstead PA-C    aspirin 81 mg Oral Daily Latisha Alcaraz MD    cefTRIAXone 1,000 mg Intravenous Q24H Susan Olmstead PA-C Last Rate: 1,000 mg (07/05/20 0904)   heparin (porcine) 5,000 Units Subcutaneous Q8H [address] Susan Olmstead PA-C    HYDROmorphone 0 5 mg Intravenous Q4H PRN Latisha Alcaraz MD    melatonin 6 mg Oral HS Estelle Gooden PA-C    metoprolol tartrate 25 mg Oral Daily Latisha Alcaraz MD    ondansetron 4 mg Intravenous Q6H PRN Susan Olmstead PA-C    oxyCODONE 10 mg Oral Q4H PRN Latisha Alcaraz MD    sodium chloride 125 mL/hr Intravenous Continuous Susan Olmstead PA-C Last Rate: 125 mL/hr (07/05/20 0904)   sodium chloride 125 mL/hr Intravenous Continuous Latisha Alcaraz MD Last Rate: 100 mL/hr (07/04/20 2227)   tamsulosin 0 4 mg Oral Daily With Dennis Niño MD          VTE Pharmacologic Prophylaxis:   Pharmacologic: Heparin      Current Length of Stay: 1 day(s)    Current Patient Status: Inpatient Discharge Plan: may be able to go home tomorrow if ok with urology  Still awaiting final urine cultures    Code Status: Level 1 - Full Code           Today, Patient Was Seen By: Savanah Mead DO    ** Please Note: Dictation voice to text software may have been used in the creation of this document   **

## 2020-07-06 ENCOUNTER — TELEPHONE (OUTPATIENT)
Dept: OTHER | Facility: HOSPITAL | Age: 60
End: 2020-07-06

## 2020-07-06 VITALS
BODY MASS INDEX: 30.77 KG/M2 | HEART RATE: 64 BPM | SYSTOLIC BLOOD PRESSURE: 101 MMHG | TEMPERATURE: 97.2 F | RESPIRATION RATE: 18 BRPM | OXYGEN SATURATION: 95 % | WEIGHT: 202.38 LBS | DIASTOLIC BLOOD PRESSURE: 67 MMHG

## 2020-07-06 PROBLEM — A41.9 SEPSIS (HCC): Status: RESOLVED | Noted: 2020-07-04 | Resolved: 2020-07-06

## 2020-07-06 PROBLEM — N17.9 AKI (ACUTE KIDNEY INJURY) (HCC): Status: RESOLVED | Noted: 2020-07-04 | Resolved: 2020-07-06

## 2020-07-06 LAB
ANION GAP SERPL CALCULATED.3IONS-SCNC: 6 MMOL/L (ref 4–13)
ATRIAL RATE: 109 BPM
BACTERIA UR CULT: NORMAL
BASOPHILS # BLD AUTO: 0.03 THOUSANDS/ΜL (ref 0–0.1)
BASOPHILS NFR BLD AUTO: 1 % (ref 0–1)
BUN SERPL-MCNC: 22 MG/DL (ref 5–25)
CALCIUM SERPL-MCNC: 8 MG/DL (ref 8.3–10.1)
CHLORIDE SERPL-SCNC: 108 MMOL/L (ref 100–108)
CO2 SERPL-SCNC: 27 MMOL/L (ref 21–32)
CREAT SERPL-MCNC: 1.24 MG/DL (ref 0.6–1.3)
EOSINOPHIL # BLD AUTO: 0.2 THOUSAND/ΜL (ref 0–0.61)
EOSINOPHIL NFR BLD AUTO: 3 % (ref 0–6)
ERYTHROCYTE [DISTWIDTH] IN BLOOD BY AUTOMATED COUNT: 12 % (ref 11.6–15.1)
GFR SERPL CREATININE-BSD FRML MDRD: 62 ML/MIN/1.73SQ M
GLUCOSE SERPL-MCNC: 100 MG/DL (ref 65–140)
HCT VFR BLD AUTO: 33.4 % (ref 36.5–49.3)
HGB BLD-MCNC: 11.2 G/DL (ref 12–17)
IMM GRANULOCYTES # BLD AUTO: 0.03 THOUSAND/UL (ref 0–0.2)
IMM GRANULOCYTES NFR BLD AUTO: 1 % (ref 0–2)
LYMPHOCYTES # BLD AUTO: 1.47 THOUSANDS/ΜL (ref 0.6–4.47)
LYMPHOCYTES NFR BLD AUTO: 23 % (ref 14–44)
MCH RBC QN AUTO: 31.5 PG (ref 26.8–34.3)
MCHC RBC AUTO-ENTMCNC: 33.5 G/DL (ref 31.4–37.4)
MCV RBC AUTO: 94 FL (ref 82–98)
MONOCYTES # BLD AUTO: 0.44 THOUSAND/ΜL (ref 0.17–1.22)
MONOCYTES NFR BLD AUTO: 7 % (ref 4–12)
NEUTROPHILS # BLD AUTO: 4.29 THOUSANDS/ΜL (ref 1.85–7.62)
NEUTS SEG NFR BLD AUTO: 65 % (ref 43–75)
NRBC BLD AUTO-RTO: 0 /100 WBCS
P AXIS: 20 DEGREES
PLATELET # BLD AUTO: 202 THOUSANDS/UL (ref 149–390)
PMV BLD AUTO: 10.1 FL (ref 8.9–12.7)
POTASSIUM SERPL-SCNC: 4 MMOL/L (ref 3.5–5.3)
PR INTERVAL: 138 MS
QRS AXIS: 97 DEGREES
QRSD INTERVAL: 100 MS
QT INTERVAL: 312 MS
QTC INTERVAL: 420 MS
RBC # BLD AUTO: 3.56 MILLION/UL (ref 3.88–5.62)
SODIUM SERPL-SCNC: 141 MMOL/L (ref 136–145)
T WAVE AXIS: -24 DEGREES
VENTRICULAR RATE: 109 BPM
WBC # BLD AUTO: 6.46 THOUSAND/UL (ref 4.31–10.16)

## 2020-07-06 PROCEDURE — 99232 SBSQ HOSP IP/OBS MODERATE 35: CPT | Performed by: PHYSICIAN ASSISTANT

## 2020-07-06 PROCEDURE — 80048 BASIC METABOLIC PNL TOTAL CA: CPT | Performed by: HOSPITALIST

## 2020-07-06 PROCEDURE — 93010 ELECTROCARDIOGRAM REPORT: CPT | Performed by: INTERNAL MEDICINE

## 2020-07-06 PROCEDURE — 99239 HOSP IP/OBS DSCHRG MGMT >30: CPT | Performed by: INTERNAL MEDICINE

## 2020-07-06 PROCEDURE — 85025 COMPLETE CBC W/AUTO DIFF WBC: CPT | Performed by: HOSPITALIST

## 2020-07-06 RX ORDER — CEPHALEXIN 500 MG/1
500 CAPSULE ORAL EVERY 6 HOURS SCHEDULED
Qty: 24 CAPSULE | Refills: 0 | Status: SHIPPED | OUTPATIENT
Start: 2020-07-06 | End: 2020-07-12

## 2020-07-06 RX ADMIN — CEFTRIAXONE SODIUM 1000 MG: 10 INJECTION, POWDER, FOR SOLUTION INTRAVENOUS at 08:43

## 2020-07-06 RX ADMIN — SODIUM CHLORIDE 125 ML/HR: 0.9 INJECTION, SOLUTION INTRAVENOUS at 01:04

## 2020-07-06 RX ADMIN — ASPIRIN 81 MG: 81 TABLET, COATED ORAL at 08:43

## 2020-07-06 RX ADMIN — HEPARIN SODIUM 5000 UNITS: 5000 INJECTION INTRAVENOUS; SUBCUTANEOUS at 05:36

## 2020-07-06 RX ADMIN — SODIUM CHLORIDE 125 ML/HR: 0.9 INJECTION, SOLUTION INTRAVENOUS at 08:43

## 2020-07-06 NOTE — PLAN OF CARE
Problem: Potential for Falls  Goal: Patient will remain free of falls  Description  INTERVENTIONS:  - Assess patient frequently for physical needs  -  Identify cognitive and physical deficits and behaviors that affect risk of falls    -  [place] fall precautions as indicated by assessment   - Educate patient/family on patient safety including physical limitations  - Instruct patient to call for assistance with activity based on assessment  - Modify environment to reduce risk of injury  - Consider OT/PT consult to assist with strengthening/mobility  Outcome: Progressing     Problem: PAIN - ADULT  Goal: Verbalizes/displays adequate comfort level or baseline comfort level  Description  Interventions:  - Encourage patient to monitor pain and request assistance  - Assess pain using appropriate pain scale  - Administer analgesics based on type and severity of pain and evaluate response  - Implement non-pharmacological measures as appropriate and evaluate response  - Consider cultural and social influences on pain and pain management  - Notify physician/advanced practitioner if interventions unsuccessful or patient reports new pain  Outcome: Progressing     Problem: INFECTION - ADULT  Goal: Absence or prevention of progression during hospitalization  Description  INTERVENTIONS:  - Assess and monitor for signs and symptoms of infection  - Monitor lab/diagnostic results  - Monitor all insertion sites, i e  indwelling lines, tubes, and drains  - Monitor endotracheal if appropriate and nasal secretions for changes in amount and color  - [place] appropriate cooling/warming therapies per order  - Administer medications as ordered  - Instruct and encourage patient and family to use good hand hygiene technique  - Identify and instruct in appropriate isolation precautions for identified infection/condition  Outcome: Progressing  Goal: Absence of fever/infection during neutropenic period  Description  INTERVENTIONS:  - Monitor WBC    Outcome: Progressing     Problem: SAFETY ADULT  Goal: Patient will remain free of falls  Description  INTERVENTIONS:  - Assess patient frequently for physical needs  -  Identify cognitive and physical deficits and behaviors that affect risk of falls    -  [place] fall precautions as indicated by assessment   - Educate patient/family on patient safety including physical limitations  - Instruct patient to call for assistance with activity based on assessment  - Modify environment to reduce risk of injury  - Consider OT/PT consult to assist with strengthening/mobility  Outcome: Progressing  Goal: Maintain or return to baseline ADL function  Description  INTERVENTIONS:  -  Assess patient's ability to carry out ADLs; assess patient's baseline for ADL function and identify physical deficits which impact ability to perform ADLs (bathing, care of mouth/teeth, toileting, grooming, dressing, etc )  - Assess/evaluate cause of self-care deficits   - Assess range of motion  - Assess patient's mobility; develop plan if impaired  - Assess patient's need for assistive devices and provide as appropriate  - Encourage maximum independence but intervene and supervise when necessary  - Involve family in performance of ADLs  - Assess for home care needs following discharge   - Consider OT consult to assist with ADL evaluation and planning for discharge  - Provide patient education as appropriate  Outcome: Progressing  Goal: Maintain or return mobility status to optimal level  Description  INTERVENTIONS:  - Assess patient's baseline mobility status (ambulation, transfers, stairs, etc )    - Identify cognitive and physical deficits and behaviors that affect mobility  - Identify mobility aids required to assist with transfers and/or ambulation (gait belt, sit-to-stand, lift, walker, cane, etc )  - [place] fall precautions as indicated by assessment  - Record patient progress and toleration of activity level on Mobility SBAR; progress patient to next Phase/Stage  - Instruct patient to call for assistance with activity based on assessment  - Consider rehabilitation consult to assist with strengthening/weightbearing, etc   Outcome: Progressing     Problem: DISCHARGE PLANNING  Goal: Discharge to home or other facility with appropriate resources  Description  INTERVENTIONS:  - Identify barriers to discharge w/patient and caregiver  - Arrange for needed discharge resources and transportation as appropriate  - Identify discharge learning needs (meds, wound care, etc )  - Arrange for interpretive services to assist at discharge as needed  - Refer to Case Management Department for coordinating discharge planning if the patient needs post-hospital services based on physician/advanced practitioner order or complex needs related to functional status, cognitive ability, or social support system  Outcome: Progressing     Problem: Knowledge Deficit  Goal: Patient/family/caregiver demonstrates understanding of disease process, treatment plan, medications, and discharge instructions  Description  Complete learning assessment and assess knowledge base  Interventions:  - Provide teaching at level of understanding  - Provide teaching via preferred learning methods  Outcome: Progressing     Problem: Nutrition/Hydration-ADULT  Goal: Nutrient/Hydration intake appropriate for improving, restoring or maintaining nutritional needs  Description  Monitor and assess patient's nutrition/hydration status for malnutrition  Collaborate with interdisciplinary team and initiate plan and interventions as ordered  Monitor patient's weight and dietary intake as ordered or per policy  Utilize nutrition screening tool and intervene as necessary  Determine patient's food preferences and provide high-protein, high-caloric foods as appropriate       INTERVENTIONS:  - Monitor oral intake, urinary output, labs, and treatment plans  - Assess nutrition and hydration status and recommend course of action  - Evaluate amount of meals eaten  - Assist patient with eating if necessary   - Allow adequate time for meals  - Recommend/ encourage appropriate diets, oral nutritional supplements, and vitamin/mineral supplements  - Order, calculate, and assess calorie counts as needed  - Recommend, monitor, and adjust tube feedings and TPN/PPN based on assessed needs  - Assess need for intravenous fluids  - Provide specific nutrition/hydration education as appropriate  - Include patient/family/caregiver in decisions related to nutrition  Outcome: Progressing     Problem: GENITOURINARY - ADULT  Goal: Maintains or returns to baseline urinary function  Description  INTERVENTIONS:  - Assess urinary function  - Encourage oral fluids to ensure adequate hydration if ordered  - Administer IV fluids as ordered to ensure adequate hydration  - Administer ordered medications as needed  - Offer frequent toileting  - Follow urinary retention protocol if ordered  Outcome: Progressing  Goal: Absence of urinary retention  Description  INTERVENTIONS:  - Assess patients ability to void and empty bladder  - Monitor I/O  - Bladder scan as needed  - Discuss with physician/AP medications to alleviate retention as needed  - Discuss catheterization for long term situations as appropriate  Outcome: Progressing     Problem: METABOLIC, FLUID AND ELECTROLYTES - ADULT  Goal: Electrolytes maintained within normal limits  Description  INTERVENTIONS:  - Monitor labs and assess patient for signs and symptoms of electrolyte imbalances  - Administer electrolyte replacement as ordered  - Monitor response to electrolyte replacements, including repeat lab results as appropriate  - Instruct patient on fluid and nutrition as appropriate  Outcome: Progressing  Goal: Fluid balance maintained  Description  INTERVENTIONS:  - Monitor labs   - Monitor I/O and WT  - Instruct patient on fluid and nutrition as appropriate  - Assess for signs & symptoms of volume excess or deficit  Outcome: Progressing  Goal: Glucose maintained within target range  Description  INTERVENTIONS:  - Monitor Blood Glucose as ordered  - Assess for signs and symptoms of hyperglycemia and hypoglycemia  - Administer ordered medications to maintain glucose within target range  - Assess nutritional intake and initiate nutrition service referral as needed  Outcome: Progressing

## 2020-07-06 NOTE — ASSESSMENT & PLAN NOTE
Presented with a string of several ureteral calculi on the left with new fevers 7/4/2020  POD#2 cystoscopy, left retrograde pyelogram, left ureteral stent insertion  Lenz out yesterday- voiding well on his own    Now afebrile, leukocytosis and acute kidney injury have resolved  Urine cultures actually no growth  Recommend completion of seven day course antibiotics for purulent-appearing urine at time of instrumentation  Repeat culture prior to his follow-up surgery which will be planned in the next 2-6 weeks  Our office will contact with him to arrange appointment time and date discussed his second surgery to include cystoscopy, left ureteroscopy, holmium laser lithotripsy, stent exchange

## 2020-07-06 NOTE — CONSULTS
Treatment Plan - Urology   Mik Marc Karishma 63 y o  male MRN: [id number]  Unit/Bed#: E5 -01 Encounter: [id number]  Please see consult dated 7/4/2020 by Dr Adilene Wallace PA-C  Date: 7/6/2020 Time: 7:45 AM

## 2020-07-06 NOTE — PROGRESS NOTES
Progress Note - Urology  Vadim Rangel 1960, 63 y o  male MRN: [id number]    Unit/Bed#: E5 -01 Encounter: [id number]    JENNIFER (acute kidney injury) ([facility] [address] )  Assessment & Plan  Creatinine max 2 31, resolved with creatinine of 1 24 today  Maintain left ureteral stent    * Ureteral stone  Assessment & Plan  Presented with a string of several ureteral calculi on the left with new fevers 7/4/2020  POD#2 cystoscopy, left retrograde pyelogram, left ureteral stent insertion  Lenz out yesterday- voiding well on his own  Now afebrile, leukocytosis and acute kidney injury have resolved  Urine cultures actually no growth  Recommend completion of seven day course antibiotics for purulent-appearing urine at time of instrumentation  Repeat culture prior to his follow-up surgery which will be planned in the next 2-6 weeks  Our office will contact with him to arrange appointment time and date discussed his second surgery to include cystoscopy, left ureteroscopy, holmium laser lithotripsy, stent exchange    Urology will sign off but remain available for any further inpatient needs  Please feel free to call with questions or concerns  Bedside rounds performed with Beckie Perez  Discussed with Dr Patel Fowler    Subjective:   HPI care feels very well this morning  He has not had any years in over 24 hours  He has no flank pain, tolerating the stent quite well  He is urinating on his own  He is eager for discharge home  He will see me in the office in the next few weeks to arrange his followup surgery  Review of Systems   Constitutional: Negative  Negative for chills and fever  Respiratory: Negative  Cardiovascular: Negative  Genitourinary: Negative for decreased urine volume, difficulty urinating, dysuria, flank pain, frequency, hematuria and urgency  Musculoskeletal: Negative  Objective:  Vitals: Blood pressure 101/67, pulse 64, temperature (!) 97 2 °F (36 2 °C), temperature source Temporal, resp   rate 18, weight 91 8 kg (202 lb 6 1 oz), SpO2 95 %  ,Body mass index is 30 77 kg/m²  Intake/Output Summary (Last 24 hours) at 7/6/2020 1047  Last data filed at 7/6/2020 0525  Gross per 24 hour   Intake 1300 ml   Output 1800 ml   Net -500 ml     Invasive Devices     Peripheral Intravenous Line            Peripheral IV 07/04/20 Right Hand 1 day          Drain            Ureteral Drain/Stent Left ureter 6 Fr  1 day                Physical Exam   Constitutional: He is oriented to person, place, and time  He appears well-developed and well-nourished  White male ambulating in his exam room, no distress, very pleasant talkative   HENT:   Head: Normocephalic and atraumatic  Cardiovascular: Normal rate, regular rhythm and normal heart sounds  No murmur heard  Pulmonary/Chest: Effort normal and breath sounds normal    Abdominal: Soft  Bowel sounds are normal  There is no tenderness  Musculoskeletal: Normal range of motion  He exhibits no edema  Neurological: He is alert and oriented to person, place, and time  Skin: Skin is warm and dry  Capillary refill takes less than 2 seconds  No pallor  Nursing note and vitals reviewed        History:    Past Medical History:   Diagnosis Date    Acute myocardial infarction ([facility] [address] )     Arthralgia     last assessed 7/8/13    Contusion of skin with intact surface     of the left medial thigh,last assessed 6/28/13    Gout     last assessed 10/29/13    Lump of skin     last assessed 7/19/13     Past Surgical History:   Procedure Laterality Date    BACK SURGERY      COLONOSCOPY  12/10/2009    Complete    CORONARY STENT PLACEMENT  08/2012    stenting of the LAD artery 95% lesion to 0% residual stenosis    KNEE SURGERY       Family History   Problem Relation Age of Onset    Edema Mother         Cerebral    Aneurysm Father         of the cerebellar artery    Aneurysm Brother         of the cerebellar artery     Social History     Socioeconomic History    Marital status: /Civil [employer]     Spouse name: None    Number of children: None    Years of education: None    Highest education level: None   Occupational History    None   Social Needs    Financial resource strain: None    Food insecurity:     Worry: None     Inability: None    Transportation needs:     Medical: None     Non-medical: None   Tobacco Use    Smoking status: Never Smoker    Smokeless tobacco: Never Used   Substance and Sexual Activity    Alcohol use: Yes     Binge frequency: Weekly    Drug use: No    Sexual activity: None   Lifestyle    Physical activity:     Days per week: None     Minutes per session: None    Stress: None   Relationships    Social connections:     Talks on phone: None     Gets together: None     Attends Anabaptist service: None     Active member of club or organization: None     Attends meetings of clubs or organizations: None     Relationship status: None    Intimate partner violence:     Fear of current or ex partner: None     Emotionally abused: None     Physically abused: None     Forced sexual activity: None   Other Topics Concern    None   Social History Narrative    None       Labs:  Recent Labs     07/04/20  1652 07/05/20  0543 07/06/20  0523   WBC 9 08 6 57 6 46     Recent Labs     07/04/20  1652 07/05/20  0543 07/06/20  0523   HGB 13 7 12 1 11 2*       Recent Labs     07/04/20  1652 07/05/20  0543 07/06/20  0523   CREATININE 2 31* 1 87* 1 24         Kb Ferrell PA-C  Date: 7/6/2020 Time: 10:47 AM

## 2020-07-06 NOTE — DISCHARGE SUMMARY
Discharge Summary - Medical Maria G Schwarz 63 y o  male MRN: [id number]    [place] [address] SURG Room / Bed: [address] [place] [address] 561/E5 -* Encounter: [id number]    BRIEF OVERVIEW      Admission Date: 7/4/2020       Discharge Date:  07/06/2020    Primary Diagnoses  Principal Problem:    Ureteral stone  Active Problems:    JENNIFER (acute kidney injury) ([facility] [address] )    Transaminitis    Sepsis ([facility] [address] )    Pyelonephritis  Resolved Problems:    * No resolved hospital problems  *  CAD  HTN  Hyperlipidemia    Service:  [facility], [facility]  Consulting Providers   Urology:  Dr Alice Suarez    Procedures Performed   7/4:  Cystoscopy with insertion of left ureteral stent    Hospital Studies:  7/5:  KUB:Fluoroscopic guidance provided for surgical procedure   Please refer to the separate procedure notes for additional details  7/5:  chest x-ray:  No acute cardiopulmonary disease   Suboptimal inspiration  7/4:  CT renal stone  3 new calculi in the distal left ureter with new mild upstream hydronephroureterosis  While fat stranding adjacent to the left renal pelvis and ureter likely due to acute obstruction, pyelonephrosis is not excluded  The most distal and largest calculus   measures 4 mm located immediately proximal to the UVJ  No perinephric collection  See above discussion  Bilateral renal calculi measuring up to 3 mm on the right and 4 mm on the left  Cholelithiasis    Colonic diverticulosis    Microbiology  7/4 urine culture:  No growth  7/4 urine culture:  No growth  7/4 SARs COVID negative  7/4 blood culture:  Negative x2  6/27 urine culture:  No growth      Results from last 7 days   Lab Units 07/06/20  0523 07/05/20  0543 07/04/20  1652   WBC Thousand/uL 6 46 6 57 9 08   HEMOGLOBIN g/dL 11 2* 12 1 13 7   HEMATOCRIT % 33 4* 35 4* 39 3   PLATELETS Thousands/uL 202 193 198     Results from last 7 days   Lab Units 07/06/20  0523 07/05/20  0543 07/04/20  1652 POTASSIUM mmol/L 4 0 4 3 3 5   CHLORIDE mmol/L 108 104 101   CO2 mmol/L 27 24 26   BUN mg/dL 22 26* 28*   CREATININE mg/dL 1 24 1 87* 2 31*   CALCIUM mg/dL 8 0* 8 5 9 4       History and Physical Exam:  Please refer to the Admission H&P note    Hospital Course by Problem  1-acute kidney injury:  Patient presented with acute kidney injury secondary to obstructive ureterolithiasis  -creatinine peaked at 2 31  He was given IV fluids, had a stent placement, and creatinine improved to 1 24   -baseline creatinine appears to range 1 0-1 1  Will need repeat BMP in 1 week     Patient was instructed to hold his ARB/HCTZ until that time, especially as his blood pressure is low-normal   He was also instructed to drink at least 64 oz of water daily  2-ureterolithiasis:  Patient presented with multiple left ureteral calculi  He was evaluated by the Urology team and underwent cystoscopy with left ureteral stent insertion   -patient with be discharged home to continue antibiotic therapy for a 7 day course of antibiotics, further recommendations of the urology team   -follow-up with urology team in the office for scheduling future surgery for definitive treatment of his ureterolithiasis, with cystoscopy, ureteroscopy, holmium laser lithotripsy and stent exchange  3-status post sepsis:  Present on admission  -patient presented with sepsis, present on admission, with fever, and tachycardia, due to acute pyelonephritis from obstructing left ureteral stone   -was placed on empiric antibiotics with ceftriaxone   -urine culture without significant organism isolated    -discharge on Keflex to complete a 7 day total course of antibiotics  At the time of discharge he is afebrile with a normal white blood cell count  4-left pyelonephritis:  Patient was admitted with fever, and obstructing left ureterolithiasis  He underwent cystoscopy with left stent placement and the urine was noted to be purulence    Patient was placed empirically on antibiotics with ceftriaxone  Urine culture without definitive organism identified  Patient will complete a 7 day course of antibiotics, will be discharged home on Keflex, under the recommendations by the Urology team, due to the purulence urine and presence of a stent  He will follow up with Urology team in the office    5-transaminitis:  Patient is noted to have mild transaminitis with an AST of 121, and ALT of 181 on admission  This has improved to an AST of 86 and ALT of 143  His alkaline phosphatase is mildly elevated at 160, with a normal total bilirubin  -patient will continue to hold his statin   -repeat LFT in 1 week, will follow-up with his primary care provider  Patient verbalized understanding and notes he believes he may have had elevated LFTs in the past     6-history of coronary artery disease :  Patient has a previous history of coronary artery disease  He did not have any evidence of ischemia during his hospitalization  He will continue his aspirin, and beta-blocker   -statin was on hold due to transaminitis    7-essential hypertension patient's blood pressure was adequately controlled on his home metoprolol 25 mg daily  His blood pressure is noted to be low-normal with a blood pressure of 101/67 this morning   -patient will continue his metoprolol, but was instructed to discontinue his ARB/HCTZ    8-mild anemia:  Patient's baseline hemoglobin appears to be in the normal range  Prior to discharge his hemoglobin decreased to 11, with normocytic indices    Patient was receiving IV fluids so this may be dilution   -recommend repeat CBC in 1 week with his primary care provider for further evaluation    9-family:  Called pt's wife Ld Deal and LM to call my cell number for update    D/w pt's nurse  D/w     Discharge Condition: Improved  Discharge Disposition: Home/Self Care    Discharge Note and Physical Exam:   Patient notes his left flank pain has completely resolved  He denies any groin pain from the stent  He denies any difficulty passing his urine or pain with urinating  He notes his urine is dark  He denies any hematuria  He denies any fevers or chills  He denies any pain anywhere else  He denies any shortness of breath  He notes he has a scant cough  He denies any dizziness or lightheadedness  He denies any abdominal pain, nausea, vomiting, diarrhea  He is tolerating p o  He notes he is urinating without any difficulty  He notes he feels back to his baseline and is eager for discharge home  Vitals:    07/06/20 0724   BP: 101/67   Pulse: 64   Resp: 18   Temp: (!) 97 2 °F (36 2 °C)   SpO2: 95%     General:  Pleasant, non-tachypnic, non-dyspnic  Conversant  Heart: Regular rate and rhythm, S1S2 present  No murmur, rub or gallop  Lungs: Clear to auscultation bilaterally, no wheezing, rhonchi, or crackles  Good air movement  No accessory muscle use or respiratory distress  Abdomen: soft, non-tender, non-distended, NABS  No rebound or guarding  No mass or peritoneal signs  Extremities: no clubbing, cyanosis or edema  2+ pedal pulses bilaterally  Neurologic:  Awake alert  Fluent and goal directed speech  Moving all 4 extremities  Skin: warm and dry  No petechiae, purpura or rash  Discharge Medications   Please see Medical Reconciliation Discharge Form    Discharge Follow Up Appointments:   Nikolas Zurita DO: 1 week  URology: 2 weeks  Labs: CBC/BMP/LFT 1 week    Discharge  Statement   Total Time Spent today including physical exam, discussion with patient and family, review of chart, operative report, lab work and radiology report, and discharge arrangements/care = 40 min minutes      This note has been constructed using a voice recognition system

## 2020-07-06 NOTE — PLAN OF CARE
Problem: Potential for Falls  Goal: Patient will remain free of falls  Description  INTERVENTIONS:  - Assess patient frequently for physical needs  -  Identify cognitive and physical deficits and behaviors that affect risk of falls    -  [place] fall precautions as indicated by assessment   - Educate patient/family on patient safety including physical limitations  - Instruct patient to call for assistance with activity based on assessment  - Modify environment to reduce risk of injury  - Consider OT/PT consult to assist with strengthening/mobility  Outcome: Progressing     Problem: PAIN - ADULT  Goal: Verbalizes/displays adequate comfort level or baseline comfort level  Description  Interventions:  - Encourage patient to monitor pain and request assistance  - Assess pain using appropriate pain scale  - Administer analgesics based on type and severity of pain and evaluate response  - Implement non-pharmacological measures as appropriate and evaluate response  - Consider cultural and social influences on pain and pain management  - Notify physician/advanced practitioner if interventions unsuccessful or patient reports new pain  Outcome: Progressing     Problem: INFECTION - ADULT  Goal: Absence or prevention of progression during hospitalization  Description  INTERVENTIONS:  - Assess and monitor for signs and symptoms of infection  - Monitor lab/diagnostic results  - Monitor all insertion sites, i e  indwelling lines, tubes, and drains  - Monitor endotracheal if appropriate and nasal secretions for changes in amount and color  - [place] appropriate cooling/warming therapies per order  - Administer medications as ordered  - Instruct and encourage patient and family to use good hand hygiene technique  - Identify and instruct in appropriate isolation precautions for identified infection/condition  Outcome: Progressing  Goal: Absence of fever/infection during neutropenic period  Description  INTERVENTIONS:  - Monitor WBC    Outcome: Progressing     Problem: SAFETY ADULT  Goal: Patient will remain free of falls  Description  INTERVENTIONS:  - Assess patient frequently for physical needs  -  Identify cognitive and physical deficits and behaviors that affect risk of falls    -  [place] fall precautions as indicated by assessment   - Educate patient/family on patient safety including physical limitations  - Instruct patient to call for assistance with activity based on assessment  - Modify environment to reduce risk of injury  - Consider OT/PT consult to assist with strengthening/mobility  Outcome: Progressing  Goal: Maintain or return to baseline ADL function  Description  INTERVENTIONS:  -  Assess patient's ability to carry out ADLs; assess patient's baseline for ADL function and identify physical deficits which impact ability to perform ADLs (bathing, care of mouth/teeth, toileting, grooming, dressing, etc )  - Assess/evaluate cause of self-care deficits   - Assess range of motion  - Assess patient's mobility; develop plan if impaired  - Assess patient's need for assistive devices and provide as appropriate  - Encourage maximum independence but intervene and supervise when necessary  - Involve family in performance of ADLs  - Assess for home care needs following discharge   - Consider OT consult to assist with ADL evaluation and planning for discharge  - Provide patient education as appropriate  Outcome: Progressing  Goal: Maintain or return mobility status to optimal level  Description  INTERVENTIONS:  - Assess patient's baseline mobility status (ambulation, transfers, stairs, etc )    - Identify cognitive and physical deficits and behaviors that affect mobility  - Identify mobility aids required to assist with transfers and/or ambulation (gait belt, sit-to-stand, lift, walker, cane, etc )  - [place] fall precautions as indicated by assessment  - Record patient progress and toleration of activity level on Mobility SBAR; progress patient to next Phase/Stage  - Instruct patient to call for assistance with activity based on assessment  - Consider rehabilitation consult to assist with strengthening/weightbearing, etc   Outcome: Progressing     Problem: DISCHARGE PLANNING  Goal: Discharge to home or other facility with appropriate resources  Description  INTERVENTIONS:  - Identify barriers to discharge w/patient and caregiver  - Arrange for needed discharge resources and transportation as appropriate  - Identify discharge learning needs (meds, wound care, etc )  - Arrange for interpretive services to assist at discharge as needed  - Refer to Case Management Department for coordinating discharge planning if the patient needs post-hospital services based on physician/advanced practitioner order or complex needs related to functional status, cognitive ability, or social support system  Outcome: Progressing     Problem: Knowledge Deficit  Goal: Patient/family/caregiver demonstrates understanding of disease process, treatment plan, medications, and discharge instructions  Description  Complete learning assessment and assess knowledge base  Interventions:  - Provide teaching at level of understanding  - Provide teaching via preferred learning methods  Outcome: Progressing     Problem: Nutrition/Hydration-ADULT  Goal: Nutrient/Hydration intake appropriate for improving, restoring or maintaining nutritional needs  Description  Monitor and assess patient's nutrition/hydration status for malnutrition  Collaborate with interdisciplinary team and initiate plan and interventions as ordered  Monitor patient's weight and dietary intake as ordered or per policy  Utilize nutrition screening tool and intervene as necessary  Determine patient's food preferences and provide high-protein, high-caloric foods as appropriate       INTERVENTIONS:  - Monitor oral intake, urinary output, labs, and treatment plans  - Assess nutrition and hydration status and recommend course of action  - Evaluate amount of meals eaten  - Assist patient with eating if necessary   - Allow adequate time for meals  - Recommend/ encourage appropriate diets, oral nutritional supplements, and vitamin/mineral supplements  - Order, calculate, and assess calorie counts as needed  - Recommend, monitor, and adjust tube feedings and TPN/PPN based on assessed needs  - Assess need for intravenous fluids  - Provide specific nutrition/hydration education as appropriate  - Include patient/family/caregiver in decisions related to nutrition  Outcome: Progressing     Problem: GENITOURINARY - ADULT  Goal: Maintains or returns to baseline urinary function  Description  INTERVENTIONS:  - Assess urinary function  - Encourage oral fluids to ensure adequate hydration if ordered  - Administer IV fluids as ordered to ensure adequate hydration  - Administer ordered medications as needed  - Offer frequent toileting  - Follow urinary retention protocol if ordered  Outcome: Progressing  Goal: Absence of urinary retention  Description  INTERVENTIONS:  - Assess patients ability to void and empty bladder  - Monitor I/O  - Bladder scan as needed  - Discuss with physician/AP medications to alleviate retention as needed  - Discuss catheterization for long term situations as appropriate  Outcome: Progressing     Problem: METABOLIC, FLUID AND ELECTROLYTES - ADULT  Goal: Electrolytes maintained within normal limits  Description  INTERVENTIONS:  - Monitor labs and assess patient for signs and symptoms of electrolyte imbalances  - Administer electrolyte replacement as ordered  - Monitor response to electrolyte replacements, including repeat lab results as appropriate  - Instruct patient on fluid and nutrition as appropriate  Outcome: Progressing  Goal: Fluid balance maintained  Description  INTERVENTIONS:  - Monitor labs   - Monitor I/O and WT  - Instruct patient on fluid and nutrition as appropriate  - Assess for signs & symptoms of volume excess or deficit  Outcome: Progressing  Goal: Glucose maintained within target range  Description  INTERVENTIONS:  - Monitor Blood Glucose as ordered  - Assess for signs and symptoms of hyperglycemia and hypoglycemia  - Administer ordered medications to maintain glucose within target range  - Assess nutritional intake and initiate nutrition service referral as needed  Outcome: Progressing

## 2020-07-06 NOTE — TELEPHONE ENCOUNTER
Hospital follow-up [place]- seen by Marina Laura last week  Now is s/p cystoscopy, left ureteral stent insertion 07/04/2020 for febrile stone by Dr Cai Has  Will need follow-up with me for H&P for next surgery if there are any slot available in 2-4 weeks    If not I will simply place case request for OR with any available

## 2020-07-06 NOTE — DISCHARGE INSTRUCTIONS
Ureteral Stent Placement   WHAT YOU NEED TO KNOW:   Ureteral stent placement is a procedure to open a blocked or narrow ureter  The ureter is the tube that carries urine from your kidney into your bladder  A stent is a thin hollow plastic tube used to hold your ureter open and allow urine to flow  The stent may stay in for several weeks  DISCHARGE INSTRUCTIONS:   Medicines:   · Pain medicine  may be given to take away or decrease pain  Do not wait until the pain is severe before you take your medicine  · Antibiotics  help prevent infections  Your healthcare provider may prescribe these for you while your stent remains in  · Take your medicine as directed  Contact your healthcare provider if you think your medicine is not helping or if you have side effects  Tell him or her if you are allergic to any medicine  Keep a list of the medicines, vitamins, and herbs you take  Include the amounts, and when and why you take them  Bring the list or the pill bottles to follow-up visits  Carry your medicine list with you in case of an emergency  You will need regular follow-up visits with your urologist as long as the stent remains in  He will check to make sure the stent is working properly  He may do urine cultures to check for infection  Write down your questions so you remember to ask them during your visits  Self-care:   · Drink liquids  as directed  Ask your healthcare provider how much liquid to drink each day and which liquids are best for you  Fluids such as cranberry or apple juice may be especially helpful to prevent urinary infections  · Return to normal activities  the day after your stent placement or as directed by your healthcare provider  · You may take a shower  the day after your stent placement if your healthcare provider says it is okay  Contact your healthcare provider or urologist if:   · You have a fever or chills  · You feel like you need to urinate often      · You have pain when you urinate or pain around your bladder or kidney  · You see blood in your urine or it looks cloudy  · You have questions or concerns about your condition or care  Seek care immediately or call 911 if:   · You urinate little or not at all  · You have severe pain in your abdomen  © 2017 [address] [employer] Information is for End User's use only and may not be sold, redistributed or otherwise used for commercial purposes  All illustrations and images included in CareNotes® are the copyrighted property of A D A M , Inc  or Dain Leach  The above information is an  only  It is not intended as medical advice for individual conditions or treatments  Talk to your doctor, nurse or pharmacist before following any medical regimen to see if it is safe and effective for you   ============================================================================================    Discharge instructions from Dr Bianca Goldsmith- Internal medicine:   -you will need a repeat blood work in 1 week for 3 labs:      -1- a CBC:  Complete blood count to check your red blood cell level in 1 week      -2- will need a BMP to recheck your kidney levels in 1 week to be sure they are normal      -please be sure to stay well hydrated and drink at least 6-8 glasses of water (8 ounce glasses)      -3-you will need blood work to check your "liver function test" in 1 week for your family doctor- your liver blood tests were slightly elevated:  Do not take you cholesterol pill- atorvastatin/lipitor until instructed by your family doctor    -please do not take your olmesartan/hctz blood pressure pill due to your previous dehydration, kidney injury and low blood pressure, unless instructed by your doctor    -you will need to see your family doctor in 1 week for a check up and to review your blood work    Please try to have your blood work done the day before you see Dr Meagan Paniagua so he can tell you the results at your check up      Please return to the ER with any problems at all, especially any fever or chills, worsening abdominal/back pain, worsening blood in your urine, dizziness, lightheadedness, chest pain, difficulty breathing, or any other problems at all  ====================================================================================    Follow up with your urologist as directed:

## 2020-07-07 ENCOUNTER — TRANSITIONAL CARE MANAGEMENT (OUTPATIENT)
Dept: FAMILY MEDICINE CLINIC | Facility: CLINIC | Age: 60
End: 2020-07-07

## 2020-07-07 LAB — BACTERIA UR CULT: NORMAL

## 2020-07-08 NOTE — UTILIZATION REVIEW
Notification of Discharge  This is a Notification of Discharge from our facility [address]  Please be advised that this patient has been discharge from our facility  Below you will find the admission and discharge date and time including the patients disposition  PRESENTATION DATE: 7/4/2020  4:16 PM    IP ADMISSION DATE: 7/4/20 1945   DISCHARGE DATE: 7/6/2020  3:39 PM  DISPOSITION: Home/Self Care Home/Self Care   Admission Orders listed below:  Admission Orders (From admission, onward)     Ordered        07/04/20 1945  Inpatient Admission  Once                   Please contact the UR Department if additional information is required to close this patient's authorization/case  [facility] Utilization Review Department  Main: [phone] x carefully listen to the prompts  All voicemails are confidential   [email or web address]  Send all requests for admission clinical reviews, approved or denied determinations and any other requests to dedicated fax number below belonging to the campus where the patient is receiving treatment   List of dedicated fax numbers:  [address] DENIALS (Administrative/Medical Necessity) [phone]   [address] (Maternity/NICU/Pediatrics) [phone]   Josefina Handing [phone]   [facility] [phone]   Eva Chosom [phone]   Saba Hanson [facility] [address] [place] [address] [phone]   [facility]  [phone]   [address] [facility] [address] [facility]  [address] [facility] [address] [facility] [phone]

## 2020-07-09 ENCOUNTER — APPOINTMENT (OUTPATIENT)
Dept: LAB | Age: 60
End: 2020-07-09
Payer: COMMERCIAL

## 2020-07-09 DIAGNOSIS — D64.9 ANEMIA: ICD-10-CM

## 2020-07-09 DIAGNOSIS — N17.9 AKI (ACUTE KIDNEY INJURY) (HCC): ICD-10-CM

## 2020-07-09 DIAGNOSIS — R79.89 ABNORMAL LFTS: ICD-10-CM

## 2020-07-09 LAB
ALBUMIN SERPL BCP-MCNC: 3.1 G/DL (ref 3.5–5)
ALP SERPL-CCNC: 140 U/L (ref 46–116)
ALT SERPL W P-5'-P-CCNC: 120 U/L (ref 12–78)
ANION GAP SERPL CALCULATED.3IONS-SCNC: 6 MMOL/L (ref 4–13)
AST SERPL W P-5'-P-CCNC: 62 U/L (ref 5–45)
BACTERIA BLD CULT: NORMAL
BACTERIA BLD CULT: NORMAL
BILIRUB DIRECT SERPL-MCNC: 0.21 MG/DL (ref 0–0.2)
BILIRUB SERPL-MCNC: 0.62 MG/DL (ref 0.2–1)
BUN SERPL-MCNC: 16 MG/DL (ref 5–25)
CALCIUM SERPL-MCNC: 8.9 MG/DL (ref 8.3–10.1)
CHLORIDE SERPL-SCNC: 105 MMOL/L (ref 100–108)
CO2 SERPL-SCNC: 30 MMOL/L (ref 21–32)
CREAT SERPL-MCNC: 0.96 MG/DL (ref 0.6–1.3)
ERYTHROCYTE [DISTWIDTH] IN BLOOD BY AUTOMATED COUNT: 12 % (ref 11.6–15.1)
GFR SERPL CREATININE-BSD FRML MDRD: 85 ML/MIN/1.73SQ M
GLUCOSE P FAST SERPL-MCNC: 99 MG/DL (ref 65–99)
HCT VFR BLD AUTO: 34.6 % (ref 36.5–49.3)
HGB BLD-MCNC: 11.9 G/DL (ref 12–17)
MCH RBC QN AUTO: 31.1 PG (ref 26.8–34.3)
MCHC RBC AUTO-ENTMCNC: 34.4 G/DL (ref 31.4–37.4)
MCV RBC AUTO: 90 FL (ref 82–98)
PLATELET # BLD AUTO: 249 THOUSANDS/UL (ref 149–390)
PMV BLD AUTO: 9.9 FL (ref 8.9–12.7)
POTASSIUM SERPL-SCNC: 3.7 MMOL/L (ref 3.5–5.3)
PROT SERPL-MCNC: 6.7 G/DL (ref 6.4–8.2)
RBC # BLD AUTO: 3.83 MILLION/UL (ref 3.88–5.62)
SODIUM SERPL-SCNC: 141 MMOL/L (ref 136–145)
WBC # BLD AUTO: 4.53 THOUSAND/UL (ref 4.31–10.16)

## 2020-07-09 PROCEDURE — 36415 COLL VENOUS BLD VENIPUNCTURE: CPT

## 2020-07-09 PROCEDURE — 80076 HEPATIC FUNCTION PANEL: CPT

## 2020-07-09 PROCEDURE — 80048 BASIC METABOLIC PNL TOTAL CA: CPT

## 2020-07-09 PROCEDURE — 85027 COMPLETE CBC AUTOMATED: CPT

## 2020-07-10 DIAGNOSIS — M10.9 GOUT, UNSPECIFIED CAUSE, UNSPECIFIED CHRONICITY, UNSPECIFIED SITE: ICD-10-CM

## 2020-07-10 DIAGNOSIS — E78.5 HYPERLIPIDEMIA, UNSPECIFIED HYPERLIPIDEMIA TYPE: ICD-10-CM

## 2020-07-10 DIAGNOSIS — I10 ESSENTIAL HYPERTENSION: ICD-10-CM

## 2020-07-10 DIAGNOSIS — N20.1 LEFT URETERAL CALCULUS: Primary | ICD-10-CM

## 2020-07-10 RX ORDER — ATORVASTATIN CALCIUM 20 MG/1
TABLET, FILM COATED ORAL
Qty: 30 TABLET | Refills: 0 | Status: SHIPPED | OUTPATIENT
Start: 2020-07-10 | End: 2020-12-11 | Stop reason: SDUPTHER

## 2020-07-10 RX ORDER — ALLOPURINOL 100 MG/1
TABLET ORAL
Qty: 30 TABLET | Refills: 0 | Status: SHIPPED | OUTPATIENT
Start: 2020-07-10 | End: 2020-08-17 | Stop reason: SDUPTHER

## 2020-07-10 RX ORDER — CEFAZOLIN SODIUM 1 G/50ML
1000 SOLUTION INTRAVENOUS ONCE
Status: CANCELLED | OUTPATIENT
Start: 2020-08-05 | End: 2020-07-10

## 2020-07-10 NOTE — TELEPHONE ENCOUNTER
Notify patient I refilled his meds including his atorvastatin but he was told to stop this med due to his elevated liver enzymes  Do not take for now  Make sure he has TCM with me as directed, has this been scheduled? Patient is noted to have mild transaminitis with an AST of 121, and ALT of 181 on admission  This has improved to an AST of 86 and ALT of 143  His alkaline phosphatase is mildly elevated at 160, with a normal total bilirubin  -patient will continue to hold his statin             -repeat LFT in 1 week, will follow-up with his primary care provider

## 2020-07-16 ENCOUNTER — OFFICE VISIT (OUTPATIENT)
Dept: FAMILY MEDICINE CLINIC | Facility: CLINIC | Age: 60
End: 2020-07-16
Payer: COMMERCIAL

## 2020-07-16 VITALS
TEMPERATURE: 98.6 F | HEIGHT: 68 IN | BODY MASS INDEX: 30.16 KG/M2 | HEART RATE: 68 BPM | WEIGHT: 199 LBS | SYSTOLIC BLOOD PRESSURE: 102 MMHG | DIASTOLIC BLOOD PRESSURE: 68 MMHG | RESPIRATION RATE: 16 BRPM

## 2020-07-16 DIAGNOSIS — R74.01 TRANSAMINITIS: Primary | ICD-10-CM

## 2020-07-16 DIAGNOSIS — I10 ESSENTIAL HYPERTENSION: ICD-10-CM

## 2020-07-16 DIAGNOSIS — N12 PYELONEPHRITIS: ICD-10-CM

## 2020-07-16 DIAGNOSIS — D64.9 ANEMIA, UNSPECIFIED TYPE: ICD-10-CM

## 2020-07-16 DIAGNOSIS — M10.9 GOUT, UNSPECIFIED CAUSE, UNSPECIFIED CHRONICITY, UNSPECIFIED SITE: ICD-10-CM

## 2020-07-16 DIAGNOSIS — Z76.89 ENCOUNTER FOR SUPPORT AND COORDINATION OF TRANSITION OF CARE: ICD-10-CM

## 2020-07-16 DIAGNOSIS — E78.2 MIXED HYPERLIPIDEMIA: ICD-10-CM

## 2020-07-16 PROCEDURE — 99495 TRANSJ CARE MGMT MOD F2F 14D: CPT | Performed by: FAMILY MEDICINE

## 2020-07-16 NOTE — PROGRESS NOTES
Assessment/Plan:  Chief Complaint   Patient presents with    Transition of Care Management     7/4-6/20 Kidney Stone     Patient Instructions   Reviewed medical records:    Hospital Course by Problem  1-acute kidney injury:  Patient presented with acute kidney injury secondary to obstructive ureterolithiasis  -creatinine peaked at 2 31  He was given IV fluids, had a stent placement, and creatinine improved to 1 24             -baseline creatinine appears to range 1 0-1 1  Will need repeat BMP in 1 week     Patient was instructed to hold his ARB/HCTZ until that time, especially as his blood pressure is low-normal   He was also instructed to drink at least 64 oz of water daily      2-ureterolithiasis:  Patient presented with multiple left ureteral calculi  He was evaluated by the Urology team and underwent cystoscopy with left ureteral stent insertion             -patient with be discharged home to continue antibiotic therapy for a 7 day course of antibiotics, further recommendations of the urology team             -follow-up with urology team in the office for scheduling future surgery for definitive treatment of his ureterolithiasis, with cystoscopy, ureteroscopy, holmium laser lithotripsy and stent exchange      3-status post sepsis:  Present on admission  -patient presented with sepsis, present on admission, with fever, and tachycardia, due to acute pyelonephritis from obstructing left ureteral stone             -was placed on empiric antibiotics with ceftriaxone             -urine culture without significant organism isolated              -discharge on Keflex to complete a 7 day total course of antibiotics  At the time of discharge he is afebrile with a normal white blood cell count      4-left pyelonephritis:  Patient was admitted with fever, and obstructing left ureterolithiasis  He underwent cystoscopy with left stent placement and the urine was noted to be purulence    Patient was placed empirically on antibiotics with ceftriaxone  Urine culture without definitive organism identified  Patient will complete a 7 day course of antibiotics, will be discharged home on Keflex, under the recommendations by the Urology team, due to the purulence urine and presence of a stent  He will follow up with Urology team in the office     5-transaminitis:  Patient is noted to have mild transaminitis with an AST of 121, and ALT of 181 on admission  This has improved to an AST of 86 and ALT of 143  His alkaline phosphatase is mildly elevated at 160, with a normal total bilirubin  -patient will continue to hold his statin             -repeat LFT in 1 week, will follow-up with his primary care provider  Patient verbalized understanding and notes he believes he may have had elevated LFTs in the past      6-history of coronary artery disease :  Patient has a previous history of coronary artery disease  He did not have any evidence of ischemia during his hospitalization  He will continue his aspirin, and beta-blocker             -statin was on hold due to transaminitis     7-essential hypertension patient's blood pressure was adequately controlled on his home metoprolol 25 mg daily  His blood pressure is noted to be low-normal with a blood pressure of 101/67 this morning             -patient will continue his metoprolol, but was instructed to discontinue his ARB/HCTZ     8-mild anemia:  Patient's baseline hemoglobin appears to be in the normal range  Prior to discharge his hemoglobin decreased to 11, with normocytic indices  Patient was receiving IV fluids so this may be dilution             -recommend repeat CBC in 1 week with his primary care provider for further evaluation     9-family:  Called pt's wife Tao Dennison and LM to call my cell number for update  All labs reviewed, BP stable off med, continue metoprolol and avoid arb/diuretic  Renal function stable   Rec also to avoid statin and continue low dose allopurinol 100 mg daily  Monitor lft's and cbc for anemia and renal function  He finished abx and is feeling well and will fup with urology for stent in left ureter  Take baby aspirin as directed  Will restart once liver enzymes stable  No problem-specific Assessment & Plan notes found for this encounter  Diagnoses and all orders for this visit:    Transaminitis  -     Comprehensive metabolic panel; Future    Encounter for support and coordination of transition of care    Essential hypertension  -     Comprehensive metabolic panel; Future    Anemia, unspecified type  -     CBC and differential; Future    Pyelonephritis  -     Comprehensive metabolic panel; Future  -     CBC and differential; Future    Mixed hyperlipidemia  -     Comprehensive metabolic panel; Future    Gout, unspecified cause, unspecified chronicity, unspecified site  -     Uric acid; Future          Subjective:      Patient ID: Sergey Fierro is a 63 y o  male  Transition of Care Management (7/4-6/20 Kidney Stone)Takes baby aspirin metoprolol and flomax and allopurinol 100 mg daily for gout  Pt  Has better renal function and anemia is stable  His lft's are improved and denies any fever or other complaints and urinating and having no GI complaints  Call if any problems  Take all meds as directed  Avoid arb/hctz as his bp is stable and also take lower dose 100 mg dose of allopurinol  Call if any problems and recheck in 1 month  The following portions of the patient's history were reviewed and updated as appropriate: allergies, current medications, past family history, past medical history, past social history, past surgical history and problem list     Review of Systems   Constitutional:        Obesity   HENT: Negative  Eyes: Negative  Respiratory: Negative  Cardiovascular: Negative  Gastrointestinal: Negative  Endocrine: Negative  Genitourinary: Negative      Musculoskeletal: Negative  Skin: Negative  Allergic/Immunologic: Negative  Neurological: Negative  Hematological: Negative  Psychiatric/Behavioral: Negative  Objective:      /68 (BP Location: Left arm, Patient Position: Sitting, Cuff Size: Standard)   Pulse 68   Temp 98 6 °F (37 °C) (Temporal)   Resp 16   Ht 5' 8" (1 727 m)   Wt 90 3 kg (199 lb)   BMI 30 26 kg/m²          Physical Exam   Constitutional: He is oriented to person, place, and time  He appears well-developed and well-nourished  obesity   HENT:   Head: Normocephalic and atraumatic  Eyes: Pupils are equal, round, and reactive to light  Conjunctivae and EOM are normal    Neck: Normal range of motion  Neck supple  Cardiovascular: Normal rate, regular rhythm, normal heart sounds and intact distal pulses  Pulmonary/Chest: Effort normal and breath sounds normal    Abdominal: Soft  Bowel sounds are normal    Musculoskeletal: Normal range of motion  Neurological: He is alert and oriented to person, place, and time  He has normal reflexes  Skin: Skin is warm and dry  Psychiatric: He has a normal mood and affect   His behavior is normal

## 2020-07-16 NOTE — PATIENT INSTRUCTIONS
Reviewed medical records:    Hospital Course by Problem  1-acute kidney injury:  Patient presented with acute kidney injury secondary to obstructive ureterolithiasis  -creatinine peaked at 2 31  He was given IV fluids, had a stent placement, and creatinine improved to 1 24             -baseline creatinine appears to range 1 0-1 1  Will need repeat BMP in 1 week     Patient was instructed to hold his ARB/HCTZ until that time, especially as his blood pressure is low-normal   He was also instructed to drink at least 64 oz of water daily      2-ureterolithiasis:  Patient presented with multiple left ureteral calculi  He was evaluated by the Urology team and underwent cystoscopy with left ureteral stent insertion             -patient with be discharged home to continue antibiotic therapy for a 7 day course of antibiotics, further recommendations of the urology team             -follow-up with urology team in the office for scheduling future surgery for definitive treatment of his ureterolithiasis, with cystoscopy, ureteroscopy, holmium laser lithotripsy and stent exchange      3-status post sepsis:  Present on admission  -patient presented with sepsis, present on admission, with fever, and tachycardia, due to acute pyelonephritis from obstructing left ureteral stone             -was placed on empiric antibiotics with ceftriaxone             -urine culture without significant organism isolated              -discharge on Keflex to complete a 7 day total course of antibiotics  At the time of discharge he is afebrile with a normal white blood cell count      4-left pyelonephritis:  Patient was admitted with fever, and obstructing left ureterolithiasis  He underwent cystoscopy with left stent placement and the urine was noted to be purulence  Patient was placed empirically on antibiotics with ceftriaxone  Urine culture without definitive organism identified    Patient will complete a 7 day course of antibiotics, will be discharged home on Keflex, under the recommendations by the Urology team, due to the purulence urine and presence of a stent  He will follow up with Urology team in the office     5-transaminitis:  Patient is noted to have mild transaminitis with an AST of 121, and ALT of 181 on admission  This has improved to an AST of 86 and ALT of 143  His alkaline phosphatase is mildly elevated at 160, with a normal total bilirubin  -patient will continue to hold his statin             -repeat LFT in 1 week, will follow-up with his primary care provider  Patient verbalized understanding and notes he believes he may have had elevated LFTs in the past      6-history of coronary artery disease :  Patient has a previous history of coronary artery disease  He did not have any evidence of ischemia during his hospitalization  He will continue his aspirin, and beta-blocker             -statin was on hold due to transaminitis     7-essential hypertension patient's blood pressure was adequately controlled on his home metoprolol 25 mg daily  His blood pressure is noted to be low-normal with a blood pressure of 101/67 this morning             -patient will continue his metoprolol, but was instructed to discontinue his ARB/HCTZ     8-mild anemia:  Patient's baseline hemoglobin appears to be in the normal range  Prior to discharge his hemoglobin decreased to 11, with normocytic indices  Patient was receiving IV fluids so this may be dilution             -recommend repeat CBC in 1 week with his primary care provider for further evaluation     9-family:  Called pt's wife Lord Fields and LM to call my cell number for update  All labs reviewed, BP stable off med, continue metoprolol and avoid arb/diuretic  Renal function stable  Rec also to avoid statin and continue low dose allopurinol 100 mg daily  Monitor lft's and cbc for anemia and renal function   He finished abx and is feeling well and will fup with urology for stent in left ureter  Take baby aspirin as directed  Will restart once liver enzymes stable

## 2020-07-22 NOTE — TELEPHONE ENCOUNTER
I spoke to patient and scheduled his surgery for 8/5 with Dr Leah Davis  He is currently at his daughters house and can not do any sooner  He is aware that he needs to have the COVID test and urine culture 10 days prior  I am emailing him his surgery packet

## 2020-07-22 NOTE — TELEPHONE ENCOUNTER
Patient called and still has his stent in and thought it should of been out by now  Please call patient back at [phone]

## 2020-07-28 ENCOUNTER — APPOINTMENT (OUTPATIENT)
Dept: LAB | Facility: MEDICAL CENTER | Age: 60
End: 2020-07-28
Attending: UROLOGY
Payer: COMMERCIAL

## 2020-07-28 ENCOUNTER — TELEPHONE (OUTPATIENT)
Dept: UROLOGY | Facility: CLINIC | Age: 60
End: 2020-07-28

## 2020-07-28 DIAGNOSIS — N20.1 LEFT URETERAL CALCULUS: ICD-10-CM

## 2020-07-28 PROCEDURE — U0003 INFECTIOUS AGENT DETECTION BY NUCLEIC ACID (DNA OR RNA); SEVERE ACUTE RESPIRATORY SYNDROME CORONAVIRUS 2 (SARS-COV-2) (CORONAVIRUS DISEASE [COVID-19]), AMPLIFIED PROBE TECHNIQUE, MAKING USE OF HIGH THROUGHPUT TECHNOLOGIES AS DESCRIBED BY CMS-2020-01-R: HCPCS

## 2020-07-28 PROCEDURE — 87086 URINE CULTURE/COLONY COUNT: CPT

## 2020-07-28 NOTE — TELEPHONE ENCOUNTER
Carmen Metzger from [facility] lab called the office stating patient's PAT urine specimen for culture shows 2 stones  Carmen Metzger will send specimen including the 2 stones to [facility]  Patient scheduled for ureteroscopy for left calculus and stent insertion 08/05/2020 with Dr Kilo Acosta  Will send encounter to Dr Kilo Acosta for his advise

## 2020-07-29 ENCOUNTER — ANESTHESIA EVENT (OUTPATIENT)
Dept: PERIOP | Facility: HOSPITAL | Age: 60
End: 2020-07-29
Payer: COMMERCIAL

## 2020-07-29 LAB
BACTERIA UR CULT: NORMAL
SARS-COV-2 RNA SPEC QL NAA+PROBE: NOT DETECTED

## 2020-07-29 NOTE — PRE-PROCEDURE INSTRUCTIONS
Pre-Surgery Instructions:   Medication Instructions    allopurinol (ZYLOPRIM) 100 mg tablet Instructed patient per Anesthesia Guidelines   aspirin (ASPIR-81) 81 mg EC tablet Instructed patient per Anesthesia Guidelines   metoprolol tartrate (LOPRESSOR) 25 mg tablet Instructed patient per Anesthesia Guidelines   tamsulosin (FLOMAX) 0 4 mg Instructed patient per Anesthesia Guidelines  Patient per anesth guidelines  instructed *to take metoprolol with a sip of water the morning of surgery  Patient given/ instructed on use of chlorhexidine soap per hospital protocol    Patient instructed to stop all ASA, NSAIDS, vitamins and herbal supplements one week prior to surgery or per Dr Kilo Acosta

## 2020-07-29 NOTE — TELEPHONE ENCOUNTER
Bridgette from [facility] lab called back inquiring what they should with the patient kidney stones   Sarah Mccall can be reached at [phone]

## 2020-07-29 NOTE — TELEPHONE ENCOUNTER
Bridgette from [facility] ([phone]) calling to inquire on the previous note  She should be contacted directly with instructions on how to proceed

## 2020-07-29 NOTE — TELEPHONE ENCOUNTER
Call placed to patient and [facility] for patient to contact the office in regards to Dr Teena Batista recommendations  Office number was provided for call back

## 2020-07-29 NOTE — TELEPHONE ENCOUNTER
Call received from patient and spoke with him  Reviewed with him the recommendations of Dr Teresa Lima at this time  Pt is aware and would like to proceed with OR procedure to remove the stent as planned on 8/5/2020

## 2020-07-29 NOTE — TELEPHONE ENCOUNTER
Patient called in regard to passing the 2 kidney stones yesterday with urine specimen    Please call patient to advise at [phone]  Thank you

## 2020-07-30 ENCOUNTER — TELEPHONE (OUTPATIENT)
Dept: LAB | Facility: HOSPITAL | Age: 60
End: 2020-07-30

## 2020-07-30 DIAGNOSIS — N20.0 NEPHROLITHIASIS: Primary | ICD-10-CM

## 2020-08-05 ENCOUNTER — HOSPITAL ENCOUNTER (OUTPATIENT)
Facility: HOSPITAL | Age: 60
Setting detail: OUTPATIENT SURGERY
Discharge: HOME | End: 2020-08-05
Attending: UROLOGY | Admitting: UROLOGY
Payer: COMMERCIAL

## 2020-08-05 ENCOUNTER — ANESTHESIA (OUTPATIENT)
Dept: PERIOP | Facility: HOSPITAL | Age: 60
End: 2020-08-05
Payer: COMMERCIAL

## 2020-08-05 ENCOUNTER — APPOINTMENT (OUTPATIENT)
Dept: RADIOLOGY | Facility: HOSPITAL | Age: 60
End: 2020-08-05
Payer: COMMERCIAL

## 2020-08-05 VITALS
RESPIRATION RATE: 18 BRPM | OXYGEN SATURATION: 95 % | BODY MASS INDEX: 30.31 KG/M2 | TEMPERATURE: 98.2 F | SYSTOLIC BLOOD PRESSURE: 126 MMHG | DIASTOLIC BLOOD PRESSURE: 70 MMHG | HEIGHT: 68 IN | WEIGHT: 200 LBS | HEART RATE: 75 BPM

## 2020-08-05 DIAGNOSIS — N20.1 LEFT URETERAL CALCULUS: ICD-10-CM

## 2020-08-05 DIAGNOSIS — N20.1 URETERAL STONE: Primary | ICD-10-CM

## 2020-08-05 PROCEDURE — C1769 GUIDE WIRE: HCPCS | Performed by: UROLOGY

## 2020-08-05 PROCEDURE — NC001 PR NO CHARGE: Performed by: UROLOGY

## 2020-08-05 PROCEDURE — 52332 CYSTOSCOPY AND TREATMENT: CPT | Performed by: UROLOGY

## 2020-08-05 PROCEDURE — C2617 STENT, NON-COR, TEM W/O DEL: HCPCS | Performed by: UROLOGY

## 2020-08-05 PROCEDURE — 52351 CYSTOURETERO & OR PYELOSCOPE: CPT | Performed by: UROLOGY

## 2020-08-05 PROCEDURE — 74018 RADEX ABDOMEN 1 VIEW: CPT

## 2020-08-05 DEVICE — INLAY OPTIMA URETERAL STENT W/O GUIDEWIRE
Type: IMPLANTABLE DEVICE | Status: FUNCTIONAL
Brand: BARD® INLAY OPTIMA® URETERAL STENT

## 2020-08-05 RX ORDER — DEXAMETHASONE SODIUM PHOSPHATE 4 MG/ML
INJECTION, SOLUTION INTRA-ARTICULAR; INTRALESIONAL; INTRAMUSCULAR; INTRAVENOUS; SOFT TISSUE AS NEEDED
Status: DISCONTINUED | OUTPATIENT
Start: 2020-08-05 | End: 2020-08-05

## 2020-08-05 RX ORDER — CEPHALEXIN 500 MG/1
500 CAPSULE ORAL EVERY 6 HOURS SCHEDULED
Qty: 12 CAPSULE | Refills: 0 | Status: SHIPPED | OUTPATIENT
Start: 2020-08-05 | End: 2020-08-08

## 2020-08-05 RX ORDER — MAGNESIUM HYDROXIDE 1200 MG/15ML
LIQUID ORAL AS NEEDED
Status: DISCONTINUED | OUTPATIENT
Start: 2020-08-05 | End: 2020-08-05 | Stop reason: HOSPADM

## 2020-08-05 RX ORDER — CEFAZOLIN SODIUM 1 G/50ML
1000 SOLUTION INTRAVENOUS ONCE
Status: DISCONTINUED | OUTPATIENT
Start: 2020-08-05 | End: 2020-08-05 | Stop reason: HOSPADM

## 2020-08-05 RX ORDER — PROPOFOL 10 MG/ML
INJECTION, EMULSION INTRAVENOUS AS NEEDED
Status: DISCONTINUED | OUTPATIENT
Start: 2020-08-05 | End: 2020-08-05

## 2020-08-05 RX ORDER — FENTANYL CITRATE 50 UG/ML
INJECTION, SOLUTION INTRAMUSCULAR; INTRAVENOUS AS NEEDED
Status: DISCONTINUED | OUTPATIENT
Start: 2020-08-05 | End: 2020-08-05

## 2020-08-05 RX ORDER — MIDAZOLAM HYDROCHLORIDE 2 MG/2ML
INJECTION, SOLUTION INTRAMUSCULAR; INTRAVENOUS AS NEEDED
Status: DISCONTINUED | OUTPATIENT
Start: 2020-08-05 | End: 2020-08-05

## 2020-08-05 RX ORDER — PHENAZOPYRIDINE HYDROCHLORIDE 200 MG/1
200 TABLET, FILM COATED ORAL 3 TIMES DAILY PRN
Qty: 10 TABLET | Refills: 0 | Status: SHIPPED | OUTPATIENT
Start: 2020-08-05 | End: 2020-08-12 | Stop reason: ALTCHOICE

## 2020-08-05 RX ORDER — CEFAZOLIN SODIUM 1 G/50ML
SOLUTION INTRAVENOUS AS NEEDED
Status: DISCONTINUED | OUTPATIENT
Start: 2020-08-05 | End: 2020-08-05

## 2020-08-05 RX ORDER — ONDANSETRON 2 MG/ML
INJECTION INTRAMUSCULAR; INTRAVENOUS AS NEEDED
Status: DISCONTINUED | OUTPATIENT
Start: 2020-08-05 | End: 2020-08-05

## 2020-08-05 RX ORDER — ONDANSETRON 2 MG/ML
4 INJECTION INTRAMUSCULAR; INTRAVENOUS ONCE AS NEEDED
Status: DISCONTINUED | OUTPATIENT
Start: 2020-08-05 | End: 2020-08-05 | Stop reason: HOSPADM

## 2020-08-05 RX ORDER — SODIUM CHLORIDE 9 MG/ML
INJECTION, SOLUTION INTRAVENOUS AS NEEDED
Status: DISCONTINUED | OUTPATIENT
Start: 2020-08-05 | End: 2020-08-05 | Stop reason: HOSPADM

## 2020-08-05 RX ORDER — PHENAZOPYRIDINE HYDROCHLORIDE 200 MG/1
200 TABLET, FILM COATED ORAL ONCE
Status: COMPLETED | OUTPATIENT
Start: 2020-08-05 | End: 2020-08-05

## 2020-08-05 RX ORDER — LIDOCAINE HYDROCHLORIDE 20 MG/ML
INJECTION, SOLUTION EPIDURAL; INFILTRATION; INTRACAUDAL; PERINEURAL AS NEEDED
Status: DISCONTINUED | OUTPATIENT
Start: 2020-08-05 | End: 2020-08-05

## 2020-08-05 RX ORDER — HYDROCODONE BITARTRATE AND ACETAMINOPHEN 5; 325 MG/1; MG/1
1-2 TABLET ORAL EVERY 6 HOURS PRN
Qty: 20 TABLET | Refills: 0 | Status: SHIPPED | OUTPATIENT
Start: 2020-08-05 | End: 2020-08-15

## 2020-08-05 RX ORDER — FENTANYL CITRATE/PF 50 MCG/ML
50 SYRINGE (ML) INJECTION
Status: DISCONTINUED | OUTPATIENT
Start: 2020-08-05 | End: 2020-08-05 | Stop reason: HOSPADM

## 2020-08-05 RX ORDER — SODIUM CHLORIDE 9 MG/ML
125 INJECTION, SOLUTION INTRAVENOUS CONTINUOUS
Status: DISCONTINUED | OUTPATIENT
Start: 2020-08-05 | End: 2020-08-05 | Stop reason: HOSPADM

## 2020-08-05 RX ORDER — HYDROCODONE BITARTRATE AND ACETAMINOPHEN 5; 325 MG/1; MG/1
1 TABLET ORAL EVERY 6 HOURS PRN
Status: DISCONTINUED | OUTPATIENT
Start: 2020-08-05 | End: 2020-08-05 | Stop reason: HOSPADM

## 2020-08-05 RX ADMIN — LIDOCAINE HYDROCHLORIDE 100 MG: 20 INJECTION, SOLUTION EPIDURAL; INFILTRATION; INTRACAUDAL; PERINEURAL at 12:27

## 2020-08-05 RX ADMIN — PHENAZOPYRIDINE 200 MG: 200 TABLET ORAL at 14:09

## 2020-08-05 RX ADMIN — DEXAMETHASONE SODIUM PHOSPHATE 4 MG: 4 INJECTION, SOLUTION INTRAMUSCULAR; INTRAVENOUS at 12:43

## 2020-08-05 RX ADMIN — FENTANYL CITRATE 25 MCG: 50 INJECTION, SOLUTION INTRAMUSCULAR; INTRAVENOUS at 12:37

## 2020-08-05 RX ADMIN — FENTANYL CITRATE 25 MCG: 50 INJECTION, SOLUTION INTRAMUSCULAR; INTRAVENOUS at 12:39

## 2020-08-05 RX ADMIN — HYDROCODONE BITARTRATE AND ACETAMINOPHEN 1 TABLET: 5; 325 TABLET ORAL at 14:09

## 2020-08-05 RX ADMIN — SODIUM CHLORIDE: 0.9 INJECTION, SOLUTION INTRAVENOUS at 12:59

## 2020-08-05 RX ADMIN — SODIUM CHLORIDE 125 ML/HR: 0.9 INJECTION, SOLUTION INTRAVENOUS at 11:54

## 2020-08-05 RX ADMIN — ONDANSETRON 4 MG: 2 INJECTION INTRAMUSCULAR; INTRAVENOUS at 12:43

## 2020-08-05 RX ADMIN — PROPOFOL 200 MG: 10 INJECTION, EMULSION INTRAVENOUS at 12:27

## 2020-08-05 RX ADMIN — FENTANYL CITRATE 25 MCG: 50 INJECTION, SOLUTION INTRAMUSCULAR; INTRAVENOUS at 12:47

## 2020-08-05 RX ADMIN — FENTANYL CITRATE 25 MCG: 50 INJECTION, SOLUTION INTRAMUSCULAR; INTRAVENOUS at 12:48

## 2020-08-05 RX ADMIN — MIDAZOLAM 2 MG: 1 INJECTION INTRAMUSCULAR; INTRAVENOUS at 12:20

## 2020-08-05 RX ADMIN — CEFAZOLIN SODIUM 2000 MG: 1 SOLUTION INTRAVENOUS at 12:10

## 2020-08-05 NOTE — OP NOTE
OPERATIVE REPORT  PATIENT NAME: Sylvester Schwarz    :  1960  MRN: [id number]  Pt Location: [facility] OR ROOM 07    SURGERY DATE: 2020    Surgeon(s) and Role:     * Dolly Lyons MD - Primary    Preop Diagnosis:  Left ureteral calculus [N20 1]    Post-Op Diagnosis Codes:     * Left ureteral calculus [N20 1], passed    Procedure(s) (LRB):  CYSTO, URETEROSCOPY W/HOLMIUM LASER, RETROGRADE PYELOGRAM, STENT (Left)    Specimen(s):  None  Estimated Blood Loss:   Minimal    Drains:  Ureteral Drain/Stent Left ureter 6 Fr  (Active)   Number of days: 32       Anesthesia Type:   General    Operative Indications:  Left ureteral calculus [N20 1]      Operative Findings:  No calculi seen along the course of the entire ureter  Complications:   None    Procedure and Technique:  21-year-old man status post left ureteral stenting by Dr Nida Wallace for 3 calculi in his distal left ureter  He was offered cystoscopy, left ureteroscopy laser lithotripsy left stone basket extraction and left ureteral stent exchange  He was also given the option of simply removing the stent as the stones may pass  He wished to undergo ureteroscopy, so procedures been explained him along with the risks of bleeding infection damage to the urinary tract need for additional procedures  He gives informed consent  The patient was brought to the operating room and identified properly  LMA was induced, the patient was prepped and draped in dorsal lithotomy position usual fashion  Time-out was performed, and cystoscopy was carried out with a 22 [place] cystoscopy sheath and 30 degree lens  The urethra was normal without stricture  The bladder was smooth not trabeculated there are no stones tumors or lesions  The orifices were orthotopic and intact  The left ureteral stent was grasped with grasping forceps and brought out to the urethral meatus    A wire was fed up through the stent up into the kidney under fluoroscopic guidance, and then I placed the cystoscope up to drain the bladder and check for any stones that might have come out with removing the stent  The stent had been discarded after pulling it out over the wire  No stone was seen in the bladder  I then drained the bladder placed the rigid ureteral scope up into the distal ureter and then performed pan ureteroscopy up to the level of the UPJ  No stone was seen  I then withdrew the scope and placed a 6 [place] by 26 cm ureteral stent full string attached over the wire and coils were established in the renal pelvis and the bladder  The bladder was drained cystoscopy sheath and the stent string was taped to the top of the penis     I was present for the entire procedure and A qualified resident physician was not available    Patient Disposition:  PACU  and extubated and stable    SIGNATURE: Sam Osborn MD  DATE: August 5, 2020  TIME: 12:25 PM

## 2020-08-05 NOTE — ANESTHESIA PREPROCEDURE EVALUATION
Review of Systems/Medical History  Patient summary reviewed  Chart reviewed      Cardiovascular  Hyperlipidemia, Hypertension , CAD , Cardiac stents    Comment: PVC's,  Pulmonary       GI/Hepatic  Negative GI/hepatic ROS          Kidney stones, Kidney disease ,        Endo/Other    Obesity    GYN  Negative gynecology ROS          Hematology   Musculoskeletal  Gout,        Neurology  Negative neurology ROS      Psychology   Negative psychology ROS              Physical Exam    Airway    Mallampati score: II  TM Distance: >3 FB  Neck ROM: full     Dental   No notable dental hx     Cardiovascular  Rhythm: regular, Rate: normal, Cardiovascular exam normal    Pulmonary  Pulmonary exam normal Breath sounds clear to auscultation,     Other Findings        Anesthesia Plan  ASA Score- 3 Emergent    Anesthesia Type- general with ASA Monitors  Additional Monitors:   Airway Plan: ETT  Plan Factors-    Induction- intravenous  Postoperative Plan- Plan for postoperative opioid use  Informed Consent- Anesthetic plan and risks discussed with patient  Procedure:  CYSTO, URETEROSCOPY W/HOLMIUM LASER, RETROGRADE PYELOGRAM, STENT (Left Bladder)    Relevant Problems   CARDIO   (+) Coronary artery disease involving native coronary artery of native heart without angina pectoris   (+) Essential hypertension   (+) Hyperlipidemia   (+) PVC's (premature ventricular contractions)      /RENAL   (+) Nephrolithiasis      MUSCULOSKELETAL   (+) Gout        Physical Exam    Airway    Mallampati score: II         Dental   No notable dental hx     Cardiovascular  Rhythm: regular, Rate: normal,     Pulmonary  Pulmonary exam normal Breath sounds clear to auscultation,     Other Findings        Anesthesia Plan  ASA Score- 3     Anesthesia Type- general with ASA Monitors  Additional Monitors:   Airway Plan:           Plan Factors-    Induction- intravenous  Postoperative Plan- Plan for postoperative opioid use  Planned trial extubation    Informed Consent- Anesthetic plan and risks discussed with patient

## 2020-08-05 NOTE — H&P
H&P Exam - Urology   Diamante Cordoba [facility] 1960 [id number]      Assessment/Plan     Assessment:  Left ureteral calculi, 4 and 3 mm in size  Status post left ureteral stenting  Plan:  Cystoscopy, left ureteroscopy laser lithotripsy stone basket extraction left ureteral stent exchange  The risks of bleeding, infection, damage to the urinary tract with need for additional procedures have been explained as well as the possibility that I may not find any stones because the stones may come out with exchange of the stent  History of Present Illness   HPI:  The patient presents for the above procedure The risks of bleeding, infection, damage to the urinary tract and need for additional procedures has been explained and informed consent given  He has already undergone cystoscopy left ureteral stent placement by Dr Miky So 7/4/2020  Past medical history and past surgical history reviewed  Review of systems:    General: No fever  CVS: No chest pain or new SOB  Abdomen: No diarrhea or blood in stool  : No new voiding difficulties  Neuro: No syncope/weakness/loss of sensation/paresis  Ophthalmologic: No new visual changes  Ortho: No new back/joint pains  Social History- as per previous notes  Family History: non-contributory    Meds/Allergies   PTA meds:   Prior to Admission Medications   Prescriptions Last Dose Informant Patient Reported? Taking?    HYDROcodone-acetaminophen (NORCO) 5-325 mg per tablet   No No   Sig: Take 1 tablet by mouth every 6 (six) hours as needed for painMax Daily Amount: 4 tablets   Patient not taking: Reported on 7/16/2020   allopurinol (ZYLOPRIM) 100 mg tablet   No Yes   Sig: TAKE ONE TABLET BY MOUTH EVERY DAY    aspirin (ASPIR-81) 81 mg EC tablet  Self Yes Yes   Sig: Take 1 tablet by mouth daily   atorvastatin (LIPITOR) 20 mg tablet   No No   Sig: TAKE ONE TABLET BY MOUTH EVERY DAY    Patient not taking: Reported on 7/16/2020   metoprolol tartrate (LOPRESSOR) 25 mg tablet No Yes   Sig: TAKE ONE TABLET BY MOUTH EVERY DAY    tamsulosin (FLOMAX) 0 4 mg   No Yes   Sig: Take 1 capsule (0 4 mg total) by mouth daily with dinner      Facility-Administered Medications: None         Objective   Vitals: Ht 5' 8"   Wt 90 7 kg (200 lb)   BMI 30 41 kg/m²     No intake/output data recorded  Physical Exam:    Awake, alert, in NAD  HEENT: Atraumatic, Normocephalic    Lungs: Normal Respiratory effort, no wheezes,stridor or rales  Abdomen: Nondistended  Extremiites: Normal ROM, no cyanosis/edema  Lab Results: I have personally reviewed pertinent reports  CBC: No results found for: WBC, HGB, HCT, MCV, PLT, ADJUSTEDWBC, MCH, MCHC, RDW, MPV, NRBC  CMP: No results found for: SODIUM, CL, CO2, ANIONGAP, BUN, CREATININE, GLUCOSE, CALCIUM, AST, ALT, ALKPHOS, PROT, BILITOT, EGFR  Urinalysis: No results found for: Josselyn Stain, SPECGRAV, PHUR, LEUKOCYTESUR, NITRITE, PROTEINUA, GLUCOSEU, KETONESU, BILIRUBINUR, BLOODU  Urine Culture: No results found for: URINECX  Imaging: I have personally reviewed pertinent reports     and I have personally reviewed pertinent films in PACS

## 2020-08-05 NOTE — DISCHARGE INSTRUCTIONS
Expect to see blood in the urine, and to experience urgency/frequency/burning with urination and dribbling  This is normal after urological procedures  Is also normal to experience some nausea after these procedures  Go back your regular diet carefully  It is normal to feel pain in the kidney when urinating and when the bladder is filling due to urine refluxing up to the kidney because of the open stent  The stent is necessary to keep the ureter open to allow clots and swelling to resolve and to allow the kidney to drain properly after instrumentation  No stone was seen  The ureter was clean  You have some tiny stones in the kidney that are too small to treat  Those will pass on their own  You need no further procedures on your left side  Call for fever greater that 101 5, inability to urinate, prolonged nausea and vomiting, or severe pain not relieved by pain medications  Alec Romano Keep stent string taped- if it becomes dislodged or gets pulled out early, that is OK- simply remove it completely by pulling on string until it is completely out  No driving/operating machinery for 24 hours, and while taking narcotics  Take over the counter remedy of choice to avoid constipation  Drink plenty of fluids

## 2020-08-05 NOTE — ANESTHESIA POSTPROCEDURE EVALUATION
Post-Op Assessment Note    CV Status:  Stable    Pain management: adequate     Mental Status:  Alert and awake   Hydration Status:  Euvolemic   PONV Controlled:  Controlled   Airway Patency:  Patent      Post Op Vitals Reviewed: Yes      Staff: Anesthesiologist       Vitals:    08/05/20 1333   BP: 132/70   Pulse: 78   Resp: 14   Temp: 98 2 °F (36 8 °C)   SpO2: 87%         No complications documented

## 2020-08-06 ENCOUNTER — TELEPHONE (OUTPATIENT)
Dept: UROLOGY | Facility: MEDICAL CENTER | Age: 60
End: 2020-08-06

## 2020-08-06 NOTE — TELEPHONE ENCOUNTER
LMOM per comm consent for pt to return call to schedule appt for stent removal s/p cysto, URS with Dr Paredes  on 8/5/20  Pt also needs appt for FU in 6 months with U/S  Order in [employer]

## 2020-08-06 NOTE — TELEPHONE ENCOUNTER
Call placed to patient and spoke with him  Appointment made for next week with nurse with stent with string removal  Pt is asking if he can have a note for work excusing him until stent comes out next week  Work note to be written and emailed to patient as requested

## 2020-08-06 NOTE — TELEPHONE ENCOUNTER
LMOM per comm consent for pt to return call to schedule appt for stent removal s/p cysto, URS with Dr Swain Getting on 8/5/20  Pt also needs appt for FU in 6 months with U/S  Order in [place]

## 2020-08-06 NOTE — TELEPHONE ENCOUNTER
----- Message from Joe Garg MD sent at 8/5/2020 12:54 PM EDT -----  Please call patient with an appointment to see the nurses next week to remove stent by pulling on the string, then see 1 of us in 6 months with a renal ultrasound

## 2020-08-06 NOTE — TELEPHONE ENCOUNTER
Call placed to patient and [facility] for him to contact office in regards to scheduling appointment for stent with string removal  Office number was provided for call back

## 2020-08-10 DIAGNOSIS — I10 ESSENTIAL HYPERTENSION: ICD-10-CM

## 2020-08-11 ENCOUNTER — APPOINTMENT (OUTPATIENT)
Dept: LAB | Age: 60
End: 2020-08-11
Payer: COMMERCIAL

## 2020-08-11 DIAGNOSIS — E78.2 MIXED HYPERLIPIDEMIA: ICD-10-CM

## 2020-08-11 DIAGNOSIS — N12 PYELONEPHRITIS: ICD-10-CM

## 2020-08-11 DIAGNOSIS — M10.9 GOUT, UNSPECIFIED CAUSE, UNSPECIFIED CHRONICITY, UNSPECIFIED SITE: ICD-10-CM

## 2020-08-11 DIAGNOSIS — R74.01 TRANSAMINITIS: ICD-10-CM

## 2020-08-11 DIAGNOSIS — I10 ESSENTIAL HYPERTENSION: ICD-10-CM

## 2020-08-11 DIAGNOSIS — D64.9 ANEMIA, UNSPECIFIED TYPE: ICD-10-CM

## 2020-08-11 LAB
ALBUMIN SERPL BCP-MCNC: 3.8 G/DL (ref 3.5–5)
ALP SERPL-CCNC: 91 U/L (ref 46–116)
ALT SERPL W P-5'-P-CCNC: 67 U/L (ref 12–78)
ANION GAP SERPL CALCULATED.3IONS-SCNC: 4 MMOL/L (ref 4–13)
AST SERPL W P-5'-P-CCNC: 44 U/L (ref 5–45)
BASOPHILS # BLD AUTO: 0.06 THOUSANDS/ΜL (ref 0–0.1)
BASOPHILS NFR BLD AUTO: 1 % (ref 0–1)
BILIRUB SERPL-MCNC: 1.32 MG/DL (ref 0.2–1)
BUN SERPL-MCNC: 16 MG/DL (ref 5–25)
CALCIUM SERPL-MCNC: 8.8 MG/DL (ref 8.3–10.1)
CHLORIDE SERPL-SCNC: 109 MMOL/L (ref 100–108)
CO2 SERPL-SCNC: 28 MMOL/L (ref 21–32)
CREAT SERPL-MCNC: 1.13 MG/DL (ref 0.6–1.3)
EOSINOPHIL # BLD AUTO: 0.31 THOUSAND/ΜL (ref 0–0.61)
EOSINOPHIL NFR BLD AUTO: 6 % (ref 0–6)
ERYTHROCYTE [DISTWIDTH] IN BLOOD BY AUTOMATED COUNT: 13.5 % (ref 11.6–15.1)
GFR SERPL CREATININE-BSD FRML MDRD: 70 ML/MIN/1.73SQ M
GLUCOSE P FAST SERPL-MCNC: 121 MG/DL (ref 65–99)
HCT VFR BLD AUTO: 41.7 % (ref 36.5–49.3)
HGB BLD-MCNC: 14.6 G/DL (ref 12–17)
IMM GRANULOCYTES # BLD AUTO: 0.01 THOUSAND/UL (ref 0–0.2)
IMM GRANULOCYTES NFR BLD AUTO: 0 % (ref 0–2)
LYMPHOCYTES # BLD AUTO: 1.29 THOUSANDS/ΜL (ref 0.6–4.47)
LYMPHOCYTES NFR BLD AUTO: 27 % (ref 14–44)
MCH RBC QN AUTO: 31.5 PG (ref 26.8–34.3)
MCHC RBC AUTO-ENTMCNC: 35 G/DL (ref 31.4–37.4)
MCV RBC AUTO: 90 FL (ref 82–98)
MONOCYTES # BLD AUTO: 0.35 THOUSAND/ΜL (ref 0.17–1.22)
MONOCYTES NFR BLD AUTO: 7 % (ref 4–12)
NEUTROPHILS # BLD AUTO: 2.79 THOUSANDS/ΜL (ref 1.85–7.62)
NEUTS SEG NFR BLD AUTO: 59 % (ref 43–75)
NRBC BLD AUTO-RTO: 0 /100 WBCS
PLATELET # BLD AUTO: 194 THOUSANDS/UL (ref 149–390)
PMV BLD AUTO: 9.7 FL (ref 8.9–12.7)
POTASSIUM SERPL-SCNC: 4 MMOL/L (ref 3.5–5.3)
PROT SERPL-MCNC: 7.5 G/DL (ref 6.4–8.2)
RBC # BLD AUTO: 4.64 MILLION/UL (ref 3.88–5.62)
SODIUM SERPL-SCNC: 141 MMOL/L (ref 136–145)
URATE SERPL-MCNC: 6.8 MG/DL (ref 4.2–8)
WBC # BLD AUTO: 4.81 THOUSAND/UL (ref 4.31–10.16)

## 2020-08-11 PROCEDURE — 85025 COMPLETE CBC W/AUTO DIFF WBC: CPT

## 2020-08-11 PROCEDURE — 36415 COLL VENOUS BLD VENIPUNCTURE: CPT

## 2020-08-11 PROCEDURE — 84550 ASSAY OF BLOOD/URIC ACID: CPT

## 2020-08-11 PROCEDURE — 80053 COMPREHEN METABOLIC PANEL: CPT

## 2020-08-12 ENCOUNTER — CLINICAL SUPPORT (OUTPATIENT)
Dept: UROLOGY | Facility: MEDICAL CENTER | Age: 60
End: 2020-08-12
Payer: COMMERCIAL

## 2020-08-12 VITALS
SYSTOLIC BLOOD PRESSURE: 142 MMHG | HEIGHT: 68 IN | HEART RATE: 76 BPM | TEMPERATURE: 97.3 F | BODY MASS INDEX: 31.07 KG/M2 | WEIGHT: 205 LBS | DIASTOLIC BLOOD PRESSURE: 82 MMHG

## 2020-08-12 DIAGNOSIS — N20.1 LEFT URETERAL CALCULUS: Primary | ICD-10-CM

## 2020-08-12 DIAGNOSIS — N20.0 NEPHROLITHIASIS: ICD-10-CM

## 2020-08-12 PROCEDURE — 1036F TOBACCO NON-USER: CPT

## 2020-08-12 PROCEDURE — 1111F DSCHRG MED/CURRENT MED MERGE: CPT

## 2020-08-12 PROCEDURE — 3008F BODY MASS INDEX DOCD: CPT

## 2020-08-12 PROCEDURE — 99211 OFF/OP EST MAY X REQ PHY/QHP: CPT

## 2020-08-12 PROCEDURE — 3077F SYST BP >= 140 MM HG: CPT

## 2020-08-12 PROCEDURE — 3079F DIAST BP 80-89 MM HG: CPT

## 2020-08-17 ENCOUNTER — OFFICE VISIT (OUTPATIENT)
Dept: FAMILY MEDICINE CLINIC | Facility: CLINIC | Age: 60
End: 2020-08-17
Payer: COMMERCIAL

## 2020-08-17 VITALS
OXYGEN SATURATION: 97 % | WEIGHT: 203.2 LBS | BODY MASS INDEX: 30.8 KG/M2 | HEIGHT: 68 IN | RESPIRATION RATE: 17 BRPM | SYSTOLIC BLOOD PRESSURE: 136 MMHG | DIASTOLIC BLOOD PRESSURE: 84 MMHG | HEART RATE: 67 BPM | TEMPERATURE: 97.6 F

## 2020-08-17 DIAGNOSIS — R74.01 TRANSAMINITIS: ICD-10-CM

## 2020-08-17 DIAGNOSIS — D64.9 ANEMIA, UNSPECIFIED TYPE: ICD-10-CM

## 2020-08-17 DIAGNOSIS — N12 PYELONEPHRITIS: ICD-10-CM

## 2020-08-17 DIAGNOSIS — I10 ESSENTIAL HYPERTENSION: ICD-10-CM

## 2020-08-17 DIAGNOSIS — E78.6 LOW HDL (UNDER 40): ICD-10-CM

## 2020-08-17 DIAGNOSIS — E78.5 HYPERLIPIDEMIA, UNSPECIFIED HYPERLIPIDEMIA TYPE: ICD-10-CM

## 2020-08-17 DIAGNOSIS — M10.9 GOUT, UNSPECIFIED CAUSE, UNSPECIFIED CHRONICITY, UNSPECIFIED SITE: ICD-10-CM

## 2020-08-17 DIAGNOSIS — Z12.5 SCREENING FOR PROSTATE CANCER: Primary | ICD-10-CM

## 2020-08-17 PROCEDURE — 3079F DIAST BP 80-89 MM HG: CPT | Performed by: FAMILY MEDICINE

## 2020-08-17 PROCEDURE — 3008F BODY MASS INDEX DOCD: CPT | Performed by: FAMILY MEDICINE

## 2020-08-17 PROCEDURE — 99214 OFFICE O/P EST MOD 30 MIN: CPT | Performed by: FAMILY MEDICINE

## 2020-08-17 PROCEDURE — 1036F TOBACCO NON-USER: CPT | Performed by: FAMILY MEDICINE

## 2020-08-17 PROCEDURE — 1111F DSCHRG MED/CURRENT MED MERGE: CPT | Performed by: FAMILY MEDICINE

## 2020-08-17 PROCEDURE — 3075F SYST BP GE 130 - 139MM HG: CPT | Performed by: FAMILY MEDICINE

## 2020-08-17 RX ORDER — ALLOPURINOL 100 MG/1
100 TABLET ORAL DAILY
Qty: 30 TABLET | Refills: 0 | Status: SHIPPED | OUTPATIENT
Start: 2020-08-17 | End: 2020-10-14 | Stop reason: SDUPTHER

## 2020-08-17 NOTE — PATIENT INSTRUCTIONS
All labs reviewed, BP stable off med, continue metoprolol and avoid arb/diuretic  Renal function stable  Rec also to avoid statin and continue low dose allopurinol 100 mg daily  Monitor lft's and cbc for anemia and renal function  He finished abx and is feeling well and will fup with urology for stent in left ureter - this was removed last Wednesday  Take baby aspirin as directed  Will restart once liver enzymes stable in 3 month  Follow CDC guidelines for prevention of COVID 19

## 2020-08-17 NOTE — PROGRESS NOTES
Assessment/Plan:  Chief Complaint   Patient presents with    Follow-up     1 Month     Patient Instructions   All labs reviewed, BP stable off med, continue metoprolol and avoid arb/diuretic  Renal function stable  Rec also to avoid statin and continue low dose allopurinol 100 mg daily  Monitor lft's and cbc for anemia and renal function  He finished abx and is feeling well and will fup with urology for stent in left ureter - this was removed last Wednesday  Take baby aspirin as directed  Will restart once liver enzymes stable in 3 month  Follow CDC guidelines for prevention of COVID 19  No problem-specific Assessment & Plan notes found for this encounter  Diagnoses and all orders for this visit:    Screening for prostate cancer  -     PSA, total and free; Future    Gout, unspecified cause, unspecified chronicity, unspecified site  -     allopurinol (ZYLOPRIM) 100 mg tablet; Take 1 tablet (100 mg total) by mouth daily  -     Comprehensive metabolic panel; Future    Essential hypertension  -     metoprolol tartrate (LOPRESSOR) 25 mg tablet; Take 1 tablet (25 mg total) by mouth daily  -     Comprehensive metabolic panel; Future    Hyperlipidemia, unspecified hyperlipidemia type  -     Lipid Panel with Direct LDL reflex; Future  -     Comprehensive metabolic panel; Future    Transaminitis  -     Comprehensive metabolic panel; Future    Pyelonephritis    Anemia, unspecified type  -     CBC and differential; Future    Low HDL (under 40)  -     Lipid Panel with Direct LDL reflex; Future  -     Comprehensive metabolic panel; Future          Subjective:      Patient ID: Lamar Sosa is a 63 y o  male      HPI    The following portions of the patient's history were reviewed and updated as appropriate: allergies, current medications, past family history, past medical history, past social history, past surgical history and problem list     Review of Systems      Objective:      /84 (BP Location: Left arm, Patient Position: Sitting, Cuff Size: Large)   Pulse 67   Temp 97 6 °F (36 4 °C) (Temporal)   Resp 17   Ht 5' 8" (1 727 m)   Wt 92 2 kg (203 lb 3 2 oz)   SpO2 97%   BMI 30 90 kg/m²          Physical Exam  BMI Counseling: Body mass index is 30 9 kg/m²  The BMI is above normal  Nutrition recommendations include reducing portion sizes, decreasing overall calorie intake, 3-5 servings of fruits/vegetables daily, reducing fast food intake, consuming healthier snacks, decreasing soda and/or juice intake and reducing intake of cholesterol  Exercise recommendations include exercising 3-5 times per week

## 2020-08-20 DIAGNOSIS — N20.0 NEPHROLITHIASIS: ICD-10-CM

## 2020-08-21 RX ORDER — TAMSULOSIN HYDROCHLORIDE 0.4 MG/1
CAPSULE ORAL
Qty: 30 CAPSULE | Refills: 0 | Status: SHIPPED | OUTPATIENT
Start: 2020-08-21 | End: 2021-03-16

## 2020-10-14 DIAGNOSIS — I10 ESSENTIAL HYPERTENSION: ICD-10-CM

## 2020-10-14 DIAGNOSIS — M10.9 GOUT, UNSPECIFIED CAUSE, UNSPECIFIED CHRONICITY, UNSPECIFIED SITE: ICD-10-CM

## 2020-10-14 RX ORDER — ALLOPURINOL 100 MG/1
100 TABLET ORAL DAILY
Qty: 30 TABLET | Refills: 5 | Status: SHIPPED | OUTPATIENT
Start: 2020-10-14 | End: 2021-05-24 | Stop reason: SDUPTHER

## 2020-11-11 ENCOUNTER — LAB (OUTPATIENT)
Dept: LAB | Age: 60
End: 2020-11-11
Payer: COMMERCIAL

## 2020-11-11 DIAGNOSIS — I10 ESSENTIAL HYPERTENSION: ICD-10-CM

## 2020-11-11 DIAGNOSIS — M10.9 GOUT, UNSPECIFIED CAUSE, UNSPECIFIED CHRONICITY, UNSPECIFIED SITE: ICD-10-CM

## 2020-11-11 DIAGNOSIS — E78.5 HYPERLIPIDEMIA, UNSPECIFIED HYPERLIPIDEMIA TYPE: ICD-10-CM

## 2020-11-11 DIAGNOSIS — E78.6 LOW HDL (UNDER 40): ICD-10-CM

## 2020-11-11 DIAGNOSIS — Z12.5 SCREENING FOR PROSTATE CANCER: ICD-10-CM

## 2020-11-11 DIAGNOSIS — D64.9 ANEMIA, UNSPECIFIED TYPE: ICD-10-CM

## 2020-11-11 DIAGNOSIS — R74.01 TRANSAMINITIS: ICD-10-CM

## 2020-11-11 LAB
ALBUMIN SERPL BCP-MCNC: 4.3 G/DL (ref 3.5–5)
ALP SERPL-CCNC: 80 U/L (ref 46–116)
ALT SERPL W P-5'-P-CCNC: 35 U/L (ref 12–78)
ANION GAP SERPL CALCULATED.3IONS-SCNC: 4 MMOL/L (ref 4–13)
AST SERPL W P-5'-P-CCNC: 25 U/L (ref 5–45)
BASOPHILS # BLD AUTO: 0.05 THOUSANDS/ΜL (ref 0–0.1)
BASOPHILS NFR BLD AUTO: 1 % (ref 0–1)
BILIRUB SERPL-MCNC: 1.45 MG/DL (ref 0.2–1)
BUN SERPL-MCNC: 18 MG/DL (ref 5–25)
CALCIUM SERPL-MCNC: 9.3 MG/DL (ref 8.3–10.1)
CHLORIDE SERPL-SCNC: 106 MMOL/L (ref 100–108)
CHOLEST SERPL-MCNC: 192 MG/DL (ref 50–200)
CO2 SERPL-SCNC: 30 MMOL/L (ref 21–32)
CREAT SERPL-MCNC: 1.05 MG/DL (ref 0.6–1.3)
EOSINOPHIL # BLD AUTO: 0.12 THOUSAND/ΜL (ref 0–0.61)
EOSINOPHIL NFR BLD AUTO: 3 % (ref 0–6)
ERYTHROCYTE [DISTWIDTH] IN BLOOD BY AUTOMATED COUNT: 13.2 % (ref 11.6–15.1)
GFR SERPL CREATININE-BSD FRML MDRD: 76 ML/MIN/1.73SQ M
GLUCOSE P FAST SERPL-MCNC: 96 MG/DL (ref 65–99)
HCT VFR BLD AUTO: 44.4 % (ref 36.5–49.3)
HDLC SERPL-MCNC: 40 MG/DL
HGB BLD-MCNC: 15.3 G/DL (ref 12–17)
IMM GRANULOCYTES # BLD AUTO: 0.01 THOUSAND/UL (ref 0–0.2)
IMM GRANULOCYTES NFR BLD AUTO: 0 % (ref 0–2)
LDLC SERPL CALC-MCNC: 123 MG/DL (ref 0–100)
LYMPHOCYTES # BLD AUTO: 1.05 THOUSANDS/ΜL (ref 0.6–4.47)
LYMPHOCYTES NFR BLD AUTO: 25 % (ref 14–44)
MCH RBC QN AUTO: 31.1 PG (ref 26.8–34.3)
MCHC RBC AUTO-ENTMCNC: 34.5 G/DL (ref 31.4–37.4)
MCV RBC AUTO: 90 FL (ref 82–98)
MONOCYTES # BLD AUTO: 0.32 THOUSAND/ΜL (ref 0.17–1.22)
MONOCYTES NFR BLD AUTO: 8 % (ref 4–12)
NEUTROPHILS # BLD AUTO: 2.68 THOUSANDS/ΜL (ref 1.85–7.62)
NEUTS SEG NFR BLD AUTO: 63 % (ref 43–75)
NRBC BLD AUTO-RTO: 0 /100 WBCS
PLATELET # BLD AUTO: 185 THOUSANDS/UL (ref 149–390)
PMV BLD AUTO: 10.1 FL (ref 8.9–12.7)
POTASSIUM SERPL-SCNC: 3.9 MMOL/L (ref 3.5–5.3)
PROT SERPL-MCNC: 7.6 G/DL (ref 6.4–8.2)
RBC # BLD AUTO: 4.92 MILLION/UL (ref 3.88–5.62)
SODIUM SERPL-SCNC: 140 MMOL/L (ref 136–145)
TRIGL SERPL-MCNC: 145 MG/DL
WBC # BLD AUTO: 4.23 THOUSAND/UL (ref 4.31–10.16)

## 2020-11-11 PROCEDURE — 85025 COMPLETE CBC W/AUTO DIFF WBC: CPT

## 2020-11-11 PROCEDURE — 84154 ASSAY OF PSA FREE: CPT

## 2020-11-11 PROCEDURE — 36415 COLL VENOUS BLD VENIPUNCTURE: CPT

## 2020-11-11 PROCEDURE — 80053 COMPREHEN METABOLIC PANEL: CPT

## 2020-11-11 PROCEDURE — 80061 LIPID PANEL: CPT

## 2020-11-11 PROCEDURE — 84153 ASSAY OF PSA TOTAL: CPT

## 2020-11-12 LAB
PSA FREE MFR SERPL: 43.3 %
PSA FREE SERPL-MCNC: 0.13 NG/ML
PSA SERPL-MCNC: 0.3 NG/ML (ref 0–4)

## 2020-11-18 ENCOUNTER — OFFICE VISIT (OUTPATIENT)
Dept: FAMILY MEDICINE CLINIC | Facility: CLINIC | Age: 60
End: 2020-11-18
Payer: COMMERCIAL

## 2020-11-18 VITALS
BODY MASS INDEX: 30.98 KG/M2 | OXYGEN SATURATION: 98 % | HEART RATE: 85 BPM | DIASTOLIC BLOOD PRESSURE: 88 MMHG | HEIGHT: 67 IN | TEMPERATURE: 97.2 F | SYSTOLIC BLOOD PRESSURE: 120 MMHG | WEIGHT: 197.4 LBS

## 2020-11-18 DIAGNOSIS — M10.9 GOUT, UNSPECIFIED CAUSE, UNSPECIFIED CHRONICITY, UNSPECIFIED SITE: ICD-10-CM

## 2020-11-18 DIAGNOSIS — Z00.00 HEALTH CARE MAINTENANCE: ICD-10-CM

## 2020-11-18 DIAGNOSIS — I10 ESSENTIAL HYPERTENSION: ICD-10-CM

## 2020-11-18 DIAGNOSIS — Z23 FLU VACCINE NEED: Primary | ICD-10-CM

## 2020-11-18 DIAGNOSIS — I25.10 CORONARY ARTERY DISEASE INVOLVING NATIVE CORONARY ARTERY OF NATIVE HEART WITHOUT ANGINA PECTORIS: ICD-10-CM

## 2020-11-18 PROCEDURE — 3074F SYST BP LT 130 MM HG: CPT | Performed by: FAMILY MEDICINE

## 2020-11-18 PROCEDURE — 90471 IMMUNIZATION ADMIN: CPT | Performed by: FAMILY MEDICINE

## 2020-11-18 PROCEDURE — 3079F DIAST BP 80-89 MM HG: CPT | Performed by: FAMILY MEDICINE

## 2020-11-18 PROCEDURE — 90682 RIV4 VACC RECOMBINANT DNA IM: CPT | Performed by: FAMILY MEDICINE

## 2020-11-18 PROCEDURE — 99396 PREV VISIT EST AGE 40-64: CPT | Performed by: FAMILY MEDICINE

## 2020-11-18 PROCEDURE — 1036F TOBACCO NON-USER: CPT | Performed by: FAMILY MEDICINE

## 2020-11-18 PROCEDURE — 3008F BODY MASS INDEX DOCD: CPT | Performed by: FAMILY MEDICINE

## 2020-12-09 ENCOUNTER — HOSPITAL ENCOUNTER (OUTPATIENT)
Dept: NON INVASIVE DIAGNOSTICS | Facility: HOSPITAL | Age: 60
Discharge: HOME | End: 2020-12-09
Payer: COMMERCIAL

## 2020-12-09 DIAGNOSIS — I49.3 PVC'S (PREMATURE VENTRICULAR CONTRACTIONS): ICD-10-CM

## 2020-12-09 PROCEDURE — 93306 TTE W/DOPPLER COMPLETE: CPT

## 2020-12-09 PROCEDURE — 93306 TTE W/DOPPLER COMPLETE: CPT | Performed by: INTERNAL MEDICINE

## 2020-12-11 ENCOUNTER — OFFICE VISIT (OUTPATIENT)
Dept: CARDIOLOGY CLINIC | Facility: CLINIC | Age: 60
End: 2020-12-11
Payer: COMMERCIAL

## 2020-12-11 VITALS
WEIGHT: 203 LBS | DIASTOLIC BLOOD PRESSURE: 73 MMHG | SYSTOLIC BLOOD PRESSURE: 115 MMHG | HEART RATE: 72 BPM | BODY MASS INDEX: 31.86 KG/M2 | HEIGHT: 67 IN | TEMPERATURE: 97 F

## 2020-12-11 DIAGNOSIS — M10.9 GOUT, UNSPECIFIED CAUSE, UNSPECIFIED CHRONICITY, UNSPECIFIED SITE: ICD-10-CM

## 2020-12-11 DIAGNOSIS — E78.5 HYPERLIPIDEMIA, UNSPECIFIED HYPERLIPIDEMIA TYPE: ICD-10-CM

## 2020-12-11 DIAGNOSIS — I49.3 PVC'S (PREMATURE VENTRICULAR CONTRACTIONS): ICD-10-CM

## 2020-12-11 DIAGNOSIS — I10 ESSENTIAL HYPERTENSION: ICD-10-CM

## 2020-12-11 DIAGNOSIS — I25.10 CORONARY ARTERY DISEASE INVOLVING NATIVE CORONARY ARTERY OF NATIVE HEART WITHOUT ANGINA PECTORIS: Primary | ICD-10-CM

## 2020-12-11 DIAGNOSIS — E78.2 MIXED HYPERLIPIDEMIA: ICD-10-CM

## 2020-12-11 PROCEDURE — 1036F TOBACCO NON-USER: CPT | Performed by: INTERNAL MEDICINE

## 2020-12-11 PROCEDURE — 3074F SYST BP LT 130 MM HG: CPT | Performed by: INTERNAL MEDICINE

## 2020-12-11 PROCEDURE — 99213 OFFICE O/P EST LOW 20 MIN: CPT | Performed by: INTERNAL MEDICINE

## 2020-12-11 PROCEDURE — 3078F DIAST BP <80 MM HG: CPT | Performed by: INTERNAL MEDICINE

## 2020-12-11 PROCEDURE — 3008F BODY MASS INDEX DOCD: CPT | Performed by: INTERNAL MEDICINE

## 2020-12-11 RX ORDER — ATORVASTATIN CALCIUM 20 MG/1
20 TABLET, FILM COATED ORAL DAILY
Qty: 30 TABLET | Refills: 11 | Status: SHIPPED | OUTPATIENT
Start: 2020-12-11 | End: 2021-10-06 | Stop reason: SDUPTHER

## 2021-01-13 ENCOUNTER — IMMUNIZATIONS (OUTPATIENT)
Dept: FAMILY MEDICINE CLINIC | Facility: HOSPITAL | Age: 61
End: 2021-01-13

## 2021-01-13 DIAGNOSIS — Z23 ENCOUNTER FOR IMMUNIZATION: ICD-10-CM

## 2021-01-13 PROCEDURE — 91300 SARS-COV-2 / COVID-19 MRNA VACCINE (PFIZER-BIONTECH) 30 MCG: CPT

## 2021-01-13 PROCEDURE — 0001A SARS-COV-2 / COVID-19 MRNA VACCINE (PFIZER-BIONTECH) 30 MCG: CPT

## 2021-02-03 ENCOUNTER — IMMUNIZATIONS (OUTPATIENT)
Dept: FAMILY MEDICINE CLINIC | Facility: HOSPITAL | Age: 61
End: 2021-02-03

## 2021-02-03 DIAGNOSIS — Z23 ENCOUNTER FOR IMMUNIZATION: Primary | ICD-10-CM

## 2021-02-03 PROCEDURE — 91300 SARS-COV-2 / COVID-19 MRNA VACCINE (PFIZER-BIONTECH) 30 MCG: CPT

## 2021-02-03 PROCEDURE — 0002A SARS-COV-2 / COVID-19 MRNA VACCINE (PFIZER-BIONTECH) 30 MCG: CPT

## 2021-02-11 ENCOUNTER — LAB (OUTPATIENT)
Dept: LAB | Age: 61
End: 2021-02-11
Payer: COMMERCIAL

## 2021-02-11 DIAGNOSIS — E78.2 MIXED HYPERLIPIDEMIA: ICD-10-CM

## 2021-02-11 LAB
AST SERPL W P-5'-P-CCNC: 25 U/L (ref 5–45)
LDLC SERPL DIRECT ASSAY-MCNC: 76 MG/DL (ref 0–100)

## 2021-02-11 PROCEDURE — 83721 ASSAY OF BLOOD LIPOPROTEIN: CPT

## 2021-02-11 PROCEDURE — 36415 COLL VENOUS BLD VENIPUNCTURE: CPT

## 2021-02-11 PROCEDURE — 84450 TRANSFERASE (AST) (SGOT): CPT

## 2021-03-18 ENCOUNTER — OFFICE VISIT (OUTPATIENT)
Dept: FAMILY MEDICINE CLINIC | Facility: CLINIC | Age: 61
End: 2021-03-18
Payer: COMMERCIAL

## 2021-03-18 VITALS
DIASTOLIC BLOOD PRESSURE: 80 MMHG | RESPIRATION RATE: 16 BRPM | BODY MASS INDEX: 31.99 KG/M2 | HEART RATE: 52 BPM | OXYGEN SATURATION: 97 % | WEIGHT: 203.8 LBS | SYSTOLIC BLOOD PRESSURE: 122 MMHG | TEMPERATURE: 97.4 F | HEIGHT: 67 IN

## 2021-03-18 DIAGNOSIS — M10.9 GOUT, UNSPECIFIED CAUSE, UNSPECIFIED CHRONICITY, UNSPECIFIED SITE: ICD-10-CM

## 2021-03-18 DIAGNOSIS — E78.2 MIXED HYPERLIPIDEMIA: ICD-10-CM

## 2021-03-18 DIAGNOSIS — E66.9 OBESITY (BMI 30-39.9): ICD-10-CM

## 2021-03-18 DIAGNOSIS — I10 ESSENTIAL HYPERTENSION: Primary | ICD-10-CM

## 2021-03-18 DIAGNOSIS — I25.10 CORONARY ARTERY DISEASE INVOLVING NATIVE CORONARY ARTERY OF NATIVE HEART WITHOUT ANGINA PECTORIS: ICD-10-CM

## 2021-03-18 PROCEDURE — 3008F BODY MASS INDEX DOCD: CPT | Performed by: FAMILY MEDICINE

## 2021-03-18 PROCEDURE — 3074F SYST BP LT 130 MM HG: CPT | Performed by: FAMILY MEDICINE

## 2021-03-18 PROCEDURE — 3725F SCREEN DEPRESSION PERFORMED: CPT | Performed by: FAMILY MEDICINE

## 2021-03-18 PROCEDURE — 3079F DIAST BP 80-89 MM HG: CPT | Performed by: FAMILY MEDICINE

## 2021-03-18 PROCEDURE — 99214 OFFICE O/P EST MOD 30 MIN: CPT | Performed by: FAMILY MEDICINE

## 2021-03-18 PROCEDURE — 1036F TOBACCO NON-USER: CPT | Performed by: FAMILY MEDICINE

## 2021-03-18 NOTE — PATIENT INSTRUCTIONS
Here for recheck and has hx of CAD and gout and also HTN  Takes all meds as directed and also will be seeing cardiology as directed  Flu shot UTD  Continue with low cholesterol diet  Colon screening UTD  Colonoscopy not due until 2025  Call if any problems  COVID vaccine UTD  Lose weight as directed

## 2021-03-18 NOTE — PROGRESS NOTES
Assessment/Plan:  Chief Complaint   Patient presents with    Follow-up     3 month followup      Patient Instructions   Here for recheck and has hx of CAD and gout and also HTN  Takes all meds as directed and also will be seeing cardiology as directed  Flu shot UTD  Continue with low cholesterol diet  Colon screening UTD  Colonoscopy not due until 2025  Call if any problems  COVID vaccine UTD  Lose weight as directed  No problem-specific Assessment & Plan notes found for this encounter  Diagnoses and all orders for this visit:    Essential hypertension    Mixed hyperlipidemia    Gout, unspecified cause, unspecified chronicity, unspecified site    Coronary artery disease involving native coronary artery of native heart without angina pectoris    Obesity (BMI 30-39  9)          Subjective:      Patient ID: Freddie Hashimoto is a 63 y o  male  HPI    The following portions of the patient's history were reviewed and updated as appropriate: allergies, current medications, past family history, past medical history, past social history, past surgical history and problem list     Review of Systems   Constitutional:        Obesity   HENT: Negative  Eyes: Negative  Respiratory: Negative  Cardiovascular: Negative  Gastrointestinal: Negative  Endocrine: Negative  Genitourinary: Negative  Musculoskeletal: Negative  Skin: Negative  Allergic/Immunologic: Negative  Neurological: Negative  Hematological: Negative  Psychiatric/Behavioral: Negative  Objective:      /80   Pulse (!) 52   Temp (!) 97 4 °F (36 3 °C) (Temporal)   Resp 16   Ht 5' 7" (1 702 m)   Wt 92 4 kg (203 lb 12 8 oz)   SpO2 97%   BMI 31 92 kg/m²          Physical Exam  Constitutional:       Appearance: He is well-developed  Comments: obesity   HENT:      Head: Normocephalic and atraumatic        Right Ear: External ear normal       Left Ear: External ear normal       Nose: Nose normal  Eyes:      Conjunctiva/sclera: Conjunctivae normal       Pupils: Pupils are equal, round, and reactive to light  Neck:      Musculoskeletal: Normal range of motion and neck supple  Cardiovascular:      Rate and Rhythm: Normal rate and regular rhythm  Heart sounds: Normal heart sounds  Pulmonary:      Effort: Pulmonary effort is normal       Breath sounds: Normal breath sounds  Musculoskeletal: Normal range of motion  Skin:     General: Skin is warm and dry  Neurological:      Mental Status: He is alert and oriented to person, place, and time  Deep Tendon Reflexes: Reflexes are normal and symmetric     Psychiatric:         Behavior: Behavior normal

## 2021-04-21 ENCOUNTER — TELEPHONE (OUTPATIENT)
Dept: FAMILY MEDICINE CLINIC | Facility: CLINIC | Age: 61
End: 2021-04-21

## 2021-04-21 NOTE — TELEPHONE ENCOUNTER
Patient wife called [employer]-  tested positive for COVID and he is fully vaccinated and so is she  They are asking if they ALL still are to quarantine and follow that protocol? Asking fi wife should get tested as well because they are together all the time  Please advise   Number is [phone]

## 2021-04-27 ENCOUNTER — TELEMEDICINE (OUTPATIENT)
Dept: FAMILY MEDICINE CLINIC | Facility: CLINIC | Age: 61
End: 2021-04-27
Payer: COMMERCIAL

## 2021-04-27 ENCOUNTER — TELEPHONE (OUTPATIENT)
Dept: FAMILY MEDICINE CLINIC | Facility: CLINIC | Age: 61
End: 2021-04-27

## 2021-04-27 DIAGNOSIS — R05.9 COUGH: ICD-10-CM

## 2021-04-27 DIAGNOSIS — U07.1 COVID-19: Primary | ICD-10-CM

## 2021-04-27 PROCEDURE — 99212 OFFICE O/P EST SF 10 MIN: CPT | Performed by: FAMILY MEDICINE

## 2021-04-27 NOTE — PROGRESS NOTES
COVID-19 Outpatient Progress Note    Assessment/Plan:    Problem List Items Addressed This Visit     None      Visit Diagnoses     COVID-19    -  Primary    Cough             Disposition:     I recommended continued isolation until at least 24 hours have passed since recovery defined as resolution of fever without the use of fever-reducing medications AND improvement in COVID symptoms AND 10 days have passed since onset of symptoms (or 10 days have passed since date of first positive viral diagnostic test for asymptomatic patients)  I have spent 15 minutes directly with the patient  Greater than 50% of this time was spent in counseling/coordination of care regarding: instructions for management and impressions  Encounter provider Evelio Oleary DO    Provider located at [address] [place] [address] [address] [facility] [address] [place] [address] [phone]    Recent Visits  Date Type Provider Dept   04/21/21 Telephone Evelio Oleary, [facility]   Showing recent visits within past 7 days and meeting all other requirements     Today's Visits  Date Type Provider Dept   04/27/21 Telemedicine Alex Pina [facility]   Showing today's visits and meeting all other requirements     Future Appointments  No visits were found meeting these conditions  Showing future appointments within next 150 days and meeting all other requirements        Patient agrees to participate in a virtual check in via telephone or video visit instead of presenting to the office to address urgent/immediate medical needs  Patient is aware this is a billable service  After connecting through Telephone, the patient was identified by name and date of birth  Lamar Sosa was informed that this was a telemedicine visit and that the exam was being conducted confidentially over secure lines  My office door was closed   No one else was in the room  Rebecca Carreno acknowledged consent and understanding of privacy and security of the telemedicine visit  I informed the patient that I have reviewed his record in Epic and presented the opportunity for him to ask any questions regarding the visit today  The patient agreed to participate  It was my intent to perform this visit via video technology but the patient was not able to do a video connection so the visit was completed via audio telephone only  Subjective:   Rebecca Carreno is a 63 y o  male who has been screened for COVID-19  Symptom change since last report: resolving  Patient's symptoms include cough (mild)  Patient denies fever and shortness of breath  Jacob Dumont has been staying home and has isolated themselves in his home  He is taking care to not share personal items and is cleaning all surfaces that are touched often, like counters, tabletops, and doorknobs using household cleaning sprays or wipes  He is wearing a mask when he leaves his room       Date of positive COVID-19 PCR: 4/19/2021    Lab Results   Component Value Date    SARSCOV2 Positive (A) 04/19/2021     Past Medical History:   Diagnosis Date    Acute myocardial infarction [facility])     "no heart damage" approx 8 yrs ago did get one stent"    Arthralgia     last assessed 7/8/13    Arthritis     Colon polyp     Contusion of skin with intact surface     of the left medial thigh,last assessed 6/28/13    Coronary artery disease     Gout     last assessed 10/29/13    History of sepsis     urinary and was in hosp 3 days /7/2020    Hypertension     Kidney stone     Lump of skin     last assessed 7/19/13//"fatty tumor and surg removed"    S/P coronary artery stent placement     x1    Wears glasses     at night     Past Surgical History:   Procedure Laterality Date    CARDIAC CATHETERIZATION      COLONOSCOPY  12/10/2009    Complete//2020    CORONARY STENT PLACEMENT  08/2012    stenting of the LAD artery 95% lesion to 0% residual stenosis    KNEE SURGERY Left     x4    LUMBAR EPIDURAL INJECTION      x1    [place] CYSTO/URETERO W/LITHOTRIPSY &INDWELL STENT INSRT Left 8/5/2020    Procedure: CYSTO, URETEROSCOPY STENT exchange;  Surgeon: Krystian Arellano MD;  Location: [facility];  Service: Urology    [place] CYSTOURETHROSCOPY,URETER CATHETER Left 7/4/2020    Procedure: CYSTOSCOPY WITH INSERTION STENT URETERAL;  Surgeon: Nancy Urrutia MD;  Location: [facility];  Service: Urology     Current Outpatient Medications   Medication Sig Dispense Refill    allopurinol (ZYLOPRIM) 100 mg tablet Take 1 tablet (100 mg total) by mouth daily 30 tablet 5    aspirin (ASPIR-81) 81 mg EC tablet Take 1 tablet by mouth daily      atorvastatin (LIPITOR) 20 mg tablet Take 1 tablet (20 mg total) by mouth daily 30 tablet 11    metoprolol tartrate (LOPRESSOR) 25 mg tablet Take 1 tablet (25 mg total) by mouth daily 30 tablet 5     No current facility-administered medications for this visit  No Known Allergies    Review of Systems   Constitutional: Negative  Negative for fever  HENT: Negative  Eyes: Negative  Respiratory: Positive for cough (mild)  Negative for shortness of breath  Cardiovascular: Negative  Gastrointestinal: Negative  Endocrine: Negative  Genitourinary: Negative  Musculoskeletal: Negative  Skin: Negative  Allergic/Immunologic: Negative  Neurological: Negative  Hematological: Negative  Psychiatric/Behavioral: Negative  Objective: There were no vitals filed for this visit  Physical Exam  Pulmonary:      Effort: Pulmonary effort is normal  No respiratory distress  Neurological:      General: No focal deficit present  Mental Status: He is alert and oriented to person, place, and time  Psychiatric:         Mood and Affect: Mood normal          Behavior: Behavior normal          Thought Content:  Thought content normal          Judgment: Judgment normal        Patient Instructions   COVID follow up phone call - [phone]  Take Vitamin D and c and zinc  Has no fever and no resp distress  Call if any problems  Self isolate as directed for covid 19  Monitor for fever and sob/resp distress  Stay well hydrated  VIRTUAL VISIT DISCLAIMER    Jeni Lanza acknowledges that he has consented to an online visit or consultation  He understands that the online visit is based solely on information provided by him, and that, in the absence of a face-to-face physical evaluation by the physician, the diagnosis he receives is both limited and provisional in terms of accuracy and completeness  This is not intended to replace a full medical face-to-face evaluation by the physician  Jeni Lanza understands and accepts these terms

## 2021-04-27 NOTE — PATIENT INSTRUCTIONS
COVID follow up phone call - [phone]  Take Vitamin D and c and zinc  Has no fever and no resp distress  Call if any problems  Self isolate as directed for covid 19  Monitor for fever and sob/resp distress  Stay well hydrated

## 2021-04-27 NOTE — TELEPHONE ENCOUNTER
lmom requesting call back from Ash Machado to schedule next follow up   Per Tim Jacobson pt needs Return in about 2 days (around 4/29/2021) for recheck covid telemed

## 2021-05-24 DIAGNOSIS — I10 ESSENTIAL HYPERTENSION: ICD-10-CM

## 2021-05-24 DIAGNOSIS — M10.9 GOUT, UNSPECIFIED CAUSE, UNSPECIFIED CHRONICITY, UNSPECIFIED SITE: ICD-10-CM

## 2021-05-24 RX ORDER — ALLOPURINOL 100 MG/1
100 TABLET ORAL DAILY
Qty: 30 TABLET | Refills: 5 | Status: SHIPPED | OUTPATIENT
Start: 2021-05-24 | End: 2021-10-06 | Stop reason: SDUPTHER

## 2021-06-23 ENCOUNTER — RA CDI HCC (OUTPATIENT)
Dept: OTHER | Facility: HOSPITAL | Age: 61
End: 2021-06-23

## 2021-06-23 NOTE — PROGRESS NOTES
[facility]  coding opportunities          Chart reviewed, no opportunity found: CHART REVIEWED, NO OPPORTUNITY FOUND                     Patients insurance company: [employer] (Medicare Advantage and Commercial)

## 2021-06-28 ENCOUNTER — OFFICE VISIT (OUTPATIENT)
Dept: FAMILY MEDICINE CLINIC | Facility: CLINIC | Age: 61
End: 2021-06-28
Payer: COMMERCIAL

## 2021-06-28 VITALS
WEIGHT: 202 LBS | OXYGEN SATURATION: 98 % | RESPIRATION RATE: 15 BRPM | HEART RATE: 62 BPM | DIASTOLIC BLOOD PRESSURE: 80 MMHG | BODY MASS INDEX: 31.71 KG/M2 | HEIGHT: 67 IN | TEMPERATURE: 96.4 F | SYSTOLIC BLOOD PRESSURE: 141 MMHG

## 2021-06-28 DIAGNOSIS — E78.2 MIXED HYPERLIPIDEMIA: ICD-10-CM

## 2021-06-28 DIAGNOSIS — B35.3 TINEA PEDIS OF RIGHT FOOT: Primary | ICD-10-CM

## 2021-06-28 DIAGNOSIS — I10 ESSENTIAL HYPERTENSION: ICD-10-CM

## 2021-06-28 DIAGNOSIS — M10.9 GOUT, UNSPECIFIED CAUSE, UNSPECIFIED CHRONICITY, UNSPECIFIED SITE: ICD-10-CM

## 2021-06-28 DIAGNOSIS — E66.9 OBESITY (BMI 30-39.9): ICD-10-CM

## 2021-06-28 DIAGNOSIS — I25.10 CORONARY ARTERY DISEASE INVOLVING NATIVE CORONARY ARTERY OF NATIVE HEART WITHOUT ANGINA PECTORIS: ICD-10-CM

## 2021-06-28 PROCEDURE — 3008F BODY MASS INDEX DOCD: CPT | Performed by: FAMILY MEDICINE

## 2021-06-28 PROCEDURE — 3079F DIAST BP 80-89 MM HG: CPT | Performed by: FAMILY MEDICINE

## 2021-06-28 PROCEDURE — 1036F TOBACCO NON-USER: CPT | Performed by: FAMILY MEDICINE

## 2021-06-28 PROCEDURE — 99214 OFFICE O/P EST MOD 30 MIN: CPT | Performed by: FAMILY MEDICINE

## 2021-06-28 PROCEDURE — 3077F SYST BP >= 140 MM HG: CPT | Performed by: FAMILY MEDICINE

## 2021-06-28 RX ORDER — CLOTRIMAZOLE AND BETAMETHASONE DIPROPIONATE 10; .64 MG/G; MG/G
CREAM TOPICAL 2 TIMES DAILY
Qty: 30 G | Refills: 1 | Status: SHIPPED | OUTPATIENT
Start: 2021-06-28 | End: 2021-08-31

## 2021-06-28 NOTE — PATIENT INSTRUCTIONS
Here for recheck and has hx of CAD and gout and also HTN  Takes all meds as directed and also will be seeing cardiology as directed  Continue with low cholesterol diet  Call if any problems  COVID vaccine UTD - Pfizer times 2  Lose weight as directed  Lose weight  Take all meds as directed

## 2021-06-28 NOTE — PROGRESS NOTES
Assessment/Plan:  Chief Complaint   Patient presents with    Follow-up     3 Month      Patient Instructions   Here for recheck and has hx of CAD and gout and also HTN  Takes all meds as directed and also will be seeing cardiology as directed  Continue with low cholesterol diet  Call if any problems  COVID vaccine UTD - Pfizer times 2  Lose weight as directed  Lose weight  Take all meds as directed  No problem-specific Assessment & Plan notes found for this encounter  Diagnoses and all orders for this visit:    Tinea pedis of right foot  -     clotrimazole-betamethasone (LOTRISONE) 1-0 05 % cream; Apply topically 2 (two) times a day    Essential hypertension    Mixed hyperlipidemia    Coronary artery disease involving native coronary artery of native heart without angina pectoris    Gout, unspecified cause, unspecified chronicity, unspecified site    Obesity (BMI 30-39  9)          Subjective:      Patient ID: Vadim Rangel is a 57 y o  male  Here for recheck and has hx of CAD and gout and also HTN  Takes all meds as directed and also will be seeing cardiology as directed  Continues with low cholesterol diet  Has rash right foot, itchy        The following portions of the patient's history were reviewed and updated as appropriate: allergies, current medications, past family history, past medical history, past social history, past surgical history and problem list     Review of Systems   Constitutional:        Obesity   HENT: Negative  Eyes: Negative  Respiratory: Negative  Cardiovascular: Negative  Gastrointestinal: Negative  Endocrine: Negative  Genitourinary: Negative  Musculoskeletal: Negative  Skin: Negative  Allergic/Immunologic: Negative  Neurological: Negative  Hematological: Negative  Psychiatric/Behavioral: Negative            Objective:      /80   Pulse 62   Temp (!) 96 4 °F (35 8 °C) (Temporal)   Resp 15   Ht 5' 7" (1 702 m)   Wt 91 6 kg (202 lb)   SpO2 98%   BMI 31 64 kg/m²          Physical Exam  Constitutional:       Appearance: He is well-developed  He is obese  HENT:      Head: Normocephalic and atraumatic  Eyes:      Conjunctiva/sclera: Conjunctivae normal       Pupils: Pupils are equal, round, and reactive to light  Cardiovascular:      Rate and Rhythm: Normal rate and regular rhythm  Heart sounds: Normal heart sounds  Pulmonary:      Effort: Pulmonary effort is normal       Breath sounds: Normal breath sounds  Musculoskeletal:         General: Normal range of motion  Cervical back: Normal range of motion and neck supple  Skin:     General: Skin is warm and dry  Findings: Rash (right foot rash) present  Neurological:      Mental Status: He is alert and oriented to person, place, and time  Deep Tendon Reflexes: Reflexes are normal and symmetric     Psychiatric:         Behavior: Behavior normal

## 2021-08-02 ENCOUNTER — HOSPITAL ENCOUNTER (OUTPATIENT)
Dept: RADIOLOGY | Facility: IMAGING CENTER | Age: 61
Discharge: HOME | End: 2021-08-02
Attending: UROLOGY
Payer: COMMERCIAL

## 2021-08-02 DIAGNOSIS — N20.1 URETERAL STONE: ICD-10-CM

## 2021-08-02 PROCEDURE — 76770 US EXAM ABDO BACK WALL COMP: CPT

## 2021-08-13 ENCOUNTER — HOSPITAL ENCOUNTER (EMERGENCY)
Facility: HOSPITAL | Age: 61
Discharge: HOME | End: 2021-08-14
Attending: EMERGENCY MEDICINE | Admitting: EMERGENCY MEDICINE
Payer: COMMERCIAL

## 2021-08-13 DIAGNOSIS — N23 RENAL COLIC ON RIGHT SIDE: ICD-10-CM

## 2021-08-13 DIAGNOSIS — R10.9 RIGHT FLANK PAIN: Primary | ICD-10-CM

## 2021-08-13 LAB
BILIRUB UR QL STRIP: ABNORMAL
CLARITY UR: CLEAR
COLOR UR: YELLOW
GLUCOSE UR STRIP-MCNC: NEGATIVE MG/DL
HGB UR QL STRIP.AUTO: ABNORMAL
KETONES UR STRIP-MCNC: ABNORMAL MG/DL
LEUKOCYTE ESTERASE UR QL STRIP: NEGATIVE
NITRITE UR QL STRIP: NEGATIVE
PH UR STRIP.AUTO: 5 [PH] (ref 4.5–8)
PROT UR STRIP-MCNC: ABNORMAL MG/DL
SP GR UR STRIP.AUTO: >=1.03 (ref 1–1.03)
UROBILINOGEN UR QL STRIP.AUTO: 1 E.U./DL

## 2021-08-13 PROCEDURE — 99284 EMERGENCY DEPT VISIT MOD MDM: CPT

## 2021-08-13 PROCEDURE — 99285 EMERGENCY DEPT VISIT HI MDM: CPT | Performed by: EMERGENCY MEDICINE

## 2021-08-13 PROCEDURE — 81001 URINALYSIS AUTO W/SCOPE: CPT

## 2021-08-13 RX ORDER — KETOROLAC TROMETHAMINE 30 MG/ML
30 INJECTION, SOLUTION INTRAMUSCULAR; INTRAVENOUS ONCE
Status: COMPLETED | OUTPATIENT
Start: 2021-08-13 | End: 2021-08-14

## 2021-08-13 RX ORDER — HYDROMORPHONE HCL/PF 1 MG/ML
1 SYRINGE (ML) INJECTION ONCE
Status: COMPLETED | OUTPATIENT
Start: 2021-08-13 | End: 2021-08-14

## 2021-08-13 RX ORDER — ONDANSETRON 2 MG/ML
4 INJECTION INTRAMUSCULAR; INTRAVENOUS ONCE
Status: COMPLETED | OUTPATIENT
Start: 2021-08-13 | End: 2021-08-14

## 2021-08-14 ENCOUNTER — APPOINTMENT (EMERGENCY)
Dept: CT IMAGING | Facility: HOSPITAL | Age: 61
End: 2021-08-14
Payer: COMMERCIAL

## 2021-08-14 VITALS
HEART RATE: 82 BPM | RESPIRATION RATE: 18 BRPM | OXYGEN SATURATION: 100 % | TEMPERATURE: 98 F | BODY MASS INDEX: 31.42 KG/M2 | DIASTOLIC BLOOD PRESSURE: 85 MMHG | SYSTOLIC BLOOD PRESSURE: 158 MMHG | WEIGHT: 200.62 LBS

## 2021-08-14 LAB
ALBUMIN SERPL BCP-MCNC: 4.6 G/DL (ref 3.5–5)
ALP SERPL-CCNC: 75 U/L (ref 46–116)
ALT SERPL W P-5'-P-CCNC: 29 U/L (ref 12–78)
ANION GAP SERPL CALCULATED.3IONS-SCNC: 8 MMOL/L (ref 4–13)
AST SERPL W P-5'-P-CCNC: 26 U/L (ref 5–45)
BACTERIA UR QL AUTO: ABNORMAL /HPF
BASOPHILS # BLD AUTO: 0.06 THOUSANDS/ΜL (ref 0–0.1)
BASOPHILS NFR BLD AUTO: 1 % (ref 0–1)
BILIRUB SERPL-MCNC: 1.52 MG/DL (ref 0.2–1)
BUN SERPL-MCNC: 17 MG/DL (ref 5–25)
CALCIUM SERPL-MCNC: 9.6 MG/DL (ref 8.3–10.1)
CAOX CRY URNS QL MICRO: ABNORMAL /HPF
CHLORIDE SERPL-SCNC: 103 MMOL/L (ref 100–108)
CO2 SERPL-SCNC: 32 MMOL/L (ref 21–32)
CREAT SERPL-MCNC: 1.6 MG/DL (ref 0.6–1.3)
EOSINOPHIL # BLD AUTO: 0.02 THOUSAND/ΜL (ref 0–0.61)
EOSINOPHIL NFR BLD AUTO: 0 % (ref 0–6)
ERYTHROCYTE [DISTWIDTH] IN BLOOD BY AUTOMATED COUNT: 12.9 % (ref 11.6–15.1)
GFR SERPL CREATININE-BSD FRML MDRD: 45 ML/MIN/1.73SQ M
GLUCOSE SERPL-MCNC: 137 MG/DL (ref 65–140)
HCT VFR BLD AUTO: 43.8 % (ref 36.5–49.3)
HGB BLD-MCNC: 15.6 G/DL (ref 12–17)
IMM GRANULOCYTES # BLD AUTO: 0.03 THOUSAND/UL (ref 0–0.2)
IMM GRANULOCYTES NFR BLD AUTO: 0 % (ref 0–2)
LYMPHOCYTES # BLD AUTO: 0.77 THOUSANDS/ΜL (ref 0.6–4.47)
LYMPHOCYTES NFR BLD AUTO: 9 % (ref 14–44)
MCH RBC QN AUTO: 31.7 PG (ref 26.8–34.3)
MCHC RBC AUTO-ENTMCNC: 35.6 G/DL (ref 31.4–37.4)
MCV RBC AUTO: 89 FL (ref 82–98)
MONOCYTES # BLD AUTO: 0.3 THOUSAND/ΜL (ref 0.17–1.22)
MONOCYTES NFR BLD AUTO: 4 % (ref 4–12)
NEUTROPHILS # BLD AUTO: 7.16 THOUSANDS/ΜL (ref 1.85–7.62)
NEUTS SEG NFR BLD AUTO: 86 % (ref 43–75)
NON-SQ EPI CELLS URNS QL MICRO: ABNORMAL /HPF
NRBC BLD AUTO-RTO: 0 /100 WBCS
PLATELET # BLD AUTO: 181 THOUSANDS/UL (ref 149–390)
PMV BLD AUTO: 9.8 FL (ref 8.9–12.7)
POTASSIUM SERPL-SCNC: 3.6 MMOL/L (ref 3.5–5.3)
PROT SERPL-MCNC: 7.9 G/DL (ref 6.4–8.2)
RBC # BLD AUTO: 4.92 MILLION/UL (ref 3.88–5.62)
RBC #/AREA URNS AUTO: ABNORMAL /HPF
SODIUM SERPL-SCNC: 143 MMOL/L (ref 136–145)
WBC # BLD AUTO: 8.34 THOUSAND/UL (ref 4.31–10.16)
WBC #/AREA URNS AUTO: ABNORMAL /HPF

## 2021-08-14 PROCEDURE — 74176 CT ABD & PELVIS W/O CONTRAST: CPT

## 2021-08-14 PROCEDURE — 96375 TX/PRO/DX INJ NEW DRUG ADDON: CPT

## 2021-08-14 PROCEDURE — 96361 HYDRATE IV INFUSION ADD-ON: CPT

## 2021-08-14 PROCEDURE — 80053 COMPREHEN METABOLIC PANEL: CPT | Performed by: EMERGENCY MEDICINE

## 2021-08-14 PROCEDURE — 85025 COMPLETE CBC W/AUTO DIFF WBC: CPT | Performed by: EMERGENCY MEDICINE

## 2021-08-14 PROCEDURE — 36415 COLL VENOUS BLD VENIPUNCTURE: CPT | Performed by: EMERGENCY MEDICINE

## 2021-08-14 PROCEDURE — 96374 THER/PROPH/DIAG INJ IV PUSH: CPT

## 2021-08-14 RX ORDER — OXYCODONE HYDROCHLORIDE AND ACETAMINOPHEN 5; 325 MG/1; MG/1
1 TABLET ORAL EVERY 8 HOURS PRN
Qty: 15 TABLET | Refills: 0 | Status: SHIPPED | OUTPATIENT
Start: 2021-08-14 | End: 2021-08-14 | Stop reason: SDUPTHER

## 2021-08-14 RX ORDER — OXYCODONE HYDROCHLORIDE AND ACETAMINOPHEN 5; 325 MG/1; MG/1
1 TABLET ORAL EVERY 8 HOURS PRN
Qty: 15 TABLET | Refills: 0 | Status: SHIPPED | OUTPATIENT
Start: 2021-08-14 | End: 2021-08-19

## 2021-08-14 RX ORDER — TAMSULOSIN HYDROCHLORIDE 0.4 MG/1
0.4 CAPSULE ORAL ONCE
Status: COMPLETED | OUTPATIENT
Start: 2021-08-14 | End: 2021-08-14

## 2021-08-14 RX ORDER — IBUPROFEN 600 MG/1
600 TABLET ORAL EVERY 8 HOURS PRN
Qty: 30 TABLET | Refills: 0 | Status: SHIPPED | OUTPATIENT
Start: 2021-08-14 | End: 2021-08-31

## 2021-08-14 RX ORDER — TAMSULOSIN HYDROCHLORIDE 0.4 MG/1
0.4 CAPSULE ORAL
Qty: 7 CAPSULE | Refills: 0 | Status: SHIPPED | OUTPATIENT
Start: 2021-08-14 | End: 2021-08-20 | Stop reason: SDUPTHER

## 2021-08-14 RX ORDER — OXYCODONE HYDROCHLORIDE AND ACETAMINOPHEN 5; 325 MG/1; MG/1
1 TABLET ORAL ONCE
Status: COMPLETED | OUTPATIENT
Start: 2021-08-14 | End: 2021-08-14

## 2021-08-14 RX ADMIN — ONDANSETRON 4 MG: 2 INJECTION INTRAMUSCULAR; INTRAVENOUS at 00:01

## 2021-08-14 RX ADMIN — KETOROLAC TROMETHAMINE 30 MG: 30 INJECTION, SOLUTION INTRAMUSCULAR; INTRAVENOUS at 00:01

## 2021-08-14 RX ADMIN — HYDROMORPHONE HYDROCHLORIDE 1 MG: 1 INJECTION, SOLUTION INTRAMUSCULAR; INTRAVENOUS; SUBCUTANEOUS at 00:01

## 2021-08-14 RX ADMIN — OXYCODONE HYDROCHLORIDE AND ACETAMINOPHEN 1 TABLET: 5; 325 TABLET ORAL at 00:49

## 2021-08-14 RX ADMIN — TAMSULOSIN HYDROCHLORIDE 0.4 MG: 0.4 CAPSULE ORAL at 00:48

## 2021-08-14 RX ADMIN — SODIUM CHLORIDE 1000 ML: 0.9 INJECTION, SOLUTION INTRAVENOUS at 00:02

## 2021-08-14 NOTE — ED PROVIDER NOTES
History  Chief Complaint   Patient presents with    Flank Pain     pt reports right side flank pain hx of kidney stones     58 yo male who had sudden onset of R flank pain about 3 hours ago when he was in pool  Pt golfed in heat all day and drank plenty of water but doesn't feel he urinated much  History provided by:  Patient and spouse   used: No    Flank Pain  Pain location:  R flank  Pain quality: aching    Pain radiation: R side of abd  Pain severity:  Severe  Onset quality:  Sudden  Duration:  3 hours  Timing:  Constant  Progression:  Worsening  Chronicity:  Recurrent  Relieved by:  Nothing  Worsened by:  Nothing  Ineffective treatments: left over hydrocodone from previous kidney stone  Associated symptoms: nausea    Associated symptoms: no chest pain, no chills, no diarrhea, no dysuria, no fever, no hematuria, no shortness of breath, no sore throat and no vomiting        Prior to Admission Medications   Prescriptions Last Dose Informant Patient Reported?  Taking?   allopurinol (ZYLOPRIM) 100 mg tablet   No No   Sig: Take 1 tablet (100 mg total) by mouth daily   aspirin (ASPIR-81) 81 mg EC tablet  Self Yes No   Sig: Take 1 tablet by mouth daily   atorvastatin (LIPITOR) 20 mg tablet   No No   Sig: Take 1 tablet (20 mg total) by mouth daily   clotrimazole-betamethasone (LOTRISONE) 1-0 05 % cream   No No   Sig: Apply topically 2 (two) times a day   metoprolol tartrate (LOPRESSOR) 25 mg tablet   No No   Sig: Take 1 tablet (25 mg total) by mouth daily      Facility-Administered Medications: None       Past Medical History:   Diagnosis Date    Acute myocardial infarction ([facility] [address] )     "no heart damage" approx 8 yrs ago did get one stent"    Arthralgia     last assessed 7/8/13    Arthritis     Colon polyp     Contusion of skin with intact surface     of the left medial thigh,last assessed 6/28/13    Coronary artery disease     Gout     last assessed 10/29/13    History of sepsis urinary and was in hosp 3 days /7/2020    Hypertension     Kidney stone     Lump of skin     last assessed 7/19/13//"fatty tumor and surg removed"    S/P coronary artery stent placement     x1    Wears glasses     at night       Past Surgical History:   Procedure Laterality Date    CARDIAC CATHETERIZATION      COLONOSCOPY  12/10/2009    Complete//2020    CORONARY STENT PLACEMENT  08/2012    stenting of the LAD artery 95% lesion to 0% residual stenosis    KNEE SURGERY Left     x4    LUMBAR EPIDURAL INJECTION      x1    DE CYSTO/URETERO W/LITHOTRIPSY &INDWELL STENT INSRT Left 8/5/2020    Procedure: CYSTO, URETEROSCOPY STENT exchange;  Surgeon: Becky Ivey MD;  Location: [facility];  Service: Urology    DE CYSTOURETHROSCOPY,URETER CATHETER Left 7/4/2020    Procedure: CYSTOSCOPY WITH INSERTION STENT URETERAL;  Surgeon: Brady Roger MD;  Location: [facility];  Service: Urology       Family History   Problem Relation Age of Onset    Edema Mother         Cerebral    Aneurysm Father         of the cerebellar artery    Aneurysm Brother         of the cerebellar artery     I have reviewed and agree with the history as documented  E-Cigarette/Vaping    E-Cigarette Use Never User      E-Cigarette/Vaping Substances    Nicotine No     THC No     CBD No     Flavoring No     Other No     Unknown No      Social History     Tobacco Use    Smoking status: Never Smoker    Smokeless tobacco: Never Used   Vaping Use    Vaping Use: Never used   Substance Use Topics    Alcohol use: Not Currently    Drug use: No       Review of Systems   Constitutional: Negative for chills and fever  HENT: Negative for congestion and sore throat  Eyes: Negative for visual disturbance  Respiratory: Negative for shortness of breath and wheezing  Cardiovascular: Negative for chest pain and palpitations  Gastrointestinal: Positive for abdominal pain (pain into R side of abd) and nausea   Negative for diarrhea and vomiting  Genitourinary: Positive for decreased urine volume and flank pain (R sided)  Negative for dysuria and hematuria  Musculoskeletal: Negative for neck pain and neck stiffness  Skin: Negative for pallor and rash  Neurological: Negative for headaches  Psychiatric/Behavioral: Negative for confusion  All other systems reviewed and are negative  Physical Exam  Physical Exam  Vitals and nursing note reviewed  Constitutional:       General: He is in acute distress (uncomfortable appearing)  Appearance: He is well-developed  HENT:      Head: Normocephalic and atraumatic  Right Ear: External ear normal       Left Ear: External ear normal       Mouth/Throat:      Mouth: Mucous membranes are moist    Cardiovascular:      Rate and Rhythm: Normal rate and regular rhythm  Heart sounds: No murmur heard  Pulmonary:      Effort: Pulmonary effort is normal       Breath sounds: Normal breath sounds  Abdominal:      General: Bowel sounds are normal  There is no distension  Palpations: Abdomen is soft  Tenderness: There is no abdominal tenderness  There is no right CVA tenderness or left CVA tenderness  Musculoskeletal:         General: Normal range of motion  Cervical back: Neck supple  Skin:     General: Skin is warm  Coloration: Skin is not pale  Findings: No rash  Neurological:      Mental Status: He is alert and oriented to person, place, and time     Psychiatric:         Behavior: Behavior normal          Vital Signs  ED Triage Vitals   Temperature Pulse Respirations Blood Pressure SpO2   08/13/21 2247 08/13/21 2247 08/13/21 2247 08/13/21 2247 08/13/21 2247   98 °F (36 7 °C) 71 18 167/85 96 %      Temp src Heart Rate Source Patient Position - Orthostatic VS BP Location FiO2 (%)   -- 08/14/21 0019 08/14/21 0019 08/14/21 0019 --    Monitor Lying Right arm       Pain Score       08/13/21 2247       9           Vitals:    08/13/21 2247 08/14/21 0019   BP: 167/85 158/85   Pulse: 71 82   Patient Position - Orthostatic VS:  Lying         Visual Acuity      ED Medications  Medications   sodium chloride 0 9 % bolus 1,000 mL (0 mL Intravenous Stopped 8/14/21 0053)   ketorolac (TORADOL) injection 30 mg (30 mg Intravenous Given 8/14/21 0001)   ondansetron (ZOFRAN) injection 4 mg (4 mg Intravenous Given 8/14/21 0001)   HYDROmorphone (DILAUDID) injection 1 mg (1 mg Intravenous Given 8/14/21 0001)   tamsulosin (FLOMAX) capsule 0 4 mg (0 4 mg Oral Given 8/14/21 0048)   oxyCODONE-acetaminophen (PERCOCET) 5-325 mg per tablet 1 tablet (1 tablet Oral Given 8/14/21 0049)       Diagnostic Studies  Results Reviewed     Procedure Component Value Units Date/Time    Urine Microscopic [[id number]]  (Abnormal) Collected: 08/13/21 2336    Lab Status: Final result Specimen: Urine, Clean Catch Updated: 08/14/21 0104     RBC, UA 20-30 /hpf      WBC, UA None Seen /hpf      Epithelial Cells Occasional /hpf      Bacteria, UA Moderate /hpf      Ca Oxalate Maggy, UA Moderate /hpf     Comprehensive metabolic panel [[id number]]  (Abnormal) Collected: 08/14/21 0001    Lab Status: Final result Specimen: Blood from Arm, Right Updated: 08/14/21 0044     Sodium 143 mmol/L      Potassium 3 6 mmol/L      Chloride 103 mmol/L      CO2 32 mmol/L      ANION GAP 8 mmol/L      BUN 17 mg/dL      Creatinine 1 60 mg/dL      Glucose 137 mg/dL      Calcium 9 6 mg/dL      AST 26 U/L      ALT 29 U/L      Alkaline Phosphatase 75 U/L      Total Protein 7 9 g/dL      Albumin 4 6 g/dL      Total Bilirubin 1 52 mg/dL      eGFR 45 ml/min/1 73sq m     Narrative:      Kajal guidelines for Chronic Kidney Disease (CKD):     Stage 1 with normal or high GFR (GFR > 90 mL/min/1 73 square meters)    Stage 2 Mild CKD (GFR = 60-89 mL/min/1 73 square meters)    Stage 3A Moderate CKD (GFR = 45-59 mL/min/1 73 square meters)    Stage 3B Moderate CKD (GFR = 30-44 mL/min/1 73 square meters)    Stage 4 Severe CKD (GFR = 15-29 mL/min/1 73 square meters)    Stage 5 End Stage CKD (GFR <15 mL/min/1 73 square meters)  Note: GFR calculation is accurate only with a steady state creatinine    CBC and differential [[id number]]  (Abnormal) Collected: 08/14/21 0001    Lab Status: Final result Specimen: Blood from Arm, Right Updated: 08/14/21 0015     WBC 8 34 Thousand/uL      RBC 4 92 Million/uL      Hemoglobin 15 6 g/dL      Hematocrit 43 8 %      MCV 89 fL      MCH 31 7 pg      MCHC 35 6 g/dL      RDW 12 9 %      MPV 9 8 fL      Platelets 245 Thousands/uL      nRBC 0 /100 WBCs      Neutrophils Relative 86 %      Immat GRANS % 0 %      Lymphocytes Relative 9 %      Monocytes Relative 4 %      Eosinophils Relative 0 %      Basophils Relative 1 %      Neutrophils Absolute 7 16 Thousands/µL      Immature Grans Absolute 0 03 Thousand/uL      Lymphocytes Absolute 0 77 Thousands/µL      Monocytes Absolute 0 30 Thousand/µL      Eosinophils Absolute 0 02 Thousand/µL      Basophils Absolute 0 06 Thousands/µL     Urine Macroscopic, POC [[id number]]  (Abnormal) Collected: 08/13/21 2336    Lab Status: Final result Specimen: Urine Updated: 08/13/21 2338     Color, UA Yellow     Clarity, UA Clear     pH, UA 5 0     Leukocytes, UA Negative     Nitrite, UA Negative     Protein, UA 30 (1+) mg/dl      Glucose, UA Negative mg/dl      Ketones, UA 40 (2+) mg/dl      Urobilinogen, UA 1 0 E U /dl      Bilirubin, UA Interference- unable to analyze     Blood, UA Moderate     Specific Gravity, UA >=1 030    Narrative:      CLINITEK RESULT                 CT renal stone study abdomen pelvis without contrast   Final Result by Carol Chaudhary MD (08/14 0022)      4 mm obstructing stone at the proximal right ureter at the level of the L2 vertebral body         Additional nonobstructing bilateral renal calculi            Workstation performed: [id number]                    Procedures  Procedures         ED Course  ED Course as of Aug 14 0117   Fri Aug 13, 2021   2327 Pt seen and examined  44 yo male who had sudden onset of R flank pain about 3 hours ago when he was in pool  Pt golfed in heat all day and drank plenty of water but doesn't feel he urinated much  Will give IVF, toradol, dilaudid and zofran and check labs, urine, CT r/o stone  2340 Pt gave urine sample, will bladder scan  Urine + moderate blood, neg leuks and nitrites  Sat Aug 14, 2021   0016 Pt feeling much better, awaiting CT results  CBC nml       0028 CT shows 4 mm obstructing stone at the proximal right ureter at the level of the L2 vertebral body  Pt aware of other nonobstructing stone  0042 Pt feels great  Will give flomax, urine strainer and percocet to go since pharmacy opens in am                                                MDM    Disposition  Final diagnoses:   Right flank pain   Renal colic on right side     Time reflects when diagnosis was documented in both MDM as applicable and the Disposition within this note     Time User Action Codes Description Comment    8/14/2021 12:40 AM Karine DOBBINS Add [R10 9] Right flank pain     8/14/2021 12:40 AM Kala Brittle Add [N93] Renal colic on right side       ED Disposition     ED Disposition Condition Date/Time Comment    Discharge Stable Sat Aug 14, 2021 12:40 AM [address] discharge to home/self care              Follow-up Information     Follow up With Specialties Details Why Contact Info Additional [address] [facility] Schedule an appointment as soon as possible for a visit   [facility]  Yared Matias [phone]  7  [facility] [place], [address] [place], [place] [address], [place], [id number] [phone]          Discharge Medication List as of 8/14/2021 12:41 AM      START taking these medications    Details   ibuprofen (MOTRIN) 600 mg tablet Take 1 tablet (600 mg total) by mouth every 8 (eight) hours as needed for mild pain or fever, Starting Sat 8/14/2021, Normal      tamsulosin (FLOMAX) 0 4 mg Take 1 capsule (0 4 mg total) by mouth daily with dinner, Starting Sat 8/14/2021, Normal      oxyCODONE-acetaminophen (PERCOCET) 5-325 mg per tablet Take 1 tablet by mouth every 8 (eight) hours as needed for moderate pain or severe pain for up to 5 daysMax Daily Amount: 3 tablets, Starting Sat 8/14/2021, Until u 8/19/2021 at 2359, Normal         CONTINUE these medications which have NOT CHANGED    Details   allopurinol (ZYLOPRIM) 100 mg tablet Take 1 tablet (100 mg total) by mouth daily, Starting Mon 5/24/2021, Normal      aspirin (ASPIR-81) 81 mg EC tablet Take 1 tablet by mouth daily, Historical Med      atorvastatin (LIPITOR) 20 mg tablet Take 1 tablet (20 mg total) by mouth daily, Starting Fri 12/11/2020, Normal      clotrimazole-betamethasone (LOTRISONE) 1-0 05 % cream Apply topically 2 (two) times a day, Starting Mon 6/28/2021, Normal      metoprolol tartrate (LOPRESSOR) 25 mg tablet Take 1 tablet (25 mg total) by mouth daily, Starting Mon 5/24/2021, Normal           No discharge procedures on file      PDMP Review       Value Time User    PDMP Reviewed  Yes 7/6/2020  1:39 PM Beni Bajwa MD          ED Provider  Electronically Signed by           oDris Van DO  08/14/21 0117

## 2021-08-20 ENCOUNTER — TELEPHONE (OUTPATIENT)
Dept: UROLOGY | Facility: MEDICAL CENTER | Age: 61
End: 2021-08-20

## 2021-08-20 DIAGNOSIS — N23 RENAL COLIC ON RIGHT SIDE: ICD-10-CM

## 2021-08-20 RX ORDER — TAMSULOSIN HYDROCHLORIDE 0.4 MG/1
0.4 CAPSULE ORAL
Qty: 30 CAPSULE | Refills: 0 | Status: SHIPPED | OUTPATIENT
Start: 2021-08-20 | End: 2021-09-15 | Stop reason: SDUPTHER

## 2021-08-20 NOTE — TELEPHONE ENCOUNTER
Called pt and relayed Annita's message  He will schedule his US, central scheduling number provided

## 2021-08-20 NOTE — TELEPHONE ENCOUNTER
Recommend patient continue tamsulosin and increase water intake for medical expulsive therapy  Patient should go for follow up KUB and renal US in 1-2 weeks to ensure passage of stone  Orders in [address] [place] [address] Rd  Refill for tamsulosin sent to his pharmacy

## 2021-08-20 NOTE — TELEPHONE ENCOUNTER
Patient of Dr Toni Guzman In [facility]    Patient called stating he was in the ER 08/13 with flank pain and ct scan was done  He has a 4 mm stone right ureter   And also has stones on left side  He would like to know how to proceed  He does have an appointment in October but would like to know if he can be seen sooner

## 2021-08-31 ENCOUNTER — APPOINTMENT (EMERGENCY)
Dept: RADIOLOGY | Facility: HOSPITAL | Age: 61
End: 2021-08-31
Payer: COMMERCIAL

## 2021-08-31 ENCOUNTER — HOSPITAL ENCOUNTER (OUTPATIENT)
Facility: HOSPITAL | Age: 61
Setting detail: OBSERVATION
Discharge: HOME | End: 2021-09-02
Attending: EMERGENCY MEDICINE | Admitting: INTERNAL MEDICINE
Payer: COMMERCIAL

## 2021-08-31 DIAGNOSIS — N20.1 RIGHT URETERAL STONE: Primary | ICD-10-CM

## 2021-08-31 DIAGNOSIS — N20.0 NEPHROLITHIASIS: ICD-10-CM

## 2021-08-31 LAB
ALBUMIN SERPL BCP-MCNC: 4 G/DL (ref 3.5–5)
ALP SERPL-CCNC: 89 U/L (ref 46–116)
ALT SERPL W P-5'-P-CCNC: 29 U/L (ref 12–78)
ANION GAP SERPL CALCULATED.3IONS-SCNC: 9 MMOL/L (ref 4–13)
AST SERPL W P-5'-P-CCNC: 27 U/L (ref 5–45)
BASOPHILS # BLD AUTO: 0.04 THOUSANDS/ΜL (ref 0–0.1)
BASOPHILS NFR BLD AUTO: 1 % (ref 0–1)
BILIRUB SERPL-MCNC: 1.59 MG/DL (ref 0.2–1)
BILIRUB UR QL STRIP: NEGATIVE
BUN SERPL-MCNC: 18 MG/DL (ref 5–25)
CALCIUM SERPL-MCNC: 8.8 MG/DL (ref 8.3–10.1)
CHLORIDE SERPL-SCNC: 104 MMOL/L (ref 100–108)
CLARITY UR: CLEAR
CO2 SERPL-SCNC: 25 MMOL/L (ref 21–32)
COLOR UR: YELLOW
CREAT SERPL-MCNC: 1.35 MG/DL (ref 0.6–1.3)
EOSINOPHIL # BLD AUTO: 0.03 THOUSAND/ΜL (ref 0–0.61)
EOSINOPHIL NFR BLD AUTO: 0 % (ref 0–6)
ERYTHROCYTE [DISTWIDTH] IN BLOOD BY AUTOMATED COUNT: 12.7 % (ref 11.6–15.1)
GFR SERPL CREATININE-BSD FRML MDRD: 56 ML/MIN/1.73SQ M
GLUCOSE SERPL-MCNC: 116 MG/DL (ref 65–140)
GLUCOSE UR STRIP-MCNC: NEGATIVE MG/DL
HCT VFR BLD AUTO: 45.3 % (ref 36.5–49.3)
HGB BLD-MCNC: 16.2 G/DL (ref 12–17)
HGB UR QL STRIP.AUTO: ABNORMAL
IMM GRANULOCYTES # BLD AUTO: 0.03 THOUSAND/UL (ref 0–0.2)
IMM GRANULOCYTES NFR BLD AUTO: 0 % (ref 0–2)
KETONES UR STRIP-MCNC: ABNORMAL MG/DL
LEUKOCYTE ESTERASE UR QL STRIP: NEGATIVE
LIPASE SERPL-CCNC: 90 U/L (ref 73–393)
LYMPHOCYTES # BLD AUTO: 0.71 THOUSANDS/ΜL (ref 0.6–4.47)
LYMPHOCYTES NFR BLD AUTO: 10 % (ref 14–44)
MCH RBC QN AUTO: 30.9 PG (ref 26.8–34.3)
MCHC RBC AUTO-ENTMCNC: 35.8 G/DL (ref 31.4–37.4)
MCV RBC AUTO: 87 FL (ref 82–98)
MONOCYTES # BLD AUTO: 0.45 THOUSAND/ΜL (ref 0.17–1.22)
MONOCYTES NFR BLD AUTO: 6 % (ref 4–12)
NEUTROPHILS # BLD AUTO: 6.2 THOUSANDS/ΜL (ref 1.85–7.62)
NEUTS SEG NFR BLD AUTO: 83 % (ref 43–75)
NITRITE UR QL STRIP: NEGATIVE
NRBC BLD AUTO-RTO: 0 /100 WBCS
PH UR STRIP.AUTO: 5.5 [PH] (ref 4.5–8)
PLATELET # BLD AUTO: 172 THOUSANDS/UL (ref 149–390)
PMV BLD AUTO: 9.8 FL (ref 8.9–12.7)
POTASSIUM SERPL-SCNC: 3.8 MMOL/L (ref 3.5–5.3)
PROT SERPL-MCNC: 7.5 G/DL (ref 6.4–8.2)
PROT UR STRIP-MCNC: NEGATIVE MG/DL
RBC # BLD AUTO: 5.24 MILLION/UL (ref 3.88–5.62)
SODIUM SERPL-SCNC: 138 MMOL/L (ref 136–145)
SP GR UR STRIP.AUTO: 1.02 (ref 1–1.03)
UROBILINOGEN UR QL STRIP.AUTO: 0.2 E.U./DL
WBC # BLD AUTO: 7.46 THOUSAND/UL (ref 4.31–10.16)

## 2021-08-31 PROCEDURE — 96374 THER/PROPH/DIAG INJ IV PUSH: CPT

## 2021-08-31 PROCEDURE — 99285 EMERGENCY DEPT VISIT HI MDM: CPT

## 2021-08-31 PROCEDURE — 99220 PR INITIAL OBSERVATION CARE/DAY 70 MINUTES: CPT | Performed by: INTERNAL MEDICINE

## 2021-08-31 PROCEDURE — 74176 CT ABD & PELVIS W/O CONTRAST: CPT

## 2021-08-31 PROCEDURE — 96376 TX/PRO/DX INJ SAME DRUG ADON: CPT

## 2021-08-31 PROCEDURE — 83690 ASSAY OF LIPASE: CPT | Performed by: EMERGENCY MEDICINE

## 2021-08-31 PROCEDURE — 99285 EMERGENCY DEPT VISIT HI MDM: CPT | Performed by: EMERGENCY MEDICINE

## 2021-08-31 PROCEDURE — G1004 CDSM NDSC: HCPCS

## 2021-08-31 PROCEDURE — 36415 COLL VENOUS BLD VENIPUNCTURE: CPT | Performed by: EMERGENCY MEDICINE

## 2021-08-31 PROCEDURE — 85025 COMPLETE CBC W/AUTO DIFF WBC: CPT | Performed by: EMERGENCY MEDICINE

## 2021-08-31 PROCEDURE — 80053 COMPREHEN METABOLIC PANEL: CPT | Performed by: EMERGENCY MEDICINE

## 2021-08-31 PROCEDURE — 99254 IP/OBS CNSLTJ NEW/EST MOD 60: CPT | Performed by: PHYSICIAN ASSISTANT

## 2021-08-31 PROCEDURE — 96361 HYDRATE IV INFUSION ADD-ON: CPT

## 2021-08-31 RX ORDER — HYDROMORPHONE HCL/PF 1 MG/ML
0.5 SYRINGE (ML) INJECTION ONCE
Status: COMPLETED | OUTPATIENT
Start: 2021-08-31 | End: 2021-08-31

## 2021-08-31 RX ORDER — MORPHINE SULFATE 4 MG/ML
4 INJECTION, SOLUTION INTRAMUSCULAR; INTRAVENOUS EVERY 4 HOURS PRN
Status: DISCONTINUED | OUTPATIENT
Start: 2021-08-31 | End: 2021-09-02 | Stop reason: HOSPADM

## 2021-08-31 RX ORDER — ALLOPURINOL 100 MG/1
100 TABLET ORAL DAILY
Status: DISCONTINUED | OUTPATIENT
Start: 2021-08-31 | End: 2021-09-02 | Stop reason: HOSPADM

## 2021-08-31 RX ORDER — TAMSULOSIN HYDROCHLORIDE 0.4 MG/1
0.4 CAPSULE ORAL
Status: DISCONTINUED | OUTPATIENT
Start: 2021-08-31 | End: 2021-09-02 | Stop reason: HOSPADM

## 2021-08-31 RX ORDER — OXYCODONE HYDROCHLORIDE 10 MG/1
10 TABLET ORAL EVERY 4 HOURS PRN
Status: DISCONTINUED | OUTPATIENT
Start: 2021-08-31 | End: 2021-09-02 | Stop reason: HOSPADM

## 2021-08-31 RX ORDER — OXYCODONE HYDROCHLORIDE 5 MG/1
5 TABLET ORAL EVERY 4 HOURS PRN
Status: DISCONTINUED | OUTPATIENT
Start: 2021-08-31 | End: 2021-09-02 | Stop reason: HOSPADM

## 2021-08-31 RX ORDER — ATORVASTATIN CALCIUM 20 MG/1
20 TABLET, FILM COATED ORAL DAILY
Status: DISCONTINUED | OUTPATIENT
Start: 2021-08-31 | End: 2021-09-02 | Stop reason: HOSPADM

## 2021-08-31 RX ORDER — ASPIRIN 81 MG/1
81 TABLET ORAL DAILY
Status: DISCONTINUED | OUTPATIENT
Start: 2021-08-31 | End: 2021-09-02 | Stop reason: HOSPADM

## 2021-08-31 RX ORDER — KETOROLAC TROMETHAMINE 30 MG/ML
15 INJECTION, SOLUTION INTRAMUSCULAR; INTRAVENOUS ONCE
Status: DISCONTINUED | OUTPATIENT
Start: 2021-08-31 | End: 2021-08-31

## 2021-08-31 RX ORDER — SODIUM CHLORIDE 9 MG/ML
100 INJECTION, SOLUTION INTRAVENOUS CONTINUOUS
Status: DISCONTINUED | OUTPATIENT
Start: 2021-08-31 | End: 2021-09-02 | Stop reason: HOSPADM

## 2021-08-31 RX ORDER — ONDANSETRON 2 MG/ML
4 INJECTION INTRAMUSCULAR; INTRAVENOUS EVERY 6 HOURS PRN
Status: DISCONTINUED | OUTPATIENT
Start: 2021-08-31 | End: 2021-09-02 | Stop reason: HOSPADM

## 2021-08-31 RX ADMIN — HYDROMORPHONE HYDROCHLORIDE 0.5 MG: 1 INJECTION, SOLUTION INTRAMUSCULAR; INTRAVENOUS; SUBCUTANEOUS at 07:46

## 2021-08-31 RX ADMIN — OXYCODONE HYDROCHLORIDE 10 MG: 10 TABLET ORAL at 22:26

## 2021-08-31 RX ADMIN — MORPHINE SULFATE 4 MG: 4 INJECTION INTRAVENOUS at 15:36

## 2021-08-31 RX ADMIN — SODIUM CHLORIDE 100 ML/HR: 0.9 INJECTION, SOLUTION INTRAVENOUS at 11:49

## 2021-08-31 RX ADMIN — ASPIRIN 81 MG: 81 TABLET, COATED ORAL at 14:07

## 2021-08-31 RX ADMIN — METOPROLOL TARTRATE 25 MG: 25 TABLET, FILM COATED ORAL at 11:43

## 2021-08-31 RX ADMIN — ATORVASTATIN CALCIUM 20 MG: 20 TABLET, FILM COATED ORAL at 11:43

## 2021-08-31 RX ADMIN — SODIUM CHLORIDE 100 ML/HR: 0.9 INJECTION, SOLUTION INTRAVENOUS at 14:17

## 2021-08-31 RX ADMIN — SODIUM CHLORIDE 100 ML/HR: 0.9 INJECTION, SOLUTION INTRAVENOUS at 22:29

## 2021-08-31 RX ADMIN — MORPHINE SULFATE 4 MG: 4 INJECTION INTRAVENOUS at 19:39

## 2021-08-31 RX ADMIN — SODIUM CHLORIDE 1000 ML: 0.9 INJECTION, SOLUTION INTRAVENOUS at 07:44

## 2021-08-31 RX ADMIN — OXYCODONE HYDROCHLORIDE 10 MG: 10 TABLET ORAL at 14:07

## 2021-08-31 RX ADMIN — HYDROMORPHONE HYDROCHLORIDE 0.5 MG: 1 INJECTION, SOLUTION INTRAMUSCULAR; INTRAVENOUS; SUBCUTANEOUS at 08:30

## 2021-08-31 RX ADMIN — OXYCODONE HYDROCHLORIDE 10 MG: 10 TABLET ORAL at 18:22

## 2021-08-31 RX ADMIN — TAMSULOSIN HYDROCHLORIDE 0.4 MG: 0.4 CAPSULE ORAL at 15:36

## 2021-08-31 NOTE — CONSULTS
Consults: Dylon Geraldine Schwazr 57 y o  male [id number]   Unit/Bed #: [facility] 312-01  Encounter: [id number]        Assessment  & Plan  :    Nephrolithiasis:  -CT scan reveals right-sided 4 mm calculus in the ureter at the level of L3 previously seen at L1-L2  There is increased rate decided perinephric stranding   -due to patient's inability to pass ureteral calculus,  Will move forward with surgical intervention  -plan for cystoscopy ureteroscopy holmium laser and right ureteral stent placement, possible tomorrow  -NPO at midnight  -creatinine 1 35  -no leukocytosis  -hemoglobin 16 2  -afebrile  -UA negative   -Discussed procedure in depth with patient  Patient understands risks and benefits of procedure  Discussed risk of bleeding, infection, need for additional stone procedures and damage to nearby structures  Patient agreeable to plan  Will continue to follow, plan for surgery tomorrow  Subjective :    Dontae Richmond  is a 57 y o  male who was admitted for right ureteral stone  Patient previously seen in the emergency room on 08/14/2021 was reported that he was passing a 4 mm ureteral calculus  Patient was discharged on medical expulsive therapy  Patient was asymptomatic for the past 2 weeks  He reports that suddenly last night he began to experience sharp flank pain on the right side 2nd in you to worsen with no relief  He denies any nausea, vomiting, fevers or chills  He reports he has a past urologic history of kidney stones as underwent prior urologic procedures  Currently reporting that he is experiencing right-sided flank pain  Denies any abdominal pain or suprapubic pain      No Known Allergies   Current Outpatient Medications   Medication Instructions    allopurinol (ZYLOPRIM) 100 mg, Oral, Daily    aspirin (ASPIR-81) 81 mg EC tablet 1 tablet, Oral, Daily    atorvastatin (LIPITOR) 20 mg, Oral, Daily    metoprolol tartrate (LOPRESSOR) 25 mg, Oral, Daily    tamsulosin (FLOMAX) 0 4 mg, Oral, Daily with dinner      Past Medical History:   Diagnosis Date    Acute myocardial infarction ([facility] [address] )     "no heart damage" approx 8 yrs ago did get one stent"    Arthralgia     last assessed 7/8/13    Arthritis     Colon polyp     Contusion of skin with intact surface     of the left medial thigh,last assessed 6/28/13    Coronary artery disease     Gout     last assessed 10/29/13    History of sepsis     urinary and was in hosp 3 days /7/2020    Hypertension     Kidney stone     Lump of skin     last assessed 7/19/13//"fatty tumor and surg removed"    S/P coronary artery stent placement     x1    Wears glasses     at night     Past Surgical History:   Procedure Laterality Date    CARDIAC CATHETERIZATION      COLONOSCOPY  12/10/2009    Complete//2020    CORONARY STENT PLACEMENT  08/2012    stenting of the LAD artery 95% lesion to 0% residual stenosis    KNEE SURGERY Left     x4    LUMBAR EPIDURAL INJECTION      x1    NC CYSTO/URETERO W/LITHOTRIPSY &INDWELL STENT INSRT Left 8/5/2020    Procedure: CYSTO, URETEROSCOPY STENT exchange;  Surgeon: John Paul Law MD;  Location: [facility];  Service: Urology    NC CYSTOURETHROSCOPY,URETER CATHETER Left 7/4/2020    Procedure: CYSTOSCOPY WITH INSERTION STENT URETERAL;  Surgeon: Alton Solorio MD;  Location: [facility];  Service: Urology     Family History   Problem Relation Age of Onset    Edema Mother         Cerebral    Aneurysm Father         of the cerebellar artery    Aneurysm Brother         of the cerebellar artery     Social History     Socioeconomic History    Marital status: /Civil Union     Spouse name: None    Number of children: None    Years of education: None    Highest education level: None   Occupational History    None   Tobacco Use    Smoking status: Never Smoker    Smokeless tobacco: Never Used   Vaping Use    Vaping Use: Never used   Substance and Sexual Activity    Alcohol use: Not Currently    Drug use:  No  Sexual activity: None   Other Topics Concern    None   Social History Narrative    None     Social Determinants of Health     Financial Resource Strain:     Difficulty of Paying Living Expenses:    Food Insecurity:     Worried About Running Out of Food in the Last Year:     [address] in the Last Year:    Transportation Needs:     Lack of Transportation (Medical):  Lack of Transportation (Non-Medical):    Physical Activity:     Days of Exercise per Week:     Minutes of Exercise per Session:    Stress:     Feeling of Stress :    Social Connections:     Frequency of Communication with Friends and Family:     Frequency of Social Gatherings with Friends and Family:     Attends [employer] Services:     Active Member of Clubs or Organizations:     Attends Club or Organization Meetings:     Marital Status:    Intimate Partner Violence:     Fear of Current or Ex-Partner:     Emotionally Abused:     Physically Abused:     Sexually Abused:         Review of Systems   Constitutional: Negative  Negative for chills and fever  HENT: Negative  Eyes: Negative  Respiratory: Negative  Cardiovascular: Negative  Gastrointestinal: Negative  Negative for abdominal pain, diarrhea, nausea and vomiting  Endocrine: Negative  Genitourinary: Negative  Negative for difficulty urinating, dysuria, flank pain, frequency, hematuria and urgency  Musculoskeletal: Negative  Skin: Negative  Allergic/Immunologic: Negative  Neurological: Negative  Hematological: Negative  Psychiatric/Behavioral: Negative  Objective     Physical Exam  Constitutional:       General: He is not in acute distress  Appearance: He is normal weight  He is not ill-appearing, toxic-appearing or diaphoretic  HENT:      Head: Normocephalic and atraumatic        Right Ear: External ear normal       Left Ear: External ear normal       Nose: Nose normal       Mouth/Throat:      Pharynx: Oropharynx is clear    Eyes:      General: No scleral icterus  Conjunctiva/sclera: Conjunctivae normal    Cardiovascular:      Rate and Rhythm: Normal rate and regular rhythm  Pulses: Normal pulses  Heart sounds: No murmur heard  No friction rub  No gallop  Pulmonary:      Effort: Pulmonary effort is normal  No respiratory distress  Breath sounds: No wheezing, rhonchi or rales  Abdominal:      General: Bowel sounds are normal  There is no distension  Palpations: Abdomen is soft  Tenderness: There is no abdominal tenderness  There is right CVA tenderness  There is no left CVA tenderness  Musculoskeletal:         General: Normal range of motion  Cervical back: Normal range of motion  Skin:     General: Skin is warm and dry  Neurological:      General: No focal deficit present  Mental Status: He is alert and oriented to person, place, and time  Psychiatric:         Mood and Affect: Mood normal          Behavior: Behavior normal          Thought Content: Thought content normal          Judgment: Judgment normal                 Imaging:  CT ABDOMEN AND PELVIS WITHOUT IV CONTRAST - LOW DOSE RENAL STONE      INDICATION:   Flank pain, kidney stone suspected  kidney stone, increased pain      COMPARISON:  CT abdomen/pelvis dated 8/14/2021      TECHNIQUE:  Low dose thin section CT examination of the abdomen and pelvis was performed without intravenous or oral contrast according to a protocol specifically designed to evaluate for urinary tract calculus  Axial, sagittal, and coronal 2D   reformatted images were created from the source data and submitted for interpretation  Evaluation for pathology in the abdomen and pelvis that is unrelated to urinary tract calculi is limited       Radiation dose length product (DLP) for this visit:  721 29 mGy-cm     This examination, like all CT scans performed in the [facility], was performed utilizing techniques to minimize radiation dose exposure, including the use of iterative   reconstruction and automated exposure control       FINDINGS:     RIGHT KIDNEY AND URETER:  Multiple 2 to 3 mm nonobstructing calculi  No hydronephrosis or hydroureter  Again seen is a 4 mm calculus in the ureter at the level of L3 (previously L1-L2)  There is increased right-sided perinephric stranding and fluid since prior study      LEFT KIDNEY AND URETER:  Multiple 2 to 3 mm nonobstructing calculi  No hydronephrosis or hydroureter  Minimal nonspecific perinephric fluid, unchanged      URINARY BLADDER:   Unremarkable  No calculi      No significant abnormality in the visualized lung bases      Limited low radiation dose noncontrast CT evaluation demonstrates no clinically significant abnormality of liver, spleen, pancreas, or adrenal glands  There are gallstone(s) within the gallbladder, without pericholecystic inflammatory changes  No ascites or bulky lymphadenopathy on this limited noncontrast study  Colonic diverticula are noted, without evidence to suggest acute diverticulitis  Visualized bowel appears otherwise unremarkable  Limited evaluation demonstrates no evidence to suggest acute appendicitis  No acute fracture or destructive osseous lesion is identified  Hemangiomas within the T8 and T11 vertebral bodies  Facet arthropathy in the lower lumbar spine with grade 1 anterolisthesis of L4 on L5 on the basis of bilateral pars interarticularis   defects  Small right-sided direct sided fat-containing inguinal hernia  Small left-sided combined direct/indirect fat-containing inguinal hernia      IMPRESSION:  Minimal progression of a 4 mm obstructing calculus in the right ureter at the level of the L3 vertebral body, with increased right-sided perinephric stranding  Additional nonobstructing bilateral renal calculi are unchanged           Labs:  Lab Results   Component Value Date    SODIUM 138 08/31/2021    K 3 8 08/31/2021     08/31/2021    CO2 25 08/31/2021    BUN 18 08/31/2021    CREATININE 1 35 (H) 08/31/2021    GLUC 116 08/31/2021    CALCIUM 8 8 08/31/2021         Lab Results   Component Value Date    WBC 7 46 08/31/2021    HGB 16 2 08/31/2021    HCT 45 3 08/31/2021    MCV 87 08/31/2021     08/31/2021         VTE Pharmacologic Prophylaxis: Enoxaparin (Lovenox)  VTE Mechanical Prophylaxis: sequential compression device     Adria Camp PA-C

## 2021-08-31 NOTE — PLAN OF CARE
Problem: GENITOURINARY - ADULT  Goal: Maintains or returns to baseline urinary function  Description: INTERVENTIONS:  - Assess urinary function  - Encourage oral fluids to ensure adequate hydration if ordered  - Administer IV fluids as ordered to ensure adequate hydration  - Administer ordered medications as needed  - Offer frequent toileting  - Follow urinary retention protocol if ordered  Outcome: Progressing  Goal: Absence of urinary retention  Description: INTERVENTIONS:  - Assess patients ability to void and empty bladder  - Monitor I/O  - Bladder scan as needed  - Discuss with physician/AP medications to alleviate retention as needed  - Discuss catheterization for long term situations as appropriate  Outcome: Progressing     Problem: METABOLIC, FLUID AND ELECTROLYTES - ADULT  Goal: Electrolytes maintained within normal limits  Description: INTERVENTIONS:  - Monitor labs and assess patient for signs and symptoms of electrolyte imbalances  - Administer electrolyte replacement as ordered  - Monitor response to electrolyte replacements, including repeat lab results as appropriate  - Instruct patient on fluid and nutrition as appropriate  Outcome: Progressing  Goal: Fluid balance maintained  Description: INTERVENTIONS:  - Monitor labs   - Monitor I/O and WT  - Instruct patient on fluid and nutrition as appropriate  - Assess for signs & symptoms of volume excess or deficit  Outcome: Progressing     Problem: PAIN - ADULT  Goal: Verbalizes/displays adequate comfort level or baseline comfort level  Description: Interventions:  - Encourage patient to monitor pain and request assistance  - Assess pain using appropriate pain scale  - Administer analgesics based on type and severity of pain and evaluate response  - Implement non-pharmacological measures as appropriate and evaluate response  - Consider cultural and social influences on pain and pain management  - Notify physician/advanced practitioner if interventions unsuccessful or patient reports new pain  Outcome: Progressing     Problem: INFECTION - ADULT  Goal: Absence or prevention of progression during hospitalization  Description: INTERVENTIONS:  - Assess and monitor for signs and symptoms of infection  - Monitor lab/diagnostic results  - Monitor all insertion sites, i e  indwelling lines, tubes, and drains  - Monitor endotracheal if appropriate and nasal secretions for changes in amount and color  - Mitchell appropriate cooling/warming therapies per order  - Administer medications as ordered  - Instruct and encourage patient and family to use good hand hygiene technique  - Identify and instruct in appropriate isolation precautions for identified infection/condition  Outcome: Progressing

## 2021-08-31 NOTE — ED PROVIDER NOTES
History  Chief Complaint   Patient presents with    Flank Pain     Pt complains of right sided flank pain  States he was seen friday 13th of auguast for a kidney stone, denies passing anything since then  58 yo with h/o right kidney stone on 8/13 presenting for severe right sided abdominal pain that radiates around to the back and feels like it's in the exact location as he was previously having pain when diagnosed with the stone  Doesn't believe he's passed anything, took a percocet last night which didn't touch the pain at all  Pain has been present since 9pm  No hematuria  Has some urinary hesitancy  No dysuria or frequency  No fevers, chills, nausea/vomiting  Was supposed to go to urology today for an US and KUB to ensure passing of stone  History provided by:  Patient   used: No    Flank Pain  Associated symptoms: no chest pain, no chills, no diarrhea, no dysuria, no fatigue, no fever, no hematuria, no nausea, no shortness of breath and no vomiting        Prior to Admission Medications   Prescriptions Last Dose Informant Patient Reported?  Taking?   allopurinol (ZYLOPRIM) 100 mg tablet 8/30/2021 at Unknown time  No Yes   Sig: Take 1 tablet (100 mg total) by mouth daily   aspirin (ASPIR-81) 81 mg EC tablet 8/30/2021 at Unknown time Self Yes Yes   Sig: Take 1 tablet by mouth daily   atorvastatin (LIPITOR) 20 mg tablet 8/30/2021 at Unknown time  No Yes   Sig: Take 1 tablet (20 mg total) by mouth daily   metoprolol tartrate (LOPRESSOR) 25 mg tablet 8/30/2021 at Unknown time  No Yes   Sig: Take 1 tablet (25 mg total) by mouth daily   tamsulosin (FLOMAX) 0 4 mg 8/30/2021 at Unknown time  No Yes   Sig: Take 1 capsule (0 4 mg total) by mouth daily with dinner      Facility-Administered Medications: None       Past Medical History:   Diagnosis Date    Acute myocardial infarction ([facility] [address] )     "no heart damage" approx 8 yrs ago did get one stent"    Arthralgia     last assessed 7/8/13    Arthritis     Colon polyp     Contusion of skin with intact surface     of the left medial thigh,last assessed 6/28/13    Coronary artery disease     Gout     last assessed 10/29/13    History of sepsis     urinary and was in hosp 3 days /7/2020    Hypertension     Kidney stone     Lump of skin     last assessed 7/19/13//"fatty tumor and surg removed"    S/P coronary artery stent placement     x1    Wears glasses     at night       Past Surgical History:   Procedure Laterality Date    CARDIAC CATHETERIZATION      COLONOSCOPY  12/10/2009    Complete//2020    CORONARY STENT PLACEMENT  08/2012    stenting of the LAD artery 95% lesion to 0% residual stenosis    KNEE SURGERY Left     x4    LUMBAR EPIDURAL INJECTION      x1    MD CYSTO/URETERO W/LITHOTRIPSY &INDWELL STENT INSRT Left 8/5/2020    Procedure: CYSTO, URETEROSCOPY STENT exchange;  Surgeon: Patsy Hwang MD;  Location: [facility];  Service: Urology    MD CYSTO/URETERO W/LITHOTRIPSY &INDWELL STENT INSRT Right 9/1/2021    Procedure: CYSTOSCOPY URETEROSCOPY WITH BASKET STONE EXTRACTION, RETROGRADE PYELOGRAM AND INSERTION STENT URETERAL;  Surgeon: Nicanor Oscar MD;  [facility];  Service: Urology    MD CYSTOURETHROSCOPY,URETER CATHETER Left 7/4/2020    Procedure: CYSTOSCOPY WITH INSERTION STENT URETERAL;  Surgeon: Carolina Ruggiero MD;  Location: [facility];  Service: Urology       Family History   Problem Relation Age of Onset    Edema Mother         Cerebral    Aneurysm Father         of the cerebellar artery    Aneurysm Brother         of the cerebellar artery     I have reviewed and agree with the history as documented      E-Cigarette/Vaping    E-Cigarette Use Never User      E-Cigarette/Vaping Substances    Nicotine No     THC No     CBD No     Flavoring No     Other No     Unknown No      Social History     Tobacco Use    Smoking status: Never Smoker    Smokeless tobacco: Never Used   Vaping Use    Vaping Use: Never used Substance Use Topics    Alcohol use: Not Currently    Drug use: No        Review of Systems   Constitutional: Negative for chills, fatigue and fever  HENT: Negative for congestion and rhinorrhea  Eyes: Negative for visual disturbance  Respiratory: Negative for shortness of breath and wheezing  Cardiovascular: Negative for chest pain and palpitations  Gastrointestinal: Positive for abdominal pain  Negative for diarrhea, nausea and vomiting  Genitourinary: Positive for decreased urine volume and flank pain  Negative for dysuria and hematuria  Musculoskeletal: Negative for back pain and gait problem  Skin: Negative for pallor and rash  Neurological: Negative for weakness, light-headedness and headaches  Psychiatric/Behavioral: Negative for confusion  The patient is not nervous/anxious  All other systems reviewed and are negative  Physical Exam  ED Triage Vitals   Temperature Pulse Respirations Blood Pressure SpO2   08/31/21 0706 08/31/21 0706 08/31/21 0706 08/31/21 0706 08/31/21 0706   98 5 °F (36 9 °C) 104 18 161/90 97 %      Temp Source Heart Rate Source Patient Position - Orthostatic VS BP Location FiO2 (%)   08/31/21 1347 08/31/21 1122 08/31/21 1347 08/31/21 0706 --   Oral Monitor Lying Left arm       Pain Score       08/31/21 0706       8             Orthostatic Vital Signs  Vitals:    09/01/21 1430 09/01/21 1445 09/01/21 1500 09/02/21 0700   BP: 98/56 114/65 129/60 153/71   Pulse: 68 86 87 77   Patient Position - Orthostatic VS:   Lying Lying       Physical Exam  Vitals and nursing note reviewed  Constitutional:       Appearance: Normal appearance  He is not ill-appearing or diaphoretic  HENT:      Head: Normocephalic and atraumatic  Right Ear: External ear normal       Left Ear: External ear normal       Nose: Nose normal       Mouth/Throat:      Mouth: Mucous membranes are moist       Pharynx: Oropharynx is clear     Eyes:      Conjunctiva/sclera: Conjunctivae normal  Pupils: Pupils are equal, round, and reactive to light  Cardiovascular:      Rate and Rhythm: Normal rate and regular rhythm  Heart sounds: No murmur heard  Pulmonary:      Effort: Pulmonary effort is normal  No respiratory distress  Breath sounds: Normal breath sounds  No wheezing or rales  Abdominal:      General: Abdomen is flat  There is no distension  Palpations: Abdomen is soft  Tenderness: There is no abdominal tenderness  There is no right CVA tenderness, left CVA tenderness or guarding  Musculoskeletal:         General: Normal range of motion  Cervical back: Normal range of motion and neck supple  No tenderness  Right lower leg: No edema  Left lower leg: No edema  Skin:     General: Skin is warm and dry  Neurological:      General: No focal deficit present  Mental Status: He is alert     Psychiatric:         Mood and Affect: Mood normal          ED Medications  Medications   allopurinol (ZYLOPRIM) tablet 100 mg (100 mg Oral Given 9/2/21 0816)   aspirin (ECOTRIN LOW STRENGTH) EC tablet 81 mg (81 mg Oral Given 9/2/21 0815)   atorvastatin (LIPITOR) tablet 20 mg (20 mg Oral Given 9/2/21 0815)   metoprolol tartrate (LOPRESSOR) tablet 25 mg (25 mg Oral Given 9/2/21 0815)   tamsulosin (FLOMAX) capsule 0 4 mg (0 4 mg Oral Given 9/1/21 1646)   enoxaparin (LOVENOX) subcutaneous injection 40 mg (40 mg Subcutaneous Not Given 9/2/21 0817)   morphine (PF) 4 mg/mL injection 4 mg (4 mg Intravenous Given 8/31/21 1939)   oxyCODONE (ROXICODONE) IR tablet 5 mg (has no administration in time range)   oxyCODONE (ROXICODONE) immediate release tablet 10 mg (10 mg Oral Not Given 9/1/21 0902)   ondansetron (ZOFRAN) injection 4 mg (has no administration in time range)   sodium chloride 0 9 % infusion (100 mL/hr Intravenous New Bag 9/2/21 0751)   sodium chloride 0 9 % bolus 1,000 mL (0 mL Intravenous Stopped 8/31/21 0946)   HYDROmorphone (DILAUDID) injection 0 5 mg (0 5 mg Intravenous Given 8/31/21 0746)   HYDROmorphone (DILAUDID) injection 0 5 mg (0 5 mg Intravenous Given 8/31/21 0830)   ceFAZolin (ANCEF) IVPB (premix in dextrose) 2,000 mg 50 mL (2,000 mg Intravenous Given 9/1/21 1352)       Diagnostic Studies  Results Reviewed     Procedure Component Value Units Date/Time    Basic metabolic panel [[id number]]  (Abnormal) Collected: 09/01/21 0541    Lab Status: Final result Specimen: Blood from Hand, Right Updated: 09/01/21 0701     Sodium 138 mmol/L      Potassium 4 0 mmol/L      Chloride 109 mmol/L      CO2 26 mmol/L      ANION GAP 3 mmol/L      BUN 13 mg/dL      Creatinine 1 48 mg/dL      Glucose 114 mg/dL      Glucose, Fasting 114 mg/dL      Calcium 7 9 mg/dL      eGFR 50 ml/min/1 73sq m     Narrative:      [place] guidelines for Chronic Kidney Disease (CKD):     Stage 1 with normal or high GFR (GFR > 90 mL/min/1 73 square meters)    Stage 2 Mild CKD (GFR = 60-89 mL/min/1 73 square meters)    Stage 3A Moderate CKD (GFR = 45-59 mL/min/1 73 square meters)    Stage 3B Moderate CKD (GFR = 30-44 mL/min/1 73 square meters)    Stage 4 Severe CKD (GFR = 15-29 mL/min/1 73 square meters)    Stage 5 End Stage CKD (GFR <15 mL/min/1 73 square meters)  Note: GFR calculation is accurate only with a steady state creatinine    CBC and differential [[id number]]  (Abnormal) Collected: 09/01/21 0541    Lab Status: Final result Specimen: Blood from Hand, Right Updated: 09/01/21 0654     WBC 8 86 Thousand/uL      RBC 4 31 Million/uL      Hemoglobin 13 4 g/dL      Hematocrit 38 5 %      MCV 89 fL      MCH 31 1 pg      MCHC 34 8 g/dL      RDW 13 0 %      MPV 10 0 fL      Platelets 802 Thousands/uL      nRBC 0 /100 WBCs      Neutrophils Relative 80 %      Immat GRANS % 0 %      Lymphocytes Relative 10 %      Monocytes Relative 8 %      Eosinophils Relative 1 %      Basophils Relative 1 %      Neutrophils Absolute 7 10 Thousands/µL      Immature Grans Absolute 0 03 Thousand/uL      Lymphocytes Absolute 0 87 Thousands/µL      Monocytes Absolute 0 74 Thousand/µL      Eosinophils Absolute 0 08 Thousand/µL      Basophils Absolute 0 04 Thousands/µL     Urine Macroscopic, POC [[id number]]  (Abnormal) Collected: 08/31/21 1313    Lab Status: Final result Specimen: Urine Updated: 08/31/21 1314     Color, UA Yellow     Clarity, UA Clear     pH, UA 5 5     Leukocytes, UA Negative     Nitrite, UA Negative     Protein, UA Negative mg/dl      Glucose, UA Negative mg/dl      Ketones, UA 40 (2+) mg/dl      Urobilinogen, UA 0 2 E U /dl      Bilirubin, UA Negative     Blood, UA Large     Specific Gravity, UA 1 025    Narrative:      CLINITEK RESULT    Urine Microscopic [[id number]] Collected: 08/31/21 1313    Lab Status: No result Specimen: Urine     Comprehensive metabolic panel [[id number]]  (Abnormal) Collected: 08/31/21 0745    Lab Status: Final result Specimen: Blood from Arm, Right Updated: 08/31/21 2901     Sodium 138 mmol/L      Potassium 3 8 mmol/L      Chloride 104 mmol/L      CO2 25 mmol/L      ANION GAP 9 mmol/L      BUN 18 mg/dL      Creatinine 1 35 mg/dL      Glucose 116 mg/dL      Calcium 8 8 mg/dL      AST 27 U/L      ALT 29 U/L      Alkaline Phosphatase 89 U/L      Total Protein 7 5 g/dL      Albumin 4 0 g/dL      Total Bilirubin 1 59 mg/dL      eGFR 56 ml/min/1 73sq m     Narrative:      [place] guidelines for Chronic Kidney Disease (CKD):     Stage 1 with normal or high GFR (GFR > 90 mL/min/1 73 square meters)    Stage 2 Mild CKD (GFR = 60-89 mL/min/1 73 square meters)    Stage 3A Moderate CKD (GFR = 45-59 mL/min/1 73 square meters)    Stage 3B Moderate CKD (GFR = 30-44 mL/min/1 73 square meters)    Stage 4 Severe CKD (GFR = 15-29 mL/min/1 73 square meters)    Stage 5 End Stage CKD (GFR <15 mL/min/1 73 square meters)  Note: GFR calculation is accurate only with a steady state creatinine    Lipase [[id number]]  (Normal) Collected: 08/31/21 0755 Lab Status: Final result Specimen: Blood from Arm, Right Updated: 08/31/21 0822     Lipase 90 u/L     CBC and differential [[id number]]  (Abnormal) Collected: 08/31/21 0745    Lab Status: Final result Specimen: Blood from Arm, Right Updated: 08/31/21 0806     WBC 7 46 Thousand/uL      RBC 5 24 Million/uL      Hemoglobin 16 2 g/dL      Hematocrit 45 3 %      MCV 87 fL      MCH 30 9 pg      MCHC 35 8 g/dL      RDW 12 7 %      MPV 9 8 fL      Platelets 260 Thousands/uL      nRBC 0 /100 WBCs      Neutrophils Relative 83 %      Immat GRANS % 0 %      Lymphocytes Relative 10 %      Monocytes Relative 6 %      Eosinophils Relative 0 %      Basophils Relative 1 %      Neutrophils Absolute 6 20 Thousands/µL      Immature Grans Absolute 0 03 Thousand/uL      Lymphocytes Absolute 0 71 Thousands/µL      Monocytes Absolute 0 45 Thousand/µL      Eosinophils Absolute 0 03 Thousand/µL      Basophils Absolute 0 04 Thousands/µL                  CT renal stone study abdomen pelvis wo contrast   Final Result by David Hull MD (08/31 5106)   Minimal progression of a 4 mm obstructing calculus in the right ureter at the level of the L3 vertebral body, with increased right-sided perinephric stranding  Additional nonobstructing bilateral renal calculi are unchanged  The study was marked in [facility] for immediate notification  Workstation performed: [id number]         FL retrograde pyelogram    (Results Pending)         Procedures  Procedures      ED Course  ED Course as of Sep 02 1403   Tue Aug 31, 2021   7744 Minimal progression of a 4 mm obstructing calculus in the right ureter at the level of the L3 vertebral body, with increased right-sided perinephric stranding  Additional nonobstructing bilateral renal calculi are unchanged                                  SBIRT 22yo+      Most Recent Value   SBIRT (24 yo +)   In order to provide better care to our patients, we are screening all of our patients for alcohol and drug use  Would it be okay to ask you these screening questions? Yes Filed at: 08/31/2021 5438   Initial Alcohol Screen: US AUDIT-C    1  How often do you have a drink containing alcohol?  0 Filed at: 08/31/2021 0723   2  How many drinks containing alcohol do you have on a typical day you are drinking? 0 Filed at: 08/31/2021 0723   3a  Male UNDER 65: How often do you have five or more drinks on one occasion? 0 Filed at: 08/31/2021 0723   Audit-C Score  0 Filed at: 08/31/2021 4782   SHERITA: How many times in the past year have you    Used an illegal drug or used a prescription medication for non-medical reasons? Never Filed at: 08/31/2021 9430                MDM  Number of Diagnoses or Management Options  Right ureteral stone  Diagnosis management comments: 56 yo M with previously diagnosed right proximal obstructing 4mm stone, with recurrence of pain today  VS WNL, afebrile  Improved pain after administration of IV dilaudid, IV toradol  Creatinine improved today to 1 3 from 1 6 at prior check  Stew Pitts has minimally progressed since last imaging, still at proximal right ureter, obstructing  Discussed with urology who recommended admission to medicine with urology consult, possible OR tomorrow          Amount and/or Complexity of Data Reviewed  Clinical lab tests: reviewed  Tests in the radiology section of CPT®: reviewed  Decide to obtain previous medical records or to obtain history from someone other than the patient: yes        Disposition  Final diagnoses:   Right ureteral stone     Time reflects when diagnosis was documented in both MDM as applicable and the Disposition within this note     Time User Action Codes Description Comment    8/31/2021 10:16 AM Rivard, Garland Bence Add [Q52 121] Longitudinal vaginal septum, obstructing, right side     8/31/2021 10:16 AM Rivard, Garland Bence Remove [Q52 121] Longitudinal vaginal septum, obstructing, right side     8/31/2021 10:16 AM Rivard, Garland Bence Add [N20 1] Right ureteral stone 8/31/2021  2:40 PM April Dior Add [N20 0] Nephrolithiasis     9/1/2021  2:06 PM Yara NARANJO Modify [N20 1] Right ureteral stone     9/2/2021  1:45 PM Ruby Sarkar Modify [N20 1] Right ureteral stone       ED Disposition     ED Disposition Condition Date/Time Comment    Admit Stable Tue Aug 31, 2021 10:16 AM Case was discussed with Dr Jessica Leija and the patient's admission status was agreed to be Admission Status: observation status to the service of Dr Marianne Decker   Follow-up Information     Follow up With Specialties Details Why [address] [facility],  Family Medicine Follow up in 1 week(s)  [address]    [address] [facility]      Gayle Hanley MD Urology Follow up in 1 week(s)  [facility]            Current Discharge Medication List      START taking these medications    Details   oxybutynin (DITROPAN) 5 mg tablet Take 1 tablet (5 mg total) by mouth 2 (two) times a day for 5 days As needed for spasm/stent pain  Qty: 10 tablet, Refills: 0    Associated Diagnoses: Right ureteral stone      oxyCODONE (ROXICODONE) 10 MG TABS Take 1 tablet (10 mg total) by mouth every 4 (four) hours as needed for severe pain for up to 10 daysMax Daily Amount: 60 mg  Qty: 10 tablet, Refills: 0    Associated Diagnoses: Right ureteral stone         CONTINUE these medications which have NOT CHANGED    Details   allopurinol (ZYLOPRIM) 100 mg tablet Take 1 tablet (100 mg total) by mouth daily  Qty: 30 tablet, Refills: 5    Associated Diagnoses: Gout, unspecified cause, unspecified chronicity, unspecified site      aspirin (ASPIR-81) 81 mg EC tablet Take 1 tablet by mouth daily      atorvastatin (LIPITOR) 20 mg tablet Take 1 tablet (20 mg total) by mouth daily  Qty: 30 tablet, Refills: 11    Associated Diagnoses: Mixed hyperlipidemia      metoprolol tartrate (LOPRESSOR) 25 mg tablet Take 1 tablet (25 mg total) by mouth daily  Qty: 30 tablet, Refills: 5    Associated Diagnoses: Essential hypertension      tamsulosin (FLOMAX) 0 4 mg Take 1 capsule (0 4 mg total) by mouth daily with dinner  Qty: 30 capsule, Refills: 0    Associated Diagnoses: Renal colic on right side           No discharge procedures on file  PDMP Review       Value Time User    PDMP Reviewed  Yes 7/6/2020  1:39 PM Vero Clarke MD           ED Provider  Attending physically available and evaluated [address] managed the patient along with the ED Attending      Electronically Signed by         Juarez Bowen MD  09/02/21 3664

## 2021-08-31 NOTE — H&P
[address] [facility] 1960, 57 y o  male MRN: [id number]  Unit/Bed#: ED 29 Encounter: [id number]  Primary Care Provider: Ashley Perez DO   Date and time admitted to hospital: 8/31/2021  7:01 AM    * Ureteral stone  Assessment & Plan  · 4 mm obstructing calculus in the right ureter, has not been progressing based on prior imaging  · May need cystoscopy to address  · NPO after midnight  · Consult urology  · IV fluids, analgesics, antiemetics, flomax  · Strain urine, send stone for analysis if obtained  · Start antibiotics if fever develops  Coronary artery disease involving native coronary artery of native heart without angina pectoris  Assessment & Plan  · Stable  · Continue aspirin, statin, BB    Essential hypertension  Assessment & Plan  · BP is acceptable, address pain at this time  · Continue metoprolol    Gout  Assessment & Plan  No acute flare at this time  Continue allopurinol    Hyperlipidemia  Assessment & Plan  · Continue statin    Obesity (BMI 30-39  9)  Assessment & Plan  · Supportive care  · Weight loss counseling as an outpatient when stable      VTE Pharmacologic Prophylaxis:   Moderate Risk (Score 3-4) - Pharmacological DVT Prophylaxis Ordered: enoxaparin (Lovenox)  Code Status: Level 1 - Full Code   Discussion with family: Updated  (wife) at bedside  Anticipated Length of Stay: Patient will be admitted on an observation basis with an anticipated length of stay of less than 2 midnights secondary to obstructing kidney stone  Total Time for Visit, including Counseling / Coordination of Care: 45 minutes Greater than 50% of this total time spent on direct patient counseling and coordination of care  Chief Complaint: flank pain    History of Present Illness:  Yakov Camara is a 57 y o  male with a PMH of kidney stone who presents with flank pain   He has a known left ureteral stone and has been monitored and treated as an outpatient  Due to worsening pain he presented to the ED  CT abdmomen redemonstrated the 4mm stone with minimal progression since his prior scan  He was referred for observation and urology evaluation  Currently reports adequate pain relief  Review of Systems:  Review of Systems   All other systems reviewed and are negative  Past Medical and Surgical History:   Past Medical History:   Diagnosis Date    Acute myocardial infarction ([facility] [address] )     "no heart damage" approx 8 yrs ago did get one stent"    Arthralgia     last assessed 7/8/13    Arthritis     Colon polyp     Contusion of skin with intact surface     of the left medial thigh,last assessed 6/28/13    Coronary artery disease     Gout     last assessed 10/29/13    History of sepsis     urinary and was in hosp 3 days /7/2020    Hypertension     Kidney stone     Lump of skin     last assessed 7/19/13//"fatty tumor and surg removed"    S/P coronary artery stent placement     x1    Wears glasses     at night       Past Surgical History:   Procedure Laterality Date    CARDIAC CATHETERIZATION      COLONOSCOPY  12/10/2009    Complete//2020    CORONARY STENT PLACEMENT  08/2012    stenting of the LAD artery 95% lesion to 0% residual stenosis    KNEE SURGERY Left     x4    LUMBAR EPIDURAL INJECTION      x1    IL CYSTO/URETERO W/LITHOTRIPSY &INDWELL STENT INSRT Left 8/5/2020    Procedure: CYSTO, URETEROSCOPY STENT exchange;  Surgeon: Jo-Ann Lora MD;  Location: [facility];  Service: Urology    IL CYSTOURETHROSCOPY,URETER CATHETER Left 7/4/2020    Procedure: CYSTOSCOPY WITH INSERTION STENT URETERAL;  Surgeon: Foster Villanueva MD;  Location: [facility];  Service: Urology       Meds/Allergies:  Prior to Admission medications    Medication Sig Start Date End Date Taking?  Authorizing Provider   allopurinol (ZYLOPRIM) 100 mg tablet Take 1 tablet (100 mg total) by mouth daily 5/24/21  Yes Farhad Sweeney, DO   aspirin (ASPIR-81) 81 mg EC tablet Take 1 tablet by mouth daily   Yes Historical Provider, MD   atorvastatin (LIPITOR) 20 mg tablet Take 1 tablet (20 mg total) by mouth daily 12/11/20  Yes Petey Alaniz MD   metoprolol tartrate (LOPRESSOR) 25 mg tablet Take 1 tablet (25 mg total) by mouth daily 5/24/21  Yes Juarez Merritt DO   tamsulosin (FLOMAX) 0 4 mg Take 1 capsule (0 4 mg total) by mouth daily with dinner 8/20/21  Yes KURT Ford   clotrimazole-betamethasone (LOTRISONE) 1-0 05 % cream Apply topically 2 (two) times a day  Patient not taking: Reported on 8/31/2021 6/28/21 8/31/21  Juarez Merritt DO   ibuprofen (MOTRIN) 600 mg tablet Take 1 tablet (600 mg total) by mouth every 8 (eight) hours as needed for mild pain or fever  Patient not taking: Reported on 8/31/2021 8/14/21 8/31/21  Ernesto Castellon DO     I have reviewed home medications with patient personally  Allergies: No Known Allergies    Social History:  Marital Status: /Civil Union   Occupation: [employer]  Patient Pre-hospital Living Situation: Home  Patient Pre-hospital Level of Mobility: walks  Patient Pre-hospital Diet Restrictions: None  Substance Use History:   Social History     Substance and Sexual Activity   Alcohol Use Not Currently     Social History     Tobacco Use   Smoking Status Never Smoker   Smokeless Tobacco Never Used     Social History     Substance and Sexual Activity   Drug Use No       Family History:  Family History   Problem Relation Age of Onset    Edema Mother         Cerebral    Aneurysm Father         of the cerebellar artery    Aneurysm Brother         of the cerebellar artery       Physical Exam:     Vitals:   Blood Pressure: 161/90 (08/31/21 0706)  Pulse: 104 (08/31/21 0706)  Temperature: 98 5 °F (36 9 °C) (08/31/21 0706)  Respirations: 18 (08/31/21 0706)  Weight - Scale: 90 7 kg (200 lb) (08/31/21 0706)  SpO2: 97 % (08/31/21 0706)    Physical Exam  Constitutional:       Appearance: Normal appearance     HENT:      Head: Normocephalic and atraumatic  Nose: Nose normal       Mouth/Throat:      Mouth: Mucous membranes are moist       Pharynx: Oropharynx is clear  Eyes:      Extraocular Movements: Extraocular movements intact  Cardiovascular:      Rate and Rhythm: Normal rate and regular rhythm  Pulmonary:      Effort: Pulmonary effort is normal       Breath sounds: No wheezing or rales  Abdominal:      General: There is no distension  Palpations: Abdomen is soft  Tenderness: There is no abdominal tenderness  There is no right CVA tenderness or left CVA tenderness  Musculoskeletal:      Right lower leg: No edema  Left lower leg: No edema  Skin:     General: Skin is warm and dry  Neurological:      General: No focal deficit present  Mental Status: He is alert and oriented to person, place, and time  Psychiatric:         Mood and Affect: Mood normal          Behavior: Behavior normal           Additional Data:     Lab Results:  Results from last 7 days   Lab Units 08/31/21  0745   WBC Thousand/uL 7 46   HEMOGLOBIN g/dL 16 2   HEMATOCRIT % 45 3   PLATELETS Thousands/uL 172   NEUTROS PCT % 83*   LYMPHS PCT % 10*   MONOS PCT % 6   EOS PCT % 0     Results from last 7 days   Lab Units 08/31/21  0745   SODIUM mmol/L 138   POTASSIUM mmol/L 3 8   CHLORIDE mmol/L 104   CO2 mmol/L 25   BUN mg/dL 18   CREATININE mg/dL 1 35*   ANION GAP mmol/L 9   CALCIUM mg/dL 8 8   ALBUMIN g/dL 4 0   TOTAL BILIRUBIN mg/dL 1 59*   ALK PHOS U/L 89   ALT U/L 29   AST U/L 27   GLUCOSE RANDOM mg/dL 116                       Imaging: Reviewed radiology reports from this admission including: abdominal/pelvic CT  CT renal stone study abdomen pelvis wo contrast   Final Result by Lisa Gilmore MD (08/31 3673)   Minimal progression of a 4 mm obstructing calculus in the right ureter at the level of the L3 vertebral body, with increased right-sided perinephric stranding    Additional nonobstructing bilateral renal calculi are unchanged  The study was marked in [facility] for immediate notification  Workstation performed: [id number]             EKG and Other Studies Reviewed on Admission:   · EKG: No EKG obtained  ** Please Note: This note has been constructed using a voice recognition system   **

## 2021-08-31 NOTE — ED ATTENDING ATTESTATION
8/31/2021  Jason Ndiaye DO, saw and evaluated the patient  I have discussed the patient with the resident/non-physician practitioner and agree with the resident's/non-physician practitioner's findings, Plan of Care, and MDM as documented in the resident's/non-physician practitioner's note, except where noted  All available labs and Radiology studies were reviewed  I was present for key portions of any procedure(s) performed by the resident/non-physician practitioner and I was immediately available to provide assistance  At this point I agree with the current assessment done in the Emergency Department  I have conducted an independent evaluation of this patient a history and physical is as follows:    Patient returns to the emergency department for continued right upper flank pain associated with a known 4 mm, obstructing, proximally ureteral stone at the UPJ as seen on CT on 14 August   He has a h/o ureteral stones and has required a stent in the past   He followed up with his urologist and was to get a KUB and ultrasound to reevaluate the stone but he felt he had too much discomfort last night and needed to be re-evaluated  He feels that the stone is causing pain in the same spot, having not moved since the 14th  He does report all of urea but no hematuria  There is no known stone on the left  No change in symptoms w/PO intake or BM      ROS: Denies f/c, CP, SOB, abdominal pain, v/d  12 system ROS o/w negative  PE: Mild distress, alert, appears uncomfortable; PERRL, EOMI; MMM, no posterior oropharyngeal exudate, edema or erythema; HRR, no murmur; lungs CTA w/o w/r/r, POx 97% on RA (nl); abdomen s/nt/nd, moderate right CVA TTP, nl BS in all 4 quadrants; (-) LE edema, FROM extremities x4; skin p/w/d  DDx: Flank pain - persistent obstructing ureteral stone, UTI/pyelonephritis, doubt GI cause       A/P: Will recheck CT stone study for location and obstruction, urine for infection, renal function, treat symptoms, reevaluate for further w/u or disposition            ED Course         Critical Care Time  Procedures

## 2021-08-31 NOTE — ASSESSMENT & PLAN NOTE
· 4 mm obstructing calculus in the right ureter, has not been progressing based on prior imaging  · May need cystoscopy to address  · NPO after midnight  · Consult urology  · IV fluids, analgesics, antiemetics, flomax  · Strain urine, send stone for analysis if obtained  · Start antibiotics if fever develops

## 2021-09-01 ENCOUNTER — ANESTHESIA (OUTPATIENT)
Dept: PERIOP | Facility: HOSPITAL | Age: 61
End: 2021-09-01
Payer: COMMERCIAL

## 2021-09-01 ENCOUNTER — TELEPHONE (OUTPATIENT)
Dept: OTHER | Facility: HOSPITAL | Age: 61
End: 2021-09-01

## 2021-09-01 ENCOUNTER — APPOINTMENT (OUTPATIENT)
Dept: RADIOLOGY | Facility: HOSPITAL | Age: 61
End: 2021-09-01
Payer: COMMERCIAL

## 2021-09-01 ENCOUNTER — ANESTHESIA EVENT (OUTPATIENT)
Dept: PERIOP | Facility: HOSPITAL | Age: 61
End: 2021-09-01
Payer: COMMERCIAL

## 2021-09-01 DIAGNOSIS — N20.1 RIGHT URETERAL STONE: Primary | ICD-10-CM

## 2021-09-01 LAB
ANION GAP SERPL CALCULATED.3IONS-SCNC: 3 MMOL/L (ref 4–13)
BASOPHILS # BLD AUTO: 0.04 THOUSANDS/ΜL (ref 0–0.1)
BASOPHILS NFR BLD AUTO: 1 % (ref 0–1)
BUN SERPL-MCNC: 13 MG/DL (ref 5–25)
CALCIUM SERPL-MCNC: 7.9 MG/DL (ref 8.3–10.1)
CHLORIDE SERPL-SCNC: 109 MMOL/L (ref 100–108)
CO2 SERPL-SCNC: 26 MMOL/L (ref 21–32)
CREAT SERPL-MCNC: 1.48 MG/DL (ref 0.6–1.3)
EOSINOPHIL # BLD AUTO: 0.08 THOUSAND/ΜL (ref 0–0.61)
EOSINOPHIL NFR BLD AUTO: 1 % (ref 0–6)
ERYTHROCYTE [DISTWIDTH] IN BLOOD BY AUTOMATED COUNT: 13 % (ref 11.6–15.1)
GFR SERPL CREATININE-BSD FRML MDRD: 50 ML/MIN/1.73SQ M
GLUCOSE P FAST SERPL-MCNC: 114 MG/DL (ref 65–99)
GLUCOSE SERPL-MCNC: 114 MG/DL (ref 65–140)
HCT VFR BLD AUTO: 38.5 % (ref 36.5–49.3)
HGB BLD-MCNC: 13.4 G/DL (ref 12–17)
IMM GRANULOCYTES # BLD AUTO: 0.03 THOUSAND/UL (ref 0–0.2)
IMM GRANULOCYTES NFR BLD AUTO: 0 % (ref 0–2)
LYMPHOCYTES # BLD AUTO: 0.87 THOUSANDS/ΜL (ref 0.6–4.47)
LYMPHOCYTES NFR BLD AUTO: 10 % (ref 14–44)
MCH RBC QN AUTO: 31.1 PG (ref 26.8–34.3)
MCHC RBC AUTO-ENTMCNC: 34.8 G/DL (ref 31.4–37.4)
MCV RBC AUTO: 89 FL (ref 82–98)
MONOCYTES # BLD AUTO: 0.74 THOUSAND/ΜL (ref 0.17–1.22)
MONOCYTES NFR BLD AUTO: 8 % (ref 4–12)
NEUTROPHILS # BLD AUTO: 7.1 THOUSANDS/ΜL (ref 1.85–7.62)
NEUTS SEG NFR BLD AUTO: 80 % (ref 43–75)
NRBC BLD AUTO-RTO: 0 /100 WBCS
PLATELET # BLD AUTO: 153 THOUSANDS/UL (ref 149–390)
PMV BLD AUTO: 10 FL (ref 8.9–12.7)
POTASSIUM SERPL-SCNC: 4 MMOL/L (ref 3.5–5.3)
RBC # BLD AUTO: 4.31 MILLION/UL (ref 3.88–5.62)
SODIUM SERPL-SCNC: 138 MMOL/L (ref 136–145)
WBC # BLD AUTO: 8.86 THOUSAND/UL (ref 4.31–10.16)

## 2021-09-01 PROCEDURE — 85025 COMPLETE CBC W/AUTO DIFF WBC: CPT | Performed by: INTERNAL MEDICINE

## 2021-09-01 PROCEDURE — 82360 CALCULUS ASSAY QUANT: CPT | Performed by: UROLOGY

## 2021-09-01 PROCEDURE — C1769 GUIDE WIRE: HCPCS | Performed by: UROLOGY

## 2021-09-01 PROCEDURE — 99226 PR SBSQ OBSERVATION CARE/DAY 35 MINUTES: CPT | Performed by: FAMILY MEDICINE

## 2021-09-01 PROCEDURE — 99225 PR SBSQ OBSERVATION CARE/DAY 25 MINUTES: CPT | Performed by: UROLOGY

## 2021-09-01 PROCEDURE — C2617 STENT, NON-COR, TEM W/O DEL: HCPCS | Performed by: UROLOGY

## 2021-09-01 PROCEDURE — 74420 UROGRAPHY RTRGR +-KUB: CPT

## 2021-09-01 PROCEDURE — 52332 CYSTOSCOPY AND TREATMENT: CPT | Performed by: UROLOGY

## 2021-09-01 PROCEDURE — 52352 CYSTOURETERO W/STONE REMOVE: CPT | Performed by: UROLOGY

## 2021-09-01 PROCEDURE — C1758 CATHETER, URETERAL: HCPCS | Performed by: UROLOGY

## 2021-09-01 PROCEDURE — 80048 BASIC METABOLIC PNL TOTAL CA: CPT | Performed by: INTERNAL MEDICINE

## 2021-09-01 DEVICE — INLAY OPTIMA URETERAL STENT W/O GUIDEWIRE
Type: IMPLANTABLE DEVICE | Site: URETER | Status: FUNCTIONAL
Brand: BARD® INLAY OPTIMA® URETERAL STENT

## 2021-09-01 RX ORDER — DEXAMETHASONE SODIUM PHOSPHATE 10 MG/ML
INJECTION, SOLUTION INTRAMUSCULAR; INTRAVENOUS AS NEEDED
Status: DISCONTINUED | OUTPATIENT
Start: 2021-09-01 | End: 2021-09-01

## 2021-09-01 RX ORDER — FENTANYL CITRATE 50 UG/ML
INJECTION, SOLUTION INTRAMUSCULAR; INTRAVENOUS AS NEEDED
Status: DISCONTINUED | OUTPATIENT
Start: 2021-09-01 | End: 2021-09-01

## 2021-09-01 RX ORDER — MIDAZOLAM HYDROCHLORIDE 2 MG/2ML
INJECTION, SOLUTION INTRAMUSCULAR; INTRAVENOUS AS NEEDED
Status: DISCONTINUED | OUTPATIENT
Start: 2021-09-01 | End: 2021-09-01

## 2021-09-01 RX ORDER — MAGNESIUM HYDROXIDE 1200 MG/15ML
LIQUID ORAL AS NEEDED
Status: DISCONTINUED | OUTPATIENT
Start: 2021-09-01 | End: 2021-09-01 | Stop reason: HOSPADM

## 2021-09-01 RX ORDER — LIDOCAINE HYDROCHLORIDE 20 MG/ML
INJECTION, SOLUTION EPIDURAL; INFILTRATION; INTRACAUDAL; PERINEURAL AS NEEDED
Status: DISCONTINUED | OUTPATIENT
Start: 2021-09-01 | End: 2021-09-01

## 2021-09-01 RX ORDER — ALBUTEROL SULFATE 2.5 MG/3ML
2.5 SOLUTION RESPIRATORY (INHALATION) ONCE AS NEEDED
Status: DISCONTINUED | OUTPATIENT
Start: 2021-09-01 | End: 2021-09-01 | Stop reason: HOSPADM

## 2021-09-01 RX ORDER — CEFAZOLIN SODIUM 2 G/50ML
2000 SOLUTION INTRAVENOUS ONCE
Status: COMPLETED | OUTPATIENT
Start: 2021-09-01 | End: 2021-09-01

## 2021-09-01 RX ORDER — FENTANYL CITRATE/PF 50 MCG/ML
25 SYRINGE (ML) INJECTION
Status: DISCONTINUED | OUTPATIENT
Start: 2021-09-01 | End: 2021-09-01 | Stop reason: HOSPADM

## 2021-09-01 RX ORDER — ONDANSETRON 2 MG/ML
INJECTION INTRAMUSCULAR; INTRAVENOUS AS NEEDED
Status: DISCONTINUED | OUTPATIENT
Start: 2021-09-01 | End: 2021-09-01

## 2021-09-01 RX ORDER — PROPOFOL 10 MG/ML
INJECTION, EMULSION INTRAVENOUS AS NEEDED
Status: DISCONTINUED | OUTPATIENT
Start: 2021-09-01 | End: 2021-09-01

## 2021-09-01 RX ORDER — HYDROMORPHONE HCL IN WATER/PF 6 MG/30 ML
0.2 PATIENT CONTROLLED ANALGESIA SYRINGE INTRAVENOUS
Status: DISCONTINUED | OUTPATIENT
Start: 2021-09-01 | End: 2021-09-01 | Stop reason: HOSPADM

## 2021-09-01 RX ORDER — ONDANSETRON 2 MG/ML
4 INJECTION INTRAMUSCULAR; INTRAVENOUS ONCE AS NEEDED
Status: DISCONTINUED | OUTPATIENT
Start: 2021-09-01 | End: 2021-09-01 | Stop reason: HOSPADM

## 2021-09-01 RX ORDER — MEPERIDINE HYDROCHLORIDE 25 MG/ML
12.5 INJECTION INTRAMUSCULAR; INTRAVENOUS; SUBCUTANEOUS
Status: DISCONTINUED | OUTPATIENT
Start: 2021-09-01 | End: 2021-09-01 | Stop reason: HOSPADM

## 2021-09-01 RX ADMIN — ASPIRIN 81 MG: 81 TABLET, COATED ORAL at 08:52

## 2021-09-01 RX ADMIN — METOPROLOL TARTRATE 25 MG: 25 TABLET, FILM COATED ORAL at 08:52

## 2021-09-01 RX ADMIN — ATORVASTATIN CALCIUM 20 MG: 20 TABLET, FILM COATED ORAL at 08:52

## 2021-09-01 RX ADMIN — SODIUM CHLORIDE 100 ML/HR: 0.9 INJECTION, SOLUTION INTRAVENOUS at 08:52

## 2021-09-01 RX ADMIN — FENTANYL CITRATE 50 MCG: 50 INJECTION INTRAMUSCULAR; INTRAVENOUS at 14:03

## 2021-09-01 RX ADMIN — ENOXAPARIN SODIUM 40 MG: 40 INJECTION SUBCUTANEOUS at 08:52

## 2021-09-01 RX ADMIN — PROPOFOL 200 MG: 10 INJECTION, EMULSION INTRAVENOUS at 13:49

## 2021-09-01 RX ADMIN — TAMSULOSIN HYDROCHLORIDE 0.4 MG: 0.4 CAPSULE ORAL at 16:46

## 2021-09-01 RX ADMIN — ONDANSETRON 4 MG: 2 INJECTION INTRAMUSCULAR; INTRAVENOUS at 13:54

## 2021-09-01 RX ADMIN — DEXAMETHASONE SODIUM PHOSPHATE 10 MG: 10 INJECTION, SOLUTION INTRAMUSCULAR; INTRAVENOUS at 13:54

## 2021-09-01 RX ADMIN — MIDAZOLAM 2 MG: 1 INJECTION INTRAMUSCULAR; INTRAVENOUS at 13:39

## 2021-09-01 RX ADMIN — FENTANYL CITRATE 50 MCG: 50 INJECTION INTRAMUSCULAR; INTRAVENOUS at 13:57

## 2021-09-01 RX ADMIN — CEFAZOLIN SODIUM 2000 MG: 2 SOLUTION INTRAVENOUS at 13:52

## 2021-09-01 RX ADMIN — ALLOPURINOL 100 MG: 100 TABLET ORAL at 10:50

## 2021-09-01 RX ADMIN — LIDOCAINE HYDROCHLORIDE 100 MG: 20 INJECTION, SOLUTION EPIDURAL; INFILTRATION; INTRACAUDAL; PERINEURAL at 13:49

## 2021-09-01 NOTE — ASSESSMENT & PLAN NOTE
· 4 mm obstructing calculus in the right ureter, has not been progressing based on prior imaging  · May need cystoscopy to address  · NPO after midnight  · IV fluids, analgesics, antiemetics, flomax  · Strain urine, send stone for analysis if obtained  · Start antibiotics if fever develops    · Cystoscopy today

## 2021-09-01 NOTE — PROGRESS NOTES
Progress Note - urology  Billie Schwarz 57 y o  male MRN: [id number]  Unit/Bed#: [facility] 312-01 Encounter: [id number]    Assessment & Plan:    Nephrolithiasis:  -CT scan reveals right-sided 4 mm calculus in the ureter at the level of L3 previously seen at L1-L2  There is increased rate decided perinephric stranding   -due to patient's inability to pass ureteral calculus,  Will move forward with surgical intervention  -plan for cystoscopy ureteroscopy holmium laser and right ureteral stent placement today   -keep patient NPO  -creatinine 1 35 yesterday, up to 1 48 today  -no leukocytosis  -hemoglobin stable  -afebrile  -UA negative   -Discussed procedure in depth with patient  Patient understands risks and benefits of procedure  Discussed risk of bleeding, infection, need for additional stone procedures and damage to nearby structures  Patient agreeable to plan      Will continue to follow, plan for surgery today    Subjective/Objective   Chief Complaint:  None    Subjective:   Patient currently reporting that he is doing better today  He reports that his pain has significantly improved at night he needed anything since last night  He did report that his pain left his back and moved more into his abdomen  Denies any nausea or vomiting, fevers or chills  Objective:     Blood pressure 137/78, pulse 81, temperature 98 7 °F (37 1 °C), temperature source Oral, resp  rate 19, weight 90 7 kg (200 lb), SpO2 98 %  ,Body mass index is 31 32 kg/m²  Intake/Output Summary (Last 24 hours) at 9/1/2021 1209  Last data filed at 8/31/2021 1306  Gross per 24 hour   Intake 128 33 ml   Output --   Net 128 33 ml       Invasive Devices     Peripheral Intravenous Line            Peripheral IV 08/31/21 Right Forearm 1 day              Physical Exam  Constitutional:       General: He is not in acute distress  Appearance: He is normal weight  He is not ill-appearing, toxic-appearing or diaphoretic        Comments: Comfortable lying in chair in no acute distress   HENT:      Head: Normocephalic and atraumatic  Right Ear: External ear normal       Left Ear: External ear normal       Nose: Nose normal       Mouth/Throat:      Pharynx: Oropharynx is clear  Eyes:      General: No scleral icterus  Conjunctiva/sclera: Conjunctivae normal    Cardiovascular:      Rate and Rhythm: Normal rate and regular rhythm  Pulses: Normal pulses  Heart sounds: No murmur heard  No friction rub  No gallop  Pulmonary:      Effort: Pulmonary effort is normal  No respiratory distress  Breath sounds: No wheezing, rhonchi or rales  Abdominal:      General: Bowel sounds are normal  There is no distension  Tenderness: There is no abdominal tenderness  There is no right CVA tenderness or left CVA tenderness  Musculoskeletal:         General: Normal range of motion  Cervical back: Normal range of motion  Skin:     General: Skin is warm and dry  Neurological:      General: No focal deficit present  Mental Status: He is alert and oriented to person, place, and time  Psychiatric:         Mood and Affect: Mood normal          Behavior: Behavior normal          Thought Content: Thought content normal          Judgment: Judgment normal            Lab, Imaging and other studies:I have personally reviewed pertinent lab results     Lab Results   Component Value Date    WBC 8 86 09/01/2021    HGB 13 4 09/01/2021    HCT 38 5 09/01/2021    MCV 89 09/01/2021     09/01/2021     Lab Results   Component Value Date    SODIUM 138 09/01/2021    K 4 0 09/01/2021     (H) 09/01/2021    CO2 26 09/01/2021    BUN 13 09/01/2021    CREATININE 1 48 (H) 09/01/2021    GLUC 114 09/01/2021    CALCIUM 7 9 (L) 09/01/2021        VTE Pharmacologic Prophylaxis: Enoxaparin (Lovenox)  VTE Mechanical Prophylaxis: sequential compression device      Benito Bennett PA-C

## 2021-09-01 NOTE — OP NOTE
OPERATIVE REPORT  PATIENT NAME: Jeremy Shin    :  1960  MRN: [id number]  Pt Location: BE CYSTO ROOM 01    SURGERY DATE: 2021    Surgeon(s) and Role:     Gayla Strickland MD - Primary    Preop Diagnosis:  Right ureteral stone [N20 1]    Post-Op Diagnosis Codes:     * Right ureteral stone [N20 1]    Procedure(s) (LRB):  CYSTOSCOPY URETEROSCOPY WITH BASKET STONE EXTRACTION, RETROGRADE PYELOGRAM AND INSERTION STENT URETERAL (Right)    Specimen(s):  ID Type Source Tests Collected by Time Destination   A :  Calculus Ureter, Right STONE ANALYSIS Misha Martinez MD 2021 1406        Estimated Blood Loss:   Minimal    Drains:  * No LDAs found *    Anesthesia Type:   General    Operative Indications:  Right ureteral stone [N20 1]      Operative Findings:  Stone identified at the level of the iliac vessels and retrieved atraumatically  Complications:   None    Procedure and Technique:  The patient was identified, brought to the operating room, and placed on the table in supine position  After induction of general anesthesia, the patient was placed in dorsal lithotomy position and prepped and draped in the usual sterile fashion  A complete formal timeout was performed  The 25 German rigid cystoscope was placed per urethra and cystoscopy was performed  There was no bladder abnormality identified  The Right ureteral orifice was identified and cannulated with a Solo wire  A semirigid ureteroscope was then placed alongside the wire into the ureter, and the stone was identified at the level of the iliac vessels  A 4-wire stone basket was placed and the stone was retrieved  At this point, a retrograde pyelogram was performed delineating the upper urinary tract anatomy  No further filling defect identified  Ureteral length was measured  The safety wire was backloaded into the cystoscope and a 26 cm x 6 German double-J stent was placed with string       The patient tolerated the procedure well and was transferred to the recovery room awake alert and in stable condition  Plan:  Stent/string for the next 3 to 5 days    Patient and wife will decide if they wish to remove on their own over the weekend or come into the office for stent removal      I was present for the entire procedure    Patient Disposition:  PACU     SIGNATURE: Pau Thomas MD  DATE: September 1, 2021  TIME: 2:23 PM

## 2021-09-01 NOTE — TELEPHONE ENCOUNTER
Patient is status post cystoscopy ureteroscopy holmium laser basket extraction and ureteral stent with a string  Your require stent removal in 3-5 days  Can call patient and see his preference as may pull stent on his own or may want to come in for stent removal     He will require an additional follow-up in 3 months with ultrasound and KUB  This order has been placed      Thank you

## 2021-09-01 NOTE — PROGRESS NOTES
[address] [facility]  Progress Note King Schwarz 1960, 57 y o  male MRN: [id number]  Unit/Bed#: [facility] 312-01 Encounter: [id number]  Primary Care Provider: Ray Menon DO   Date and time admitted to hospital: 8/31/2021  7:01 AM    * Ureteral stone  Assessment & Plan  · 4 mm obstructing calculus in the right ureter, has not been progressing based on prior imaging  · May need cystoscopy to address  · NPO after midnight  · IV fluids, analgesics, antiemetics, flomax  · Strain urine, send stone for analysis if obtained  · Start antibiotics if fever develops  · Cystoscopy today    Obesity (BMI 30-39  9)  Assessment & Plan  · Supportive care  · Weight loss counseling as an outpatient when stable    Acute kidney injury (nontraumatic) (HCC)  Assessment & Plan  Likely secondary to uropathy  Avoid nephrotoxic agents  Continue IV fluids  Repeat BMP a m  Coronary artery disease involving native coronary artery of native heart without angina pectoris  Assessment & Plan  · Stable  · Continue aspirin, statin, BB    Essential hypertension  Assessment & Plan  · BP is acceptable, address pain at this time  · Continue metoprolol      VTE Pharmacologic Prophylaxis:   Pharmacologic: Enoxaparin (Lovenox)  Mechanical VTE Prophylaxis in Place: Yes    Patient Centered Rounds: I have performed bedside rounds with nursing staff today  Discussions with Specialists or Other Care Team Provider:  Cm and nursing    Education and Discussions with Family / Patient:  With patient  Patient did not want me to call any family members    Time Spent for Care: 30 minutes  More than 50% of total time spent on counseling and coordination of care as described above      Current Length of Stay: 0 day(s)    Current Patient Status: Observation   Certification Statement: The patient will continue to require additional inpatient hospital stay due to Pending cystoscopy    Discharge Plan:  Likely tomorrow    Code Status: Level 1 - Full Code      Subjective:   Patient seen examined bedside  No acute events denies any chest pain, shortness of breath  Continues to have right flank pain    Objective:     Vitals:   Temp (24hrs), Av 7 °F (37 1 °C), Min:98 5 °F (36 9 °C), Max:98 8 °F (37 1 °C)    Temp:  [98 5 °F (36 9 °C)-98 8 °F (37 1 °C)] 98 7 °F (37 1 °C)  HR:  [66-81] 81  Resp:  [17-19] 19  BP: (137-169)/(77-87) 137/78  SpO2:  [96 %-99 %] 98 %  Body mass index is 31 32 kg/m²  Input and Output Summary (last 24 hours): Intake/Output Summary (Last 24 hours) at 2021 1138  Last data filed at 2021 1306  Gross per 24 hour   Intake 128 33 ml   Output --   Net 128 33 ml       Physical Exam:     Physical Exam  Vitals and nursing note reviewed  Constitutional:       General: He is not in acute distress  Appearance: Normal appearance  He is obese  HENT:      Head: Normocephalic  Nose: Nose normal       Mouth/Throat:      Mouth: Mucous membranes are moist    Eyes:      Conjunctiva/sclera: Conjunctivae normal    Cardiovascular:      Rate and Rhythm: Normal rate  Heart sounds: Normal heart sounds  No murmur heard  Pulmonary:      Effort: Pulmonary effort is normal  No respiratory distress  Breath sounds: Normal breath sounds  No wheezing  Abdominal:      General: There is no distension  Tenderness: There is no abdominal tenderness  There is no guarding  Comments: Right CVA tenderness present   Musculoskeletal:         General: No swelling  Right lower leg: No edema  Skin:     General: Skin is warm  Neurological:      General: No focal deficit present  Mental Status: He is alert and oriented to person, place, and time     Psychiatric:         Mood and Affect: Mood normal            Additional Data:     Labs:    Results from last 7 days   Lab Units 21  0541   WBC Thousand/uL 8 86   HEMOGLOBIN g/dL 13 4   HEMATOCRIT % 38 5   PLATELETS Thousands/uL 153   NEUTROS PCT % 80*   LYMPHS PCT % 10*   MONOS PCT % 8   EOS PCT % 1     Results from last 7 days   Lab Units 09/01/21  0541 08/31/21  0745   SODIUM mmol/L 138 138   POTASSIUM mmol/L 4 0 3 8   CHLORIDE mmol/L 109* 104   CO2 mmol/L 26 25   BUN mg/dL 13 18   CREATININE mg/dL 1 48* 1 35*   ANION GAP mmol/L 3* 9   CALCIUM mg/dL 7 9* 8 8   ALBUMIN g/dL  --  4 0   TOTAL BILIRUBIN mg/dL  --  1 59*   ALK PHOS U/L  --  89   ALT U/L  --  29   AST U/L  --  27   GLUCOSE RANDOM mg/dL 114 116                           * I Have Reviewed All Lab Data Listed Above  * Additional Pertinent Lab Tests Reviewed: Soumy[address] 65 Admission Reviewed    Imaging:    Imaging Reports Reviewed Today Include:  CT renal stone  Imaging Personally Reviewed by Myself Includes:  None    Recent Cultures (last 7 days):           Last 24 Hours Medication List:   Current Facility-Administered Medications   Medication Dose Route Frequency Provider Last Rate    allopurinol  100 mg Oral Daily Tano Rubin MD      aspirin  81 mg Oral Daily Tano Rubin MD      atorvastatin  20 mg Oral Daily Tano Rubin MD      enoxaparin  40 mg Subcutaneous Daily Tano Rubin MD      metoprolol tartrate  25 mg Oral Daily Tano Rubin MD      morphine injection  4 mg Intravenous Q4H PRN Tano Rubin MD      ondansetron  4 mg Intravenous Q6H PRN Tano Rubin MD      oxyCODONE  10 mg Oral Q4H PRN Tano Rubin MD      oxyCODONE  5 mg Oral Q4H PRN Tano Rubin MD      sodium chloride  100 mL/hr Intravenous Continuous Tano Rubin  mL/hr (09/01/21 7446)    tamsulosin  0 4 mg Oral Daily With Tree Escobar MD          Today, Patient Was Seen By: Ana Ha MD    ** Please Note: Dictation voice to text software may have been used in the creation of this document   **

## 2021-09-01 NOTE — ANESTHESIA POSTPROCEDURE EVALUATION
Post-Op Assessment Note    CV Status:  Stable    Pain management: adequate     Mental Status:  Awake   Hydration Status:  Stable   PONV Controlled:  Controlled   Airway Patency:  Patent      Post Op Vitals Reviewed: Yes      Staff: Anesthesiologist, CRNA         No complications documented      BP (!) 83/53 (09/01/21 1423)    Temp 98 2 °F (36 8 °C) (09/01/21 1423)    Pulse 67 (09/01/21 1423)   Resp 16 (09/01/21 1423)    SpO2 99 % (09/01/21 1423)

## 2021-09-01 NOTE — UTILIZATION REVIEW
Initial Clinical Review    Admission: Date/Time/Statement:   Admission Orders (From admission, onward)     Ordered        08/31/21 1016  Place in Observation  Once                   Orders Placed This Encounter   Procedures    Place in Observation     Standing Status:   Standing     Number of Occurrences:   1     Order Specific Question:   Level of Care     Answer:   Med Surg [16]     ED Arrival Information     Expected Arrival Acuity    - 8/31/2021 06:46 Urgent         Means of arrival Escorted by Service Admission type    Walk-In Self Hospitalist Urgent         Arrival complaint    Kidney Stone        Chief Complaint   Patient presents with    Flank Pain     Pt complains of right sided flank pain  States he was seen friday 13th of auguast for a kidney stone, denies passing anything since then  Initial Presentation:     57year old male presents to ed from home for evaluation and treatment of right flank pain and known kidney stone  Clinical assessment significant for imaging showing a 4 mm obstructing calculus in the right ureter  Treated in ed with iv ns bolus, iv dilaudid x2, iv ns 100/hr  Admit to observation for obstructing ureteral stone  Plan includes strain all urine, flomax, analgesia, npo, consult urology  Possible cystoscopy  Consult urology     -CT scan reveals right-sided 4 mm calculus in the ureter at the level of L3 previously seen at L1-L2  There is increased rate decided perinephric stranding   -due to patient's inability to pass ureteral calculus,  Will move forward with surgical intervention  -plan for cystoscopy ureteroscopy holmium laser and right ureteral stent placement, possible tomorrow  -NPO at midnight  -creatinine 1 35  -no leukocytosis  -hemoglobin 16 2  -afebrile  -UA negative   -Discussed procedure in depth with patient  Patient understands risks and benefits of procedure    Discussed risk of bleeding, infection, need for additional stone procedures and damage to nearby structures  Patient agreeable to plan  Date: 9-1-21  Day 2:  observation       Date: 09/01/21   Procedure: CYSTOSCOPY URETEROSCOPY WITH LITHOTRIPSY HOLMIUM LASER, RETROGRADE PYELOGRAM AND INSERTION STENT URETERAL (Right Bladder)   Anesthesia type: general   Diagnosis: Right ureteral stone [N20 1]   Pre-op diagnosis: Right ureteral stone [N20 1]           ED Triage Vitals   08/31/21 0706 08/31/21 0706 08/31/21 0706 08/31/21 0706 08/31/21 0706   98 5 °F (36 9 °C) 104 18 161/90 97 %      Oral Monitor         Pain Score       8          08/31/21 90 7 kg (200 lb)     Additional Vital Signs:       Date/Time  Temp  Pulse  Resp  BP  MAP   SpO2  O2 Device   09/01/21 0700  98 7 °F (37 1 °C)  81  19  137/78  102  98 %  None (Room air)   08/31/21 2300  98 8 °F (37 1 °C)  80  18  147/77  105  99 %  None (Room air)   08/31/21 1500  98 5 °F (36 9 °C)  69  17  153/87  102  96 %  None (Room air)   08/31/21 1347  98 6 °F (37 °C)  66  17  169/77  110  99 %  None (Room air)   08/31/21 1122  --  83  18  138/82  --  95 %  None (Room air)             Pertinent Labs/Diagnostic Test Results:       CT renal stone study abdomen pelvis wo contrast   Final (08/31 0934)   Minimal progression of a 4 mm obstructing calculus in the right ureter at the level of the L3 vertebral body, with increased right-sided perinephric stranding  Additional nonobstructing bilateral renal calculi are unchanged                 Results from last 7 days   Lab Units 09/01/21  0541 08/31/21  0745   WBC Thousand/uL 8 86 7 46   HEMOGLOBIN g/dL 13 4 16 2   HEMATOCRIT % 38 5 45 3   PLATELETS Thousands/uL 153 172   NEUTROS ABS Thousands/µL 7 10 6 20         Results from last 7 days   Lab Units 09/01/21  0541 08/31/21  0745   SODIUM mmol/L 138 138   POTASSIUM mmol/L 4 0 3 8   CHLORIDE mmol/L 109* 104   CO2 mmol/L 26 25   ANION GAP mmol/L 3* 9   BUN mg/dL 13 18   CREATININE mg/dL 1 48* 1 35*   EGFR ml/min/1 73sq m 50 56   CALCIUM mg/dL 7 9* 8 8     Results from last 7 days   Lab Units 08/31/21  0745   AST U/L 27   ALT U/L 29   ALK PHOS U/L 89   TOTAL PROTEIN g/dL 7 5   ALBUMIN g/dL 4 0   TOTAL BILIRUBIN mg/dL 1 59*         Results from last 7 days   Lab Units 09/01/21  0541 08/31/21  0745   GLUCOSE RANDOM mg/dL 114 116       Results from last 7 days   Lab Units 08/31/21  0745   LIPASE u/L 90       Results from last 7 days   Lab Units 08/31/21  1313   CLARITY UA  Clear   COLOR UA  Yellow   SPEC GRAV UA  1 025   PH UA  5 5   GLUCOSE UA mg/dl Negative   KETONES UA mg/dl 40 (2+)*   BLOOD UA  Large*   PROTEIN UA mg/dl Negative   NITRITE UA  Negative   BILIRUBIN UA  Negative   UROBILINOGEN UA E U /dl 0 2   LEUKOCYTES UA  Negative       ED Treatment:   Medication Administration from 08/31/2021 0646 to 08/31/2021 1342       Date/Time Order Dose Route Action     08/31/2021 0744 sodium chloride 0 9 % bolus 1,000 mL 1,000 mL Intravenous New Bag     08/31/2021 0746 HYDROmorphone (DILAUDID) injection 0 5 mg 0 5 mg Intravenous Given     08/31/2021 0830 HYDROmorphone (DILAUDID) injection 0 5 mg 0 5 mg Intravenous Given     08/31/2021 1143 atorvastatin (LIPITOR) tablet 20 mg 20 mg Oral Given     08/31/2021 1143 metoprolol tartrate (LOPRESSOR) tablet 25 mg 25 mg Oral Given     08/31/2021 1149 sodium chloride 0 9 % infusion 100 mL/hr Intravenous New Bag        Past Medical History:   Diagnosis    Acute myocardial infarction ([facility] [address] )    "no heart damage" approx 8 yrs ago did get one stent"    Arthralgia    last assessed 7/8/13    Arthritis    Colon polyp    Contusion of skin with intact surface    of the left medial thigh,last assessed 6/28/13    Coronary artery disease    Gout    last assessed 10/29/13    History of sepsis    urinary and was in hosp 3 days /7/2020    Hypertension    Kidney stone    Lump of skin    last assessed 7/19/13//"fatty tumor and surg removed"    S/P coronary artery stent placement    x1    Wears glasses    at night     Present on Admission:   Gout   Coronary artery disease involving native coronary artery of native heart without angina pectoris   Essential hypertension   Hyperlipidemia   Obesity (BMI 30-39  9)   Ureteral stone      Admitting Diagnosis:     Kidney stone [N20 0]  Right ureteral stone [N20 1]    Age/Sex: 57 y o  male    Scheduled Medications:    allopurinol, 100 mg, Oral, Daily  aspirin, 81 mg, Oral, Daily  atorvastatin, 20 mg, Oral, Daily  enoxaparin, 40 mg, Subcutaneous, Daily  metoprolol tartrate, 25 mg, Oral, Daily  tamsulosin, 0 4 mg, Oral, Daily With Dinner      Continuous IV Infusions:  sodium chloride, 100 mL/hr, Intravenous, Continuous      PRN Meds:  morphine injection, 4 mg, Intravenous, Q4H PRN  ondansetron, 4 mg, Intravenous, Q6H PRN  oxyCODONE, 10 mg, Oral, Q4H PRN  oxyCODONE, 5 mg, Oral, Q4H PRN        IP CONSULT TO UROLOGY    Network Utilization Review Department  ATTENTION: Please call with any questions or concerns to [phone] and carefully listen to the prompts so that you are directed to the right person  All voicemails are confidential   Mulugeta Kenney all requests for admission clinical reviews, approved or denied determinations and any other requests to dedicated fax number below belonging to the campus where the patient is receiving treatment   List of dedicated fax numbers for the Facilities:  [address] DENIALS (Administrative/Medical Necessity) [phone]   [address] (Maternity/NICU/Pediatrics) [phone]   [address] [place] [address] [facility] [id number] [address]  Samaria Schumacher [address]  [place] [address] [address] [place] [address] [phone]

## 2021-09-01 NOTE — ANESTHESIA PREPROCEDURE EVALUATION
Procedure:  CYSTOSCOPY URETEROSCOPY WITH LITHOTRIPSY HOLMIUM LASER, RETROGRADE PYELOGRAM AND INSERTION STENT URETERAL (Right Bladder)    Relevant Problems   CARDIO   (+) Coronary artery disease involving native coronary artery of native heart without angina pectoris   (+) Essential hypertension   (+) Hyperlipidemia   (+) PVC's (premature ventricular contractions)      /RENAL   (+) Acute kidney injury (nontraumatic) (HCC)   (+) Nephrolithiasis      MUSCULOSKELETAL   (+) Gout      Other   (+) Obesity (BMI 30-39 9)   (+) S/P coronary artery stent placement (LAD stent 2012)   (+) Ureteral stone      TTE 12/2020:  Normal biventricular function, no valvulopathies    CT renal stone study 8/31  Minimal progression of a 4 mm obstructing calculus in the right ureter at the level of the L3 vertebral body, with increased right-sided perinephric stranding  Additional nonobstructing bilateral renal calculi are unchanged  Physical Exam    Airway    Mallampati score: II  TM Distance: >3 FB  Neck ROM: full     Dental       Cardiovascular      Pulmonary      Other Findings        Anesthesia Plan  ASA Score- 2     Anesthesia Type- general with ASA Monitors  Additional Monitors:   Airway Plan: LMA  Plan Factors-Exercise tolerance (METS): >4 METS  Chart reviewed  Imaging results reviewed  Existing labs reviewed  Patient summary reviewed  Patient is not a current smoker  Patient did not smoke on day of surgery  Obstructive sleep apnea risk education given perioperatively  Induction- intravenous  Postoperative Plan- Plan for postoperative opioid use  Planned trial extubation    Informed Consent- Anesthetic plan and risks discussed with patient  I personally reviewed this patient with the CRNA  Discussed and agreed on the Anesthesia Plan with the CRNA  Juan Carlos Ruiz

## 2021-09-01 NOTE — NURSING NOTE
Patient states he feels like the "stone has moved", he is now complaining of lower mid abdominal pain  Pain currently manageable without pain medications  Will pass information onto oncoming RN

## 2021-09-02 VITALS
WEIGHT: 200 LBS | OXYGEN SATURATION: 95 % | DIASTOLIC BLOOD PRESSURE: 71 MMHG | SYSTOLIC BLOOD PRESSURE: 153 MMHG | RESPIRATION RATE: 15 BRPM | HEART RATE: 77 BPM | BODY MASS INDEX: 31.32 KG/M2 | TEMPERATURE: 97.7 F

## 2021-09-02 LAB
ANION GAP SERPL CALCULATED.3IONS-SCNC: 3 MMOL/L (ref 4–13)
BASOPHILS # BLD AUTO: 0.01 THOUSANDS/ΜL (ref 0–0.1)
BASOPHILS NFR BLD AUTO: 0 % (ref 0–1)
BUN SERPL-MCNC: 11 MG/DL (ref 5–25)
CALCIUM SERPL-MCNC: 8.3 MG/DL (ref 8.3–10.1)
CHLORIDE SERPL-SCNC: 113 MMOL/L (ref 100–108)
CO2 SERPL-SCNC: 24 MMOL/L (ref 21–32)
CREAT SERPL-MCNC: 0.82 MG/DL (ref 0.6–1.3)
EOSINOPHIL # BLD AUTO: 0 THOUSAND/ΜL (ref 0–0.61)
EOSINOPHIL NFR BLD AUTO: 0 % (ref 0–6)
ERYTHROCYTE [DISTWIDTH] IN BLOOD BY AUTOMATED COUNT: 12.7 % (ref 11.6–15.1)
GFR SERPL CREATININE-BSD FRML MDRD: 95 ML/MIN/1.73SQ M
GLUCOSE SERPL-MCNC: 126 MG/DL (ref 65–140)
HCT VFR BLD AUTO: 36.6 % (ref 36.5–49.3)
HGB BLD-MCNC: 12.8 G/DL (ref 12–17)
IMM GRANULOCYTES # BLD AUTO: 0.02 THOUSAND/UL (ref 0–0.2)
IMM GRANULOCYTES NFR BLD AUTO: 0 % (ref 0–2)
LYMPHOCYTES # BLD AUTO: 0.52 THOUSANDS/ΜL (ref 0.6–4.47)
LYMPHOCYTES NFR BLD AUTO: 7 % (ref 14–44)
MCH RBC QN AUTO: 31.1 PG (ref 26.8–34.3)
MCHC RBC AUTO-ENTMCNC: 35 G/DL (ref 31.4–37.4)
MCV RBC AUTO: 89 FL (ref 82–98)
MONOCYTES # BLD AUTO: 0.4 THOUSAND/ΜL (ref 0.17–1.22)
MONOCYTES NFR BLD AUTO: 5 % (ref 4–12)
NEUTROPHILS # BLD AUTO: 6.68 THOUSANDS/ΜL (ref 1.85–7.62)
NEUTS SEG NFR BLD AUTO: 88 % (ref 43–75)
NRBC BLD AUTO-RTO: 0 /100 WBCS
PLATELET # BLD AUTO: 164 THOUSANDS/UL (ref 149–390)
PMV BLD AUTO: 9.9 FL (ref 8.9–12.7)
POTASSIUM SERPL-SCNC: 3.9 MMOL/L (ref 3.5–5.3)
RBC # BLD AUTO: 4.12 MILLION/UL (ref 3.88–5.62)
SODIUM SERPL-SCNC: 140 MMOL/L (ref 136–145)
WBC # BLD AUTO: 7.63 THOUSAND/UL (ref 4.31–10.16)

## 2021-09-02 PROCEDURE — 99217 PR OBSERVATION CARE DISCHARGE MANAGEMENT: CPT | Performed by: FAMILY MEDICINE

## 2021-09-02 PROCEDURE — 85025 COMPLETE CBC W/AUTO DIFF WBC: CPT | Performed by: UROLOGY

## 2021-09-02 PROCEDURE — 80048 BASIC METABOLIC PNL TOTAL CA: CPT | Performed by: UROLOGY

## 2021-09-02 RX ORDER — OXYCODONE HYDROCHLORIDE 5 MG/1
5 TABLET ORAL EVERY 4 HOURS PRN
Qty: 10 TABLET | Refills: 0 | Status: SHIPPED | OUTPATIENT
Start: 2021-09-02 | End: 2021-09-12

## 2021-09-02 RX ORDER — OXYCODONE HYDROCHLORIDE 10 MG/1
10 TABLET ORAL EVERY 4 HOURS PRN
Qty: 10 TABLET | Refills: 0 | Status: SHIPPED | OUTPATIENT
Start: 2021-09-02 | End: 2021-09-02 | Stop reason: HOSPADM

## 2021-09-02 RX ORDER — OXYBUTYNIN CHLORIDE 5 MG/1
5 TABLET ORAL 2 TIMES DAILY
Qty: 10 TABLET | Refills: 0 | Status: SHIPPED | OUTPATIENT
Start: 2021-09-02 | End: 2021-10-06

## 2021-09-02 RX ADMIN — ATORVASTATIN CALCIUM 20 MG: 20 TABLET, FILM COATED ORAL at 08:15

## 2021-09-02 RX ADMIN — METOPROLOL TARTRATE 25 MG: 25 TABLET, FILM COATED ORAL at 08:15

## 2021-09-02 RX ADMIN — ASPIRIN 81 MG: 81 TABLET, COATED ORAL at 08:15

## 2021-09-02 RX ADMIN — ALLOPURINOL 100 MG: 100 TABLET ORAL at 08:16

## 2021-09-02 RX ADMIN — SODIUM CHLORIDE 100 ML/HR: 0.9 INJECTION, SOLUTION INTRAVENOUS at 07:51

## 2021-09-02 NOTE — TELEPHONE ENCOUNTER
Patient called in stating he can not removal the stent himself and asked if he can come into the office   Patient can be reached at [phone]

## 2021-09-02 NOTE — ASSESSMENT & PLAN NOTE
· 4 mm obstructing calculus in the right ureter, has not been progressing based on prior imaging  · May need cystoscopy to address  · NPO after midnight  · IV fluids, analgesics, antiemetics, flomax  · Strain urine, send stone for analysis if obtained  · Start antibiotics if fever develops    · Right cystoscopy with stent insertion on 09/01/2021  · Patient follow-up with Urology as outpatient

## 2021-09-02 NOTE — DISCHARGE SUMMARY
[address] [facility]  Discharge- [address]  1960, 57 y o  male MRN: [id number]  Unit/Bed#: [facility] 313-01 Encounter: [id number]  Primary Care Provider: Monika Harris DO   Date and time admitted to hospital: 8/31/2021  7:01 AM    * Ureteral stone  Assessment & Plan  · 4 mm obstructing calculus in the right ureter, has not been progressing based on prior imaging  · May need cystoscopy to address  · NPO after midnight  · IV fluids, analgesics, antiemetics, flomax  · Strain urine, send stone for analysis if obtained  · Start antibiotics if fever develops  · Right cystoscopy with stent insertion on 09/01/2021  · Patient follow-up with Urology as outpatient    Obesity (BMI 30-39  9)  Assessment & Plan  · Supportive care  · Weight loss counseling as an outpatient when stable    Acute kidney injury (nontraumatic) ([facility] [address] )  Assessment & Plan  Likely secondary to uropathy  Avoid nephrotoxic agents  Result of IV fluids and stent insertion    Coronary artery disease involving native coronary artery of native heart without angina pectoris  Assessment & Plan  · Stable  · Continue aspirin, statin, BB    Essential hypertension  Assessment & Plan  · BP is acceptable, address pain at this time  · Continue metoprolol        Discharging Physician / Practitioner: Isaak Cohn MD  PCP: Monika Harris DO  Admission Date:   Admission Orders (From admission, onward)     Ordered        08/31/21 1016  Place in Observation  Once                   Discharge Date: 09/02/21    Medical Problems     Resolved Problems  Date Reviewed: 8/31/2021    None                Consultations During Hospital Stay:  · Urology    Procedures Performed:   · Cystoscopy with right ureteral stent insertion    Significant Findings / Test Results:   · S above    Incidental Findings:   · None     Test Results Pending at Discharge (will require follow up):    · Non     Outpatient Tests Requested:  · Non    Complications: None    Reason for Admission:  Right flank pain  [facility] for details  Hospital Course:     Juliana Loera is a 57 y o  male patient who originally presented to the hospital on 8/31/2021 due to right flank pain  Patient was found to have a 4 mm obstructing calculus in the right ureteral and JENNIFER  Patient underwent cystoscopy with right ureteral stent insertion  JENNIFER result of IV fluids consistent insertion  Patient is stable to be discharged home today  He will follow-up with Urology as the patient      Please see above list of diagnoses and related plan for additional information  Condition at Discharge: good     Discharge Day Visit / Exam:     Subjective:  Patient seen examined bedside  No acute events  Denies any chest pain, shortness of breath, abdominal pain, nausea, vomiting  Vitals: Blood Pressure: 129/60 (09/01/21 1500)  Pulse: 87 (09/01/21 1500)  Temperature: 98 7 °F (37 1 °C) (09/01/21 1500)  Temp Source: Oral (09/01/21 1500)  Respirations: 16 (09/01/21 1500)  Weight - Scale: 90 7 kg (200 lb) (08/31/21 0706)  SpO2: 95 % (09/01/21 1500)  Exam:   Physical Exam  Vitals and nursing note reviewed  Constitutional:       General: He is not in acute distress  Appearance: Normal appearance  HENT:      Head: Normocephalic  Nose: Nose normal       Mouth/Throat:      Mouth: Mucous membranes are moist    Eyes:      Conjunctiva/sclera: Conjunctivae normal    Cardiovascular:      Rate and Rhythm: Normal rate  Heart sounds: Normal heart sounds  No murmur heard  Pulmonary:      Effort: Pulmonary effort is normal  No respiratory distress  Breath sounds: Normal breath sounds  No wheezing  Abdominal:      General: There is no distension  Tenderness: There is no abdominal tenderness  There is no guarding  Musculoskeletal:         General: No swelling  Right lower leg: No edema  Skin:     General: Skin is warm  Neurological:      General: No focal deficit present        Mental Status: He is alert and oriented to person, place, and time  Psychiatric:         Mood and Affect: Mood normal          Discussion with Family:  With patient    Discharge instructions/Information to patient and family:   See after visit summary for information provided to patient and family  Provisions for Follow-Up Care:  See after visit summary for information related to follow-up care and any pertinent home health orders  Disposition:     Home    For Discharges to [facility] SNF:   · Not Applicable to this Patient - Not Applicable to this Patient    Planned Readmission:  No     Discharge Statement:  I spent 35 minutes discharging the patient  This time was spent on the day of discharge  I had direct contact with the patient on the day of discharge  Greater than 50% of the total time was spent examining patient, answering all patient questions, arranging and discussing plan of care with patient as well as directly providing post-discharge instructions  Additional time then spent on discharge activities  Discharge Medications:  See after visit summary for reconciled discharge medications provided to patient and family        ** Please Note: This note has been constructed using a voice recognition system **

## 2021-09-02 NOTE — DISCHARGE INSTR - AVS FIRST PAGE
Dear Lili Collazo,     It was our pleasure to care for you here at [facility]  It is our hope that we were always able to exceed the expected standards for your care during your stay  You were hospitalized due to ureteral stone  You were cared for on the medical floor by Anika Sylvester MD with the [address] [place] [address] [facility] who covers for your primary care physician (PCP), Corrie Julio DO, while you were hospitalized  If you have any questions or concerns related to this hospitalization, you may contact us at [phone]  For follow up as well as any medication refills, we recommend that you follow up with your primary care physician  A registered nurse will reach out to you by phone within a few days after your discharge to answer any additional questions that you may have after going home  However, at this time we provide for you here, the most important instructions / recommendations at discharge:     · Notable Medication Adjustments -   · Take oxycodone as needed  · Testing Required after Discharge -   · None  · Important follow up information -   · Follow-up with urology  · Follow-up with PCP  · Other Instructions -   · ***  · Please review this entire after visit summary as additional general instructions including medication list, appointments, activity, diet, any pertinent wound care, and other additional recommendations from your care team that may be provided for you        Sincerely,     Anika Sylvester MD

## 2021-09-03 ENCOUNTER — TRANSITIONAL CARE MANAGEMENT (OUTPATIENT)
Dept: FAMILY MEDICINE CLINIC | Facility: CLINIC | Age: 61
End: 2021-09-03

## 2021-09-03 NOTE — TELEPHONE ENCOUNTER
Call placed to patient and spoke with his wife  Pt is scheduled on 9-7-2021 at our [facility] to have stent with string removed at 1:30pm  Wife confirmed this appointment

## 2021-09-07 ENCOUNTER — PROCEDURE VISIT (OUTPATIENT)
Dept: UROLOGY | Facility: CLINIC | Age: 61
End: 2021-09-07
Payer: COMMERCIAL

## 2021-09-07 VITALS
WEIGHT: 199 LBS | DIASTOLIC BLOOD PRESSURE: 100 MMHG | BODY MASS INDEX: 31.23 KG/M2 | HEIGHT: 67 IN | SYSTOLIC BLOOD PRESSURE: 160 MMHG | RESPIRATION RATE: 20 BRPM | HEART RATE: 84 BPM

## 2021-09-07 DIAGNOSIS — Z46.6 ENCOUNTER FOR REMOVAL OF URETERAL STENT: Primary | ICD-10-CM

## 2021-09-07 PROCEDURE — 52310 CYSTOSCOPY AND TREATMENT: CPT | Performed by: PHYSICIAN ASSISTANT

## 2021-09-07 RX ORDER — CEFUROXIME AXETIL 250 MG/1
250 TABLET ORAL EVERY 12 HOURS SCHEDULED
Qty: 6 TABLET | Refills: 0 | Status: SHIPPED | OUTPATIENT
Start: 2021-09-07 | End: 2021-09-10

## 2021-09-07 NOTE — PROGRESS NOTES
9/7/2021      Chief Complaint   Patient presents with    Nephrolithiasis    Right stent removal         Assessment and Plan    57 y o  male managed by Dr Joy Kan    1  Ureterolithiasis  - s/p urs/basket retrieval/rpg/stent 9/1/21  - cysto stent removal today    The patient returns to the office today to undergo cystoscopy with RIGHT ureteral stent removal  Risk and benefits of the procedure were discussed and informed consent was obtained  The patient was placed in the modified supine position  The genitalia were prepped and draped in a sterile fashion  Viscous lidocaine was used for local anesthesia  The flexible cystoscope was passed  The bladder was inspected  The stent was identified within the mid urethra  The stent was grasped with a flexible grasper and was then removed in its entirety without complications  Overall the patient tolerated the procedure  The patient was provided with a 3 day prescription of  ceftin for infection prophylaxis following the procedure  They were made aware to advise our office of any fevers greater than 101 degrees Fahrenheit, malaise, or chills  They were advised that it is normal to have cramping pain on the ipsilateral side for a day or so after ureteral stent removal   They are to remain well hydrated in the coming days  Return 3 months kub/us  History of Present Illness  Destiney An is a 57 y o  male here for evaluation of   Postop cysto stent removal   Patient underwent right ureteroscopy basket stone extraction retrograde pyelogram ureteral stent insertion on 09/01/2021 with Dr Joy Kan   Ureteral stone was retrieved  Presented today for stent removal on string  The string detached from the stent  Decision was made to proceed with cystoscopy here in the office for retrieval of the remaining stent    It is suspected to be within the urethra as he is incontinent following partial removal         Review of Systems   Constitutional: Negative for activity change, appetite change, chills, fever and unexpected weight change  HENT: Negative  Respiratory: Negative  Negative for shortness of breath  Cardiovascular: Negative  Negative for chest pain  Gastrointestinal: Negative for abdominal pain, diarrhea, nausea and vomiting  Endocrine: Negative  Genitourinary: Negative for decreased urine volume, difficulty urinating, dysuria, flank pain, frequency, hematuria, penile pain and urgency  Musculoskeletal: Negative for back pain and gait problem  Skin: Negative  Allergic/Immunologic: Negative  Neurological: Negative  Hematological: Negative for adenopathy  Does not bruise/bleed easily  Vitals  Vitals:    09/07/21 1344   BP: 160/100   Pulse: 84   Resp: 20   Weight: 90 3 kg (199 lb)   Height: 5' 7" (1 702 m)       Physical Exam  Vitals and nursing note reviewed  Constitutional:       General: He is not in acute distress  Appearance: Normal appearance  He is well-developed  He is not diaphoretic  HENT:      Head: Normocephalic and atraumatic  Pulmonary:      Effort: Pulmonary effort is normal       Comments: No cough or audible wheeze  Abdominal:      General: There is no distension  Tenderness: There is no abdominal tenderness  There is no right CVA tenderness or left CVA tenderness  Genitourinary:     Comments: Circumcised penis, normal phallus, orthotopic patent meatus  Testes smooth descended bilaterally into the scrotum nontender with no palpable mass  Musculoskeletal:      Right lower leg: No edema  Left lower leg: No edema  Skin:     General: Skin is warm and dry  Neurological:      Mental Status: He is alert and oriented to person, place, and time        Gait: Gait normal    Psychiatric:         Speech: Speech normal          Behavior: Behavior normal            Past History  Past Medical History:   Diagnosis Date    [facility]     "no heart damage" approx 8 yrs ago did get one stent"    Arthralgia     last assessed 7/8/13    Arthritis     Colon polyp     Contusion of skin with intact surface     of the left medial thigh,last assessed 6/28/13    Coronary artery disease     Gout     last assessed 10/29/13    History of sepsis     urinary and was in hosp 3 days /7/2020    Hypertension     Kidney stone     Lump of skin     last assessed 7/19/13//"fatty tumor and surg removed"    S/P coronary artery stent placement     x1    Wears glasses     at night     Social History     Socioeconomic History    Marital status: /Civil Union     Spouse name: None    Number of children: None    Years of education: None    Highest education level: None   Occupational History    None   Tobacco Use    Smoking status: Never Smoker    Smokeless tobacco: Never Used   Vaping Use    Vaping Use: Never used   Substance and Sexual Activity    Alcohol use: Not Currently    Drug use: No    Sexual activity: None   Other Topics Concern    None   Social History Narrative    None     Social Determinants of Health     Financial Resource Strain:     Difficulty of Paying Living Expenses:    Food Insecurity:     Worried About Running Out of Food in the Last Year:     Ran Out of Food in the Last Year:    Transportation Needs:     Lack of Transportation (Medical):      Lack of Transportation (Non-Medical):    Physical Activity:     Days of Exercise per Week:     Minutes of Exercise per Session:    Stress:     Feeling of Stress :    Social Connections:     Frequency of Communication with Friends and Family:     Frequency of Social Gatherings with Friends and Family:     Attends Che[employer] Services:     Active Member of Clubs or Organizations:     Attends Club or Organization Meetings:     Marital Status:    Intimate Partner Violence:     Fear of Current or Ex-Partner:     Emotionally Abused:     Physically Abused:     Sexually Abused:      Social History     Tobacco Use Smoking Status Never Smoker   Smokeless Tobacco Never Used     Family History   Problem Relation Age of Onset    Edema Mother         Cerebral    Aneurysm Father         of the cerebellar artery    Aneurysm Brother         of the cerebellar artery       The following portions of the patient's history were reviewed and updated as appropriate: allergies, current medications, past medical history, past social history, past surgical history and problem list     Results  No results found for this or any previous visit (from the past 1 hour(s)) ]  Lab Results   Component Value Date    PSA 0 3 11/11/2020    PSA 0 6 08/05/2019    PSA 0 3 06/22/2018    PSA 0 6 06/22/2018     Lab Results   Component Value Date    CALCIUM 8 3 09/02/2021     06/22/2018    K 3 9 09/02/2021    CO2 24 09/02/2021     (H) 09/02/2021    BUN 11 09/02/2021    CREATININE 0 82 09/02/2021     Lab Results   Component Value Date    WBC 7 63 09/02/2021    HGB 12 8 09/02/2021    HCT 36 6 09/02/2021    MCV 89 09/02/2021     09/02/2021

## 2021-09-09 LAB
CALCIUM OXALATE DIHYDRATE MFR STONE IR: 20 %
COLOR STONE: NORMAL
COM MFR STONE: 80 %
COMMENT-STONE3: NORMAL
COMPOSITION: NORMAL
LABORATORY COMMENT REPORT: NORMAL
PHOTO: NORMAL
SIZE STONE: NORMAL MM
SPEC SOURCE SUBJ: NORMAL
STONE ANALYSIS-IMP: NORMAL
WT STONE: 35 MG

## 2021-09-15 DIAGNOSIS — N23 RENAL COLIC ON RIGHT SIDE: ICD-10-CM

## 2021-09-15 RX ORDER — TAMSULOSIN HYDROCHLORIDE 0.4 MG/1
CAPSULE ORAL
Qty: 30 CAPSULE | Refills: 0 | Status: SHIPPED | OUTPATIENT
Start: 2021-09-15 | End: 2021-10-13 | Stop reason: SDUPTHER

## 2021-09-29 ENCOUNTER — RA CDI HCC (OUTPATIENT)
Dept: OTHER | Facility: HOSPITAL | Age: 61
End: 2021-09-29

## 2021-10-06 ENCOUNTER — OFFICE VISIT (OUTPATIENT)
Dept: FAMILY MEDICINE CLINIC | Facility: CLINIC | Age: 61
End: 2021-10-06
Payer: COMMERCIAL

## 2021-10-06 VITALS
SYSTOLIC BLOOD PRESSURE: 136 MMHG | HEIGHT: 67 IN | OXYGEN SATURATION: 98 % | TEMPERATURE: 96.9 F | BODY MASS INDEX: 30.7 KG/M2 | HEART RATE: 94 BPM | WEIGHT: 195.6 LBS | RESPIRATION RATE: 16 BRPM | DIASTOLIC BLOOD PRESSURE: 82 MMHG

## 2021-10-06 DIAGNOSIS — I25.10 CORONARY ARTERY DISEASE INVOLVING NATIVE CORONARY ARTERY OF NATIVE HEART WITHOUT ANGINA PECTORIS: ICD-10-CM

## 2021-10-06 DIAGNOSIS — I10 ESSENTIAL HYPERTENSION: Primary | ICD-10-CM

## 2021-10-06 DIAGNOSIS — M10.40 OTHER SECONDARY GOUT, UNSPECIFIED CHRONICITY, UNSPECIFIED SITE: ICD-10-CM

## 2021-10-06 DIAGNOSIS — E66.9 OBESITY (BMI 30-39.9): ICD-10-CM

## 2021-10-06 DIAGNOSIS — Z23 FLU VACCINE NEED: ICD-10-CM

## 2021-10-06 DIAGNOSIS — E78.2 MIXED HYPERLIPIDEMIA: ICD-10-CM

## 2021-10-06 DIAGNOSIS — M10.9 GOUT, UNSPECIFIED CAUSE, UNSPECIFIED CHRONICITY, UNSPECIFIED SITE: ICD-10-CM

## 2021-10-06 PROCEDURE — 90471 IMMUNIZATION ADMIN: CPT

## 2021-10-06 PROCEDURE — 99214 OFFICE O/P EST MOD 30 MIN: CPT | Performed by: FAMILY MEDICINE

## 2021-10-06 PROCEDURE — 1036F TOBACCO NON-USER: CPT | Performed by: FAMILY MEDICINE

## 2021-10-06 PROCEDURE — 3008F BODY MASS INDEX DOCD: CPT | Performed by: FAMILY MEDICINE

## 2021-10-06 PROCEDURE — 90682 RIV4 VACC RECOMBINANT DNA IM: CPT

## 2021-10-06 RX ORDER — ATORVASTATIN CALCIUM 20 MG/1
20 TABLET, FILM COATED ORAL DAILY
Qty: 30 TABLET | Refills: 11 | Status: SHIPPED | OUTPATIENT
Start: 2021-10-06

## 2021-10-06 RX ORDER — ALLOPURINOL 100 MG/1
100 TABLET ORAL DAILY
Qty: 30 TABLET | Refills: 5 | Status: SHIPPED | OUTPATIENT
Start: 2021-10-06 | End: 2022-04-05

## 2021-10-13 DIAGNOSIS — N23 RENAL COLIC ON RIGHT SIDE: ICD-10-CM

## 2021-10-13 RX ORDER — TAMSULOSIN HYDROCHLORIDE 0.4 MG/1
CAPSULE ORAL
Qty: 30 CAPSULE | Refills: 0 | Status: SHIPPED | OUTPATIENT
Start: 2021-10-13 | End: 2022-03-15

## 2021-11-16 ENCOUNTER — HOSPITAL ENCOUNTER (OUTPATIENT)
Dept: RADIOLOGY | Facility: IMAGING CENTER | Age: 61
Discharge: HOME | End: 2021-11-16
Payer: COMMERCIAL

## 2021-11-16 DIAGNOSIS — N20.1 RIGHT URETERAL STONE: ICD-10-CM

## 2021-11-16 PROCEDURE — 76770 US EXAM ABDO BACK WALL COMP: CPT

## 2021-11-16 PROCEDURE — 74018 RADEX ABDOMEN 1 VIEW: CPT

## 2021-12-09 ENCOUNTER — OFFICE VISIT (OUTPATIENT)
Dept: UROLOGY | Facility: CLINIC | Age: 61
End: 2021-12-09
Payer: COMMERCIAL

## 2021-12-09 VITALS
HEART RATE: 64 BPM | SYSTOLIC BLOOD PRESSURE: 146 MMHG | WEIGHT: 200 LBS | BODY MASS INDEX: 31.32 KG/M2 | DIASTOLIC BLOOD PRESSURE: 90 MMHG

## 2021-12-09 DIAGNOSIS — N20.0 NEPHROLITHIASIS: Primary | ICD-10-CM

## 2021-12-09 PROCEDURE — 99214 OFFICE O/P EST MOD 30 MIN: CPT | Performed by: PHYSICIAN ASSISTANT

## 2021-12-09 PROCEDURE — 1036F TOBACCO NON-USER: CPT | Performed by: PHYSICIAN ASSISTANT

## 2021-12-09 RX ORDER — GABAPENTIN 300 MG/1
CAPSULE ORAL
COMMUNITY
Start: 2021-12-07 | End: 2022-03-15

## 2021-12-21 ENCOUNTER — OFFICE VISIT (OUTPATIENT)
Dept: URGENT CARE | Age: 61
End: 2021-12-21
Payer: COMMERCIAL

## 2021-12-21 VITALS
TEMPERATURE: 97.8 F | HEART RATE: 93 BPM | OXYGEN SATURATION: 97 % | RESPIRATION RATE: 18 BRPM | DIASTOLIC BLOOD PRESSURE: 82 MMHG | SYSTOLIC BLOOD PRESSURE: 140 MMHG

## 2021-12-21 DIAGNOSIS — J01.90 ACUTE SINUSITIS, RECURRENCE NOT SPECIFIED, UNSPECIFIED LOCATION: Primary | ICD-10-CM

## 2021-12-21 DIAGNOSIS — J40 BRONCHITIS: ICD-10-CM

## 2021-12-21 PROCEDURE — 99213 OFFICE O/P EST LOW 20 MIN: CPT | Performed by: PHYSICIAN ASSISTANT

## 2021-12-21 RX ORDER — BENZONATATE 100 MG/1
100 CAPSULE ORAL 3 TIMES DAILY PRN
Qty: 20 CAPSULE | Refills: 0 | Status: SHIPPED | OUTPATIENT
Start: 2021-12-21 | End: 2022-03-15

## 2021-12-21 RX ORDER — AZITHROMYCIN 250 MG/1
TABLET, FILM COATED ORAL
Qty: 6 TABLET | Refills: 0 | Status: SHIPPED | OUTPATIENT
Start: 2021-12-21 | End: 2021-12-25

## 2022-03-02 ENCOUNTER — EVALUATION (OUTPATIENT)
Dept: PHYSICAL THERAPY | Facility: REHABILITATION | Age: 62
End: 2022-03-02
Payer: COMMERCIAL

## 2022-03-02 DIAGNOSIS — M54.16 LUMBAR RADICULOPATHY: Primary | ICD-10-CM

## 2022-03-02 PROCEDURE — 97162 PT EVAL MOD COMPLEX 30 MIN: CPT | Performed by: PHYSICAL THERAPIST

## 2022-03-02 PROCEDURE — 97012 MECHANICAL TRACTION THERAPY: CPT | Performed by: PHYSICAL THERAPIST
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
## 2022-03-04 ENCOUNTER — OFFICE VISIT (OUTPATIENT)
Dept: PHYSICAL THERAPY | Facility: REHABILITATION | Age: 62
End: 2022-03-04
Payer: COMMERCIAL

## 2022-03-04 DIAGNOSIS — M54.16 LUMBAR RADICULOPATHY: Primary | ICD-10-CM

## 2022-03-04 PROCEDURE — 97110 THERAPEUTIC EXERCISES: CPT

## 2022-03-04 PROCEDURE — 97012 MECHANICAL TRACTION THERAPY: CPT

## 2022-03-04 NOTE — PROGRESS NOTES
Daily Note     Today's date: 3/4/2022  Patient name: Diaz Drake  : 1960  MRN: [id number]  Referring provider: Sy Bell MD  Dx:   Encounter Diagnosis     ICD-10-CM    1  Lumbar radiculopathy  M54 16                   Subjective: pt reports relief following IE for a few days  He noted compliance with HEP and expressed no concerns  Pt currently reports increased pain in L LB radiating into posterior hip through knee  He denied change in activity within the past day  Objective: See treatment diary below      Assessment: Tolerated treatment well and without complaints  Good response with addition of exercises as well as good recall with home exercises  Decreased pain post mechanical traction  Patient demonstrated fatigue post treatment, exhibited good technique with therapeutic exercises and would benefit from continued PT      Plan: Continue per plan of care  Progress treatment as tolerated         Precautions: HTN     Daily Treatment Diary      Assessment  3/2  3/4                   Eval/Reval                       FOTO         **         **   Manuals                                                     Exercise Diary     bike    5 min                    abdom cristo 5"x10  5"x10                    abdom cristo w/ball    5"x10                    supine marching    5"x10                                                                                                                                            seated flex 5"x10  5"x10                                                                                                                                                                   Modalities    mech tx 80# supine static 10 min  10 min

## 2022-03-08 ENCOUNTER — OFFICE VISIT (OUTPATIENT)
Dept: PHYSICAL THERAPY | Facility: REHABILITATION | Age: 62
End: 2022-03-08
Payer: COMMERCIAL

## 2022-03-08 ENCOUNTER — TELEPHONE (OUTPATIENT)
Dept: FAMILY MEDICINE CLINIC | Facility: CLINIC | Age: 62
End: 2022-03-08

## 2022-03-08 DIAGNOSIS — M54.16 LUMBAR RADICULOPATHY: Primary | ICD-10-CM

## 2022-03-08 PROCEDURE — 97012 MECHANICAL TRACTION THERAPY: CPT | Performed by: PHYSICAL THERAPIST

## 2022-03-08 PROCEDURE — 97110 THERAPEUTIC EXERCISES: CPT | Performed by: PHYSICAL THERAPIST

## 2022-03-08 NOTE — PROGRESS NOTES
Daily Note     Today's date: 3/8/2022  Patient name: Maude Dandy  : 1960  MRN: [id number]  Referring provider: Dilan Gonzalez MD  Dx:   Encounter Diagnosis     ICD-10-CM    1  Lumbar radiculopathy  M54 16                   Subjective: Pt reports that he has good and bad days  He has been doing a lot at home and is overall more painful  It radiates down to mid calf and infrequently into the toes  Walking is still very painful  Has one more week of cleaning out his mother's home  Objective: See treatment diary below      Assessment: Tolerated treatment well and without complaints  Pt gets stiff lying to do the exercises but is better afterward  Patient demonstrated fatigue post treatment, exhibited good technique with therapeutic exercises and would benefit from continued PT      Plan: Continue per plan of care  Progress treatment as tolerated         Precautions: HTN     Daily Treatment Diary      Assessment  3/2  3/4  3/8                 Eval/Reval                       FOTO         **         **   Manuals                                                     Exercise Diary     bike    5 min  6 min                  abdom cristo 5"x10  5"x10 10"x10                  abdom cristo w/ball    5"x10 10"x10                   supine marching    5"x10  5"x15                  wall sits     20"x5                  LTR                                                                                                seated flex 5"x10  5"x10  w/ball 5"x10                                                                                                                                                                 Modalities    mech tx 80# supine static 10 min  10 min  10 min 95#

## 2022-03-08 NOTE — TELEPHONE ENCOUNTER
I called Juli Foss he is aware that we received notification via my chart he scheduled a physical on 03/16/22 at 10:30am  I called pt to make him aware Diana Lopez does not have enough time to do a physical  Per verbal conversation with pt he just wants his 3-4 month check not a physical  I informed  pt Dr Nayak schedule allows for that

## 2022-03-10 ENCOUNTER — RA CDI HCC (OUTPATIENT)
Dept: OTHER | Facility: HOSPITAL | Age: 62
End: 2022-03-10

## 2022-03-10 ENCOUNTER — TELEPHONE (OUTPATIENT)
Dept: CARDIOLOGY CLINIC | Facility: CLINIC | Age: 62
End: 2022-03-10

## 2022-03-10 NOTE — TELEPHONE ENCOUNTER
Tell patient we will set up appointment   TO CLERICAL -please set up patient for appointment next available  Latisha WASHINGTON

## 2022-03-10 NOTE — PROGRESS NOTES
Jose L [facility]  coding opportunities       Chart reviewed, no opportunity found: CHART REVIEWED, NO OPPORTUNITY FOUND                        Patients insurance company: [employer] (Medicare Advantage and Commercial)

## 2022-03-10 NOTE — TELEPHONE ENCOUNTER
Pt calling last seen 12/2020 at that time was ordered stress test I one year   the patient has had back issues and unable to do stress would like OV to see you and advisement since lov like 1 5 years now  Please advise

## 2022-03-11 ENCOUNTER — OFFICE VISIT (OUTPATIENT)
Dept: PHYSICAL THERAPY | Facility: REHABILITATION | Age: 62
End: 2022-03-11
Payer: COMMERCIAL

## 2022-03-11 DIAGNOSIS — M54.16 LUMBAR RADICULOPATHY: Primary | ICD-10-CM

## 2022-03-11 PROCEDURE — 97112 NEUROMUSCULAR REEDUCATION: CPT

## 2022-03-11 PROCEDURE — 97012 MECHANICAL TRACTION THERAPY: CPT

## 2022-03-11 PROCEDURE — 97110 THERAPEUTIC EXERCISES: CPT

## 2022-03-11 NOTE — PROGRESS NOTES
Daily Note     Today's date: 3/11/2022  Patient name: Rachana Cuenca  : 1960  MRN: [id number]  Referring provider: Cecy Aldrich MD  Dx:   Encounter Diagnosis     ICD-10-CM    1  Lumbar radiculopathy  M54 16                   Subjective: Pt reports no real change in symptoms since LV  Notes R LB pain seems to be slight more intense lately  Still has pain that radiates down entire RLE  Objective: See treatment diary below      Assessment: Tolerated treatment well  Continued with program as outlined below, focusing on core stability and lumbar mobility  Was able to perform all exercise with no complaints of pain  Continues to have good response to mechanical traction  Patient would benefit from continued PT      Plan: Continue per plan of care        Precautions: HTN     Daily Treatment Diary      Assessment  3/2  3/4  3/8  3/11               Eval/Reval                       FOTO         **         **   Manuals                                                     Exercise Diary     bike    5 min  6 min  6 min                abdom cristo 5"x10  5"x10 10"x10  10"x10                abdom cristo w/ball    5"x10 10"x10   10"x10                supine marching    5"x10  5"x15 5"x15                 wall sits     20"x5  20"x5                LTR                                                                                                seated flex 5"x10  5"x10  w/ball 5"x10  w/ball 5"x10                                                                                                                                                               Modalities    mech tx 80# supine static 10 min  10 min  10 min 95#  10 min 95#

## 2022-03-15 ENCOUNTER — OFFICE VISIT (OUTPATIENT)
Dept: CARDIOLOGY CLINIC | Facility: CLINIC | Age: 62
End: 2022-03-15
Payer: COMMERCIAL

## 2022-03-15 ENCOUNTER — OFFICE VISIT (OUTPATIENT)
Dept: PHYSICAL THERAPY | Facility: REHABILITATION | Age: 62
End: 2022-03-15
Payer: COMMERCIAL

## 2022-03-15 VITALS
BODY MASS INDEX: 31.36 KG/M2 | HEIGHT: 67 IN | HEART RATE: 69 BPM | DIASTOLIC BLOOD PRESSURE: 90 MMHG | WEIGHT: 199.8 LBS | SYSTOLIC BLOOD PRESSURE: 132 MMHG

## 2022-03-15 DIAGNOSIS — I25.10 CORONARY ARTERY DISEASE INVOLVING NATIVE CORONARY ARTERY OF NATIVE HEART WITHOUT ANGINA PECTORIS: Primary | ICD-10-CM

## 2022-03-15 DIAGNOSIS — M54.16 LUMBAR RADICULOPATHY: Primary | ICD-10-CM

## 2022-03-15 PROCEDURE — 99214 OFFICE O/P EST MOD 30 MIN: CPT | Performed by: INTERNAL MEDICINE

## 2022-03-15 PROCEDURE — 97110 THERAPEUTIC EXERCISES: CPT | Performed by: PHYSICAL THERAPIST

## 2022-03-15 PROCEDURE — 97112 NEUROMUSCULAR REEDUCATION: CPT | Performed by: PHYSICAL THERAPIST

## 2022-03-15 PROCEDURE — 93000 ELECTROCARDIOGRAM COMPLETE: CPT | Performed by: INTERNAL MEDICINE

## 2022-03-15 PROCEDURE — 97012 MECHANICAL TRACTION THERAPY: CPT | Performed by: PHYSICAL THERAPIST

## 2022-03-15 NOTE — PROGRESS NOTES
Daily Note     Today's date: 3/15/2022  Patient name: Jeni Lanza  : 1960  MRN: [id number]  Referring provider: Sigrid Osler, MD  Dx:   Encounter Diagnosis     ICD-10-CM    1  Lumbar radiculopathy  M54 16                   Subjective: Pt notes that he felt great when he left PT Friday and it lasted into the next day  Pt did a lot of work this weekend including lifting and bending  Today he is painful over the right buttock but at times over the weekend it radiated to the foot  Objective: See treatment diary below      Assessment: Tolerated treatment well  Pt has some mild discomfort with progression of marching to leg drops and with lowering from bridge  Trunk rotation knees to the right abolished pain  Pt instructed to do this at home  Patient would benefit from continued PT      Plan: Continue per plan of care        Precautions: HTN     Daily Treatment Diary      Assessment  3/2  3/4  3/8  3/11  3/14             Eval/Reval                       FOTO         performed         **   Manuals    Manual flex rotation to the right          x2 60 sec                                       Exercise Diary     bike    5 min  6 min  6 min  6 min              abdom cristo 5"x10  5"x10 10"x10  10"x10 10"x10              abdom cristo w/ball    5"x10 10"x10   10"x10 10"x10              supine marching    5"x10  5"x15 5"x15   bent leg drop x5              wall sits     20"x5  20"x5  20"x5              LTR                        bridge          5"x10                                                              seated flex 5"x10  5"x10  w/ball 5"x10  w/ball 5"x10  w/ball 5"x10                                                                                                                                                             Modalities    mech tx 80# supine static 10 min  10 min  10 min 95#  10 min 95#  15 min 95#

## 2022-03-15 NOTE — PROGRESS NOTES
Cardiology Follow Up    Real Brunner Schlaffer  1960  [id number]  [address] [facility] [id number] [phone]    Reason for visit:  Follow-up for CAD status post nontransmural myocardial infarction in August 2012 with stenting of the left anterior descending artery at that time  Also has hypertension, hyperlipidemia and PVCs  1  Coronary artery disease involving native coronary artery of native heart without angina pectoris  POCT ECG       Interval History:  He was unable to perform a stress echo due to sciatica problems  We decided to schedule a visit to discuss the options of stress testing  He denies chest discomfort  He is not that active and has not had dyspnea  He denies lightheadedness  He denies palpitations or lower extremity edema    Patient Active Problem List   Diagnosis    Gout    Essential hypertension    Hyperlipidemia    Coronary artery disease involving native coronary artery of native heart without angina pectoris    PVC's (premature ventricular contractions)    Nephrolithiasis    Acute kidney injury (nontraumatic) (HCC)    Transaminitis    Pyelonephritis    Ureteral stone    S/P coronary artery stent placement    Obesity (BMI 30-39  9)     Past Medical History:   Diagnosis Date    Acute myocardial infarction ([address] )     "no heart damage" approx 8 yrs ago did get one stent"    Arthralgia     last assessed 7/8/13    Arthritis     Colon polyp     Contusion of skin with intact surface     of the left medial thigh,last assessed 6/28/13    Coronary artery disease     Gout     last assessed 10/29/13    History of sepsis     urinary and was in hosp 3 days /7/2020    Hypertension     Kidney stone     Lump of skin     last assessed 7/19/13//"fatty tumor and surg removed"    S/P coronary artery stent placement     x1    Wears glasses     at night     Social History Socioeconomic History    Marital status: /Civil Union     Spouse name: Not on file    Number of children: Not on file    Years of education: Not on file    Highest education level: Not on file   Occupational History    Not on file   Tobacco Use    Smoking status: Never Smoker    Smokeless tobacco: Never Used   Vaping Use    Vaping Use: Never used   Substance and Sexual Activity    Alcohol use:  Yes     Alcohol/week: 1 0 standard drink     Types: 1 Cans of beer per week    Drug use: No    Sexual activity: Not on file   Other Topics Concern    Not on file   Social History Narrative    Not on file     Social Determinants of Health     Financial Resource Strain: Not on file   Food Insecurity: Not on file   Transportation Needs: Not on file   Physical Activity: Not on file   Stress: Not on file   Social Connections: Not on file   Intimate Partner Violence: Not on file   Housing Stability: Not on file      Family History   Problem Relation Age of Onset    Edema Mother         Cerebral    Aneurysm Father         of the cerebellar artery    Aneurysm Brother         of the cerebellar artery     Past Surgical History:   Procedure Laterality Date    CARDIAC CATHETERIZATION      COLONOSCOPY  12/10/2009    Complete//2020    CORONARY STENT PLACEMENT  08/2012    stenting of the LAD artery 95% lesion to 0% residual stenosis    FL RETROGRADE PYELOGRAM  9/1/2021    KNEE SURGERY Left     x4    LUMBAR EPIDURAL INJECTION      x1    NV CYSTO/URETERO W/LITHOTRIPSY &INDWELL STENT INSRT Left 8/5/2020    Procedure: CYSTO, URETEROSCOPY STENT exchange;  Surgeon: Robbi Lenz MD;  Location: [facility];  Service: Urology    NV CYSTO/URETERO W/LITHOTRIPSY &INDWELL STENT INSRT Right 9/1/2021    Procedure: CYSTOSCOPY URETEROSCOPY WITH BASKET STONE EXTRACTION, RETROGRADE PYELOGRAM AND INSERTION STENT URETERAL;  Surgeon: Marques James MD;  [facility];  Service: Urology    NV Shannon Reid CATHETER Left 7/4/2020    Procedure: CYSTOSCOPY WITH INSERTION STENT URETERAL;  Surgeon: Anuel Abbasi MD;  Location: [facility];  Service: Urology       Current Outpatient Medications:     allopurinol (ZYLOPRIM) 100 mg tablet, Take 1 tablet (100 mg total) by mouth daily, Disp: 30 tablet, Rfl: 5    aspirin (ASPIR-81) 81 mg EC tablet, Take 1 tablet by mouth daily, Disp: , Rfl:     atorvastatin (LIPITOR) 20 mg tablet, Take 1 tablet (20 mg total) by mouth daily, Disp: 30 tablet, Rfl: 11    metoprolol tartrate (LOPRESSOR) 25 mg tablet, Take 1 tablet (25 mg total) by mouth daily, Disp: 30 tablet, Rfl: 5  No Known Allergies    Review of Systems:  Review of Systems   Constitutional: Negative for activity change, appetite change, fatigue and unexpected weight change  Respiratory: Negative for cough, chest tightness, shortness of breath and wheezing  Cardiovascular: Negative for chest pain, palpitations and leg swelling  Gastrointestinal: Negative for abdominal pain, blood in stool, constipation and diarrhea  Genitourinary: Negative for dysuria, frequency, hematuria and urgency  Musculoskeletal: Positive for arthralgias and back pain  Negative for gait problem and joint swelling  Neurological: Positive for numbness  Negative for dizziness, speech difficulty, light-headedness and headaches  Psychiatric/Behavioral: Negative for agitation, behavioral problems, confusion and decreased concentration  Physical Exam:   Vitals:    03/15/22 1558   BP: 132/90   Pulse: 69   Weight: 90 6 kg (199 lb 12 8 oz)   Height: 5' 7" (1 702 m)       Physical Exam  Constitutional:       General: He is not in acute distress  Appearance: He is obese  He is not ill-appearing  HENT:      Head: Normocephalic and atraumatic  Mouth/Throat:      Mouth: Mucous membranes are moist       Pharynx: No oropharyngeal exudate or posterior oropharyngeal erythema  Eyes:      General: No scleral icterus       Conjunctiva/sclera: Conjunctivae normal    Neck:      Thyroid: No thyroid mass or thyromegaly  Vascular: Normal carotid pulses  No carotid bruit or JVD  Cardiovascular:      Rate and Rhythm: Normal rate  Occasional extrasystoles are present  Pulses: Normal pulses  Heart sounds: No murmur heard  No friction rub  No gallop  Pulmonary:      Breath sounds: Normal breath sounds  No wheezing, rhonchi or rales  Abdominal:      Palpations: Abdomen is soft  There is no hepatomegaly, splenomegaly or mass  Tenderness: There is no abdominal tenderness  Musculoskeletal:         General: No swelling or deformity  Cervical back: Neck supple  Skin:     General: Skin is warm  Coloration: Skin is not jaundiced or pale  Findings: No bruising, erythema, lesion or rash  Neurological:      General: No focal deficit present  Mental Status: He is oriented to person, place, and time  Cranial Nerves: No cranial nerve deficit  Sensory: No sensory deficit  Motor: No weakness  Psychiatric:         Mood and Affect: Mood normal          Behavior: Behavior normal          Thought Content: Thought content normal          Judgment: Judgment normal          Discussion/Summary:  1  CAD status post remote stent after nontransmural myocardial infarction in 2012  Patient on low-dose aspirin and metoprolol 25 mg daily  Having no angina  In light of not having stress test for over 3 years will order Lexiscan Myoview stress test at this time  He cannot walk on a treadmill to a good level due to his back pain  2  Hypertension  Borderline today on metoprolol 25 mg daily  Will see numbers when he comes in for stress test  3  PVCs  Fairly infrequent today  [place] felt to be borderline on previous visits  Echo done in late 2020 showed no structural heart disease  4  Hyperlipidemia  Patient on atorvastatin  Last LDL cholesterol from a little over year ago 76      Follow-up by phone regarding Ada Dorado Jose Chaudhary MD

## 2022-03-16 ENCOUNTER — OFFICE VISIT (OUTPATIENT)
Dept: FAMILY MEDICINE CLINIC | Facility: CLINIC | Age: 62
End: 2022-03-16
Payer: COMMERCIAL

## 2022-03-16 VITALS
SYSTOLIC BLOOD PRESSURE: 140 MMHG | DIASTOLIC BLOOD PRESSURE: 82 MMHG | HEART RATE: 72 BPM | WEIGHT: 200.6 LBS | RESPIRATION RATE: 16 BRPM | HEIGHT: 67 IN | OXYGEN SATURATION: 98 % | TEMPERATURE: 97.6 F | BODY MASS INDEX: 31.48 KG/M2

## 2022-03-16 DIAGNOSIS — E66.9 OBESITY (BMI 30-39.9): ICD-10-CM

## 2022-03-16 DIAGNOSIS — Z12.5 SCREENING FOR PROSTATE CANCER: ICD-10-CM

## 2022-03-16 DIAGNOSIS — E78.2 MIXED HYPERLIPIDEMIA: ICD-10-CM

## 2022-03-16 DIAGNOSIS — I10 ESSENTIAL HYPERTENSION: Primary | ICD-10-CM

## 2022-03-16 DIAGNOSIS — Z00.00 HEALTH CARE MAINTENANCE: ICD-10-CM

## 2022-03-16 DIAGNOSIS — M10.9 GOUT, UNSPECIFIED CAUSE, UNSPECIFIED CHRONICITY, UNSPECIFIED SITE: ICD-10-CM

## 2022-03-16 DIAGNOSIS — I25.10 CORONARY ARTERY DISEASE INVOLVING NATIVE CORONARY ARTERY OF NATIVE HEART WITHOUT ANGINA PECTORIS: ICD-10-CM

## 2022-03-16 DIAGNOSIS — M10.40 OTHER SECONDARY GOUT, UNSPECIFIED CHRONICITY, UNSPECIFIED SITE: ICD-10-CM

## 2022-03-16 PROCEDURE — 1036F TOBACCO NON-USER: CPT | Performed by: FAMILY MEDICINE

## 2022-03-16 PROCEDURE — 3008F BODY MASS INDEX DOCD: CPT | Performed by: FAMILY MEDICINE

## 2022-03-16 PROCEDURE — 99214 OFFICE O/P EST MOD 30 MIN: CPT | Performed by: FAMILY MEDICINE

## 2022-03-16 NOTE — PROGRESS NOTES
Assessment/Plan:  Chief Complaint   Patient presents with    Follow-up     3-4 month follow up      Patient Instructions   Here for recheck and has hx of CAD and saw Cardiology yesterday and waiting for chemical stress test and here for gout and also HTN  Takes all meds as directed  Continue with low cholesterol diet  Call if any problems  COVID vaccine UTD - Pfizer times 3  Lose weight as directed  Lose weight  Take all meds as directed  Chronic back pain, f-up with orthopedics as directed             No problem-specific Assessment & Plan notes found for this encounter  Diagnoses and all orders for this visit:    Essential hypertension  -     Comprehensive metabolic panel; Future    Coronary artery disease involving native coronary artery of native heart without angina pectoris  -     Comprehensive metabolic panel; Future  -     CBC and differential; Future    Obesity (BMI 30-39 9)  -     Comprehensive metabolic panel; Future  -     Lipid Panel with Direct LDL reflex; Future    Other secondary gout, unspecified chronicity, unspecified site    Mixed hyperlipidemia    Gout, unspecified cause, unspecified chronicity, unspecified site  -     Uric acid; Future    Screening for prostate cancer  -     PSA, Total Screen; Future    Health care maintenance  -     Comprehensive metabolic panel; Future  -     CBC and differential; Future  -     Lipid Panel with Direct LDL reflex; Future  -     Uric acid; Future  -     PSA, Total Screen; Future          Subjective:      Patient ID: Vadim Rangel is a 57 y o  male  Here for recheck and has back pain and seeing specialists  Therapy at end of month then consider MRI low back  Takes BP and cholesterol med  Hypertension  This is a recurrent problem  The current episode started more than 1 year ago  The problem is unchanged  The problem is controlled   Pertinent negatives include no anxiety, blurred vision, chest pain, headaches, malaise/fatigue, neck pain, orthopnea, palpitations, peripheral edema, PND, shortness of breath or sweats  Risk factors for coronary artery disease include dyslipidemia and obesity  There are no compliance problems  The following portions of the patient's history were reviewed and updated as appropriate: allergies, current medications, past family history, past medical history, past social history, past surgical history and problem list     Review of Systems   Constitutional: Negative  Negative for malaise/fatigue  HENT: Negative  Eyes: Negative  Negative for blurred vision  Respiratory: Negative  Negative for shortness of breath  Cardiovascular: Negative  Negative for chest pain, palpitations, orthopnea and PND  Gastrointestinal: Negative  Endocrine: Negative  Genitourinary: Negative  Musculoskeletal: Negative  Negative for neck pain  Skin: Negative  Allergic/Immunologic: Negative  Neurological: Negative  Negative for headaches  Hematological: Negative  Psychiatric/Behavioral: Negative  Objective:      /82 (BP Location: Left arm, Patient Position: Sitting, Cuff Size: Adult)   Pulse 72   Temp 97 6 °F (36 4 °C) (Temporal)   Resp 16   Ht 5' 7" (1 702 m)   Wt 91 kg (200 lb 9 6 oz)   SpO2 98%   BMI 31 42 kg/m²          Physical Exam  Constitutional:       Appearance: He is well-developed  HENT:      Head: Normocephalic and atraumatic  Eyes:      Conjunctiva/sclera: Conjunctivae normal       Pupils: Pupils are equal, round, and reactive to light  Cardiovascular:      Rate and Rhythm: Normal rate and regular rhythm  Heart sounds: Normal heart sounds  Pulmonary:      Effort: Pulmonary effort is normal       Breath sounds: Normal breath sounds  Musculoskeletal:         General: Normal range of motion  Cervical back: Normal range of motion and neck supple  Skin:     General: Skin is warm and dry     Neurological:      Mental Status: He is alert and oriented to person, place, and time  Deep Tendon Reflexes: Reflexes are normal and symmetric     Psychiatric:         Behavior: Behavior normal

## 2022-03-16 NOTE — PROGRESS NOTES
BMI Counseling: Body mass index is 31 42 kg/m²  The BMI is above normal  Nutrition recommendations include reducing portion sizes, decreasing overall calorie intake, 3-5 servings of fruits/vegetables daily, reducing fast food intake, consuming healthier snacks and decreasing soda and/or juice intake  Exercise recommendations include exercising 3-5 times per week    Answers for HPI/ROS submitted by the patient on 3/9/2022  Chronicity: recurrent  Onset: more than 1 year ago  Progression since onset: unchanged  Condition status: controlled  anxiety: No  blurred vision: No  chest pain: No  headaches: No  malaise/fatigue: No  neck pain: No  orthopnea: No  palpitations: No  peripheral edema: No  PND: No  shortness of breath: No  sweats: No  CAD risks: dyslipidemia, obesity  Compliance problems: no compliance problems

## 2022-03-16 NOTE — PATIENT INSTRUCTIONS
Here for recheck and has hx of CAD and saw Cardiology yesterday and waiting for chemical stress test and here for gout and also HTN  Takes all meds as directed  Continue with low cholesterol diet  Call if any problems  COVID vaccine UTD - Pfizer times 3  Lose weight as directed  Lose weight   Take all meds as directed  Chronic back pain, f-up with orthopedics as directed

## 2022-03-17 ENCOUNTER — APPOINTMENT (OUTPATIENT)
Dept: LAB | Age: 62
End: 2022-03-17
Payer: COMMERCIAL

## 2022-03-17 DIAGNOSIS — Z12.5 SCREENING FOR PROSTATE CANCER: ICD-10-CM

## 2022-03-17 DIAGNOSIS — E66.9 OBESITY (BMI 30-39.9): ICD-10-CM

## 2022-03-17 DIAGNOSIS — M10.9 GOUT, UNSPECIFIED CAUSE, UNSPECIFIED CHRONICITY, UNSPECIFIED SITE: ICD-10-CM

## 2022-03-17 DIAGNOSIS — Z00.00 HEALTH CARE MAINTENANCE: ICD-10-CM

## 2022-03-17 DIAGNOSIS — I25.10 CORONARY ARTERY DISEASE INVOLVING NATIVE CORONARY ARTERY OF NATIVE HEART WITHOUT ANGINA PECTORIS: ICD-10-CM

## 2022-03-17 DIAGNOSIS — I10 ESSENTIAL HYPERTENSION: ICD-10-CM

## 2022-03-17 LAB
ALBUMIN SERPL BCP-MCNC: 4.2 G/DL (ref 3.5–5)
ALP SERPL-CCNC: 77 U/L (ref 46–116)
ALT SERPL W P-5'-P-CCNC: 24 U/L (ref 12–78)
ANION GAP SERPL CALCULATED.3IONS-SCNC: 5 MMOL/L (ref 4–13)
AST SERPL W P-5'-P-CCNC: 25 U/L (ref 5–45)
BASOPHILS # BLD AUTO: 0.07 THOUSANDS/ΜL (ref 0–0.1)
BASOPHILS NFR BLD AUTO: 2 % (ref 0–1)
BILIRUB SERPL-MCNC: 1.82 MG/DL (ref 0.2–1)
BUN SERPL-MCNC: 12 MG/DL (ref 5–25)
CALCIUM SERPL-MCNC: 9.2 MG/DL (ref 8.3–10.1)
CHLORIDE SERPL-SCNC: 107 MMOL/L (ref 100–108)
CHOLEST SERPL-MCNC: 122 MG/DL
CO2 SERPL-SCNC: 29 MMOL/L (ref 21–32)
CREAT SERPL-MCNC: 1.01 MG/DL (ref 0.6–1.3)
EOSINOPHIL # BLD AUTO: 0.14 THOUSAND/ΜL (ref 0–0.61)
EOSINOPHIL NFR BLD AUTO: 3 % (ref 0–6)
ERYTHROCYTE [DISTWIDTH] IN BLOOD BY AUTOMATED COUNT: 13.3 % (ref 11.6–15.1)
GFR SERPL CREATININE-BSD FRML MDRD: 79 ML/MIN/1.73SQ M
GLUCOSE P FAST SERPL-MCNC: 106 MG/DL (ref 65–99)
HCT VFR BLD AUTO: 44 % (ref 36.5–49.3)
HDLC SERPL-MCNC: 38 MG/DL
HGB BLD-MCNC: 15.4 G/DL (ref 12–17)
IMM GRANULOCYTES # BLD AUTO: 0.01 THOUSAND/UL (ref 0–0.2)
IMM GRANULOCYTES NFR BLD AUTO: 0 % (ref 0–2)
LDLC SERPL CALC-MCNC: 59 MG/DL (ref 0–100)
LYMPHOCYTES # BLD AUTO: 1.12 THOUSANDS/ΜL (ref 0.6–4.47)
LYMPHOCYTES NFR BLD AUTO: 25 % (ref 14–44)
MCH RBC QN AUTO: 31.5 PG (ref 26.8–34.3)
MCHC RBC AUTO-ENTMCNC: 35 G/DL (ref 31.4–37.4)
MCV RBC AUTO: 90 FL (ref 82–98)
MONOCYTES # BLD AUTO: 0.39 THOUSAND/ΜL (ref 0.17–1.22)
MONOCYTES NFR BLD AUTO: 9 % (ref 4–12)
NEUTROPHILS # BLD AUTO: 2.82 THOUSANDS/ΜL (ref 1.85–7.62)
NEUTS SEG NFR BLD AUTO: 61 % (ref 43–75)
NRBC BLD AUTO-RTO: 0 /100 WBCS
PLATELET # BLD AUTO: 193 THOUSANDS/UL (ref 149–390)
PMV BLD AUTO: 10 FL (ref 8.9–12.7)
POTASSIUM SERPL-SCNC: 4.4 MMOL/L (ref 3.5–5.3)
PROT SERPL-MCNC: 7.3 G/DL (ref 6.4–8.2)
PSA SERPL-MCNC: 0.7 NG/ML (ref 0–4)
RBC # BLD AUTO: 4.89 MILLION/UL (ref 3.88–5.62)
SODIUM SERPL-SCNC: 141 MMOL/L (ref 136–145)
TRIGL SERPL-MCNC: 123 MG/DL
URATE SERPL-MCNC: 5.3 MG/DL (ref 4.2–8)
WBC # BLD AUTO: 4.55 THOUSAND/UL (ref 4.31–10.16)

## 2022-03-17 PROCEDURE — 84550 ASSAY OF BLOOD/URIC ACID: CPT

## 2022-03-17 PROCEDURE — G0103 PSA SCREENING: HCPCS

## 2022-03-17 PROCEDURE — 80053 COMPREHEN METABOLIC PANEL: CPT

## 2022-03-17 PROCEDURE — 85025 COMPLETE CBC W/AUTO DIFF WBC: CPT

## 2022-03-17 PROCEDURE — 36415 COLL VENOUS BLD VENIPUNCTURE: CPT

## 2022-03-17 PROCEDURE — 80061 LIPID PANEL: CPT

## 2022-03-18 ENCOUNTER — OFFICE VISIT (OUTPATIENT)
Dept: PHYSICAL THERAPY | Facility: REHABILITATION | Age: 62
End: 2022-03-18
Payer: COMMERCIAL

## 2022-03-18 DIAGNOSIS — M54.16 LUMBAR RADICULOPATHY: Primary | ICD-10-CM

## 2022-03-18 PROCEDURE — 97110 THERAPEUTIC EXERCISES: CPT | Performed by: PHYSICAL THERAPIST

## 2022-03-18 PROCEDURE — 97112 NEUROMUSCULAR REEDUCATION: CPT | Performed by: PHYSICAL THERAPIST

## 2022-03-18 PROCEDURE — 97012 MECHANICAL TRACTION THERAPY: CPT | Performed by: PHYSICAL THERAPIST

## 2022-03-18 NOTE — PROGRESS NOTES
Daily Note     Today's date: 3/18/2022  Patient name: Nathan Valvedre  : 1960  MRN: [id number]  Referring provider: Jaquita Felty, MD  Dx:   Encounter Diagnosis     ICD-10-CM    1  Lumbar radiculopathy  M54 16                   Subjective: Pt states that he is doing 'okay' today  He reports that he did not do much yesterday  Indicates that whenever he does more at home, his back pain get worse  States that he gets pain in back and down his R LE after walking short distances  Objective: See treatment diary below      Assessment: Tolerated treatment well  Pt denied pain in low back or down LE with all activities today  Activities were performed to target core and LEs  Reported feeling good following today's session  Patient demonstrated fatigue post treatment and would benefit from continued PT      Plan: Continue per plan of care        Precautions: HTN     Daily Treatment Diary      Assessment  3/2  3/4  3/8  3/11  3/14  3/18           Eval/Reval                       FOTO         performed         **   Manuals    Manual flex rotation to the right          x2 60 sec                                       Exercise Diary     bike    5 min  6 min  6 min  6 min  5 min            abdom cristo 5"x10  5"x10 10"x10  10"x10 10"x10  10"x10            abdom cristo w/ball    5"x10 10"x10   10"x10 10"x10  10"x10            supine marching    5"x10  5"x15 5"x15  bent leg drop x5              wall sits     20"x5  20"x5  20"x5  20"x5            LTR            5x ea            bridge          5"x10  5"x10            Single knee to chest            5"x10 b/l                                    seated flex 5"x10  5"x10  w/ball 5"x10  w/ball 5"x10  w/ball 5"x10 w/ball 5"x10                                                                                                                                                           Modalities    mech tx 80# supine static 10 min  10 min  10 min 95#  10 min 95#  15 min 95# 15 min 95# Pt was treated by student physical therapist, Velia Herrera  Treatment was supervised by Juan Bowen

## 2022-03-22 ENCOUNTER — APPOINTMENT (OUTPATIENT)
Dept: PHYSICAL THERAPY | Facility: REHABILITATION | Age: 62
End: 2022-03-22
Payer: COMMERCIAL

## 2022-03-25 ENCOUNTER — OFFICE VISIT (OUTPATIENT)
Dept: PHYSICAL THERAPY | Facility: REHABILITATION | Age: 62
End: 2022-03-25
Payer: COMMERCIAL

## 2022-03-25 DIAGNOSIS — M54.16 LUMBAR RADICULOPATHY: Primary | ICD-10-CM

## 2022-03-25 PROCEDURE — 97110 THERAPEUTIC EXERCISES: CPT

## 2022-03-25 PROCEDURE — 97112 NEUROMUSCULAR REEDUCATION: CPT

## 2022-03-25 NOTE — PROGRESS NOTES
Daily Note     Today's date: 3/25/2022  Patient name: Teddy Chiu  : 1960  MRN: [id number]  Referring provider: Korin Rodriguez MD  Dx:   Encounter Diagnosis     ICD-10-CM    1  Lumbar radiculopathy  M54 16                   Subjective: pt reports with c/o increased R lower back pain radiating into posterior thigh to his knee  He noted sx's worsened yesterday which he attributes to walking for prolonged periods  He noted slight reduction of LE sx's at present time, but continues to be elevated from normal state  Objective: See treatment diary below      Assessment: Tolerated treatment well  Mild increased LE sx's with performance of bridges, but otherwise tolerated remiander of exercises well and without increased pain/sx's  Patient demonstrated fatigue post treatment, exhibited good technique with therapeutic exercises and would benefit from continued PT      Plan: Continue per plan of care  Progress treatment as tolerated         Precautions: HTN     Daily Treatment Diary      Assessment  3/2  3/4  3/8  3/11  3/14  3/18  3/25         Eval/Reval                       FOTO         performed         **   Manuals    Manual flex rotation to the right          x2 60 sec                                       Exercise Diary     bike    5 min  6 min  6 min  6 min  5 min  6 min          abdom cristo 5"x10  5"x10 10"x10  10"x10 10"x10  10"x10            abdom cristo w/ball    5"x10 10"x10   10"x10 10"x10  10"x10  10"x10          supine marching    5"x10  5"x15 5"x15  bent leg drop x5    5"x10 ea          wall sits     20"x5  20"x5  20"x5  20"x5  np          LTR            5x ea  10"x5 ea          bridge          5"x10  5"x10            Single knee to chest            5"x10 b/l  10"x5 ea b/l                                  seated flex 5"x10  5"x10  w/ball 5"x10  w/ball 5"x10  w/ball 5"x10 w/ball 5"x10  w/ ball 10"x5 ea Modalities    mech tx 80# supine static 10 min  10 min  10 min 95#  10 min 95#  15 min 95# 15 min 95#  15 min 95#

## 2022-03-29 ENCOUNTER — OFFICE VISIT (OUTPATIENT)
Dept: PHYSICAL THERAPY | Facility: REHABILITATION | Age: 62
End: 2022-03-29
Payer: COMMERCIAL

## 2022-03-29 DIAGNOSIS — M54.16 LUMBAR RADICULOPATHY: Primary | ICD-10-CM

## 2022-03-29 PROCEDURE — 97112 NEUROMUSCULAR REEDUCATION: CPT | Performed by: PHYSICAL THERAPIST

## 2022-03-29 PROCEDURE — 97012 MECHANICAL TRACTION THERAPY: CPT | Performed by: PHYSICAL THERAPIST

## 2022-03-29 PROCEDURE — 97110 THERAPEUTIC EXERCISES: CPT | Performed by: PHYSICAL THERAPIST

## 2022-03-29 NOTE — PROGRESS NOTES
Daily Note     Today's date: 3/29/2022  Patient name: Nathan Valverde  : 1960  MRN: [id number]  Referring provider: Jaquita Felty, MD  Dx:   Encounter Diagnosis     ICD-10-CM    1  Lumbar radiculopathy  M54 16                   Subjective: Pt tried to walk through the outlets over the weekend and could not make it back  His wife had to go get the car  Pain is rated 2/10 today but 8/10 with trying to walk through the mall radiating down the let  Objective: See treatment diary below      Assessment: Tolerated treatment well  Pt experienced pain during standing exercises  It did resolve with sitting  Abdominals are getting stronger but it is not translating into improved tolerance to walking  Traction does help but effects are not lasting  Increased pull today to determine if we can get longer lasting results  Patient demonstrated fatigue post treatment, exhibited good technique with therapeutic exercises and would benefit from continued PT      Plan: Continue per plan of care  Progress treatment as tolerated         Precautions: HTN     Daily Treatment Diary      Assessment  3/2  3/4  3/8  3/11  3/14  3/18  3/25  3/29       Eval/Reval                       FOTO         performed         **   Manuals    Manual flex rotation to the right          x2 60 sec                                       Exercise Diary     bike    5 min  6 min  6 min  6 min  5 min  6 min  6 min        abdom cristo 5"x10  5"x10 10"x10  10"x10 10"x10  10"x10            abdom cristo w/ball    5"x10 10"x10   10"x10 10"x10  10"x10  10"x10  10"x10        supine marching    5"x10  5"x15 5"x15  bent leg drop x5    5"x10 ea  bent leg drop 5" x10        wall sits     20"x5  20"x5  20"x5  20"x5  np          LTR            5x ea  10"x5 ea  10"x5        bridge          5"x10  5"x10    too painful        Single knee to chest            5"x10 b/l  10"x5 ea b/l  10"x5                                seated flex 5"x10  5"x10  w/ball 5"x10  w/ball 5"x10  w/ball 5"x10 w/ball 5"x10  w/ ball 10"x5 ea  w/ball 10"x5        Theraband shoulder ext w/cristo abdom                Blue 5"x10       TB multifidus press                Blue x20                                                                                                       Modalities    mech tx 80# supine static 10 min  10 min  10 min 95#  10 min 95#  15 min 95# 15 min 95#  15 min 95#  15 min 105#

## 2022-04-01 ENCOUNTER — OFFICE VISIT (OUTPATIENT)
Dept: PHYSICAL THERAPY | Facility: REHABILITATION | Age: 62
End: 2022-04-01
Payer: COMMERCIAL

## 2022-04-01 DIAGNOSIS — M54.16 LUMBAR RADICULOPATHY: Primary | ICD-10-CM

## 2022-04-01 PROCEDURE — 97012 MECHANICAL TRACTION THERAPY: CPT

## 2022-04-01 PROCEDURE — 97112 NEUROMUSCULAR REEDUCATION: CPT

## 2022-04-01 PROCEDURE — 97110 THERAPEUTIC EXERCISES: CPT

## 2022-04-01 NOTE — PROGRESS NOTES
Daily Note     Today's date: 2022  Patient name: Constantino Lopez  : 1960  MRN: [id number]  Referring provider: Jose Norris MD  Dx:   Encounter Diagnosis     ICD-10-CM    1  Lumbar radiculopathy  M54 16                   Subjective: pt reports no changes following last visit with increased weight on traction  He noted only an hour relief per his usual  He noted he continues to do a lot of activity after each session  Objective: See treatment diary below      Assessment: Tolerated treatment well  Positive relief noted with stretches, but he reported reproduction od LBP and radic sx's  Abolishment of pain post traction; increased weight with good response  Patient demonstrated fatigue post treatment, exhibited good technique with therapeutic exercises and would benefit from continued PT      Plan: Continue per plan of care  Progress treatment as tolerated         Precautions: HTN     Daily Treatment Diary      Assessment  3/2  3/4  3/8  3/11  3/14  3/18  3/25  3/29  4/1     Eval/Reval                       FOTO         performed         **   Manuals    Manual flex rotation to the right          x2 60 sec                                       Exercise Diary     bike    5 min  6 min  6 min  6 min  5 min  6 min  6 min  6 min      abdom cristo 5"x10  5"x10 10"x10  10"x10 10"x10  10"x10            abdom cristo w/ball    5"x10 10"x10   10"x10 10"x10  10"x10  10"x10  10"x10 10"x10      supine marching    5"x10  5"x15 5"x15  bent leg drop x5    5"x10 ea  bent leg drop 5" x10   bent leg drop 5" x10      wall sits     20"x5  20"x5  20"x5  20"x5  np          LTR            5x ea  10"x5 ea  10"x5  10"x5      bridge          5"x10  5"x10    too painful        Single knee to chest            5"x10 b/l  10"x5 ea b/l  10"x5  10"x5                              seated flex 5"x10  5"x10  w/ball 5"x10  w/ball 5"x10  w/ball 5"x10 w/ball 5"x10  w/ ball 10"x5 ea  w/ball 10"x5  w/ball 10"x5      Theraband shoulder ext w/cristo abdom                Blue 5"x10   Blue 5"x10  p!      TB multifidus press                Blue x20  nv                                                                                                     Modalities    mech tx 80# supine static 10 min  10 min  10 min 95#  10 min 95#  15 min 95# 15 min 95#  15 min 95#  15 min 105#  15'  110#

## 2022-04-05 DIAGNOSIS — I10 ESSENTIAL HYPERTENSION: ICD-10-CM

## 2022-04-05 DIAGNOSIS — M10.9 GOUT, UNSPECIFIED CAUSE, UNSPECIFIED CHRONICITY, UNSPECIFIED SITE: ICD-10-CM

## 2022-04-05 RX ORDER — ALLOPURINOL 100 MG/1
TABLET ORAL
Qty: 30 TABLET | Refills: 0 | Status: SHIPPED | OUTPATIENT
Start: 2022-04-05 | End: 2022-05-02

## 2022-04-20 NOTE — PROGRESS NOTES
Spoke with patient  He had his MRI and would like to put PT on hold until he f/u with the doctor  He has been resting and feeling a little better

## 2022-04-30 DIAGNOSIS — M10.9 GOUT, UNSPECIFIED CAUSE, UNSPECIFIED CHRONICITY, UNSPECIFIED SITE: ICD-10-CM

## 2022-04-30 DIAGNOSIS — I10 ESSENTIAL HYPERTENSION: ICD-10-CM

## 2022-05-02 RX ORDER — ALLOPURINOL 100 MG/1
TABLET ORAL
Qty: 30 TABLET | Refills: 0 | Status: SHIPPED | OUTPATIENT
Start: 2022-05-02 | End: 2022-05-31

## 2022-05-03 ENCOUNTER — OFFICE VISIT (OUTPATIENT)
Dept: FAMILY MEDICINE CLINIC | Facility: CLINIC | Age: 62
End: 2022-05-03
Payer: COMMERCIAL

## 2022-05-03 VITALS
HEART RATE: 93 BPM | HEIGHT: 67 IN | TEMPERATURE: 96 F | WEIGHT: 202 LBS | OXYGEN SATURATION: 97 % | SYSTOLIC BLOOD PRESSURE: 180 MMHG | BODY MASS INDEX: 31.71 KG/M2 | DIASTOLIC BLOOD PRESSURE: 110 MMHG

## 2022-05-03 DIAGNOSIS — I10 ESSENTIAL HYPERTENSION: Primary | ICD-10-CM

## 2022-05-03 DIAGNOSIS — H11.32 SUBCONJUNCTIVAL HEMORRHAGE, LEFT: ICD-10-CM

## 2022-05-03 PROCEDURE — 3725F SCREEN DEPRESSION PERFORMED: CPT | Performed by: FAMILY MEDICINE

## 2022-05-03 PROCEDURE — 99213 OFFICE O/P EST LOW 20 MIN: CPT | Performed by: FAMILY MEDICINE

## 2022-05-03 RX ORDER — CANDESARTAN CILEXETIL AND HYDROCHLOROTHIAZIDE 16; 12.5 MG/1; MG/1
1 TABLET ORAL DAILY
Qty: 30 TABLET | Refills: 5 | Status: SHIPPED | OUTPATIENT
Start: 2022-05-03

## 2022-05-03 NOTE — PATIENT INSTRUCTIONS
Here for elevated BP and left subconjunctival hemorrhage  Start BP med and recheck BP in 1 week and call if any problems  Natural Tears for left eye left subconjunctival hemorrhage  Stay well hydrated  Monitor BP while at home and call if any symptoms

## 2022-05-03 NOTE — PROGRESS NOTES
Assessment/Plan:  Chief Complaint   Patient presents with    Blood Pressure Check     Pt has been experiencing high BP reading Pt states it has been averaging 160/95 Pt also stated he felt his BP was high saturday while at a wedding woke un sunday and L eye was blood shot and has not gone away      Patient Instructions   Here for elevated BP and left subconjunctival hemorrhage  Start BP med and recheck BP in 1 week and call if any problems  Natural Tears for left eye left subconjunctival hemorrhage  Stay well hydrated  Monitor BP while at home and call if any symptoms  No problem-specific Assessment & Plan notes found for this encounter  Diagnoses and all orders for this visit:    Essential hypertension  -     candesartan-hydrochlorothiazide (ATACAND HCT) 16-12 5 MG per tablet; Take 1 tablet by mouth daily    Subconjunctival hemorrhage, left          Subjective:      Patient ID: Bessy Smith is a 57 y o  male  Blood Pressure Check (Pt has been experiencing high BP reading Pt states it has been averaging 160/95 Pt also stated he felt his BP was high saturday while at a wedding woke un sunday and L eye was blood shot and has not gone away )    Hypertension  This is a chronic problem  The current episode started more than 1 year ago  The problem has been gradually worsening since onset  The problem is uncontrolled  Associated symptoms include headaches  There are no associated agents to hypertension  Risk factors for coronary artery disease include dyslipidemia and obesity  Compliance problems include diet and exercise  The following portions of the patient's history were reviewed and updated as appropriate: allergies, current medications, past family history, past medical history, past social history, past surgical history and problem list     Review of Systems   Constitutional: Negative  HENT: Negative  Eyes: Positive for redness  Respiratory: Negative      Cardiovascular: Negative  Gastrointestinal: Negative  Endocrine: Negative  Genitourinary: Negative  Musculoskeletal: Negative  Skin: Negative  Allergic/Immunologic: Negative  Neurological: Positive for headaches  Hematological: Negative  Psychiatric/Behavioral: Negative  Objective:      BP (!) 180/110   Pulse 93   Temp (!) 96 °F (35 6 °C) (Temporal)   Ht 5' 7 25" (1 708 m)   Wt 91 6 kg (202 lb)   SpO2 97%   BMI 31 40 kg/m²          Physical Exam  Constitutional:       Appearance: He is well-developed  HENT:      Head: Normocephalic and atraumatic  Right Ear: External ear normal       Left Ear: External ear normal       Nose: Nose normal    Eyes:      Conjunctiva/sclera: Conjunctivae normal       Pupils: Pupils are equal, round, and reactive to light  Cardiovascular:      Rate and Rhythm: Normal rate and regular rhythm  Heart sounds: Normal heart sounds  Pulmonary:      Effort: Pulmonary effort is normal       Breath sounds: Normal breath sounds  Musculoskeletal:         General: Normal range of motion  Cervical back: Normal range of motion and neck supple  Skin:     General: Skin is warm and dry  Neurological:      Mental Status: He is alert and oriented to person, place, and time  Deep Tendon Reflexes: Reflexes are normal and symmetric     Psychiatric:         Behavior: Behavior normal

## 2022-05-10 ENCOUNTER — OFFICE VISIT (OUTPATIENT)
Dept: FAMILY MEDICINE CLINIC | Facility: CLINIC | Age: 62
End: 2022-05-10
Payer: COMMERCIAL

## 2022-05-10 VITALS
OXYGEN SATURATION: 97 % | SYSTOLIC BLOOD PRESSURE: 140 MMHG | HEART RATE: 91 BPM | TEMPERATURE: 96.3 F | HEIGHT: 67 IN | BODY MASS INDEX: 31.4 KG/M2 | WEIGHT: 200.1 LBS | DIASTOLIC BLOOD PRESSURE: 92 MMHG | RESPIRATION RATE: 16 BRPM

## 2022-05-10 DIAGNOSIS — I10 ESSENTIAL HYPERTENSION: Primary | ICD-10-CM

## 2022-05-10 DIAGNOSIS — G89.29 CHRONIC LOW BACK PAIN, UNSPECIFIED BACK PAIN LATERALITY, UNSPECIFIED WHETHER SCIATICA PRESENT: ICD-10-CM

## 2022-05-10 DIAGNOSIS — E66.9 OBESITY (BMI 30-39.9): ICD-10-CM

## 2022-05-10 DIAGNOSIS — M54.50 CHRONIC LOW BACK PAIN, UNSPECIFIED BACK PAIN LATERALITY, UNSPECIFIED WHETHER SCIATICA PRESENT: ICD-10-CM

## 2022-05-10 PROCEDURE — 99213 OFFICE O/P EST LOW 20 MIN: CPT | Performed by: FAMILY MEDICINE

## 2022-05-10 PROCEDURE — 3008F BODY MASS INDEX DOCD: CPT | Performed by: FAMILY MEDICINE

## 2022-05-10 PROCEDURE — 1036F TOBACCO NON-USER: CPT | Performed by: FAMILY MEDICINE

## 2022-05-10 NOTE — PATIENT INSTRUCTIONS
BP stable and much improved, and will call if any problems and continue BP meds  F-up with Pain management as directed  Lose weight as directed

## 2022-05-10 NOTE — PROGRESS NOTES
Assessment/Plan:  Chief Complaint   Patient presents with    Follow-up     1 week bp check      Patient Instructions   BP stable and much improved, and will call if any problems and continue BP meds  F-up with Pain management as directed  Lose weight as directed  No problem-specific Assessment & Plan notes found for this encounter  Diagnoses and all orders for this visit:    Essential hypertension  Comments:  BP stable today, rec continuing same meds for HTN    Obesity (BMI 30-39  9)  Comments:  Encouraged to lose weight    Chronic low back pain, unspecified back pain laterality, unspecified whether sciatica present  Comments:  Has procedure next Monday for another ALECIA Dr Eyad Baxter by pain Management  Subjective:      Patient ID: Ye Hewitt is a 57 y o  male  Here for BP check and is much improved  Would like to discuss MRI recently done  The following portions of the patient's history were reviewed and updated as appropriate: allergies, current medications, past family history, past medical history, past social history, past surgical history and problem list     Review of Systems   Constitutional: Negative  HENT: Negative  Eyes: Negative  Respiratory: Negative  Cardiovascular: Negative  Gastrointestinal: Negative  Endocrine: Negative  Genitourinary: Negative  Musculoskeletal: Negative  Skin: Negative  Allergic/Immunologic: Negative  Neurological: Negative  Hematological: Negative  Psychiatric/Behavioral: Negative  Objective:      /92 (BP Location: Left arm, Patient Position: Sitting, Cuff Size: Adult)   Pulse 91   Temp (!) 96 3 °F (35 7 °C) (Temporal)   Resp 16   Ht 5' 7" (1 702 m)   Wt 90 8 kg (200 lb 1 6 oz)   SpO2 97%   BMI 31 34 kg/m²          Physical Exam  Constitutional:       Appearance: He is well-developed  HENT:      Head: Normocephalic and atraumatic     Eyes:      Conjunctiva/sclera: Conjunctivae normal  Pupils: Pupils are equal, round, and reactive to light  Cardiovascular:      Rate and Rhythm: Normal rate and regular rhythm  Heart sounds: Normal heart sounds  Pulmonary:      Effort: Pulmonary effort is normal       Breath sounds: Normal breath sounds  Musculoskeletal:         General: Normal range of motion  Cervical back: Normal range of motion and neck supple  Skin:     General: Skin is warm and dry  Neurological:      Mental Status: He is alert and oriented to person, place, and time  Deep Tendon Reflexes: Reflexes are normal and symmetric     Psychiatric:         Behavior: Behavior normal

## 2022-05-29 DIAGNOSIS — I10 ESSENTIAL HYPERTENSION: ICD-10-CM

## 2022-05-29 DIAGNOSIS — M10.9 GOUT, UNSPECIFIED CAUSE, UNSPECIFIED CHRONICITY, UNSPECIFIED SITE: ICD-10-CM

## 2022-05-31 RX ORDER — ALLOPURINOL 100 MG/1
TABLET ORAL
Qty: 30 TABLET | Refills: 0 | Status: SHIPPED | OUTPATIENT
Start: 2022-05-31 | End: 2022-06-27

## 2022-06-25 DIAGNOSIS — M10.9 GOUT, UNSPECIFIED CAUSE, UNSPECIFIED CHRONICITY, UNSPECIFIED SITE: ICD-10-CM

## 2022-06-25 DIAGNOSIS — I10 ESSENTIAL HYPERTENSION: ICD-10-CM

## 2022-06-27 RX ORDER — ALLOPURINOL 100 MG/1
TABLET ORAL
Qty: 30 TABLET | Refills: 0 | Status: SHIPPED | OUTPATIENT
Start: 2022-06-27 | End: 2022-07-22

## 2022-07-06 ENCOUNTER — OFFICE VISIT (OUTPATIENT)
Dept: URGENT CARE | Age: 62
End: 2022-07-06
Payer: COMMERCIAL

## 2022-07-06 VITALS
SYSTOLIC BLOOD PRESSURE: 104 MMHG | HEART RATE: 94 BPM | OXYGEN SATURATION: 97 % | TEMPERATURE: 97.7 F | RESPIRATION RATE: 16 BRPM | DIASTOLIC BLOOD PRESSURE: 72 MMHG

## 2022-07-06 DIAGNOSIS — J06.9 BACTERIAL URI: Primary | ICD-10-CM

## 2022-07-06 DIAGNOSIS — R05.1 ACUTE COUGH: ICD-10-CM

## 2022-07-06 DIAGNOSIS — B96.89 BACTERIAL URI: Primary | ICD-10-CM

## 2022-07-06 LAB
SARS-COV-2 AG UPPER RESP QL IA: NEGATIVE
VALID CONTROL: NORMAL

## 2022-07-06 PROCEDURE — 99213 OFFICE O/P EST LOW 20 MIN: CPT | Performed by: NURSE PRACTITIONER

## 2022-07-06 PROCEDURE — 87811 SARS-COV-2 COVID19 W/OPTIC: CPT | Performed by: NURSE PRACTITIONER

## 2022-07-06 RX ORDER — AZITHROMYCIN 250 MG/1
TABLET, FILM COATED ORAL
Qty: 6 TABLET | Refills: 0 | Status: SHIPPED | OUTPATIENT
Start: 2022-07-06 | End: 2022-07-10

## 2022-07-06 NOTE — PATIENT INSTRUCTIONS
Take zyrtec, allegra, or Claritin daily  Use flonase 1-2 sprays in each nare daily   Use nasal saline to the nose,   Use humidifer in room  Symptoms worsen go to ER  Rest    May use mucinex plain for symptoms  Covid test today in the office is negative

## 2022-07-06 NOTE — PROGRESS NOTES
NAME: Mehrdad Chau is a 60 y o  male  : 1960    MRN: [id number]    /72   Pulse 94   Temp 97 7 °F (36 5 °C)   Resp 16   SpO2 97%     Assessment and Plan   Bacterial URI [J06 9, B96 89]  1  Bacterial URI  Poct Covid 19 Rapid Antigen Test    azithromycin (ZITHROMAX) 250 mg tablet   2  Acute cough  Poct Covid 19 Rapid Antigen Test    azithromycin (ZITHROMAX) 250 mg tablet       Jose Chandler was seen today for cough  Diagnoses and all orders for this visit:    Bacterial URI  -     Poct Covid 19 Rapid Antigen Test  -     azithromycin (ZITHROMAX) 250 mg tablet; Take 2 tablets today and only 1 pill daily until finished  Acute cough  -     Poct Covid 19 Rapid Antigen Test  -     azithromycin (ZITHROMAX) 250 mg tablet; Take 2 tablets today and only 1 pill daily until finished  Patient Instructions   Patient Instructions   Take zyrtec, allegra, or Claritin daily  Use flonase 1-2 sprays in each nare daily   Use nasal saline to the nose,   Use humidifer in room  Symptoms worsen go to ER  Rest    May use mucinex plain for symptoms  Covid test today in the office is negative  Proceed to the nearest ER if symptoms worsen, Follow up with your PCP  Continue to social distance, wash your hands, and wear your masks  Please continue to follow the CDC  gov guidelines daily for they are subject to change on COVID-19    Chief Complaint     Chief Complaint   Patient presents with    Cough     Chest congestion for past three days  Occasional productive cough with green mucus  Denies fever, sore throat  Covid vaccinated, home test negative  History of Present Illness     Patient is a 27-year-old male who is here today with cough for the past 3 days  He has wheezing noted but is able to clear after coughing  He has been coughing up green phlegm has been taking over-the-counter cough medicine Cloracedin due to his high blood pressure    He has no postnasal drip ear pain or headaches but does have a runny nose  He has taken a culture test which was negative on Monday  He is COVID-19 vaccinated and has had no recent travel  Denies any shortness of breath or chest pain      Review of Systems   Review of Systems   Constitutional: Negative for chills and fever  HENT: Positive for congestion, postnasal drip, rhinorrhea and sore throat (monday)  Negative for ear pain, sinus pressure and sinus pain  Eyes: Negative  Respiratory: Positive for cough  Negative for shortness of breath and wheezing  Cardiovascular: Negative  Negative for chest pain  Gastrointestinal: Negative  Genitourinary: Negative  Musculoskeletal: Negative  Skin: Negative  Negative for rash  Allergic/Immunologic: Negative  Neurological: Negative  Hematological: Negative  Psychiatric/Behavioral: Negative  All other systems reviewed and are negative          Current Medications       Current Outpatient Medications:     allopurinol (ZYLOPRIM) 100 mg tablet, TAKE ONE TABLET BY MOUTH EVERY DAY, Disp: 30 tablet, Rfl: 0    aspirin (ECOTRIN LOW STRENGTH) 81 mg EC tablet, Take 1 tablet by mouth daily, Disp: , Rfl:     atorvastatin (LIPITOR) 20 mg tablet, Take 1 tablet (20 mg total) by mouth daily, Disp: 30 tablet, Rfl: 11    azithromycin (ZITHROMAX) 250 mg tablet, Take 2 tablets today and only 1 pill daily until finished , Disp: 6 tablet, Rfl: 0    candesartan-hydrochlorothiazide (ATACAND HCT) 16-12 5 MG per tablet, Take 1 tablet by mouth daily, Disp: 30 tablet, Rfl: 5    metoprolol tartrate (LOPRESSOR) 25 mg tablet, TAKE ONE TABLET BY MOUTH EVERY DAY, Disp: 30 tablet, Rfl: 0    Current Allergies     Allergies as of 07/06/2022    (No Known Allergies)              Past Medical History:   Diagnosis Date    Acute myocardial infarction ([facility] [address] )     "no heart damage" approx 8 yrs ago did get one stent"    Arthralgia     last assessed 7/8/13    Arthritis     Colon polyp     Contusion of skin with intact surface     of the left medial thigh,last assessed 6/28/13    Coronary artery disease     Gout     last assessed 10/29/13    History of sepsis     urinary and was in hosp 3 days /7/2020    Hypertension     Kidney stone     Lump of skin     last assessed 7/19/13//"fatty tumor and surg removed"    S/P coronary artery stent placement     x1    Wears glasses     at night       Past Surgical History:   Procedure Laterality Date    CARDIAC CATHETERIZATION      COLONOSCOPY  12/10/2009    Complete//2020    CORONARY STENT PLACEMENT  08/2012    stenting of the LAD artery 95% lesion to 0% residual stenosis    FL RETROGRADE PYELOGRAM  9/1/2021    KNEE SURGERY Left     x4    LUMBAR EPIDURAL INJECTION      x1    RI CYSTO/URETERO W/LITHOTRIPSY &INDWELL STENT INSRT Left 8/5/2020    Procedure: CYSTO, URETEROSCOPY STENT exchange;  Surgeon: Gina Beltre MD;  Location: [facility];  Service: Urology    RI CYSTO/URETERO W/LITHOTRIPSY &INDWELL STENT INSRT Right 9/1/2021    Procedure: CYSTOSCOPY URETEROSCOPY WITH BASKET STONE EXTRACTION, RETROGRADE PYELOGRAM AND INSERTION STENT URETERAL;  Surgeon: Rhys Manzo MD;  [facility];  Service: Urology    RI CYSTOURETHROSCOPY,URETER CATHETER Left 7/4/2020    Procedure: CYSTOSCOPY WITH INSERTION STENT URETERAL;  Surgeon: Timoteo Faulkner MD;  Location: [facility];  Service: Urology       Family History   Problem Relation Age of Onset    Edema Mother         Cerebral    Aneurysm Father         of the cerebellar artery    Aneurysm Brother         of the cerebellar artery         Medications have been verified      The following portions of the patient's history were reviewed and updated as appropriate: allergies, current medications, past family history, past medical history, past social history, past surgical history and problem list     Objective   /72   Pulse 94   Temp 97 7 °F (36 5 °C)   Resp 16   SpO2 97%      Physical Exam     Physical Exam  Constitutional: Appearance: He is well-developed  HENT:      Head: Normocephalic  Right Ear: Hearing normal       Left Ear: Hearing normal       Ears:      Comments: Unable to assess at time of exam due to power in facility out and otoscope not accessible, however pt denies any ear discomfort     Nose: Rhinorrhea present  No mucosal edema  Right Sinus: No maxillary sinus tenderness or frontal sinus tenderness  Left Sinus: No maxillary sinus tenderness or frontal sinus tenderness  Mouth/Throat:      Lips: Pink  Mouth: Mucous membranes are moist       Pharynx: Uvula midline  No pharyngeal swelling or posterior oropharyngeal erythema  Tonsils: No tonsillar exudate  0 on the right  0 on the left  Comments: Thick mucus in back of his throat, yellow/green in nature    Neck:      Thyroid: No thyroid mass  Trachea: Trachea normal    Cardiovascular:      Rate and Rhythm: Normal rate and regular rhythm  Pulses: Normal pulses  Pulmonary:      Effort: Pulmonary effort is normal  No respiratory distress  Breath sounds: Examination of the right-upper field reveals wheezing  Examination of the left-upper field reveals wheezing  Wheezing present  No decreased breath sounds, rhonchi or rales  Comments: Wheezes heard on the anterior side of chest, no wheezing heard on lung assessment  Musculoskeletal:      Cervical back: Full passive range of motion without pain and normal range of motion  Lymphadenopathy:      Cervical: No cervical adenopathy  Neurological:      General: No focal deficit present  Mental Status: He is alert and oriented to person, place, and time  Psychiatric:         Attention and Perception: Attention normal          Mood and Affect: Mood normal          Speech: Speech normal          Behavior: Behavior is cooperative  Note: Portions of this record may have been created with voice recognition software   Occasional wrong word or "sound a like" substitutions may have occurred due to the inherent limitations of voice recognition software  Please read the chart carefully and recognize, using context, where substitutions have occurred  KURT Mcclellan

## 2022-07-22 ENCOUNTER — HOSPITAL ENCOUNTER (OUTPATIENT)
Dept: RADIOLOGY | Facility: HOSPITAL | Age: 62
Discharge: HOME | End: 2022-07-22
Payer: COMMERCIAL

## 2022-07-22 ENCOUNTER — OFFICE VISIT (OUTPATIENT)
Dept: FAMILY MEDICINE CLINIC | Facility: CLINIC | Age: 62
End: 2022-07-22
Payer: COMMERCIAL

## 2022-07-22 VITALS
RESPIRATION RATE: 17 BRPM | HEIGHT: 67 IN | OXYGEN SATURATION: 98 % | HEART RATE: 68 BPM | SYSTOLIC BLOOD PRESSURE: 126 MMHG | DIASTOLIC BLOOD PRESSURE: 84 MMHG | TEMPERATURE: 96.8 F | BODY MASS INDEX: 31.55 KG/M2 | WEIGHT: 201 LBS

## 2022-07-22 DIAGNOSIS — E66.9 OBESITY (BMI 30-39.9): ICD-10-CM

## 2022-07-22 DIAGNOSIS — R05.9 COUGH: ICD-10-CM

## 2022-07-22 DIAGNOSIS — L98.9 SKIN LESION OF RIGHT ARM: ICD-10-CM

## 2022-07-22 DIAGNOSIS — I10 ESSENTIAL HYPERTENSION: ICD-10-CM

## 2022-07-22 DIAGNOSIS — E78.2 MIXED HYPERLIPIDEMIA: ICD-10-CM

## 2022-07-22 DIAGNOSIS — M10.9 GOUT, UNSPECIFIED CAUSE, UNSPECIFIED CHRONICITY, UNSPECIFIED SITE: ICD-10-CM

## 2022-07-22 DIAGNOSIS — I10 ESSENTIAL HYPERTENSION: Primary | ICD-10-CM

## 2022-07-22 DIAGNOSIS — M10.40 OTHER SECONDARY GOUT, UNSPECIFIED CHRONICITY, UNSPECIFIED SITE: ICD-10-CM

## 2022-07-22 PROCEDURE — 71046 X-RAY EXAM CHEST 2 VIEWS: CPT

## 2022-07-22 PROCEDURE — 99214 OFFICE O/P EST MOD 30 MIN: CPT | Performed by: FAMILY MEDICINE

## 2022-07-22 RX ORDER — BENZONATATE 200 MG/1
200 CAPSULE ORAL 3 TIMES DAILY PRN
Qty: 30 CAPSULE | Refills: 0 | Status: SHIPPED | OUTPATIENT
Start: 2022-07-22

## 2022-07-22 RX ORDER — ALLOPURINOL 100 MG/1
TABLET ORAL
Qty: 30 TABLET | Refills: 0 | Status: SHIPPED | OUTPATIENT
Start: 2022-07-22 | End: 2022-08-16

## 2022-07-22 RX ORDER — PREDNISONE 10 MG/1
TABLET ORAL
Qty: 21 TABLET | Refills: 0 | Status: SHIPPED | OUTPATIENT
Start: 2022-07-22

## 2022-07-22 RX ORDER — ACETAMINOPHEN 500 MG
TABLET ORAL
COMMUNITY
Start: 2022-06-29

## 2022-07-22 NOTE — PROGRESS NOTES
Assessment/Plan:  Chief Complaint   Patient presents with    Follow-up     Follow up      Patient Instructions   Here for recheck and has stable HTN and gout and also rec cxray r/o infiltrate due to chronic cough last month  He was treated with abx in past but still has cough  HTN stable  Lipids stable, exercise to increase HDL  Consult derm for right forearm lesion possibly seborrheic keratosis  Recheck BP in 3 months and call if cought not better, await cxray and see if prednisone and tessalon perles help with cough  No problem-specific Assessment & Plan notes found for this encounter  Diagnoses and all orders for this visit:    Essential hypertension    Mixed hyperlipidemia    Skin lesion of right arm  -     Ambulatory Referral to Dermatology; Future    Other secondary gout, unspecified chronicity, unspecified site    Cough  -     XR chest pa & lateral; Future  -     predniSONE 10 mg tablet; Take 60 mg po day#1, 50 mg po day#2, 40 mg po day#3, 30 mg po day#4, 20 mg po day#5m and 10 mg po day#6  -     benzonatate (TESSALON) 200 MG capsule; Take 1 capsule (200 mg total) by mouth 3 (three) times a day as needed for cough    Obesity (BMI 30-39  9)    Other orders  -     acetaminophen (TYLENOL) 500 mg tablet; TAKE 2 TABLETS BY MOUTH THE NIGHT BEFORE SURGERY          Subjective:      Patient ID: Alcira Sampson is a 60 y o  male  Follow-up (Follow up )  Chronic cough and was given abx and still coughing  Here for HTN  Gout stable  Hx of hyperlipidemia  Back pain subsided after 3 years and no back pain for last 6 weeks  Cancelled surgery for now  The following portions of the patient's history were reviewed and updated as appropriate: allergies, current medications, past family history, past medical history, past social history, past surgical history and problem list     Review of Systems   Constitutional: Negative  Negative for fever  HENT: Negative  Eyes: Negative      Respiratory: Positive for cough  Negative for shortness of breath  Cardiovascular: Negative  Gastrointestinal: Negative  Endocrine: Negative  Genitourinary: Negative  Musculoskeletal: Negative  Skin:        Right forearm lesion comes and goes    Allergic/Immunologic: Negative  Neurological: Negative  Hematological: Negative  Psychiatric/Behavioral: Negative  Objective:      /84 (BP Location: Left arm, Patient Position: Sitting, Cuff Size: Adult)   Pulse 68   Temp (!) 96 8 °F (36 °C) (Temporal)   Resp 17   Ht 5' 7" (1 702 m)   Wt 91 2 kg (201 lb)   SpO2 98%   BMI 31 48 kg/m²          Physical Exam  Constitutional:       Appearance: He is well-developed  He is obese  HENT:      Head: Normocephalic and atraumatic  Right Ear: External ear normal       Left Ear: External ear normal       Nose: Nose normal    Eyes:      Conjunctiva/sclera: Conjunctivae normal       Pupils: Pupils are equal, round, and reactive to light  Cardiovascular:      Rate and Rhythm: Normal rate and regular rhythm  Heart sounds: Normal heart sounds  Pulmonary:      Effort: Pulmonary effort is normal       Breath sounds: Normal breath sounds  Comments: cough  Musculoskeletal:         General: Normal range of motion  Cervical back: Normal range of motion and neck supple  Skin:     General: Skin is warm and dry  Findings: Lesion (right forearm possible seborrheic keratosis) present  Neurological:      Mental Status: He is alert and oriented to person, place, and time  Deep Tendon Reflexes: Reflexes are normal and symmetric     Psychiatric:         Behavior: Behavior normal

## 2022-07-22 NOTE — PATIENT INSTRUCTIONS
Here for recheck and has stable HTN and gout and also rec cxray r/o infiltrate due to chronic cough last month  He was treated with abx in past but still has cough  HTN stable  Lipids stable, exercise to increase HDL  Consult derm for right forearm lesion possibly seborrheic keratosis  Recheck BP in 3 months and call if cought not better, await cxray and see if prednisone and tessalon perles help with cough

## 2022-08-16 DIAGNOSIS — I10 ESSENTIAL HYPERTENSION: ICD-10-CM

## 2022-08-16 DIAGNOSIS — M10.9 GOUT, UNSPECIFIED CAUSE, UNSPECIFIED CHRONICITY, UNSPECIFIED SITE: ICD-10-CM

## 2022-08-16 RX ORDER — ALLOPURINOL 100 MG/1
TABLET ORAL
Qty: 30 TABLET | Refills: 0 | Status: SHIPPED | OUTPATIENT
Start: 2022-08-16 | End: 2022-09-14

## 2022-09-14 DIAGNOSIS — E78.2 MIXED HYPERLIPIDEMIA: ICD-10-CM

## 2022-09-14 DIAGNOSIS — M10.9 GOUT, UNSPECIFIED CAUSE, UNSPECIFIED CHRONICITY, UNSPECIFIED SITE: ICD-10-CM

## 2022-09-14 RX ORDER — ATORVASTATIN CALCIUM 20 MG/1
TABLET, FILM COATED ORAL
Qty: 30 TABLET | Refills: 0 | Status: SHIPPED | OUTPATIENT
Start: 2022-09-14 | End: 2022-10-18 | Stop reason: SDUPTHER

## 2022-09-14 RX ORDER — ALLOPURINOL 100 MG/1
TABLET ORAL
Qty: 30 TABLET | Refills: 0 | Status: SHIPPED | OUTPATIENT
Start: 2022-09-14 | End: 2022-10-18 | Stop reason: SDUPTHER

## 2022-09-15 ENCOUNTER — HOSPITAL ENCOUNTER (OUTPATIENT)
Dept: NUCLEAR MEDICINE | Facility: HOSPITAL | Age: 62
Discharge: HOME | End: 2022-09-15
Attending: INTERNAL MEDICINE
Payer: COMMERCIAL

## 2022-09-15 ENCOUNTER — HOSPITAL ENCOUNTER (OUTPATIENT)
Dept: NON INVASIVE DIAGNOSTICS | Facility: HOSPITAL | Age: 62
Discharge: HOME | End: 2022-09-15
Attending: INTERNAL MEDICINE
Payer: COMMERCIAL

## 2022-09-15 VITALS — HEIGHT: 67 IN | BODY MASS INDEX: 31.55 KG/M2 | WEIGHT: 201 LBS

## 2022-09-15 DIAGNOSIS — I25.10 CORONARY ARTERY DISEASE INVOLVING NATIVE CORONARY ARTERY OF NATIVE HEART WITHOUT ANGINA PECTORIS: ICD-10-CM

## 2022-09-15 LAB
CHEST PAIN STATEMENT: NORMAL
MAX DIASTOLIC BP: 88 MMHG
MAX HEART RATE: 90 BPM
MAX PREDICTED HEART RATE: 157 BPM
MAX. SYSTOLIC BP: 130 MMHG
NUC STRESS EJECTION FRACTION: 57 %
PROTOCOL NAME: NORMAL
RATE PRESSURE PRODUCT: NORMAL
REASON FOR TERMINATION: NORMAL
SL CV REST NUCLEAR ISOTOPE DOSE: 10.4 MCI
SL CV STRESS NUCLEAR ISOTOPE DOSE: 32 MCI
SL CV STRESS RECOVERY BP: NORMAL MMHG
SL CV STRESS RECOVERY HR: 80 BPM
SL CV STRESS RECOVERY O2 SAT: 98 %
STRESS ANGINA INDEX: 0
STRESS BASELINE BP: NORMAL MMHG
STRESS BASELINE HR: 59 BPM
STRESS O2 SAT REST: 98 %
STRESS PEAK HR: 90 BPM
STRESS POST O2 SAT PEAK: 94 %
STRESS POST PEAK BP: 114 MMHG
STRESS ST DEPRESSION: 0 MM
STRESS/REST PERFUSION RATIO: 0.86
TARGET HR FORMULA: NORMAL
TIME IN EXERCISE PHASE: NORMAL

## 2022-09-15 PROCEDURE — 93016 CV STRESS TEST SUPVJ ONLY: CPT

## 2022-09-15 PROCEDURE — A9502 TC99M TETROFOSMIN: HCPCS

## 2022-09-15 PROCEDURE — 78452 HT MUSCLE IMAGE SPECT MULT: CPT

## 2022-09-15 PROCEDURE — G1004 CDSM NDSC: HCPCS

## 2022-09-15 PROCEDURE — 93017 CV STRESS TEST TRACING ONLY: CPT

## 2022-09-15 PROCEDURE — 93018 CV STRESS TEST I&R ONLY: CPT

## 2022-09-15 RX ADMIN — REGADENOSON 0.4 MG: 0.08 INJECTION, SOLUTION INTRAVENOUS at 10:26

## 2022-09-19 DIAGNOSIS — I10 ESSENTIAL HYPERTENSION: ICD-10-CM

## 2022-09-19 LAB
CHEST PAIN STATEMENT: NORMAL
MAX DIASTOLIC BP: 88 MMHG
MAX HEART RATE: 90 BPM
MAX PREDICTED HEART RATE: 157 BPM
MAX. SYSTOLIC BP: 130 MMHG
PROTOCOL NAME: NORMAL
REASON FOR TERMINATION: NORMAL
TARGET HR FORMULA: NORMAL
TIME IN EXERCISE PHASE: NORMAL

## 2022-10-11 ENCOUNTER — VBI (OUTPATIENT)
Dept: ADMINISTRATIVE | Facility: OTHER | Age: 62
End: 2022-10-11

## 2022-10-14 DIAGNOSIS — I10 ESSENTIAL HYPERTENSION: ICD-10-CM

## 2022-10-14 RX ORDER — CANDESARTAN CILEXETIL AND HYDROCHLOROTHIAZIDE 16; 12.5 MG/1; MG/1
TABLET ORAL
Qty: 30 TABLET | Refills: 0 | Status: SHIPPED | OUTPATIENT
Start: 2022-10-14

## 2022-10-16 DIAGNOSIS — I10 ESSENTIAL HYPERTENSION: ICD-10-CM

## 2022-10-18 DIAGNOSIS — E78.2 MIXED HYPERLIPIDEMIA: ICD-10-CM

## 2022-10-18 DIAGNOSIS — M10.9 GOUT, UNSPECIFIED CAUSE, UNSPECIFIED CHRONICITY, UNSPECIFIED SITE: ICD-10-CM

## 2022-10-18 RX ORDER — ATORVASTATIN CALCIUM 20 MG/1
20 TABLET, FILM COATED ORAL DAILY
Qty: 30 TABLET | Refills: 0 | Status: SHIPPED | OUTPATIENT
Start: 2022-10-18

## 2022-10-18 RX ORDER — ALLOPURINOL 100 MG/1
100 TABLET ORAL DAILY
Qty: 30 TABLET | Refills: 0 | Status: SHIPPED | OUTPATIENT
Start: 2022-10-18

## 2022-11-12 DIAGNOSIS — I10 ESSENTIAL HYPERTENSION: ICD-10-CM

## 2022-11-13 DIAGNOSIS — M10.9 GOUT, UNSPECIFIED CAUSE, UNSPECIFIED CHRONICITY, UNSPECIFIED SITE: ICD-10-CM

## 2022-11-13 DIAGNOSIS — E78.2 MIXED HYPERLIPIDEMIA: ICD-10-CM

## 2022-11-14 RX ORDER — ALLOPURINOL 100 MG/1
TABLET ORAL
Qty: 30 TABLET | Refills: 0 | Status: SHIPPED | OUTPATIENT
Start: 2022-11-14

## 2022-11-14 RX ORDER — ATORVASTATIN CALCIUM 20 MG/1
TABLET, FILM COATED ORAL
Qty: 30 TABLET | Refills: 0 | Status: SHIPPED | OUTPATIENT
Start: 2022-11-14

## 2022-11-21 ENCOUNTER — OFFICE VISIT (OUTPATIENT)
Dept: FAMILY MEDICINE CLINIC | Facility: CLINIC | Age: 62
End: 2022-11-21

## 2022-11-21 VITALS
BODY MASS INDEX: 33.04 KG/M2 | DIASTOLIC BLOOD PRESSURE: 80 MMHG | TEMPERATURE: 97.9 F | SYSTOLIC BLOOD PRESSURE: 130 MMHG | HEIGHT: 67 IN | WEIGHT: 210.5 LBS

## 2022-11-21 DIAGNOSIS — B34.9 VIRAL INFECTION, UNSPECIFIED: Primary | ICD-10-CM

## 2022-11-21 DIAGNOSIS — H66.92 OTITIS OF LEFT EAR: ICD-10-CM

## 2022-11-21 RX ORDER — CEFUROXIME AXETIL 500 MG/1
500 TABLET ORAL EVERY 12 HOURS SCHEDULED
Qty: 20 TABLET | Refills: 0 | Status: SHIPPED | OUTPATIENT
Start: 2022-11-21 | End: 2022-12-01

## 2022-11-21 RX ORDER — DEXAMETHASONE 1 MG
1 TABLET ORAL 2 TIMES DAILY WITH MEALS
Qty: 10 TABLET | Refills: 0 | Status: SHIPPED | OUTPATIENT
Start: 2022-11-21 | End: 2022-11-26

## 2022-11-21 NOTE — PROGRESS NOTES
Name: Freddie Hashimoto      : 1960      MRN: [id number]  Encounter Provider: Michael Acharya DO  Encounter Date: 2022   Encounter department: [address] [place] [address] PRIMARY CARE    Assessment & Plan     Chief Complaint   Patient presents with   • Cold Like Symptoms     Symptoms "came back"       1  Viral infection, unspecified  Comments:  Check for COVID/ flu  Orders:  -     Covid/Flu- Office Collect    2  Otitis of left ear  Comments:  Have switched to cefuroxime, and Decadron  Patient has follow-up scheduled with ENT shortly  Orders:  -     dexamethasone (DECADRON) 1 mg tablet; Take 1 tablet (1 mg total) by mouth 2 (two) times a day with meals for 5 days  -     cefuroxime (CEFTIN) 500 mg tablet; Take 1 tablet (500 mg total) by mouth every 12 (twelve) hours for 10 days           Subjective      He presents to the office complaining of 5 days of cough, congestion, wheezing, and fatigue  He is leaving the country for vacation shortly  He was seen by ENT approximately 10 days ago and started on a Medrol Dosepak and azithromycin for an ear infection  The ear continues to bother him  Review of Systems   Constitutional: Negative for chills and fever  HENT: Positive for congestion, ear pain, postnasal drip and rhinorrhea  Negative for sinus pressure, sinus pain and sore throat  Eyes: Negative for pain and visual disturbance  Respiratory: Positive for cough and wheezing  Negative for chest tightness and shortness of breath  Cardiovascular: Negative for chest pain and palpitations  Gastrointestinal: Negative for abdominal pain, constipation, diarrhea, nausea and vomiting  Genitourinary: Negative for dysuria and hematuria  Musculoskeletal: Negative for arthralgias, back pain and myalgias  Skin: Negative for color change and rash  Neurological: Negative for seizures, syncope and headaches  All other systems reviewed and are negative        Current Outpatient Medications on File Prior to Visit   Medication Sig   • allopurinol (ZYLOPRIM) 100 mg tablet TAKE ONE TABLET BY MOUTH EVERY DAY   • aspirin (ECOTRIN LOW STRENGTH) 81 mg EC tablet Take 1 tablet by mouth daily   • atorvastatin (LIPITOR) 20 mg tablet TAKE ONE TABLET BY MOUTH EVERY DAY   • candesartan-hydrochlorothiazide (ATACAND HCT) 16-12 5 MG per tablet TAKE ONE TABLET BY MOUTH EVERY DAY   • fluticasone (FLONASE) 50 mcg/act nasal spray 1 spray into each nostril 2 (two) times a day   • metoprolol tartrate (LOPRESSOR) 25 mg tablet TAKE ONE TABLET BY MOUTH EVERY DAY   • [DISCONTINUED] methylprednisolone (MEDROL) 4 mg tablet 6, 6, 5, 5, 4, 4, 3, 3, 2, 2,1,1   • acetaminophen (TYLENOL) 500 mg tablet TAKE 2 TABLETS BY MOUTH THE NIGHT BEFORE SURGERY   • [DISCONTINUED] benzonatate (TESSALON) 200 MG capsule Take 1 capsule (200 mg total) by mouth 3 (three) times a day as needed for cough       Objective     /80 (BP Location: Left arm, Patient Position: Sitting, Cuff Size: Large)   Temp 97 9 °F (36 6 °C)   Ht 5' 7" (1 702 m)   Wt 95 5 kg (210 lb 8 oz)   BMI 32 97 kg/m²     Physical Exam  Vitals and nursing note reviewed  Constitutional:       General: He is not in acute distress  Appearance: He is well-developed and well-nourished  HENT:      Head: Normocephalic and atraumatic  Right Ear: No middle ear effusion  Left Ear: A middle ear effusion is present  Tympanic membrane is injected and bulging  Eyes:      Extraocular Movements: EOM normal       Conjunctiva/sclera: Conjunctivae normal    Pulmonary:      Effort: Pulmonary effort is normal    Neurological:      Mental Status: He is alert and oriented to person, place, and time     Psychiatric:         Mood and Affect: Mood and affect normal          Judgment: Judgment normal        Delmy Sanchez DO

## 2022-11-22 LAB
FLUAV RNA RESP QL NAA+PROBE: NEGATIVE
FLUBV RNA RESP QL NAA+PROBE: NEGATIVE
SARS-COV-2 RNA RESP QL NAA+PROBE: NEGATIVE

## 2022-12-01 ENCOUNTER — RA CDI HCC (OUTPATIENT)
Dept: OTHER | Facility: HOSPITAL | Age: 62
End: 2022-12-01

## 2022-12-01 NOTE — PROGRESS NOTES
[facility]  coding opportunities       Chart reviewed, no opportunity found: CHART REVIEWED, NO OPPORTUNITY FOUND        Patients Insurance        Commercial Insurance: [address] [place] [address]

## 2022-12-08 ENCOUNTER — OFFICE VISIT (OUTPATIENT)
Dept: FAMILY MEDICINE CLINIC | Facility: CLINIC | Age: 62
End: 2022-12-08

## 2022-12-08 VITALS
WEIGHT: 210 LBS | DIASTOLIC BLOOD PRESSURE: 86 MMHG | HEIGHT: 69 IN | OXYGEN SATURATION: 94 % | TEMPERATURE: 97.3 F | SYSTOLIC BLOOD PRESSURE: 138 MMHG | RESPIRATION RATE: 16 BRPM | BODY MASS INDEX: 31.1 KG/M2 | HEART RATE: 97 BPM

## 2022-12-08 DIAGNOSIS — I25.10 CORONARY ARTERY DISEASE INVOLVING NATIVE CORONARY ARTERY OF NATIVE HEART WITHOUT ANGINA PECTORIS: ICD-10-CM

## 2022-12-08 DIAGNOSIS — M10.40 OTHER SECONDARY GOUT, UNSPECIFIED CHRONICITY, UNSPECIFIED SITE: ICD-10-CM

## 2022-12-08 DIAGNOSIS — M10.9 GOUT, UNSPECIFIED CAUSE, UNSPECIFIED CHRONICITY, UNSPECIFIED SITE: ICD-10-CM

## 2022-12-08 DIAGNOSIS — Z23 NEED FOR INFLUENZA VACCINATION: ICD-10-CM

## 2022-12-08 DIAGNOSIS — L98.9 SKIN LESION OF RIGHT ARM: ICD-10-CM

## 2022-12-08 DIAGNOSIS — E66.9 OBESITY (BMI 30-39.9): ICD-10-CM

## 2022-12-08 DIAGNOSIS — B35.3 TINEA PEDIS OF RIGHT FOOT: ICD-10-CM

## 2022-12-08 DIAGNOSIS — I10 ESSENTIAL HYPERTENSION: ICD-10-CM

## 2022-12-08 DIAGNOSIS — E78.2 MIXED HYPERLIPIDEMIA: Primary | ICD-10-CM

## 2022-12-08 DIAGNOSIS — Z12.5 SCREENING FOR PROSTATE CANCER: ICD-10-CM

## 2022-12-08 RX ORDER — CLOTRIMAZOLE AND BETAMETHASONE DIPROPIONATE 10; .64 MG/G; MG/G
CREAM TOPICAL 2 TIMES DAILY
Qty: 45 G | Refills: 1 | Status: SHIPPED | OUTPATIENT
Start: 2022-12-08

## 2022-12-08 NOTE — PROGRESS NOTES
Name: Savannah Newton      : 1960      MRN: [id number]  Encounter Provider: Marcin Rabago DO  Encounter Date: 2022   Encounter department: [address] [place] [address]  Chief Complaint   Patient presents with   • Follow-up     3 mo bp check pt states his left ear feels clogged it comes and goes  Pt would like flu shot      Patient Instructions   Here for recheck and has stable HTN and gout  HTN stable  Lipids stable, exercise to increase HDL  Consult derm for right forearm lesion possibly seborrheic keratosis  Recheck BP in 4 months  Flu shot today  Lose weight as directed  Trial of Lotrisone for right foot skin rash/tinea pedis  F-up with ENT as directed for left ear issues and congestion  Assessment & Plan     1  Mixed hyperlipidemia  -     Comprehensive metabolic panel; Future; Expected date: 2023  -     Lipid Panel with Direct LDL reflex; Future; Expected date: 2023    2  Essential hypertension  -     Comprehensive metabolic panel; Future; Expected date: 2023    3  Coronary artery disease involving native coronary artery of native heart without angina pectoris  -     CBC and differential; Future; Expected date: 2023    4  Other secondary gout, unspecified chronicity, unspecified site    5  Obesity (BMI 30-39 9)  -     CBC and differential; Future; Expected date: 2023    6  Tinea pedis of right foot  -     clotrimazole-betamethasone (LOTRISONE) 1-0 05 % cream; Apply topically 2 (two) times a day  -     Ambulatory Referral to Dermatology; Future    7  Skin lesion of right arm  -     Ambulatory Referral to Dermatology; Future    8  Need for influenza vaccination  -     influenza vaccine, quadrivalent, recombinant, PF, 0 5 mL, for patients 18 yr+ (FLUBLOK)    9  Gout, unspecified cause, unspecified chronicity, unspecified site  -     CBC and differential; Future; Expected date: 2023  -     Uric acid; Future; Expected date: 2023    10  Screening for prostate cancer  -     PSA, Total Screen; Future; Expected date: 04/08/2023           Subjective      Follow-up (3 mo bp check pt states his left ear feels clogged it comes and goes  Pt would like flu shot )    Review of Systems   Constitutional: Negative  HENT: Negative  Eyes: Negative  Respiratory: Negative  Cardiovascular: Negative  Gastrointestinal: Negative  Endocrine: Negative  Genitourinary: Negative  Musculoskeletal: Negative  Skin: Negative  Allergic/Immunologic: Negative  Neurological: Negative  Hematological: Negative  Psychiatric/Behavioral: Negative  Current Outpatient Medications on File Prior to Visit   Medication Sig   • allopurinol (ZYLOPRIM) 100 mg tablet TAKE ONE TABLET BY MOUTH EVERY DAY   • aspirin (ECOTRIN LOW STRENGTH) 81 mg EC tablet Take 1 tablet by mouth daily   • atorvastatin (LIPITOR) 20 mg tablet TAKE ONE TABLET BY MOUTH EVERY DAY   • candesartan-hydrochlorothiazide (ATACAND HCT) 16-12 5 MG per tablet TAKE ONE TABLET BY MOUTH EVERY DAY   • fluticasone (FLONASE) 50 mcg/act nasal spray 1 spray into each nostril 2 (two) times a day   • metoprolol tartrate (LOPRESSOR) 25 mg tablet TAKE ONE TABLET BY MOUTH EVERY DAY   • [DISCONTINUED] acetaminophen (TYLENOL) 500 mg tablet TAKE 2 TABLETS BY MOUTH THE NIGHT BEFORE SURGERY       Objective     /86 (BP Location: Left arm, Patient Position: Sitting, Cuff Size: Adult)   Pulse 97   Temp (!) 97 3 °F (36 3 °C) (Temporal)   Resp 16   Ht 5' 8 5" (1 74 m)   Wt 95 3 kg (210 lb)   SpO2 94%   BMI 31 47 kg/m²     Physical Exam  Constitutional:       Appearance: He is well-developed  HENT:      Head: Normocephalic and atraumatic  Right Ear: External ear normal       Left Ear: External ear normal       Nose: Nose normal    Eyes:      Conjunctiva/sclera: Conjunctivae normal       Pupils: Pupils are equal, round, and reactive to light     Cardiovascular:      Rate and Rhythm: Normal rate and regular rhythm  Heart sounds: Normal heart sounds  Pulmonary:      Effort: Pulmonary effort is normal       Breath sounds: Normal breath sounds  Abdominal:      General: Abdomen is flat  Bowel sounds are normal       Palpations: Abdomen is soft  Musculoskeletal:         General: Normal range of motion  Cervical back: Normal range of motion and neck supple  Skin:     General: Skin is warm and dry  Neurological:      Mental Status: He is alert and oriented to person, place, and time  Deep Tendon Reflexes: Reflexes are normal and symmetric     Psychiatric:         Behavior: Behavior normal        Durene Ramal, DO

## 2022-12-08 NOTE — PATIENT INSTRUCTIONS
Here for recheck and has stable HTN and gout  HTN stable  Lipids stable, exercise to increase HDL  Consult derm for right forearm lesion possibly seborrheic keratosis  Recheck BP in 4 months  Flu shot today  Lose weight as directed  Trial of Lotrisone for right foot skin rash/tinea pedis  F-up with ENT as directed for left ear issues and congestion

## 2022-12-19 DIAGNOSIS — M10.9 GOUT, UNSPECIFIED CAUSE, UNSPECIFIED CHRONICITY, UNSPECIFIED SITE: ICD-10-CM

## 2022-12-19 DIAGNOSIS — E78.2 MIXED HYPERLIPIDEMIA: ICD-10-CM

## 2022-12-19 DIAGNOSIS — I10 ESSENTIAL HYPERTENSION: ICD-10-CM

## 2022-12-19 RX ORDER — ALLOPURINOL 100 MG/1
100 TABLET ORAL DAILY
Qty: 30 TABLET | Refills: 3 | Status: SHIPPED | OUTPATIENT
Start: 2022-12-19

## 2022-12-19 RX ORDER — CANDESARTAN CILEXETIL AND HYDROCHLOROTHIAZIDE 16; 12.5 MG/1; MG/1
1 TABLET ORAL DAILY
Qty: 30 TABLET | Refills: 3 | Status: SHIPPED | OUTPATIENT
Start: 2022-12-19

## 2022-12-19 RX ORDER — ATORVASTATIN CALCIUM 20 MG/1
20 TABLET, FILM COATED ORAL DAILY
Qty: 30 TABLET | Refills: 3 | Status: SHIPPED | OUTPATIENT
Start: 2022-12-19

## 2023-04-05 ENCOUNTER — HOSPITAL ENCOUNTER (OUTPATIENT)
Dept: CT IMAGING | Facility: HOSPITAL | Age: 63
Discharge: HOME | End: 2023-04-05
Attending: SPECIALIST

## 2023-04-05 DIAGNOSIS — H92.12 OTORRHEA OF LEFT EAR: ICD-10-CM

## 2023-04-06 ENCOUNTER — APPOINTMENT (OUTPATIENT)
Dept: LAB | Age: 63
End: 2023-04-06

## 2023-04-06 DIAGNOSIS — E78.2 MIXED HYPERLIPIDEMIA: ICD-10-CM

## 2023-04-06 DIAGNOSIS — I25.10 CORONARY ARTERY DISEASE INVOLVING NATIVE CORONARY ARTERY OF NATIVE HEART WITHOUT ANGINA PECTORIS: ICD-10-CM

## 2023-04-06 DIAGNOSIS — M10.9 GOUT, UNSPECIFIED CAUSE, UNSPECIFIED CHRONICITY, UNSPECIFIED SITE: ICD-10-CM

## 2023-04-06 DIAGNOSIS — E66.9 OBESITY (BMI 30-39.9): ICD-10-CM

## 2023-04-06 DIAGNOSIS — I10 ESSENTIAL HYPERTENSION: ICD-10-CM

## 2023-04-06 DIAGNOSIS — Z12.5 SCREENING FOR PROSTATE CANCER: ICD-10-CM

## 2023-04-06 LAB
ALBUMIN SERPL BCP-MCNC: 4 G/DL (ref 3.5–5)
ALP SERPL-CCNC: 78 U/L (ref 46–116)
ALT SERPL W P-5'-P-CCNC: 54 U/L (ref 12–78)
ANION GAP SERPL CALCULATED.3IONS-SCNC: 5 MMOL/L (ref 4–13)
AST SERPL W P-5'-P-CCNC: 60 U/L (ref 5–45)
BASOPHILS # BLD AUTO: 0.05 THOUSANDS/ÂΜL (ref 0–0.1)
BASOPHILS NFR BLD AUTO: 1 % (ref 0–1)
BILIRUB SERPL-MCNC: 1.36 MG/DL (ref 0.2–1)
BUN SERPL-MCNC: 14 MG/DL (ref 5–25)
CALCIUM SERPL-MCNC: 8.9 MG/DL (ref 8.3–10.1)
CHLORIDE SERPL-SCNC: 103 MMOL/L (ref 96–108)
CHOLEST SERPL-MCNC: 131 MG/DL
CO2 SERPL-SCNC: 29 MMOL/L (ref 21–32)
CREAT SERPL-MCNC: 1.11 MG/DL (ref 0.6–1.3)
EOSINOPHIL # BLD AUTO: 0.15 THOUSAND/ÂΜL (ref 0–0.61)
EOSINOPHIL NFR BLD AUTO: 3 % (ref 0–6)
ERYTHROCYTE [DISTWIDTH] IN BLOOD BY AUTOMATED COUNT: 12.3 % (ref 11.6–15.1)
GFR SERPL CREATININE-BSD FRML MDRD: 70 ML/MIN/1.73SQ M
GLUCOSE P FAST SERPL-MCNC: 117 MG/DL (ref 65–99)
HCT VFR BLD AUTO: 45.7 % (ref 36.5–49.3)
HDLC SERPL-MCNC: 38 MG/DL
HGB BLD-MCNC: 15.7 G/DL (ref 12–17)
IMM GRANULOCYTES # BLD AUTO: 0.01 THOUSAND/UL (ref 0–0.2)
IMM GRANULOCYTES NFR BLD AUTO: 0 % (ref 0–2)
LDLC SERPL CALC-MCNC: 53 MG/DL (ref 0–100)
LYMPHOCYTES # BLD AUTO: 1.56 THOUSANDS/ÂΜL (ref 0.6–4.47)
LYMPHOCYTES NFR BLD AUTO: 35 % (ref 14–44)
MCH RBC QN AUTO: 30.8 PG (ref 26.8–34.3)
MCHC RBC AUTO-ENTMCNC: 34.4 G/DL (ref 31.4–37.4)
MCV RBC AUTO: 90 FL (ref 82–98)
MONOCYTES # BLD AUTO: 0.32 THOUSAND/ÂΜL (ref 0.17–1.22)
MONOCYTES NFR BLD AUTO: 7 % (ref 4–12)
NEUTROPHILS # BLD AUTO: 2.37 THOUSANDS/ÂΜL (ref 1.85–7.62)
NEUTS SEG NFR BLD AUTO: 54 % (ref 43–75)
NRBC BLD AUTO-RTO: 0 /100 WBCS
PLATELET # BLD AUTO: 188 THOUSANDS/UL (ref 149–390)
PMV BLD AUTO: 10.1 FL (ref 8.9–12.7)
POTASSIUM SERPL-SCNC: 3.7 MMOL/L (ref 3.5–5.3)
PROT SERPL-MCNC: 7.4 G/DL (ref 6.4–8.4)
PSA SERPL-MCNC: 0.7 NG/ML (ref 0–4)
RBC # BLD AUTO: 5.09 MILLION/UL (ref 3.88–5.62)
SODIUM SERPL-SCNC: 137 MMOL/L (ref 135–147)
TRIGL SERPL-MCNC: 198 MG/DL
URATE SERPL-MCNC: 6.5 MG/DL (ref 3.5–8.5)
WBC # BLD AUTO: 4.46 THOUSAND/UL (ref 4.31–10.16)

## 2023-04-07 DIAGNOSIS — M10.9 GOUT, UNSPECIFIED CAUSE, UNSPECIFIED CHRONICITY, UNSPECIFIED SITE: ICD-10-CM

## 2023-04-07 DIAGNOSIS — E78.2 MIXED HYPERLIPIDEMIA: ICD-10-CM

## 2023-04-07 DIAGNOSIS — I10 ESSENTIAL HYPERTENSION: ICD-10-CM

## 2023-04-07 RX ORDER — CANDESARTAN CILEXETIL AND HYDROCHLOROTHIAZIDE 16; 12.5 MG/1; MG/1
TABLET ORAL
Qty: 30 TABLET | Refills: 0 | Status: SHIPPED | OUTPATIENT
Start: 2023-04-07

## 2023-04-07 RX ORDER — ALLOPURINOL 100 MG/1
TABLET ORAL
Qty: 30 TABLET | Refills: 0 | Status: SHIPPED | OUTPATIENT
Start: 2023-04-07

## 2023-04-07 RX ORDER — ATORVASTATIN CALCIUM 20 MG/1
TABLET, FILM COATED ORAL
Qty: 30 TABLET | Refills: 0 | Status: SHIPPED | OUTPATIENT
Start: 2023-04-07

## 2023-04-12 PROBLEM — R73.01 ELEVATED FASTING BLOOD SUGAR: Status: ACTIVE | Noted: 2023-04-12

## 2023-05-05 DIAGNOSIS — I10 ESSENTIAL HYPERTENSION: ICD-10-CM

## 2023-05-05 DIAGNOSIS — E78.2 MIXED HYPERLIPIDEMIA: ICD-10-CM

## 2023-05-05 DIAGNOSIS — M10.9 GOUT, UNSPECIFIED CAUSE, UNSPECIFIED CHRONICITY, UNSPECIFIED SITE: ICD-10-CM

## 2023-05-05 RX ORDER — ATORVASTATIN CALCIUM 20 MG/1
TABLET, FILM COATED ORAL
Qty: 30 TABLET | Refills: 0 | Status: SHIPPED | OUTPATIENT
Start: 2023-05-05

## 2023-05-05 RX ORDER — ALLOPURINOL 100 MG/1
TABLET ORAL
Qty: 30 TABLET | Refills: 0 | Status: SHIPPED | OUTPATIENT
Start: 2023-05-05

## 2023-05-05 RX ORDER — CANDESARTAN CILEXETIL AND HYDROCHLOROTHIAZIDE 16; 12.5 MG/1; MG/1
TABLET ORAL
Qty: 30 TABLET | Refills: 0 | Status: SHIPPED | OUTPATIENT
Start: 2023-05-05

## 2023-05-08 DIAGNOSIS — I10 ESSENTIAL HYPERTENSION: ICD-10-CM

## 2023-05-30 DIAGNOSIS — M10.9 GOUT, UNSPECIFIED CAUSE, UNSPECIFIED CHRONICITY, UNSPECIFIED SITE: ICD-10-CM

## 2023-05-30 DIAGNOSIS — E78.2 MIXED HYPERLIPIDEMIA: ICD-10-CM

## 2023-05-30 DIAGNOSIS — I10 ESSENTIAL HYPERTENSION: ICD-10-CM

## 2023-05-30 RX ORDER — CANDESARTAN CILEXETIL AND HYDROCHLOROTHIAZIDE 16; 12.5 MG/1; MG/1
1 TABLET ORAL DAILY
Qty: 30 TABLET | Refills: 0 | Status: SHIPPED | OUTPATIENT
Start: 2023-05-30

## 2023-05-30 RX ORDER — ATORVASTATIN CALCIUM 20 MG/1
20 TABLET, FILM COATED ORAL DAILY
Qty: 30 TABLET | Refills: 0 | Status: SHIPPED | OUTPATIENT
Start: 2023-05-30

## 2023-05-30 RX ORDER — ALLOPURINOL 100 MG/1
100 TABLET ORAL DAILY
Qty: 30 TABLET | Refills: 0 | Status: SHIPPED | OUTPATIENT
Start: 2023-05-30 | End: 2023-06-02

## 2023-06-02 DIAGNOSIS — M10.9 GOUT, UNSPECIFIED CAUSE, UNSPECIFIED CHRONICITY, UNSPECIFIED SITE: ICD-10-CM

## 2023-06-02 RX ORDER — ALLOPURINOL 100 MG/1
TABLET ORAL
Qty: 30 TABLET | Refills: 0 | Status: SHIPPED | OUTPATIENT
Start: 2023-06-02

## 2023-06-07 ENCOUNTER — HOSPITAL ENCOUNTER (OUTPATIENT)
Facility: MEDICAL CENTER | Age: 63
Discharge: HOME | End: 2023-06-07
Payer: COMMERCIAL

## 2023-06-07 DIAGNOSIS — H90.A32 MIXED CONDUCTIVE AND SENSORINEURAL HEARING LOSS OF LEFT EAR WITH RESTRICTED HEARING OF RIGHT EAR: ICD-10-CM

## 2023-06-07 DIAGNOSIS — R93.0 ABNORMAL CT OF THE HEAD: ICD-10-CM

## 2023-06-07 DIAGNOSIS — H90.A21 SENSORINEURAL HEARING LOSS (SNHL) OF RIGHT EAR WITH RESTRICTED HEARING OF LEFT EAR: ICD-10-CM

## 2023-06-07 DIAGNOSIS — H92.12 CHRONIC OTORRHEA OF LEFT EAR: ICD-10-CM

## 2023-06-07 DIAGNOSIS — Z82.49 FAMILY HISTORY OF ANEURYSM: ICD-10-CM

## 2023-06-07 PROCEDURE — A9585 GADOBUTROL INJECTION: HCPCS

## 2023-06-07 PROCEDURE — G1004 CDSM NDSC: HCPCS

## 2023-06-07 PROCEDURE — 70553 MRI BRAIN STEM W/O & W/DYE: CPT

## 2023-06-07 RX ADMIN — GADOBUTROL 9 ML: 604.72 INJECTION INTRAVENOUS at 11:30

## 2023-06-26 DIAGNOSIS — I10 ESSENTIAL HYPERTENSION: ICD-10-CM

## 2023-06-26 RX ORDER — CANDESARTAN CILEXETIL AND HYDROCHLOROTHIAZIDE 16; 12.5 MG/1; MG/1
TABLET ORAL
Qty: 30 TABLET | Refills: 0 | Status: SHIPPED | OUTPATIENT
Start: 2023-06-26

## 2023-06-27 DIAGNOSIS — M10.9 GOUT, UNSPECIFIED CAUSE, UNSPECIFIED CHRONICITY, UNSPECIFIED SITE: ICD-10-CM

## 2023-06-27 RX ORDER — ALLOPURINOL 100 MG/1
TABLET ORAL
Qty: 30 TABLET | Refills: 0 | Status: SHIPPED | OUTPATIENT
Start: 2023-06-27

## 2023-07-01 DIAGNOSIS — E78.2 MIXED HYPERLIPIDEMIA: ICD-10-CM

## 2023-07-01 DIAGNOSIS — I10 ESSENTIAL HYPERTENSION: ICD-10-CM

## 2023-07-03 RX ORDER — ATORVASTATIN CALCIUM 20 MG/1
TABLET, FILM COATED ORAL
Qty: 30 TABLET | Refills: 0 | Status: SHIPPED | OUTPATIENT
Start: 2023-07-03

## 2023-07-14 ENCOUNTER — CONSULT (OUTPATIENT)
Dept: NEUROSURGERY | Facility: CLINIC | Age: 63
End: 2023-07-14
Payer: COMMERCIAL

## 2023-07-14 VITALS
OXYGEN SATURATION: 97 % | WEIGHT: 217 LBS | HEART RATE: 79 BPM | BODY MASS INDEX: 32.89 KG/M2 | TEMPERATURE: 97.4 F | DIASTOLIC BLOOD PRESSURE: 96 MMHG | SYSTOLIC BLOOD PRESSURE: 156 MMHG | HEIGHT: 68 IN

## 2023-07-14 DIAGNOSIS — G96.01 CSF OTORRHEA: ICD-10-CM

## 2023-07-14 DIAGNOSIS — Q01.8 TEMPORAL ENCEPHALOCELE (HCC): ICD-10-CM

## 2023-07-14 PROCEDURE — 99245 OFF/OP CONSLTJ NEW/EST HI 55: CPT | Performed by: NEUROLOGICAL SURGERY

## 2023-07-14 RX ORDER — CEFAZOLIN SODIUM 2 G/50ML
2000 SOLUTION INTRAVENOUS ONCE
OUTPATIENT
Start: 2023-07-14 | End: 2023-07-14

## 2023-07-14 NOTE — PROGRESS NOTES
Neurosurgery Office Note  Consuelo Schwarz 58 y.o. 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 Randee Luis. He is a 34-year-old male with h/o HTN, HLD, ACS, CAD s/p PCI in 2012 on ASA 81 mg daily, urosepsis requiring several days of hospitalization in 2020 who p/w left hearing loss and persistent left CSF otorrhea s/p left myringotomy and tube placement on 12/12/2022. Never had lumbar spinal procedure or surgery. Non-smoker. Today, he reports no new neurologic complaints but since October 2022 daily clear leakage from left ear ("my pillow is always damp") a/w HA and hearing loss. No change in vision, seizure, neck stiffness, recent/remote h/o head trauma. Audiogram on 3/7/2023 demonstrated moderately severe mixed hearing loss in left ear. CT temporal bone on 4/5/2023 showed thinned left tegmen defect with mastoid nearly completely filled with fluid. 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     At this time, Dr. Abe Blakely and I will proceed with scheduling joint case for endoscopic left middle fossa craniotomy for repair of tegmen defect and CSF leak with temporalis fascia or muscle flap, with neuromonitoring (CN 7/8) and intraoperative lumbar drain placement. I have discussed the potential benefits, risks, and alternatives to surgery. He agrees to proceed, signing consent today. He will need preoperative medical clearance (specifically cardiac) and updated labs. All questions and concerns from him and his wife were addressed during this visit. CHIEF COMPLAINT    Chief Complaint   Patient presents with   • Consult       HISTORY    History of Present Illness     58y.o. year old male     HPI    See Discussion    REVIEW OF SYSTEMS    Review of Systems   HENT: Positive for ear discharge (fluid from left ear, leaking everyday, ) and hearing loss (left ear ). Negative for ear pain and tinnitus. Every once in a while can feel fluid leaking from his left nostril not much fluid      Eyes: Negative for visual disturbance. Respiratory: Negative for shortness of breath and wheezing. Cardiovascular: Negative for chest pain. Gastrointestinal: Negative. Genitourinary: Negative. Musculoskeletal: Negative for back pain, gait problem and neck pain. Neurological: Positive for headaches (a little bit of headaches this week has gotten them more than usual). Negative for dizziness, tremors, seizures, speech difficulty, weakness and numbness. Hematological: Bruises/bleeds easily (asa81). Meds/Allergies     Current Outpatient Medications   Medication Sig Dispense Refill   • allopurinol (ZYLOPRIM) 100 mg tablet TAKE ONE TABLET BY MOUTH ONCE DAILY 30 tablet 0   • aspirin (ECOTRIN LOW STRENGTH) 81 mg EC tablet Take 1 tablet by mouth daily     • atorvastatin (LIPITOR) 20 mg tablet TAKE ONE TABLET BY MOUTH ONCE DAILY 30 tablet 0   • candesartan-hydrochlorothiazide (ATACAND HCT) 16-12.5 MG per tablet TAKE ONE TABLET BY MOUTH ONCE DAILY 30 tablet 0   • ciclopirox (LOPROX) 0.77 % cream Apply topically 2 (two) times a day For 4 wk.  90 g 0   • metoprolol tartrate (LOPRESSOR) 25 mg tablet TAKE ONE TABLET BY MOUTH ONCE DAILY 30 tablet 0   • zolpidem (AMBIEN) 10 mg tablet Take 1 tablet (10 mg total) by mouth daily at bedtime as needed for sleep 14 tablet 0   • clotrimazole-betamethasone (LOTRISONE) 1-0.05 % cream Apply topically 2 (two) times a day (Patient not taking: Reported on 4/20/2023) 45 g 1   • diazepam (VALIUM) 2 mg tablet Take 1 tablet (2 mg total) by mouth 30 min pre-procedure 1 tablet 0     No current facility-administered medications for this visit.        No Known Allergies    PAST HISTORY    Past Medical History:   Diagnosis Date   • Acute myocardial infarction [facility])     "no heart damage" approx 8 yrs ago did get one stent"   • Arthralgia     last assessed 7/8/13   • Arthritis    • Colon polyp    • Contusion of skin with intact surface     of the left medial thigh,last assessed 6/28/13   • Coronary artery disease    • Gout     last assessed 10/29/13   • History of sepsis     urinary and was in hosp 3 days /7/2020   • Hypertension    • Kidney stone    • Lump of skin     last assessed 7/19/13//"fatty tumor and surg removed"   • S/P coronary artery stent placement     x1   • Wears glasses     at night       Past Surgical History:   Procedure Laterality Date   • CARDIAC CATHETERIZATION     • COLONOSCOPY  12/10/2009    Complete//2020   • CORONARY STENT PLACEMENT  08/2012    stenting of the LAD artery 95% lesion to 0% residual stenosis   • FL RETROGRADE PYELOGRAM  9/1/2021   • KNEE SURGERY Left     x4   • LUMBAR EPIDURAL INJECTION      x1   • CO CYSTO BLADDER W/URETERAL CATHETERIZATION Left 7/4/2020    Procedure: CYSTOSCOPY WITH INSERTION STENT URETERAL;  Surgeon: Shelia Maldonado MD;  Location: [facility];  Service: Urology   • CO CYSTO/URETERO W/LITHOTRIPSY &INDWELL STENT INSRT Left 8/5/2020    Procedure: CYSTO, URETEROSCOPY STENT exchange;  Surgeon: Erica Hall MD;  Location: [facility];  Service: Urology   • CO CYSTO/URETERO W/LITHOTRIPSY &INDWELL STENT INSRT Right 9/1/2021    Procedure: CYSTOSCOPY URETEROSCOPY WITH BASKET STONE EXTRACTION, RETROGRADE PYELOGRAM AND INSERTION STENT URETERAL;  Surgeon: Sher Resendiz MD;  Location: [facility];  Service: Urology       Social History     Tobacco Use   • Smoking status: Never   • Smokeless tobacco: Never   Vaping Use   • Vaping Use: Never used   Substance Use Topics   • Alcohol use: Yes     Alcohol/week: 1.0 standard drink of alcohol     Types: 1 Cans of beer per week   • Drug use: No       Family History   Problem Relation Age of Onset   • Edema Mother         Cerebral   • Aneurysm Father         of the cerebellar artery   • Aneurysm Brother         of the cerebellar artery         The following portions of the patient's history were reviewed in this encounter and updated as appropriate: Past medical, surgical, family, and social history, as well as medications, allergies, and review of systems. EXAM    Vitals:Blood pressure 156/96, pulse 79, temperature (!) 97.4 °F (36.3 °C), temperature source Temporal, height 5' 8" (1.727 m), weight 98.4 kg (217 lb), SpO2 97 %. ,Body mass index is 32.99 kg/m². Physical Exam  Vitals and nursing note reviewed. Constitutional:       Appearance: Normal appearance. He is normal weight. HENT:      Head: Normocephalic and atraumatic. Eyes:      Extraocular Movements: Extraocular movements intact and EOM normal.      Pupils: Pupils are equal, round, and reactive to light. Cardiovascular:      Rate and Rhythm: Normal rate and regular rhythm. Pulses: Normal pulses. Heart sounds: Normal heart sounds. Pulmonary:      Effort: Pulmonary effort is normal.      Breath sounds: Normal breath sounds. Abdominal:      General: Abdomen is flat. Palpations: Abdomen is soft. Musculoskeletal:         General: Normal range of motion. Cervical back: Normal range of motion. Neurological:      General: No focal deficit present. Mental Status: He is alert and oriented to person, place, and time.  Mental status is at baseline. GCS: GCS eye subscore is 4. GCS verbal subscore is 5. GCS motor subscore is 6. Sensory: Sensation is intact. Motor: Motor strength is normal.Motor function is intact. Coordination: Coordination is intact. Gait: Gait is intact. Deep Tendon Reflexes: Reflexes are normal and symmetric. Psychiatric:         Mood and Affect: Mood normal.         Speech: Speech normal.         Behavior: Behavior normal.         Neurologic Exam     Mental Status   Oriented to person, place, and time. Attention: normal.   Speech: speech is normal   Level of consciousness: alert    Cranial Nerves     CN II   Visual fields full to confrontation. Visual acuity: normal  Right visual field deficit: none  Left visual field deficit: none     CN III, IV, VI   Pupils are equal, round, and reactive to light. Extraocular motions are normal.   CN III: no CN III palsy  CN VI: no CN VI palsy  Nystagmus: none   Diplopia: none  Ophthalmoparesis: none  Upgaze: normal  Downgaze: normal  Conjugate gaze: present    CN V   Facial sensation intact. Right facial sensation deficit: none  Left facial sensation deficit: none    CN VII   Facial expression full, symmetric. Right facial weakness: none  Left facial weakness: none    CN IX, X   CN IX normal.   CN X normal.   Palate: symmetric    CN XI   CN XI normal.   Right sternocleidomastoid strength: normal  Left sternocleidomastoid strength: normal  Right trapezius strength: normal  Left trapezius strength: normal    CN XII   CN XII normal.   Tongue deviation: none  Unchanged L hearing loss     Motor Exam   Muscle bulk: normal  Overall muscle tone: normal  Right arm pronator drift: absent  Left arm pronator drift: absent    Strength   Strength 5/5 throughout. Sensory Exam   Light touch normal.     Gait, Coordination, and Reflexes     Gait  Gait: normal    Reflexes   Reflexes 2+ except as noted.          MEDICAL DECISION MAKING    Imaging Studies:     No results found.    I have personally reviewed pertinent reports. and I have personally reviewed pertinent films in Tristen Muñoz M.D.   Neurosurgeon

## 2023-07-18 ENCOUNTER — PROCEDURE VISIT (OUTPATIENT)
Dept: DERMATOLOGY | Facility: CLINIC | Age: 63
End: 2023-07-18
Payer: COMMERCIAL

## 2023-07-18 VITALS — HEIGHT: 68 IN | WEIGHT: 211 LBS | BODY MASS INDEX: 31.98 KG/M2

## 2023-07-18 DIAGNOSIS — D48.5 NEOPLASM OF UNCERTAIN BEHAVIOR OF SKIN: Primary | ICD-10-CM

## 2023-07-18 PROCEDURE — 11102 TANGNTL BX SKIN SINGLE LES: CPT | Performed by: DERMATOLOGY

## 2023-07-18 PROCEDURE — 88305 TISSUE EXAM BY PATHOLOGIST: CPT | Performed by: PATHOLOGY

## 2023-07-18 NOTE — PATIENT INSTRUCTIONS
INFORMED CONSENT DISCUSSION AND POST-OPERATIVE INSTRUCTIONS FOR PATIENT    I.  RATIONALE FOR PROCEDURE  I understand that a skin biopsy allows the Dermatologist to test a lesion or rash under the microscope to obtain a diagnosis. It usually involves numbing the area with numbing medication and removing a small piece of skin; sometimes the area will be closed with sutures. In this specific procedure, sutures are not usually needed. If any sutures are placed, then they are usually need to be removed in 2 weeks or less. I understand that my Dermatologist recommends that a skin "shave" biopsy be performed today. A local anesthetic, similar to the kind that a dentist uses when filling a cavity, will be injected with a very small needle into the skin area to be sampled. The injected skin and tissue underneath "will go to sleep” and become numb so no pain should be felt afterwards. An instrument shaped like a tiny "razor blade" (shave biopsy instrument) will be used to cut a small piece of tissue and skin from the area so that a sample of tissue can be taken and examined more closely under the microscope. A slight amount of bleeding will occur, but it will be stopped with direct pressure and a pressure bandage and any other appropriate methods. I understands that a scar will form where the wound was created. Surgical ointment will be applied to help protect the wound. Sutures are not usually needed.     II.  RISKS AND POTENTIAL COMPLICATIONS   I understand the risks and potential complications of a skin biopsy include but are not limited to the following:  Bleeding  Infection  Pain  Scar/keloid  Skin discoloration  Incomplete Removal  Recurrence  Nerve Damage/Numbness/Loss of Function  Allergic Reaction to Anesthesia  Biopsies are diagnostic procedures and based on findings additional treatment or evaluation may be required  Loss or destruction of specimen resulting in no additional findings    My Dermatologist has explained to me the nature of the condition, the nature of the procedure, and the benefits to be reasonably expected compared with alternative approaches. My Dermatologist has discussed the likelihood of major risks or complications of this procedure including the specific risks listed above, such as bleeding, infection, and scarring/keloid. I understand that a scar is expected after this procedure. I understand that my physician cannot predict if the scar will form a "keloid," which extends beyond the borders of the wound that is created. A keloid is a thick, painful, and bumpy scar. A keloid can be difficult to treat, as it does not always respond well to therapy, which includes injecting cortisone directly into the keloid every few weeks. While this usually reduces the pain and size of the scar, it does not eliminate it. I understand that photographs may be taken before and after the procedure. These will be maintained as part of the medical providers confidential records and may not be made available to me. I further authorize the medical provider to use the photographs for teaching purposes or to illustrate scientific papers, books, or lectures if in his/her judgment, medical research, education, or science may benefit from its use. I have had an opportunity to fully inquire about the risks and benefits of this procedure and its alternatives. I have been given ample time and opportunity to ask questions and to seek a second opinion if I wished to do so. I acknowledge that there have specifically been no guarantees as to the cosmetic results from the procedure. I am aware that with any procedure there is always the possibility of an unexpected complication. III. POST-PROCEDURAL CARE (WHAT YOU WILL NEED TO DO "AFTER THE BIOPSY" TO OPTIMIZE HEALING)    Keep the area clean and dry. Try NOT to remove the bandage or get it wet for the first 24 hours.     Gently clean the area and apply surgical ointment (such as Vaseline petrolatum ointment, which is available "over the counter" and not a prescription) to the biopsy site for up to 2 weeks straight. This acts to protect the wound from the outside world. Sutures are not usually placed in this procedure. If any sutures were placed, return for suture removal as instructed (generally 1 week for the face, 2 weeks for the body). Take Acetaminophen (Tylenol) for discomfort, if no contraindications. Ibuprofen or aspirin could make bleeding worse. Call our office immediately for signs of infection: fever, chills, increased redness, warmth, tenderness, discomfort/pain, or pus or foul smell coming from the wound. WHAT TO DO IF THERE IS ANY BLEEDING? If a small amount of bleeding is noticed, place a clean cloth over the area and apply firm pressure for ten minutes. Check the wound after 10 minutes of direct pressure. If bleeding persists, try one more time for an additional 10 minutes of direct pressure on the area. If the bleeding becomes heavier or does not stop after the second attempt, or if you have any other questions about this procedure, then please call your [facility]. [facility] Dermatologist by calling [phone] (SKIN). I hereby acknowledge that I have reviewed and verified the site with my Dermatologist and have requested and authorized my Dermatologist to proceed with the procedure.

## 2023-07-18 NOTE — PROGRESS NOTES
[facility] Note     Patient Name: Maciej Kirk  Encounter Date: 07/18/2023     Have you been cared for by a [facility] Dermatologist in the last 3 years and, if so, which description applies to you? Yes. I have been here within the last 3 years, and my medical history has NOT changed since that time. I am MALE/not capable of bearing children. REVIEW OF SYSTEMS:  Have you recently had or currently have any of the following? No changes in my recent health. PAST MEDICAL HISTORY:  Have you personally ever had or currently have any of the following? If "YES," then please provide more detail. No changes in my medical history. FAMILY HISTORY:  Any "first degree relatives" (parent, brother, sister, or child) with the following? No changes in my family's known health. PATIENT EXPERIENCE:    Do you want the Dermatologist to perform a COMPLETE skin exam today including a clinical examination under the "bra and underwear" areas? NO  If necessary, do we have your permission to call and leave a detailed message on your Preferred Phone number that includes your specific medical information?   Yes      No Known Allergies   Current Outpatient Medications:   •  allopurinol (ZYLOPRIM) 100 mg tablet, TAKE ONE TABLET BY MOUTH ONCE DAILY, Disp: 30 tablet, Rfl: 0  •  aspirin (ECOTRIN LOW STRENGTH) 81 mg EC tablet, Take 1 tablet by mouth daily, Disp: , Rfl:   •  atorvastatin (LIPITOR) 20 mg tablet, TAKE ONE TABLET BY MOUTH ONCE DAILY, Disp: 30 tablet, Rfl: 0  •  candesartan-hydrochlorothiazide (ATACAND HCT) 16-12.5 MG per tablet, TAKE ONE TABLET BY MOUTH ONCE DAILY, Disp: 30 tablet, Rfl: 0  •  ciclopirox (LOPROX) 0.77 % cream, Apply topically 2 (two) times a day For 4 wk., Disp: 90 g, Rfl: 0  •  metoprolol tartrate (LOPRESSOR) 25 mg tablet, TAKE ONE TABLET BY MOUTH ONCE DAILY, Disp: 30 tablet, Rfl: 0  •  zolpidem (AMBIEN) 10 mg tablet, Take 1 tablet (10 mg total) by mouth daily at bedtime as needed for sleep, Disp: 14 tablet, Rfl: 0  •  clotrimazole-betamethasone (LOTRISONE) 1-0.05 % cream, Apply topically 2 (two) times a day (Patient not taking: Reported on 4/20/2023), Disp: 45 g, Rfl: 1  •  diazepam (VALIUM) 2 mg tablet, Take 1 tablet (2 mg total) by mouth 30 min pre-procedure, Disp: 1 tablet, Rfl: 0          Whom besides the patient is providing clinical information about today's encounter? NO ADDITIONAL HISTORIAN (patient alone provided history)    Physical Exam and Assessment/Plan by Diagnosis:    NEOPLASM OF UNCERTAIN BEHAVIOR OF SKIN    Physical Exam:  (Anatomic Location); (Size and Morphological Description); (Differential Diagnosis):  Right forearm; 1.1 cm crusted warty verrucous nodule; DDx irritated seborrheic keratosis versus SCC      Pertinent Positives:  Pertinent Negatives:    Assessment and Plan:  I have discussed with the patient that a sample of skin via a "skin biopsy” would be potentially helpful to further make a specific diagnosis under the microscope. Based on a thorough discussion of this condition and the management approach to it (including a comprehensive discussion of the known risks, side effects and potential benefits of treatment), the patient (family) agrees to implement the following specific plan:    Procedure:  Skin Biopsy. After a thorough discussion of treatment options and risk/benefits/alternatives (including but not limited to local pain, scarring, dyspigmentation, blistering, possible superinfection, and inability to confirm a diagnosis via histopathology), verbal and written consent were obtained and portion of the rash was biopsied for tissue sample. See below for consent that was obtained from patient and subsequent Procedure Note.     PROCEDURE TANGENTIAL (SHAVE) BIOPSY NOTE:    Performing Physician:   Anatomic Location; Clinical Description with size (cm); Pre-Op Diagnosis:   Right forearm; 1.1 cm crusted warty verrucous nodule; DDx irritated seborrheic keratosis versus SCC  Post-op diagnosis: Same     Local anesthesia: 1% xylocaine with epi      Topical anesthesia: None    Hemostasis: Aluminum chloride       After obtaining informed consent  at which time there was a discussion about the purpose of biopsy  and low risks of infection and bleeding. The area was prepped and draped in the usual fashion. Anesthesia was obtained with 1% lidocaine with epinephrine. A shave biopsy to an appropriate sampling depth was obtained by Shave (Dermablade or 15 blade) The resulting wound was covered with surgical ointment and bandaged appropriately. The patient tolerated the procedure well without complications and was without signs of functional compromise. Specimen has been sent for review by Dermatopathology. Standard post-procedure care has been explained and has been included in written form within the patient's copy of Informed Consent. INFORMED CONSENT DISCUSSION AND POST-OPERATIVE INSTRUCTIONS FOR PATIENT    I.  RATIONALE FOR PROCEDURE  I understand that a skin biopsy allows the Dermatologist to test a lesion or rash under the microscope to obtain a diagnosis. It usually involves numbing the area with numbing medication and removing a small piece of skin; sometimes the area will be closed with sutures. In this specific procedure, sutures are not usually needed. If any sutures are placed, then they are usually need to be removed in 2 weeks or less. I understand that my Dermatologist recommends that a skin "shave" biopsy be performed today. A local anesthetic, similar to the kind that a dentist uses when filling a cavity, will be injected with a very small needle into the skin area to be sampled. The injected skin and tissue underneath "will go to sleep” and become numb so no pain should be felt afterwards.   An instrument shaped like a tiny "razor blade" (shave biopsy instrument) will be used to cut a small piece of tissue and skin from the area so that a sample of tissue can be taken and examined more closely under the microscope. A slight amount of bleeding will occur, but it will be stopped with direct pressure and a pressure bandage and any other appropriate methods. I understands that a scar will form where the wound was created. Surgical ointment will be applied to help protect the wound. Sutures are not usually needed. II.  RISKS AND POTENTIAL COMPLICATIONS   I understand the risks and potential complications of a skin biopsy include but are not limited to the following:  Bleeding  Infection  Pain  Scar/keloid  Skin discoloration  Incomplete Removal  Recurrence  Nerve Damage/Numbness/Loss of Function  Allergic Reaction to Anesthesia  Biopsies are diagnostic procedures and based on findings additional treatment or evaluation may be required  Loss or destruction of specimen resulting in no additional findings    My Dermatologist has explained to me the nature of the condition, the nature of the procedure, and the benefits to be reasonably expected compared with alternative approaches. My Dermatologist has discussed the likelihood of major risks or complications of this procedure including the specific risks listed above, such as bleeding, infection, and scarring/keloid. I understand that a scar is expected after this procedure. I understand that my physician cannot predict if the scar will form a "keloid," which extends beyond the borders of the wound that is created. A keloid is a thick, painful, and bumpy scar. A keloid can be difficult to treat, as it does not always respond well to therapy, which includes injecting cortisone directly into the keloid every few weeks. While this usually reduces the pain and size of the scar, it does not eliminate it. I understand that photographs may be taken before and after the procedure.   These will be maintained as part of the medical providers confidential records and may not be made available to me.  I further authorize the medical provider to use the photographs for teaching purposes or to illustrate scientific papers, books, or lectures if in his/her judgment, medical research, education, or science may benefit from its use. I have had an opportunity to fully inquire about the risks and benefits of this procedure and its alternatives. I have been given ample time and opportunity to ask questions and to seek a second opinion if I wished to do so. I acknowledge that there have specifically been no guarantees as to the cosmetic results from the procedure. I am aware that with any procedure there is always the possibility of an unexpected complication. III. POST-PROCEDURAL CARE (WHAT YOU WILL NEED TO DO "AFTER THE BIOPSY" TO OPTIMIZE HEALING)    Keep the area clean and dry. Try NOT to remove the bandage or get it wet for the first 24 hours. Gently clean the area and apply surgical ointment (such as Vaseline petrolatum ointment, which is available "over the counter" and not a prescription) to the biopsy site for up to 2 weeks straight. This acts to protect the wound from the outside world. Sutures are not usually placed in this procedure. If any sutures were placed, return for suture removal as instructed (generally 1 week for the face, 2 weeks for the body). Take Acetaminophen (Tylenol) for discomfort, if no contraindications. Ibuprofen or aspirin could make bleeding worse. Call our office immediately for signs of infection: fever, chills, increased redness, warmth, tenderness, discomfort/pain, or pus or foul smell coming from the wound. WHAT TO DO IF THERE IS ANY BLEEDING? If a small amount of bleeding is noticed, place a clean cloth over the area and apply firm pressure for ten minutes. Check the wound after 10 minutes of direct pressure. If bleeding persists, try one more time for an additional 10 minutes of direct pressure on the area.   If the bleeding becomes heavier or does not stop after the second attempt, or if you have any other questions about this procedure, then please call your [facility] Dermatologist by calling [phone] (SKIN). I hereby acknowledge that I have reviewed and verified the site with my Dermatologist and have requested and authorized my Dermatologist to proceed with the procedure.       Scribe Attestation    I,:  Antonella Huitron am acting as a scribe while in the presence of the attending physician.:       I,:  Lazara De La Cruz MD personally performed the services described in this documentation    as scribed in my presence.:

## 2023-07-21 ENCOUNTER — TELEPHONE (OUTPATIENT)
Dept: NEUROSURGERY | Facility: CLINIC | Age: 63
End: 2023-07-21

## 2023-07-21 ENCOUNTER — OFFICE VISIT (OUTPATIENT)
Dept: URGENT CARE | Age: 63
End: 2023-07-21
Payer: COMMERCIAL

## 2023-07-21 VITALS
SYSTOLIC BLOOD PRESSURE: 156 MMHG | TEMPERATURE: 97.7 F | RESPIRATION RATE: 18 BRPM | OXYGEN SATURATION: 98 % | HEART RATE: 78 BPM | DIASTOLIC BLOOD PRESSURE: 88 MMHG

## 2023-07-21 DIAGNOSIS — H92.12 EAR DRAINAGE, LEFT: Primary | ICD-10-CM

## 2023-07-21 PROCEDURE — 88305 TISSUE EXAM BY PATHOLOGIST: CPT | Performed by: PATHOLOGY

## 2023-07-21 PROCEDURE — 99213 OFFICE O/P EST LOW 20 MIN: CPT

## 2023-07-21 RX ORDER — AMOXICILLIN AND CLAVULANATE POTASSIUM 875; 125 MG/1; MG/1
1 TABLET, FILM COATED ORAL EVERY 12 HOURS SCHEDULED
Qty: 14 TABLET | Refills: 0 | Status: SHIPPED | OUTPATIENT
Start: 2023-07-21 | End: 2023-07-27

## 2023-07-21 NOTE — PROGRESS NOTES
[facility]        NAME: Oliver Barth is a 58 y.o. male  : 1960    MRN: [id number]  DATE: 2023  TIME: 3:11 PM    Assessment and Plan   Ear drainage, left [H92.12]  1. Ear drainage, left  amoxicillin-clavulanate (AUGMENTIN) 875-125 mg per tablet        Patient presents for eval of left ear drainage and pain. [facility] has appt for CSF leak repair surgery in August. Follows with neurosurg. Attempted to contact PCP/ENT. States drainage is yellow and diff than normal baseline leakage. Assessment notes pain with movement, excessive purulent drainage. Unable to visualize TM tube. Advised oral abx and continue to reach out to specialist.     Patient Instructions       Follow up with PCP as needed    Chief Complaint     Chief Complaint   Patient presents with   • Earache     Pt c/o left ear pain, yellowish drainage. Pain started yesterday. Attempted to call PCP and ENT, no returned call or cannot get thru to them. History of Present Illness       Patient presents for eval of left ear drainage and pain. [facility] has appt for CSF leak repair surgery in August. Follows with neurosurg. Attempted to contact PCP/ENT. States drainage is yellow and diff than normal baseline leakage. Assessment notes pain with movement, excessive purulent drainage. Unable to visualize TM tube. Advised oral abx and continue to reach out to specialist.       Review of Systems   Review of Systems   Constitutional: Negative for fever. HENT: Positive for ear discharge and ear pain. Negative for facial swelling. Neurological: Negative for dizziness, light-headedness and headaches. All other systems reviewed and are negative.         Current Medications       Current Outpatient Medications:   •  allopurinol (ZYLOPRIM) 100 mg tablet, TAKE ONE TABLET BY MOUTH ONCE DAILY, Disp: 30 tablet, Rfl: 0  •  amoxicillin-clavulanate (AUGMENTIN) 875-125 mg per tablet, Take 1 tablet by mouth every 12 (twelve) hours for 7 days, Disp: 14 tablet, Rfl: 0  •  aspirin (ECOTRIN LOW STRENGTH) 81 mg EC tablet, Take 1 tablet by mouth daily, Disp: , Rfl:   •  atorvastatin (LIPITOR) 20 mg tablet, TAKE ONE TABLET BY MOUTH ONCE DAILY, Disp: 30 tablet, Rfl: 0  •  candesartan-hydrochlorothiazide (ATACAND HCT) 16-12.5 MG per tablet, TAKE ONE TABLET BY MOUTH ONCE DAILY, Disp: 30 tablet, Rfl: 0  •  ciclopirox (LOPROX) 0.77 % cream, Apply topically 2 (two) times a day For 4 wk., Disp: 90 g, Rfl: 0  •  metoprolol tartrate (LOPRESSOR) 25 mg tablet, TAKE ONE TABLET BY MOUTH ONCE DAILY, Disp: 30 tablet, Rfl: 0  •  zolpidem (AMBIEN) 10 mg tablet, Take 1 tablet (10 mg total) by mouth daily at bedtime as needed for sleep, Disp: 14 tablet, Rfl: 0  •  clotrimazole-betamethasone (LOTRISONE) 1-0.05 % cream, Apply topically 2 (two) times a day (Patient not taking: Reported on 4/20/2023), Disp: 45 g, Rfl: 1  •  diazepam (VALIUM) 2 mg tablet, Take 1 tablet (2 mg total) by mouth 30 min pre-procedure, Disp: 1 tablet, Rfl: 0    Current Allergies     Allergies as of 07/21/2023   • (No Known Allergies)            The following portions of the patient's history were reviewed and updated as appropriate: allergies, current medications, past family history, past medical history, past social history, past surgical history and problem list.     Past Medical History:   Diagnosis Date   • Acute myocardial infarction ([address])     "no heart damage" approx 8 yrs ago did get one stent"   • Arthralgia     last assessed 7/8/13   • Arthritis    • Colon polyp    • Contusion of skin with intact surface     of the left medial thigh,last assessed 6/28/13   • Coronary artery disease    • Gout     last assessed 10/29/13   • History of sepsis     urinary and was in hosp 3 days /7/2020   • Hypertension    • Kidney stone    • Lump of skin     last assessed 7/19/13//"fatty tumor and surg removed"   • S/P coronary artery stent placement     x1   • Wears glasses     at night       Past Surgical History: Procedure Laterality Date   • CARDIAC CATHETERIZATION     • COLONOSCOPY  12/10/2009    Complete//2020   • CORONARY STENT PLACEMENT  08/2012    stenting of the LAD artery 95% lesion to 0% residual stenosis   • FL RETROGRADE PYELOGRAM  9/1/2021   • KNEE SURGERY Left     x4   • LUMBAR EPIDURAL INJECTION      x1   • NV CYSTO BLADDER W/URETERAL CATHETERIZATION Left 7/4/2020    Procedure: CYSTOSCOPY WITH INSERTION STENT URETERAL;  Surgeon: Vicente Troy MD;  Location: [facility];  Service: Urology   • NV CYSTO/URETERO W/LITHOTRIPSY &INDWELL STENT INSRT Left 8/5/2020    Procedure: CYSTO, URETEROSCOPY STENT exchange;  Surgeon: Lory Morales MD;  Location: [facility];  Service: Urology   • NV CYSTO/URETERO W/LITHOTRIPSY &INDWELL STENT INSRT Right 9/1/2021    Procedure: CYSTOSCOPY URETEROSCOPY WITH BASKET STONE EXTRACTION, RETROGRADE PYELOGRAM AND INSERTION STENT URETERAL;  Surgeon: Brittnee Vidales MD;  Location: [facility];  Service: Urology       Family History   Problem Relation Age of Onset   • Edema Mother         Cerebral   • Aneurysm Father         of the cerebellar artery   • Aneurysm Brother         of the cerebellar artery         Medications have been verified. Objective   /88   Pulse 78   Temp 97.7 °F (36.5 °C) (Tympanic)   Resp 18   SpO2 98%   No LMP for male patient. Physical Exam     Physical Exam  Vitals reviewed. Constitutional:       Appearance: Normal appearance. HENT:      Left Ear: Decreased hearing noted. Drainage and tenderness present. Skin:     General: Skin is warm and dry. Capillary Refill: Capillary refill takes less than 2 seconds. Neurological:      General: No focal deficit present. Mental Status: He is alert and oriented to person, place, and time.

## 2023-07-24 DIAGNOSIS — I10 ESSENTIAL HYPERTENSION: ICD-10-CM

## 2023-07-24 RX ORDER — CANDESARTAN CILEXETIL AND HYDROCHLOROTHIAZIDE 16; 12.5 MG/1; MG/1
TABLET ORAL
Qty: 30 TABLET | Refills: 0 | Status: SHIPPED | OUTPATIENT
Start: 2023-07-24 | End: 2023-07-27

## 2023-07-24 NOTE — RESULT ENCOUNTER NOTE
DERMATOPATHOLOGY RESULT NOTE    Results reviewed by ordering physician. Called patient to personally discuss results. My Chart      Instructions for Clinical Derm Team:   (remember to route Result Note to appropriate staff):    None    Result & Plan by Specimen:    Specimen A: benign  Plan: BENIGN WARTY GROWTH . NO SIGN OF MALIGNANCY    Final Diagnosis  A. Skin, right forearm, shave biopsy:  Verrucous keratosis, irritated.

## 2023-07-25 DIAGNOSIS — M10.9 GOUT, UNSPECIFIED CAUSE, UNSPECIFIED CHRONICITY, UNSPECIFIED SITE: ICD-10-CM

## 2023-07-25 RX ORDER — ALLOPURINOL 100 MG/1
TABLET ORAL
Qty: 30 TABLET | Refills: 0 | Status: SHIPPED | OUTPATIENT
Start: 2023-07-25 | End: 2023-07-27

## 2023-07-26 ENCOUNTER — APPOINTMENT (OUTPATIENT)
Dept: LAB | Age: 63
End: 2023-07-26
Payer: COMMERCIAL

## 2023-07-26 DIAGNOSIS — E78.2 MIXED HYPERLIPIDEMIA: ICD-10-CM

## 2023-07-26 DIAGNOSIS — Z01.818 PRE-PROCEDURAL EXAMINATION: ICD-10-CM

## 2023-07-26 DIAGNOSIS — I10 ESSENTIAL HYPERTENSION: ICD-10-CM

## 2023-07-26 DIAGNOSIS — G96.01 CSF OTORRHEA: ICD-10-CM

## 2023-07-26 DIAGNOSIS — Q01.8 TEMPORAL ENCEPHALOCELE (HCC): ICD-10-CM

## 2023-07-26 DIAGNOSIS — R74.01 ELEVATED AST (SGOT): ICD-10-CM

## 2023-07-26 DIAGNOSIS — R73.01 ELEVATED FASTING BLOOD SUGAR: ICD-10-CM

## 2023-07-26 LAB
ALBUMIN SERPL BCP-MCNC: 4.4 G/DL (ref 3.5–5)
ALP SERPL-CCNC: 108 U/L (ref 46–116)
ALT SERPL W P-5'-P-CCNC: 83 U/L (ref 12–78)
ANION GAP SERPL CALCULATED.3IONS-SCNC: 5 MMOL/L
APTT PPP: 28 SECONDS (ref 23–37)
AST SERPL W P-5'-P-CCNC: 44 U/L (ref 5–45)
BASOPHILS # BLD AUTO: 0.06 THOUSANDS/ÂΜL (ref 0–0.1)
BASOPHILS NFR BLD AUTO: 1 % (ref 0–1)
BILIRUB SERPL-MCNC: 1.63 MG/DL (ref 0.2–1)
BUN SERPL-MCNC: 20 MG/DL (ref 5–25)
CALCIUM SERPL-MCNC: 9.7 MG/DL (ref 8.3–10.1)
CHLORIDE SERPL-SCNC: 105 MMOL/L (ref 96–108)
CO2 SERPL-SCNC: 28 MMOL/L (ref 21–32)
CREAT SERPL-MCNC: 1.05 MG/DL (ref 0.6–1.3)
EOSINOPHIL # BLD AUTO: 0.17 THOUSAND/ÂΜL (ref 0–0.61)
EOSINOPHIL NFR BLD AUTO: 4 % (ref 0–6)
ERYTHROCYTE [DISTWIDTH] IN BLOOD BY AUTOMATED COUNT: 12.4 % (ref 11.6–15.1)
GFR SERPL CREATININE-BSD FRML MDRD: 74 ML/MIN/1.73SQ M
GLUCOSE P FAST SERPL-MCNC: 106 MG/DL (ref 65–99)
HCT VFR BLD AUTO: 44.2 % (ref 36.5–49.3)
HGB BLD-MCNC: 15.3 G/DL (ref 12–17)
IMM GRANULOCYTES # BLD AUTO: 0.01 THOUSAND/UL (ref 0–0.2)
IMM GRANULOCYTES NFR BLD AUTO: 0 % (ref 0–2)
INR PPP: 0.96 (ref 0.84–1.19)
LYMPHOCYTES # BLD AUTO: 1.48 THOUSANDS/ÂΜL (ref 0.6–4.47)
LYMPHOCYTES NFR BLD AUTO: 31 % (ref 14–44)
MCH RBC QN AUTO: 31.2 PG (ref 26.8–34.3)
MCHC RBC AUTO-ENTMCNC: 34.6 G/DL (ref 31.4–37.4)
MCV RBC AUTO: 90 FL (ref 82–98)
MONOCYTES # BLD AUTO: 0.33 THOUSAND/ÂΜL (ref 0.17–1.22)
MONOCYTES NFR BLD AUTO: 7 % (ref 4–12)
NEUTROPHILS # BLD AUTO: 2.8 THOUSANDS/ÂΜL (ref 1.85–7.62)
NEUTS SEG NFR BLD AUTO: 57 % (ref 43–75)
NRBC BLD AUTO-RTO: 0 /100 WBCS
PLATELET # BLD AUTO: 203 THOUSANDS/UL (ref 149–390)
PMV BLD AUTO: 9.9 FL (ref 8.9–12.7)
POTASSIUM SERPL-SCNC: 4.3 MMOL/L (ref 3.5–5.3)
PROT SERPL-MCNC: 8 G/DL (ref 6.4–8.4)
PROTHROMBIN TIME: 12.9 SECONDS (ref 11.6–14.5)
RBC # BLD AUTO: 4.9 MILLION/UL (ref 3.88–5.62)
SODIUM SERPL-SCNC: 138 MMOL/L (ref 135–147)
WBC # BLD AUTO: 4.85 THOUSAND/UL (ref 4.31–10.16)

## 2023-07-26 PROCEDURE — 80053 COMPREHEN METABOLIC PANEL: CPT

## 2023-07-26 PROCEDURE — 85730 THROMBOPLASTIN TIME PARTIAL: CPT

## 2023-07-26 PROCEDURE — 85025 COMPLETE CBC W/AUTO DIFF WBC: CPT

## 2023-07-26 PROCEDURE — 36415 COLL VENOUS BLD VENIPUNCTURE: CPT

## 2023-07-26 PROCEDURE — 85610 PROTHROMBIN TIME: CPT

## 2023-07-31 ENCOUNTER — LAB REQUISITION (OUTPATIENT)
Dept: LAB | Facility: HOSPITAL | Age: 63
End: 2023-07-31
Payer: COMMERCIAL

## 2023-07-31 ENCOUNTER — APPOINTMENT (OUTPATIENT)
Dept: LAB | Age: 63
End: 2023-07-31
Payer: COMMERCIAL

## 2023-07-31 DIAGNOSIS — Z01.818 PRE-OP TESTING: ICD-10-CM

## 2023-07-31 DIAGNOSIS — Z01.818 ENCOUNTER FOR OTHER PREPROCEDURAL EXAMINATION: ICD-10-CM

## 2023-07-31 PROCEDURE — 86850 RBC ANTIBODY SCREEN: CPT | Performed by: NEUROLOGICAL SURGERY

## 2023-07-31 PROCEDURE — 36415 COLL VENOUS BLD VENIPUNCTURE: CPT

## 2023-07-31 PROCEDURE — 86900 BLOOD TYPING SEROLOGIC ABO: CPT | Performed by: NEUROLOGICAL SURGERY

## 2023-07-31 PROCEDURE — 86901 BLOOD TYPING SEROLOGIC RH(D): CPT | Performed by: NEUROLOGICAL SURGERY

## 2023-08-01 ENCOUNTER — APPOINTMENT (OUTPATIENT)
Dept: LAB | Facility: HOSPITAL | Age: 63
End: 2023-08-01
Payer: COMMERCIAL

## 2023-08-01 ENCOUNTER — OFFICE VISIT (OUTPATIENT)
Dept: FAMILY MEDICINE CLINIC | Facility: CLINIC | Age: 63
End: 2023-08-01
Payer: COMMERCIAL

## 2023-08-01 VITALS
BODY MASS INDEX: 31.46 KG/M2 | DIASTOLIC BLOOD PRESSURE: 70 MMHG | TEMPERATURE: 97.8 F | WEIGHT: 207.6 LBS | SYSTOLIC BLOOD PRESSURE: 118 MMHG | HEIGHT: 68 IN | OXYGEN SATURATION: 98 % | HEART RATE: 63 BPM | RESPIRATION RATE: 16 BRPM

## 2023-08-01 DIAGNOSIS — N20.0 NEPHROLITHIASIS: ICD-10-CM

## 2023-08-01 DIAGNOSIS — M10.40 OTHER SECONDARY GOUT, UNSPECIFIED CHRONICITY, UNSPECIFIED SITE: ICD-10-CM

## 2023-08-01 DIAGNOSIS — G96.01 CSF OTORRHEA: ICD-10-CM

## 2023-08-01 DIAGNOSIS — Z01.818 PREOP EXAMINATION: Primary | ICD-10-CM

## 2023-08-01 DIAGNOSIS — I49.3 PVC'S (PREMATURE VENTRICULAR CONTRACTIONS): ICD-10-CM

## 2023-08-01 DIAGNOSIS — E78.2 MIXED HYPERLIPIDEMIA: ICD-10-CM

## 2023-08-01 DIAGNOSIS — I25.10 CORONARY ARTERY DISEASE INVOLVING NATIVE CORONARY ARTERY OF NATIVE HEART WITHOUT ANGINA PECTORIS: ICD-10-CM

## 2023-08-01 DIAGNOSIS — E66.9 OBESITY (BMI 30-39.9): ICD-10-CM

## 2023-08-01 DIAGNOSIS — Q01.8 TEMPORAL ENCEPHALOCELE (HCC): ICD-10-CM

## 2023-08-01 DIAGNOSIS — Z95.5 S/P CORONARY ARTERY STENT PLACEMENT: ICD-10-CM

## 2023-08-01 DIAGNOSIS — Z01.818 PRE-PROCEDURAL EXAMINATION: ICD-10-CM

## 2023-08-01 DIAGNOSIS — R73.01 ELEVATED FASTING BLOOD SUGAR: ICD-10-CM

## 2023-08-01 DIAGNOSIS — M10.9 GOUT, UNSPECIFIED CAUSE, UNSPECIFIED CHRONICITY, UNSPECIFIED SITE: ICD-10-CM

## 2023-08-01 DIAGNOSIS — I10 ESSENTIAL HYPERTENSION: ICD-10-CM

## 2023-08-01 DIAGNOSIS — H92.12 CHRONIC OTORRHEA OF LEFT EAR: ICD-10-CM

## 2023-08-01 LAB
ATRIAL RATE: 52 BPM
P AXIS: -1 DEGREES
PR INTERVAL: 156 MS
QRS AXIS: -2 DEGREES
QRSD INTERVAL: 98 MS
QT INTERVAL: 412 MS
QTC INTERVAL: 383 MS
T WAVE AXIS: -13 DEGREES
VENTRICULAR RATE: 52 BPM

## 2023-08-01 PROCEDURE — 93010 ELECTROCARDIOGRAM REPORT: CPT | Performed by: INTERNAL MEDICINE

## 2023-08-01 PROCEDURE — 99242 OFF/OP CONSLTJ NEW/EST SF 20: CPT | Performed by: FAMILY MEDICINE

## 2023-08-01 RX ORDER — ALLOPURINOL 100 MG/1
100 TABLET ORAL DAILY
Qty: 30 TABLET | Refills: 5 | Status: SHIPPED | OUTPATIENT
Start: 2023-08-01

## 2023-08-01 RX ORDER — ALLOPURINOL 100 MG/1
100 TABLET ORAL DAILY
COMMUNITY
End: 2023-08-01 | Stop reason: SDUPTHER

## 2023-08-01 RX ORDER — CANDESARTAN CILEXETIL AND HYDROCHLOROTHIAZIDE 16; 12.5 MG/1; MG/1
1 TABLET ORAL DAILY
COMMUNITY
End: 2023-08-01 | Stop reason: SDUPTHER

## 2023-08-01 RX ORDER — CANDESARTAN CILEXETIL AND HYDROCHLOROTHIAZIDE 16; 12.5 MG/1; MG/1
1 TABLET ORAL DAILY
Qty: 30 TABLET | Refills: 5 | Status: SHIPPED | OUTPATIENT
Start: 2023-08-01

## 2023-08-01 RX ORDER — ATORVASTATIN CALCIUM 20 MG/1
20 TABLET, FILM COATED ORAL DAILY
COMMUNITY
End: 2023-08-01 | Stop reason: SDUPTHER

## 2023-08-01 RX ORDER — ATORVASTATIN CALCIUM 20 MG/1
20 TABLET, FILM COATED ORAL DAILY
Qty: 30 TABLET | Refills: 5 | Status: SHIPPED | OUTPATIENT
Start: 2023-08-01

## 2023-08-01 NOTE — PATIENT INSTRUCTIONS
Patient is medically cleared for surgery 8/7/23 at [address] by Dr. Nubia Walker, and Dr. Murali Greer.  Take all meds as directed and patient stopped baby aspirin last Thursday and may stop atorvastatin and allopurinol while in hospital.     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

## 2023-08-01 NOTE — PROGRESS NOTES
Name: Guillermina uBrnett      : 1960      MRN: [id number]  Encounter Provider: Manuel Nichols DO  Encounter Date: 2023   Encounter department: [address] [place] [address] PRIMARY CARE  Chief Complaint   Patient presents with   • Pre-op Exam     Pt is having Endoscopic left middle fossa craniotomy for repair of tegmen defect and CSF leak with temporalis fascia or muscle flap, with neuromonitoring and intraoperative lumbar drain placement by Dr Keyanna Hayes with General at [address] [place] [address] on 2023     Patient Instructions     Patient is medically cleared for surgery 23 at [address] by Dr. Sachin Ruff, and Dr. Keyanna Hayes. Take all meds as directed and patient stopped baby aspirin last Thursday and may stop atorvastatin and allopurinol while in hospital.     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 Preop examination    2. Other secondary gout, unspecified chronicity, unspecified site    3. Mixed hyperlipidemia  -     atorvastatin (LIPITOR) 20 mg tablet; Take 1 tablet (20 mg total) by mouth daily    4. Nephrolithiasis    5. Obesity (BMI 30-39.9)    6. S/P coronary artery stent placement    7. Essential hypertension  -     metoprolol tartrate (LOPRESSOR) 25 mg tablet;  Take 1 tablet (25 mg total) by mouth every 12 (twelve) hours  -     candesartan-hydrochlorothiazide (ATACAND HCT) 16-12.5 MG per tablet; Take 1 tablet by mouth daily    8. Elevated fasting blood sugar    9. Coronary artery disease involving native coronary artery of native heart without angina pectoris    10. PVC's (premature ventricular contractions)    11. Chronic otorrhea of left ear    12. 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 Gout, unspecified cause, unspecified chronicity, unspecified site  -     allopurinol (ZYLOPRIM) 100 mg tablet; Take 1 tablet (100 mg total) by mouth daily           Subjective      Pre-op Exam (Pt is having Endoscopic left middle fossa craniotomy for repair of tegmen defect and CSF leak with temporalis fascia or muscle flap, with neuromonitoring and intraoperative lumbar drain placement by Dr Kristen Aj with [facility] on 8/7/2023)      Patient here for preop. Had labs and had a recent stress test and did not need an ekg. Stopped baby aspirin last Thursday as per patient. Review of Systems   Constitutional: Negative. Negative for fever. HENT: Positive for ear discharge (left ear drainage). Eyes: Negative. Respiratory: Negative. Cardiovascular: Negative. Gastrointestinal: Negative. Endocrine: Negative. Genitourinary: Negative. Musculoskeletal: Negative. Skin: Negative. Allergic/Immunologic: Negative. Neurological: Negative. Hematological: Negative. Psychiatric/Behavioral: Negative.         Current Outpatient Medications on File Prior to Visit   Medication Sig   • ASPIRIN 81 PO Take 81 mg by mouth in the morning   • [DISCONTINUED] allopurinol (ZYLOPRIM) 100 mg tablet Take 100 mg by mouth daily   • [DISCONTINUED] atorvastatin (LIPITOR) 20 mg tablet Take 20 mg by mouth daily   • [DISCONTINUED] candesartan-hydrochlorothiazide (ATACAND HCT) 16-12.5 MG per tablet Take 1 tablet by mouth daily   • [DISCONTINUED] metoprolol tartrate (LOPRESSOR) 25 mg tablet Take 25 mg by mouth every 12 (twelve) hours       Objective     /70 (BP Location: Left arm, Patient Position: Sitting, Cuff Size: Adult)   Pulse 63   Temp 97.8 °F (36.6 °C) (Temporal)   Resp 16   Ht 5' 8" (1.727 m)   Wt 94.2 kg (207 lb 9.6 oz)   SpO2 98%   BMI 31.57 kg/m²     Physical Exam  Constitutional:       Appearance: He is well-developed. He is obese. HENT:      Head: Normocephalic and atraumatic. Right Ear: Tympanic membrane, ear canal and external ear normal.      Left Ear: Tympanic membrane, ear canal and external ear normal.      Nose: Nose normal.      Mouth/Throat:      Mouth: Mucous membranes are moist.   Eyes:      Conjunctiva/sclera: Conjunctivae normal.      Pupils: Pupils are equal, round, and reactive to light. Cardiovascular:      Rate and Rhythm: Normal rate and regular rhythm. Pulses: Normal pulses. Heart sounds: Normal heart sounds. Pulmonary:      Effort: Pulmonary effort is normal.      Breath sounds: Normal breath sounds. Abdominal:      General: Abdomen is flat. Bowel sounds are normal.      Palpations: Abdomen is soft. Musculoskeletal:         General: Normal range of motion. Cervical back: Normal range of motion and neck supple. Skin:     General: Skin is warm and dry. Capillary Refill: Capillary refill takes less than 2 seconds. Neurological:      General: No focal deficit present. Mental Status: He is alert and oriented to person, place, and time. Mental status is at baseline. Deep Tendon Reflexes: Reflexes are normal and symmetric. Psychiatric:         Mood and Affect: Mood normal.         Behavior: Behavior normal.         Thought Content:  Thought content normal.         Judgment: Judgment normal.       Rupa Carter DO

## 2023-08-03 LAB
ABO GROUP BLD: NORMAL
BLD GP AB SCN SERPL QL: NEGATIVE
RH BLD: POSITIVE
SPECIMEN EXPIRATION DATE: NORMAL

## 2023-08-04 NOTE — H&P
History & Physical Exam  Cuco Schwarz 58 y.o. male MRN: [id number]    Assessment & Plan:   Patient is a 78-year-old male with h/o HTN, HLD, ACS, CAD s/p PCI in 2012 on ASA 81 mg daily, urosepsis requiring several days of hospitalization in 2020 who p/w left hearing loss and persistent left CSF otorrhea now undergoing endoscopic left middle fossa craniotomy for repair of tegmen defect and CSF leak with temporalis fascia or muscle flap, with neuromonitoring (CN 7/8) and intraoperative lumbar drain placement after my evaluation and medical clearance. No changes per patient since my last evaluation in clinic. HPI  Please refer to my office note for full HPI leading up to this surgery.     History:  Past Medical History:   Diagnosis Date   • Acute myocardial infarction ([address])     "no heart damage" approx 8 yrs ago did get one stent"   • Arthralgia     last assessed 7/8/13   • Arthritis    • Colon polyp    • Contusion of skin with intact surface     of the left medial thigh,last assessed 6/28/13   • Coronary artery disease    • Gout     last assessed 10/29/13   • History of sepsis     urinary and was in hosp 3 days /7/2020   • Hyperlipidemia    • Hypertension    • Kidney stone    • Lump of skin     last assessed 7/19/13//"fatty tumor and surg removed"   • S/P coronary artery stent placement     x1   • Wears glasses     at night     Past Surgical History:   Procedure Laterality Date   • CARDIAC CATHETERIZATION     • COLONOSCOPY  12/10/2009    Complete//2020   • CORONARY STENT PLACEMENT  08/2012    stenting of the LAD artery 95% lesion to 0% residual stenosis   • FL RETROGRADE PYELOGRAM  9/1/2021   • KNEE SURGERY Left     x4   • LUMBAR EPIDURAL INJECTION      x1   • LA CYSTO BLADDER W/URETERAL CATHETERIZATION Left 7/4/2020    Procedure: CYSTOSCOPY WITH INSERTION STENT URETERAL;  Surgeon: Pasquale Shaw MD;  [facility];  Service: Urology   • LA CYSTO/URETERO W/LITHOTRIPSY &INDWELL STENT INSRT Left 8/5/2020 Procedure: CYSTO, URETEROSCOPY STENT exchange;  Surgeon: Ramesh Felix MD;  [facility];  Service: Urology   • [place] CYSTO/URETERO W/LITHOTRIPSY &INDWELL STENT INSRT Right 9/1/2021    Procedure: CYSTOSCOPY URETEROSCOPY WITH BASKET STONE EXTRACTION, RETROGRADE PYELOGRAM AND INSERTION STENT URETERAL;  Surgeon: Aquilino Ohara MD;  [facility];  Service: Urology       Current medications:  No current facility-administered medications for this encounter.     Current Outpatient Medications:   •  allopurinol (ZYLOPRIM) 100 mg tablet, Take 1 tablet (100 mg total) by mouth daily, Disp: 30 tablet, Rfl: 5  •  ASPIRIN 81 PO, Take 81 mg by mouth in the morning, Disp: , Rfl:   •  atorvastatin (LIPITOR) 20 mg tablet, Take 1 tablet (20 mg total) by mouth daily, Disp: 30 tablet, Rfl: 5  •  candesartan-hydrochlorothiazide (ATACAND HCT) 16-12.5 MG per tablet, Take 1 tablet by mouth daily, Disp: 30 tablet, Rfl: 5  •  Cholecalciferol (VITAMIN D3 PO), Take by mouth daily after breakfast, Disp: , Rfl:   •  metoprolol tartrate (LOPRESSOR) 25 mg tablet, Take 1 tablet (25 mg total) by mouth every 12 (twelve) hours (Patient taking differently: Take 25 mg by mouth daily after breakfast), Disp: 30 tablet, Rfl: 5    Allergies:  No Known Allergies    Review of systems:  General ROS: negative for chills, fatigue, fever, night sweats, or unintentional weight changes  Respiratory ROS: no cough, shortness of breath, or wheezing  Cardiovascular ROS: no chest pain or dyspnea on exertion  Gastrointestinal ROS: no abdominal pain, change in bowel habits, or black or bloody stools  Genito-Urinary ROS: no dysuria, trouble voiding, or hematuria  Musculoskeletal ROS: negative  Neurological ROS: negative except for symptoms outlined in HPI and Discussion     Physical exam:  Neurologic:  AOX3  PERRL, EOMI  FS  Follows commands briskly throughout  5/5 in all extremities, no drift  Sensation intact throughout  Baseline neurologic deficits as per recent clinic note    CV: regular sinus rhythm without murmur or abnormalities  Respiratory: normal breath sounds  Abdominal: soft, non-tender, normal bowel sounds  Extremities: normal coloration, no edema or visible/gross abnormalities     Lab Results: All relevant preoperative labs reviewed  Imaging: All relevant preoperative imaging reviewed  EKG, Pathology, and Other Studies: All relevant preoperative studies reviewed    We will proceed with surgery as planned.     Sohan Quinn MD

## 2023-08-06 ENCOUNTER — ANESTHESIA EVENT (OUTPATIENT)
Dept: PERIOP | Facility: HOSPITAL | Age: 63
DRG: 026 | End: 2023-08-06
Payer: COMMERCIAL

## 2023-08-06 PROBLEM — I21.9 MI (MYOCARDIAL INFARCTION) (HCC): Status: ACTIVE | Noted: 2023-08-06

## 2023-08-06 NOTE — ANESTHESIA PREPROCEDURE EVALUATION
Procedure:  Left middle fossa craniotomy, retromastoid approach, for ENT surgery (Left: Head)  Endoscopic left middle fossa craniotomy for repair of tegmen defect and CSF leak with temporalis fascia or muscle flap, with neuromonitoring (CN 7/8) and intraoperative lumbar drain placement (Left: Head)    Relevant Problems   CARDIO   (+) Coronary artery disease involving native coronary artery of native heart without angina pectoris   (+) Essential hypertension   (+) Hyperlipidemia   (+) MI (myocardial infarction) (HCC)   (+) PVC's (premature ventricular contractions)      /RENAL   (+) Acute kidney injury (nontraumatic) (HCC)   (+) Nephrolithiasis      MUSCULOSKELETAL   (+) Gout      Other   (+) Elevated fasting blood sugar   (+) S/P coronary artery stent placement   (+) Transaminitis       NM myocardial perfusion Sept 2022[de-identified]  •  Stress ECG: No ST deviation is noted. The ECG was not diagnostic due to pharmacological (vasodilator) stress. •  Perfusion: There is a left ventricular perfusion defect that is small in size with mild reduction in uptake present in the apical location(s) that is paradoxical. The stress radiotracer uptake is improved over the resting images. The defect appears to be an artifact. Gated imaging was normal.  •  Stress Function: Left ventricular function post-stress is normal. Post-stress ejection fraction is 57 %. •  Stress Combined Conclusion: There is image artifact, without diagnostic evidence for perfusion abnormality. •  Perfusion Defect Conclusion: There is no evidence of transient ischemic dilation (TID). Kong Slack is no evidence of inducible myocardial ischemia. EKG:  Sinus bradycardia  Inferior infarct (cited on or before 04-JUL-2020)  Abnormal ECG  When compared with ECG of 04-JUL-2020 16:56,  Vent.  rate has decreased BY  57 BPM  Questionable change in initial forces of Inferior leads  T wave inversion less evident in Anterolateral leads  Confirmed by Kristen Sandhu ([id number]) on 8/1/2023 3:37:05 PM    Physical Exam    Airway    Mallampati score: III  TM Distance: >3 FB  Neck ROM: full     Dental       Cardiovascular      Pulmonary      Other Findings    CBC: wnl      Component Ref Range & Units 7/26/23 1014 4/6/23 1020 3/17/22 0905 9/2/21 0612 9/1/21 0541 8/31/21 0745 8/14/21 0001   Sodium 135 - 147 mmol/L 138  137  141 R  140 R  138 R  138 R  143 R    Potassium 3.5 - 5.3 mmol/L 4.3  3.7  4.4  3.9  4.0  3.8  3.6    Chloride 96 - 108 mmol/L 105  103  107 R  113 High  R  109 High  R  104 R  103 R    CO2 21 - 32 mmol/L 28  29  29  24  26  25  32    ANION GAP mmol/L 5  5 R  5 R  3 Low  R  3 Low  R  9 R  8 R    BUN 5 - 25 mg/dL 20  14  12  11  13  18  17    Creatinine 0.60 - 1.30 mg/dL 1.05  1.11 CM  1.01 CM  0.82 CM  1.48 High  CM  1.35 High  CM  1.60 High  CM    Comment: Standardized to IDMS reference method   Glucose, Fasting 65 - 99 mg/dL 106 High   117 High  CM  106 High  CM   114 High  CM      Comment: Specimen collection should occur prior to Sulfasalazine administration due to the potential for falsely depressed results. Specimen collection should occur prior to Sulfapyridine administration due to the potential for falsely elevated results. Calcium 8.3 - 10.1 mg/dL 9.7  8.9  9.2  8.3  7.9 Low   8.8  9.6    AST 5 - 45 U/L 44  60 High  CM  25 CM    27 CM  26 CM       ALT 12 - 78 U/L 83 High   54 CM  24 CM    29 CM  29 CM       Alkaline Phosphatase 46 - 116 U/L 108  78  77    89  75    Total Protein 6.4 - 8.4 g/dL 8.0  7.4  7.3 R    7.5 R  7.9 R    Albumin 3.5 - 5.0 g/dL 4.4  4.0  4.2    4.0  4.6    Total Bilirubin 0.20 - 1.00 mg/dL 1.63 High   1.36 High  CM  1.82 High  CM    1.59 High  CM  1.52 High  CM       eGFR ml/min/1.73sq m 74  70  79  95  50  56  45                            Anesthesia Plan  ASA Score- 3     Anesthesia Type- general with ASA Monitors. Additional Monitors: arterial line. Airway Plan: ETT. Plan Factors-    Chart reviewed. EKG reviewed.  Imaging results reviewed. Existing labs reviewed. Patient summary reviewed. Patient is not a current smoker. Patient did not smoke on day of surgery. Induction- intravenous. Postoperative Plan- Plan for postoperative opioid use. Planned trial extubation    Informed Consent- Anesthetic plan and risks discussed with patient. I personally reviewed this patient with the CRNA. Discussed and agreed on the Anesthesia Plan with the CRNA. Lexie Villagran

## 2023-08-07 ENCOUNTER — HOSPITAL ENCOUNTER (INPATIENT)
Facility: HOSPITAL | Age: 63
LOS: 4 days | Discharge: HOME | DRG: 026 | End: 2023-08-11
Attending: OTOLARYNGOLOGY | Admitting: NEUROLOGICAL SURGERY
Payer: COMMERCIAL

## 2023-08-07 ENCOUNTER — ANESTHESIA (OUTPATIENT)
Dept: PERIOP | Facility: HOSPITAL | Age: 63
DRG: 026 | End: 2023-08-07
Payer: COMMERCIAL

## 2023-08-07 ENCOUNTER — TELEPHONE (OUTPATIENT)
Dept: NEUROSURGERY | Facility: CLINIC | Age: 63
End: 2023-08-07

## 2023-08-07 DIAGNOSIS — Z01.818 PRE-PROCEDURAL EXAMINATION: ICD-10-CM

## 2023-08-07 DIAGNOSIS — G96.01 CSF OTORRHEA: ICD-10-CM

## 2023-08-07 DIAGNOSIS — I49.3 PVC'S (PREMATURE VENTRICULAR CONTRACTIONS): ICD-10-CM

## 2023-08-07 DIAGNOSIS — Q01.8 TEMPORAL ENCEPHALOCELE (HCC): Primary | ICD-10-CM

## 2023-08-07 DIAGNOSIS — Z95.5 S/P CORONARY ARTERY STENT PLACEMENT: ICD-10-CM

## 2023-08-07 DIAGNOSIS — I10 ESSENTIAL HYPERTENSION: ICD-10-CM

## 2023-08-07 LAB
ABO GROUP BLD: NORMAL
ANION GAP SERPL CALCULATED.3IONS-SCNC: 7 MMOL/L
ANISOCYTOSIS BLD QL SMEAR: PRESENT
BASE EXCESS BLDA CALC-SCNC: 2 MMOL/L (ref -2–3)
BASOPHILS # BLD MANUAL: 0.07 THOUSAND/UL (ref 0–0.1)
BASOPHILS NFR MAR MANUAL: 1 % (ref 0–1)
BUN SERPL-MCNC: 12 MG/DL (ref 5–25)
CA-I BLD-SCNC: 1.15 MMOL/L (ref 1.12–1.32)
CALCIUM SERPL-MCNC: 8.2 MG/DL (ref 8.3–10.1)
CHLORIDE SERPL-SCNC: 111 MMOL/L (ref 96–108)
CO2 SERPL-SCNC: 22 MMOL/L (ref 21–32)
CREAT SERPL-MCNC: 1 MG/DL (ref 0.6–1.3)
EOSINOPHIL # BLD MANUAL: 0 THOUSAND/UL (ref 0–0.4)
EOSINOPHIL NFR BLD MANUAL: 0 % (ref 0–6)
ERYTHROCYTE [DISTWIDTH] IN BLOOD BY AUTOMATED COUNT: 12.6 % (ref 11.6–15.1)
GFR SERPL CREATININE-BSD FRML MDRD: 79 ML/MIN/1.73SQ M
GLUCOSE SERPL-MCNC: 124 MG/DL (ref 65–140)
GLUCOSE SERPL-MCNC: 125 MG/DL (ref 65–140)
GLUCOSE SERPL-MCNC: 151 MG/DL (ref 65–140)
HCO3 BLDA-SCNC: 26.7 MMOL/L (ref 22–28)
HCT VFR BLD AUTO: 38.7 % (ref 36.5–49.3)
HCT VFR BLD CALC: 35 % (ref 36.5–49.3)
HGB BLD-MCNC: 13.8 G/DL (ref 12–17)
HGB BLDA-MCNC: 11.9 G/DL (ref 12–17)
LYMPHOCYTES # BLD AUTO: 0.13 THOUSAND/UL (ref 0.6–4.47)
LYMPHOCYTES # BLD AUTO: 1 % (ref 14–44)
MAGNESIUM SERPL-MCNC: 2.1 MG/DL (ref 1.6–2.6)
MCH RBC QN AUTO: 31.6 PG (ref 26.8–34.3)
MCHC RBC AUTO-ENTMCNC: 35.7 G/DL (ref 31.4–37.4)
MCV RBC AUTO: 89 FL (ref 82–98)
MONOCYTES # BLD AUTO: 0 THOUSAND/UL (ref 0–1.22)
MONOCYTES NFR BLD: 0 % (ref 4–12)
NEUTROPHILS # BLD MANUAL: 6.43 THOUSAND/UL (ref 1.85–7.62)
NEUTS SEG NFR BLD AUTO: 97 % (ref 43–75)
PCO2 BLD: 28 MMOL/L (ref 21–32)
PCO2 BLD: 39.5 MM HG (ref 36–44)
PH BLD: 7.44 [PH] (ref 7.35–7.45)
PHOSPHATE SERPL-MCNC: 1.8 MG/DL (ref 2.3–4.1)
PLATELET # BLD AUTO: 178 THOUSANDS/UL (ref 149–390)
PLATELET BLD QL SMEAR: ABNORMAL
PMV BLD AUTO: 9.7 FL (ref 8.9–12.7)
PO2 BLD: >400 MM HG (ref 75–129)
POTASSIUM BLD-SCNC: 4.1 MMOL/L (ref 3.5–5.3)
POTASSIUM SERPL-SCNC: 3.8 MMOL/L (ref 3.5–5.3)
RBC # BLD AUTO: 4.37 MILLION/UL (ref 3.88–5.62)
RBC MORPH BLD: PRESENT
RH BLD: POSITIVE
SODIUM BLD-SCNC: 141 MMOL/L (ref 136–145)
SODIUM SERPL-SCNC: 140 MMOL/L (ref 135–147)
SPECIMEN SOURCE: ABNORMAL
VARIANT LYMPHS # BLD AUTO: 1 %
WBC # BLD AUTO: 6.63 THOUSAND/UL (ref 4.31–10.16)

## 2023-08-07 PROCEDURE — 84132 ASSAY OF SERUM POTASSIUM: CPT

## 2023-08-07 PROCEDURE — 83735 ASSAY OF MAGNESIUM: CPT | Performed by: STUDENT IN AN ORGANIZED HEALTH CARE EDUCATION/TRAINING PROGRAM

## 2023-08-07 PROCEDURE — NC001 PR NO CHARGE: Performed by: NEUROLOGICAL SURGERY

## 2023-08-07 PROCEDURE — 62121 INCISE SKULL REPAIR: CPT | Performed by: NEUROLOGICAL SURGERY

## 2023-08-07 PROCEDURE — 82947 ASSAY GLUCOSE BLOOD QUANT: CPT

## 2023-08-07 PROCEDURE — 85007 BL SMEAR W/DIFF WBC COUNT: CPT | Performed by: STUDENT IN AN ORGANIZED HEALTH CARE EDUCATION/TRAINING PROGRAM

## 2023-08-07 PROCEDURE — 84100 ASSAY OF PHOSPHORUS: CPT | Performed by: STUDENT IN AN ORGANIZED HEALTH CARE EDUCATION/TRAINING PROGRAM

## 2023-08-07 PROCEDURE — 85027 COMPLETE CBC AUTOMATED: CPT | Performed by: STUDENT IN AN ORGANIZED HEALTH CARE EDUCATION/TRAINING PROGRAM

## 2023-08-07 PROCEDURE — 00U207Z SUPPLEMENT DURA MATER WITH AUTOLOGOUS TISSUE SUBSTITUTE, OPEN APPROACH: ICD-10-PCS | Performed by: OTOLARYNGOLOGY

## 2023-08-07 PROCEDURE — 009600Z DRAINAGE OF CEREBRAL VENTRICLE WITH DRAINAGE DEVICE, OPEN APPROACH: ICD-10-PCS | Performed by: OTOLARYNGOLOGY

## 2023-08-07 PROCEDURE — 009600Z DRAINAGE OF CEREBRAL VENTRICLE WITH DRAINAGE DEVICE, OPEN APPROACH: ICD-10-PCS | Performed by: NEUROLOGICAL SURGERY

## 2023-08-07 PROCEDURE — 009U30Z DRAINAGE OF SPINAL CANAL WITH DRAINAGE DEVICE, PERCUTANEOUS APPROACH: ICD-10-PCS | Performed by: NEUROLOGICAL SURGERY

## 2023-08-07 PROCEDURE — 62272 THER SPI PNXR DRG CSF: CPT | Performed by: NEUROLOGICAL SURGERY

## 2023-08-07 PROCEDURE — 99024 POSTOP FOLLOW-UP VISIT: CPT | Performed by: NEUROLOGICAL SURGERY

## 2023-08-07 PROCEDURE — 80048 BASIC METABOLIC PNL TOTAL CA: CPT | Performed by: STUDENT IN AN ORGANIZED HEALTH CARE EDUCATION/TRAINING PROGRAM

## 2023-08-07 PROCEDURE — 94760 N-INVAS EAR/PLS OXIMETRY 1: CPT

## 2023-08-07 PROCEDURE — 15733 MUSC MYOQ/FSCQ FLP H&N PEDCL: CPT | Performed by: NEUROLOGICAL SURGERY

## 2023-08-07 PROCEDURE — C1781 MESH (IMPLANTABLE): HCPCS | Performed by: OTOLARYNGOLOGY

## 2023-08-07 PROCEDURE — 99291 CRITICAL CARE FIRST HOUR: CPT | Performed by: ANESTHESIOLOGY

## 2023-08-07 PROCEDURE — 82803 BLOOD GASES ANY COMBINATION: CPT

## 2023-08-07 PROCEDURE — 4A10X4G MONITORING OF CENTRAL NERVOUS ELECTRICAL ACTIVITY, INTRAOPERATIVE, EXTERNAL APPROACH: ICD-10-PCS | Performed by: OTOLARYNGOLOGY

## 2023-08-07 PROCEDURE — 03HY32Z INSERTION OF MONITORING DEVICE INTO UPPER ARTERY, PERCUTANEOUS APPROACH: ICD-10-PCS

## 2023-08-07 PROCEDURE — 4A133J1 MONITORING OF ARTERIAL PULSE, PERIPHERAL, PERCUTANEOUS APPROACH: ICD-10-PCS

## 2023-08-07 PROCEDURE — 4A133B1 MONITORING OF ARTERIAL PRESSURE, PERIPHERAL, PERCUTANEOUS APPROACH: ICD-10-PCS

## 2023-08-07 PROCEDURE — 009U30Z DRAINAGE OF SPINAL CANAL WITH DRAINAGE DEVICE, PERCUTANEOUS APPROACH: ICD-10-PCS | Performed by: OTOLARYNGOLOGY

## 2023-08-07 PROCEDURE — 15733 MUSC MYOQ/FSCQ FLP H&N PEDCL: CPT | Performed by: OTOLARYNGOLOGY

## 2023-08-07 PROCEDURE — 84295 ASSAY OF SERUM SODIUM: CPT

## 2023-08-07 PROCEDURE — C1713 ANCHOR/SCREW BN/BN,TIS/BN: HCPCS | Performed by: OTOLARYNGOLOGY

## 2023-08-07 PROCEDURE — 85014 HEMATOCRIT: CPT

## 2023-08-07 PROCEDURE — 82330 ASSAY OF CALCIUM: CPT

## 2023-08-07 PROCEDURE — 62121 INCISE SKULL REPAIR: CPT | Performed by: OTOLARYNGOLOGY

## 2023-08-07 PROCEDURE — 00U207Z SUPPLEMENT DURA MATER WITH AUTOLOGOUS TISSUE SUBSTITUTE, OPEN APPROACH: ICD-10-PCS | Performed by: NEUROLOGICAL SURGERY

## 2023-08-07 PROCEDURE — 86920 COMPATIBILITY TEST SPIN: CPT

## 2023-08-07 PROCEDURE — C1729 CATH, DRAINAGE: HCPCS | Performed by: OTOLARYNGOLOGY

## 2023-08-07 PROCEDURE — 82948 REAGENT STRIP/BLOOD GLUCOSE: CPT

## 2023-08-07 DEVICE — INTEGRA® DURAFLEX™ SUTURABLE DURAL GRAFT 2 CM X 7 CM
Type: IMPLANTABLE DEVICE | Site: BRAIN | Status: FUNCTIONAL
Brand: INTEGRA® DURAFLEX®

## 2023-08-07 DEVICE — IMPLANTABLE DEVICE
Type: IMPLANTABLE DEVICE | Site: CRANIAL | Status: FUNCTIONAL
Brand: THINFLAP

## 2023-08-07 DEVICE — IMPLANTABLE DEVICE
Type: IMPLANTABLE DEVICE | Site: CRANIAL | Status: FUNCTIONAL
Brand: THINFLAP SYSTEM

## 2023-08-07 RX ORDER — MIDAZOLAM HYDROCHLORIDE 2 MG/2ML
INJECTION, SOLUTION INTRAMUSCULAR; INTRAVENOUS AS NEEDED
Status: DISCONTINUED | OUTPATIENT
Start: 2023-08-07 | End: 2023-08-07

## 2023-08-07 RX ORDER — SODIUM CHLORIDE 9 MG/ML
INJECTION, SOLUTION INTRAVENOUS CONTINUOUS PRN
Status: DISCONTINUED | OUTPATIENT
Start: 2023-08-07 | End: 2023-08-07

## 2023-08-07 RX ORDER — DOCUSATE SODIUM 100 MG/1
100 CAPSULE, LIQUID FILLED ORAL 2 TIMES DAILY
Status: DISCONTINUED | OUTPATIENT
Start: 2023-08-07 | End: 2023-08-11 | Stop reason: HOSPADM

## 2023-08-07 RX ORDER — PROPOFOL 10 MG/ML
INJECTION, EMULSION INTRAVENOUS AS NEEDED
Status: DISCONTINUED | OUTPATIENT
Start: 2023-08-07 | End: 2023-08-07

## 2023-08-07 RX ORDER — ALLOPURINOL 100 MG/1
100 TABLET ORAL DAILY
Status: DISCONTINUED | OUTPATIENT
Start: 2023-08-07 | End: 2023-08-11 | Stop reason: HOSPADM

## 2023-08-07 RX ORDER — ONDANSETRON 2 MG/ML
INJECTION INTRAMUSCULAR; INTRAVENOUS AS NEEDED
Status: DISCONTINUED | OUTPATIENT
Start: 2023-08-07 | End: 2023-08-07

## 2023-08-07 RX ORDER — DEXAMETHASONE 2 MG/1
2 TABLET ORAL
Status: DISCONTINUED | OUTPATIENT
Start: 2023-08-17 | End: 2023-08-11 | Stop reason: HOSPADM

## 2023-08-07 RX ORDER — POTASSIUM CHLORIDE 20 MEQ/1
20 TABLET, EXTENDED RELEASE ORAL ONCE
Status: COMPLETED | OUTPATIENT
Start: 2023-08-07 | End: 2023-08-07

## 2023-08-07 RX ORDER — PHENYLEPHRINE HCL IN 0.9% NACL 1 MG/10 ML
SYRINGE (ML) INTRAVENOUS AS NEEDED
Status: DISCONTINUED | OUTPATIENT
Start: 2023-08-07 | End: 2023-08-07

## 2023-08-07 RX ORDER — HYDROMORPHONE HCL IN WATER/PF 6 MG/30 ML
0.2 PATIENT CONTROLLED ANALGESIA SYRINGE INTRAVENOUS EVERY 2 HOUR PRN
Status: DISCONTINUED | OUTPATIENT
Start: 2023-08-07 | End: 2023-08-09

## 2023-08-07 RX ORDER — ACETAMINOPHEN 325 MG/1
975 TABLET ORAL EVERY 6 HOURS PRN
Status: DISCONTINUED | OUTPATIENT
Start: 2023-08-07 | End: 2023-08-11 | Stop reason: HOSPADM

## 2023-08-07 RX ORDER — HYDROCHLOROTHIAZIDE 12.5 MG/1
12.5 TABLET ORAL DAILY
Status: DISCONTINUED | OUTPATIENT
Start: 2023-08-07 | End: 2023-08-11 | Stop reason: HOSPADM

## 2023-08-07 RX ORDER — HYDROMORPHONE HCL/PF 1 MG/ML
SYRINGE (ML) INJECTION AS NEEDED
Status: DISCONTINUED | OUTPATIENT
Start: 2023-08-07 | End: 2023-08-07

## 2023-08-07 RX ORDER — PROPOFOL 10 MG/ML
INJECTION, EMULSION INTRAVENOUS CONTINUOUS PRN
Status: DISCONTINUED | OUTPATIENT
Start: 2023-08-07 | End: 2023-08-07

## 2023-08-07 RX ORDER — SENNOSIDES 8.6 MG
1 TABLET ORAL DAILY
Status: DISCONTINUED | OUTPATIENT
Start: 2023-08-07 | End: 2023-08-11 | Stop reason: HOSPADM

## 2023-08-07 RX ORDER — DEXAMETHASONE 2 MG/1
2 TABLET ORAL EVERY 8 HOURS SCHEDULED
Status: DISCONTINUED | OUTPATIENT
Start: 2023-08-13 | End: 2023-08-11 | Stop reason: HOSPADM

## 2023-08-07 RX ORDER — CALCIUM CARBONATE 500 MG/1
1000 TABLET, CHEWABLE ORAL DAILY PRN
Status: DISCONTINUED | OUTPATIENT
Start: 2023-08-07 | End: 2023-08-11 | Stop reason: HOSPADM

## 2023-08-07 RX ORDER — PANTOPRAZOLE SODIUM 40 MG/1
40 TABLET, DELAYED RELEASE ORAL
Status: DISCONTINUED | OUTPATIENT
Start: 2023-08-07 | End: 2023-08-11 | Stop reason: HOSPADM

## 2023-08-07 RX ORDER — LEVETIRACETAM 750 MG/1
750 TABLET ORAL EVERY 12 HOURS SCHEDULED
Status: DISCONTINUED | OUTPATIENT
Start: 2023-08-07 | End: 2023-08-11 | Stop reason: HOSPADM

## 2023-08-07 RX ORDER — DEXAMETHASONE 2 MG/1
2 TABLET ORAL EVERY 6 HOURS SCHEDULED
Status: DISCONTINUED | OUTPATIENT
Start: 2023-08-11 | End: 2023-08-11 | Stop reason: HOSPADM

## 2023-08-07 RX ORDER — OXYCODONE HYDROCHLORIDE 5 MG/1
5 TABLET ORAL EVERY 6 HOURS PRN
Status: DISCONTINUED | OUTPATIENT
Start: 2023-08-07 | End: 2023-08-08

## 2023-08-07 RX ORDER — ONDANSETRON 2 MG/ML
4 INJECTION INTRAMUSCULAR; INTRAVENOUS EVERY 6 HOURS PRN
Status: DISCONTINUED | OUTPATIENT
Start: 2023-08-07 | End: 2023-08-11 | Stop reason: HOSPADM

## 2023-08-07 RX ORDER — DEXAMETHASONE 4 MG/1
4 TABLET ORAL EVERY 6 HOURS SCHEDULED
Status: COMPLETED | OUTPATIENT
Start: 2023-08-07 | End: 2023-08-09

## 2023-08-07 RX ORDER — ONDANSETRON 2 MG/ML
4 INJECTION INTRAMUSCULAR; INTRAVENOUS ONCE AS NEEDED
Status: DISCONTINUED | OUTPATIENT
Start: 2023-08-07 | End: 2023-08-07 | Stop reason: HOSPADM

## 2023-08-07 RX ORDER — DEXAMETHASONE SODIUM PHOSPHATE 10 MG/ML
INJECTION, SOLUTION INTRAMUSCULAR; INTRAVENOUS AS NEEDED
Status: DISCONTINUED | OUTPATIENT
Start: 2023-08-07 | End: 2023-08-07

## 2023-08-07 RX ORDER — SODIUM CHLORIDE 9 MG/ML
125 INJECTION, SOLUTION INTRAVENOUS CONTINUOUS
Status: DISCONTINUED | OUTPATIENT
Start: 2023-08-07 | End: 2023-08-07

## 2023-08-07 RX ORDER — CEFAZOLIN SODIUM 2 G/50ML
2000 SOLUTION INTRAVENOUS ONCE
Status: DISCONTINUED | OUTPATIENT
Start: 2023-08-07 | End: 2023-08-07 | Stop reason: HOSPADM

## 2023-08-07 RX ORDER — MAGNESIUM HYDROXIDE 1200 MG/15ML
LIQUID ORAL AS NEEDED
Status: DISCONTINUED | OUTPATIENT
Start: 2023-08-07 | End: 2023-08-07 | Stop reason: HOSPADM

## 2023-08-07 RX ORDER — FENTANYL CITRATE/PF 50 MCG/ML
50 SYRINGE (ML) INJECTION
Status: DISCONTINUED | OUTPATIENT
Start: 2023-08-07 | End: 2023-08-07 | Stop reason: HOSPADM

## 2023-08-07 RX ORDER — DEXAMETHASONE 4 MG/1
4 TABLET ORAL EVERY 8 HOURS SCHEDULED
Status: COMPLETED | OUTPATIENT
Start: 2023-08-09 | End: 2023-08-11

## 2023-08-07 RX ORDER — LIDOCAINE HYDROCHLORIDE AND EPINEPHRINE 10; 10 MG/ML; UG/ML
INJECTION, SOLUTION INFILTRATION; PERINEURAL AS NEEDED
Status: DISCONTINUED | OUTPATIENT
Start: 2023-08-07 | End: 2023-08-07 | Stop reason: HOSPADM

## 2023-08-07 RX ORDER — CEFAZOLIN SODIUM 1 G/50ML
1000 SOLUTION INTRAVENOUS EVERY 8 HOURS
Status: DISCONTINUED | OUTPATIENT
Start: 2023-08-07 | End: 2023-08-10

## 2023-08-07 RX ORDER — IBUPROFEN 600 MG/1
600 TABLET ORAL EVERY 6 HOURS PRN
Status: DISCONTINUED | OUTPATIENT
Start: 2023-08-07 | End: 2023-08-07

## 2023-08-07 RX ORDER — FENTANYL CITRATE 50 UG/ML
INJECTION, SOLUTION INTRAMUSCULAR; INTRAVENOUS AS NEEDED
Status: DISCONTINUED | OUTPATIENT
Start: 2023-08-07 | End: 2023-08-07

## 2023-08-07 RX ORDER — ATORVASTATIN CALCIUM 20 MG/1
20 TABLET, FILM COATED ORAL
Status: DISCONTINUED | OUTPATIENT
Start: 2023-08-07 | End: 2023-08-11 | Stop reason: HOSPADM

## 2023-08-07 RX ORDER — SODIUM CHLORIDE 9 MG/ML
50 INJECTION, SOLUTION INTRAVENOUS CONTINUOUS
Status: DISCONTINUED | OUTPATIENT
Start: 2023-08-07 | End: 2023-08-07

## 2023-08-07 RX ORDER — MANNITOL 250 MG/ML
INJECTION, SOLUTION INTRAVENOUS AS NEEDED
Status: DISCONTINUED | OUTPATIENT
Start: 2023-08-07 | End: 2023-08-07

## 2023-08-07 RX ORDER — LOSARTAN POTASSIUM 50 MG/1
100 TABLET ORAL DAILY
Status: DISCONTINUED | OUTPATIENT
Start: 2023-08-07 | End: 2023-08-08

## 2023-08-07 RX ORDER — DEXAMETHASONE 2 MG/1
2 TABLET ORAL EVERY 12 HOURS SCHEDULED
Status: DISCONTINUED | OUTPATIENT
Start: 2023-08-15 | End: 2023-08-11 | Stop reason: HOSPADM

## 2023-08-07 RX ORDER — METOCLOPRAMIDE HYDROCHLORIDE 5 MG/ML
10 INJECTION INTRAMUSCULAR; INTRAVENOUS ONCE AS NEEDED
Status: DISCONTINUED | OUTPATIENT
Start: 2023-08-07 | End: 2023-08-07 | Stop reason: HOSPADM

## 2023-08-07 RX ORDER — LIDOCAINE HYDROCHLORIDE 10 MG/ML
INJECTION, SOLUTION EPIDURAL; INFILTRATION; INTRACAUDAL; PERINEURAL AS NEEDED
Status: DISCONTINUED | OUTPATIENT
Start: 2023-08-07 | End: 2023-08-07

## 2023-08-07 RX ORDER — ACETAMINOPHEN 325 MG/1
975 TABLET ORAL EVERY 8 HOURS SCHEDULED
Status: DISCONTINUED | OUTPATIENT
Start: 2023-08-07 | End: 2023-08-11 | Stop reason: HOSPADM

## 2023-08-07 RX ORDER — SUCCINYLCHOLINE/SOD CL,ISO/PF 100 MG/5ML
SYRINGE (ML) INTRAVENOUS AS NEEDED
Status: DISCONTINUED | OUTPATIENT
Start: 2023-08-07 | End: 2023-08-07

## 2023-08-07 RX ORDER — HYDROMORPHONE HCL/PF 1 MG/ML
0.5 SYRINGE (ML) INJECTION
Status: DISCONTINUED | OUTPATIENT
Start: 2023-08-07 | End: 2023-08-07 | Stop reason: HOSPADM

## 2023-08-07 RX ORDER — CEFAZOLIN SODIUM 1 G/3ML
INJECTION, POWDER, FOR SOLUTION INTRAMUSCULAR; INTRAVENOUS AS NEEDED
Status: DISCONTINUED | OUTPATIENT
Start: 2023-08-07 | End: 2023-08-07

## 2023-08-07 RX ADMIN — POTASSIUM CHLORIDE 20 MEQ: 1500 TABLET, EXTENDED RELEASE ORAL at 16:17

## 2023-08-07 RX ADMIN — DEXAMETHASONE 4 MG: 4 TABLET ORAL at 12:55

## 2023-08-07 RX ADMIN — Medication 100 MCG: at 07:58

## 2023-08-07 RX ADMIN — HYDROMORPHONE HYDROCHLORIDE 0.5 MG: 1 INJECTION, SOLUTION INTRAMUSCULAR; INTRAVENOUS; SUBCUTANEOUS at 09:59

## 2023-08-07 RX ADMIN — SODIUM CHLORIDE: 9 INJECTION, SOLUTION INTRAVENOUS at 07:45

## 2023-08-07 RX ADMIN — HYDROCHLOROTHIAZIDE 12.5 MG: 12.5 TABLET ORAL at 14:31

## 2023-08-07 RX ADMIN — Medication 100 MCG: at 07:51

## 2023-08-07 RX ADMIN — LEVETIRACETAM 500 MG: 100 INJECTION, SOLUTION INTRAVENOUS at 08:44

## 2023-08-07 RX ADMIN — SODIUM CHLORIDE: 0.9 INJECTION, SOLUTION INTRAVENOUS at 07:36

## 2023-08-07 RX ADMIN — ATORVASTATIN CALCIUM 20 MG: 20 TABLET, FILM COATED ORAL at 16:16

## 2023-08-07 RX ADMIN — MANNITOL 40 G: 250 INJECTION, SOLUTION INTRAVENOUS at 09:23

## 2023-08-07 RX ADMIN — LOSARTAN POTASSIUM 100 MG: 50 TABLET, FILM COATED ORAL at 12:54

## 2023-08-07 RX ADMIN — ALLOPURINOL 100 MG: 100 TABLET ORAL at 14:31

## 2023-08-07 RX ADMIN — DEXAMETHASONE 4 MG: 4 TABLET ORAL at 17:43

## 2023-08-07 RX ADMIN — PROPOFOL 200 MG: 10 INJECTION, EMULSION INTRAVENOUS at 07:43

## 2023-08-07 RX ADMIN — Medication 2.5 MG: at 16:16

## 2023-08-07 RX ADMIN — SODIUM CHLORIDE: 9 INJECTION, SOLUTION INTRAVENOUS at 10:07

## 2023-08-07 RX ADMIN — PROPOFOL 120 MCG/KG/MIN: 10 INJECTION, EMULSION INTRAVENOUS at 07:55

## 2023-08-07 RX ADMIN — OXYCODONE HYDROCHLORIDE 5 MG: 5 TABLET ORAL at 20:35

## 2023-08-07 RX ADMIN — FENTANYL CITRATE 100 MCG: 50 INJECTION, SOLUTION INTRAMUSCULAR; INTRAVENOUS at 07:43

## 2023-08-07 RX ADMIN — PROPOFOL 100 MG: 10 INJECTION, EMULSION INTRAVENOUS at 07:55

## 2023-08-07 RX ADMIN — Medication 100 MG: at 07:43

## 2023-08-07 RX ADMIN — LIDOCAINE HYDROCHLORIDE 50 MG: 10 INJECTION, SOLUTION EPIDURAL; INFILTRATION; INTRACAUDAL; PERINEURAL at 07:43

## 2023-08-07 RX ADMIN — LEVETIRACETAM 750 MG: 750 TABLET, FILM COATED ORAL at 20:36

## 2023-08-07 RX ADMIN — CEFAZOLIN 2000 MG: 1 INJECTION, POWDER, FOR SOLUTION INTRAMUSCULAR; INTRAVENOUS at 08:12

## 2023-08-07 RX ADMIN — ACETAMINOPHEN 975 MG: 325 TABLET, FILM COATED ORAL at 12:54

## 2023-08-07 RX ADMIN — REMIFENTANIL HYDROCHLORIDE 0.2 MCG/KG/MIN: 1 INJECTION, POWDER, LYOPHILIZED, FOR SOLUTION INTRAVENOUS at 07:55

## 2023-08-07 RX ADMIN — DIBASIC SODIUM PHOSPHATE, MONOBASIC POTASSIUM PHOSPHATE AND MONOBASIC SODIUM PHOSPHATE 2 TABLET: 852; 155; 130 TABLET ORAL at 16:16

## 2023-08-07 RX ADMIN — ONDANSETRON 4 MG: 2 INJECTION INTRAMUSCULAR; INTRAVENOUS at 07:36

## 2023-08-07 RX ADMIN — DOCUSATE SODIUM 100 MG: 100 CAPSULE ORAL at 12:54

## 2023-08-07 RX ADMIN — MIDAZOLAM 2 MG: 1 INJECTION INTRAMUSCULAR; INTRAVENOUS at 07:36

## 2023-08-07 RX ADMIN — DEXAMETHASONE SODIUM PHOSPHATE 10 MG: 10 INJECTION, SOLUTION INTRAMUSCULAR; INTRAVENOUS at 07:43

## 2023-08-07 RX ADMIN — CEFAZOLIN SODIUM 1000 MG: 1 SOLUTION INTRAVENOUS at 16:17

## 2023-08-07 RX ADMIN — ACETAMINOPHEN 975 MG: 325 TABLET, FILM COATED ORAL at 21:02

## 2023-08-07 RX ADMIN — PANTOPRAZOLE SODIUM 40 MG: 40 TABLET, DELAYED RELEASE ORAL at 12:54

## 2023-08-07 RX ADMIN — Medication 10 MG: at 10:06

## 2023-08-07 RX ADMIN — SENNOSIDES 8.6 MG: 8.6 TABLET, FILM COATED ORAL at 12:54

## 2023-08-07 RX ADMIN — Medication 5 MG: at 08:53

## 2023-08-07 RX ADMIN — PHENYLEPHRINE HYDROCHLORIDE 50 MCG/MIN: 10 INJECTION INTRAVENOUS at 07:56

## 2023-08-07 NOTE — ANESTHESIA POSTPROCEDURE EVALUATION
Post-Op Assessment Note    CV Status:  Stable  Pain Score: 0    Pain management: adequate     Mental Status:  Alert and awake   Hydration Status:  Euvolemic   PONV Controlled:  Controlled   Airway Patency:  Patent   Two or more mitigation strategies used for obstructive sleep apnea   Post Op Vitals Reviewed: Yes      Staff: Anesthesiologist, CRNA   Comments: aox3, VSS        There were no known notable events for this encounter.     BP   133/75   Temp   98.3   Pulse   82   Resp   14   SpO2   94% RA

## 2023-08-07 NOTE — PLAN OF CARE
Problem: INFECTION - ADULT  Goal: Absence or prevention of progression during hospitalization  Description: INTERVENTIONS:  - Assess and monitor for signs and symptoms of infection  - Monitor lab/diagnostic results  - Monitor all insertion sites, i.e. indwelling lines, tubes, and drains  - Monitor endotracheal if appropriate and nasal secretions for changes in amount and color  - Sherman appropriate cooling/warming therapies per order  - Administer medications as ordered  - Instruct and encourage patient and family to use good hand hygiene technique  - Identify and instruct in appropriate isolation precautions for identified infection/condition  Outcome: Progressing  Goal: Absence of fever/infection during neutropenic period  Description: INTERVENTIONS:  - Monitor WBC    Outcome: Progressing     Problem: PAIN - ADULT  Goal: Verbalizes/displays adequate comfort level or baseline comfort level  Description: Interventions:  - Encourage patient to monitor pain and request assistance  - Assess pain using appropriate pain scale  - Administer analgesics based on type and severity of pain and evaluate response  - Implement non-pharmacological measures as appropriate and evaluate response  - Consider cultural and social influences on pain and pain management  - Notify physician/advanced practitioner if interventions unsuccessful or patient reports new pain  Outcome: Progressing     Problem: Knowledge Deficit  Goal: Patient/family/caregiver demonstrates understanding of disease process, treatment plan, medications, and discharge instructions  Description: Complete learning assessment and assess knowledge base.   Interventions:  - Provide teaching at level of understanding  - Provide teaching via preferred learning methods  Outcome: Progressing     Problem: NEUROSENSORY - ADULT  Goal: Achieves stable or improved neurological status  Description: INTERVENTIONS  - Monitor and report changes in neurological status  - Monitor vital signs such as temperature, blood pressure, glucose, and any other labs ordered   - Initiate measures to prevent increased intracranial pressure  - Monitor for seizure activity and implement precautions if appropriate      Outcome: Progressing  Goal: Remains free of injury related to seizures activity  Description: INTERVENTIONS  - Maintain airway, patient safety  and administer oxygen as ordered  - Monitor patient for seizure activity, document and report duration and description of seizure to physician/advanced practitioner  - If seizure occurs,  ensure patient safety during seizure  - Reorient patient post seizure  - Seizure pads on all 4 side rails  - Instruct patient/family to notify RN of any seizure activity including if an aura is experienced  - Instruct patient/family to call for assistance with activity based on nursing assessment  - Administer anti-seizure medications if ordered    Outcome: Progressing  Goal: Achieves maximal functionality and self care  Description: INTERVENTIONS  - Monitor swallowing and airway patency with patient fatigue and changes in neurological status  - Encourage and assist patient to increase activity and self care.    - Encourage visually impaired, hearing impaired and aphasic patients to use assistive/communication devices  Outcome: Progressing

## 2023-08-07 NOTE — CONSULTS
[address] [place] [address]  Consult: Critical Care  Name: Cuco Schwarz 58 y.o. male I MRN: [id number]  Unit/Bed#: ICU 03 I Date of Admission: 8/7/2023   Date of Service: 8/7/2023 I Hospital Day: 0      Inpatient consult to [address] performed by: Alivia Giraldo  Consult ordered by: Sidra Costa PA-C        Assessment/Plan   Neuro:   · Diagnosis: L middle fossa craniotomy for repair of tegmen defect and CSF leak with temporalis muscle flap and intraoperative lumbar drain placement. · Plan:   · SBP goal 110-160  · Neuro checks Q1  · Keppra 750mg BID x7 days  · Decadron taper x7days  · Seizure precautions   · Pain regimen: Tylenol 975 q6, Dilaudid 0.2mg q2 PRN for breakthrough pain, oxycodone 2.5mg PRN for severe pain, oxycodone 5mg PRN for severe pain   · Hold pharmacologic DVT prophylaxis    · Diagnosis: Lumbar drain   · Plan:  · Monitor output  · Goal 10cc/hr  · Per neurosurgery drain through Wednesday. Clamp Wednesday night. Remove drain Thursday if no issues  CV:   · Diagnosis: CAD s/p PCI 2012  · On ASA 81mg at home  · Plan:   · Hold ASA   · Diagnosis: HTN  · Home med Lopressor 25mg BID, Candesartan-HCTZ 16-12.5mg   · Plan:   · BP goals: 110-160  · Monitor BP closely  · Losartan 100mg, HCTZ 12.5mg, Lopressor 25mg daily. Hold for SBP <110  ·  Diagnosis: HLD  · Plan:   · Continue home Lipitor 20mg daily     Pulm:  No active issues    GI:   · Diagnosis: Post op bowel regemin  · Plan:   · Colace 100mg BID, Senokot 8.6mg daily      :   No active issues   Lenz in place    F/E/N:   · Fluids: NS 125ml/hr  · Electrolytes: Monitor and replete as needed  · Phos 1.8.  Replete w/ Potassium phosphate  · Nutrition: regular diet    Heme/Onc:   No active issues    Endo:   · Diagnosis: Gout  · Plan:   · Continue home allopurinol 100mg daily      ID:   · Diagnosis: Post op abx  · Plan:   · Continue post op ancef       MSK/Skin:   PT/OT    LDA: Lumbar drain, Lenz, Peripheral IV x3    Disposition: Critical care     History of Present Illness     HPI: Christine Alvarez is a 58 y.o. with h/o HTN, HLD, ACS, CAD s/p PCI in 2012, gout, who for postop management of endoscopic L middle fossa craniotomy for repair of tegmen defect and CSF leak with temporalis muscle flap and intraoperative lumbar drain placement. Per chart review pt first noticed strange sensation in ear Nov 2022. He was found to have Lserous otitis media and associated L conductive hearing loss. He then underwent a trial of oral steroids, oral antibiotic, and nasal sprays without relief. He eventually underwent L myringotomy and tube placement in December 2022. His L conductive hearing loss and otorrhea persisted. CT temporal bone on 4/5/2023 showed thinned left tegmen defect with mastoid nearly completely filled with fluid. 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 Pt opted to undergo corrective surgery. Pt admitted to ICU for post op care and lumbar drain managment    History obtained from pt and chart review.     All systems reviewed and were negative    Historical Information   Past Medical History:  No date: Acute myocardial infarction [facility])      Comment:  "no heart damage" approx 8 yrs ago did get one stent"  No date: Arthralgia      Comment:  last assessed 7/8/13  No date: Arthritis  No date: Colon polyp  No date: Contusion of skin with intact surface      Comment:  of the left medial thigh,last assessed 6/28/13  No date: Coronary artery disease  No date: Gout      Comment:  last assessed 10/29/13  No date: History of sepsis      Comment:  urinary and was in hosp 3 days /7/2020  No date: Hyperlipidemia  No date: Hypertension  No date: Kidney stone  No date: Lump of skin      Comment:  last assessed 7/19/13//"fatty tumor and surg removed"  No date: S/P coronary artery stent placement      Comment:  x1  No date: Wears glasses      Comment:  at night Past Surgical History:  No date: CARDIAC CATHETERIZATION  12/10/2009: COLONOSCOPY      Comment:  Complete//2020 08/2012: CORONARY STENT PLACEMENT      Comment:  stenting of the LAD artery 95% lesion to 0% residual                stenosis  9/1/2021: FL RETROGRADE PYELOGRAM  No date: KNEE SURGERY; Left      Comment:  x4  No date: LUMBAR EPIDURAL INJECTION      Comment:  x1  7/4/2020: [place] CYSTO BLADDER W/URETERAL CATHETERIZATION; Left      Comment:  Procedure: CYSTOSCOPY WITH INSERTION STENT URETERAL;                 Surgeon: Jeannette Urrutia MD;  [facility];                 Service: Urology  8/5/2020: [place] CYSTO/URETERO W/LITHOTRIPSY &INDWELL STENT INSRT; Left      Comment:  Procedure: CYSTO, URETEROSCOPY STENT exchange;  Surgeon:               Dean Marsh MD;  [facility];  Service: Urology  9/1/2021: [place] CYSTO/URETERO W/LITHOTRIPSY &INDWELL STENT INSRT; Right      Comment:  Procedure: CYSTOSCOPY URETEROSCOPY WITH BASKET STONE                EXTRACTION, RETROGRADE PYELOGRAM AND INSERTION STENT                URETERAL;  Surgeon: Nikole Darden MD;  [facility];  Service: Urology   Current Outpatient Medications   Medication Instructions   • allopurinol (ZYLOPRIM) 100 mg, Oral, Daily   • ASPIRIN 81 PO 81 mg, Oral, Daily   • atorvastatin (LIPITOR) 20 mg, Oral, Daily   • candesartan-hydrochlorothiazide (ATACAND HCT) 16-12.5 MG per tablet 1 tablet, Oral, Daily   • Cholecalciferol (VITAMIN D3 PO) Oral, Daily after breakfast   • metoprolol tartrate (LOPRESSOR) 25 mg, Oral, Every 12 hours scheduled    No Known Allergies   Social History     Tobacco Use   • Smoking status: Never   • Smokeless tobacco: Never   Vaping Use   • Vaping Use: Never used   Substance Use Topics   • Alcohol use:  Yes     Alcohol/week: 1.0 standard drink of alcohol     Types: 1 Cans of beer per week   • Drug use: Never    Family History   Problem Relation Age of Onset   • Edema Mother         Cerebral   • Aneurysm Father         of the cerebellar artery   • Aneurysm Brother         of the cerebellar artery          Objective                            Vitals I/O      Most Recent Min/Max in 24hrs   Temp 98.3 °F (36.8 °C) Temp  Min: 98.1 °F (36.7 °C)  Max: 98.3 °F (36.8 °C)   Pulse 94 Pulse  Min: 88  Max: 108   Resp 13 Resp  Min: 13  Max: 18   /79 BP  Min: 124/68  Max: 154/103   O2 Sat 95 % SpO2  Min: 93 %  Max: 97 %      Intake/Output Summary (Last 24 hours) at 8/7/2023 1333  Last data filed at 8/7/2023 1301  Gross per 24 hour   Intake 2436.97 ml   Output 705 ml   Net 1731.97 ml         Diet Regular; Regular House     Invasive Monitoring Physical exam    Physical Exam  Constitutional:       General: He is not in acute distress. Appearance: He is not ill-appearing. HENT:      Head: Normocephalic. Nose: Nose normal.      Mouth/Throat:      Mouth: Mucous membranes are moist.      Pharynx: Oropharynx is clear. Eyes:      Extraocular Movements: Extraocular movements intact. Conjunctiva/sclera: Conjunctivae normal.      Pupils: Pupils are equal, round, and reactive to light. Cardiovascular:      Rate and Rhythm: Normal rate and regular rhythm. Pulses: Normal pulses. Heart sounds: No murmur heard. Pulmonary:      Effort: Pulmonary effort is normal. No respiratory distress. Abdominal:      General: Bowel sounds are normal. There is no distension. Palpations: Abdomen is soft. Musculoskeletal:      Right lower leg: No edema. Left lower leg: No edema. Skin:     General: Skin is warm and dry. Neurological:      Mental Status: He is alert and oriented to person, place, and time. Cranial Nerves: Cranial nerves 2-12 are intact. Sensory: Sensation is intact. Motor: No weakness (5/5 strength throughout). Comments: Lumbar drain present              Diagnostic Studies      EKG: NSR  Imaging:  I have personally reviewed pertinent reports.        Medications:  Scheduled PRN acetaminophen, 975 mg, Q8H [address]  allopurinol, 100 mg, Daily  atorvastatin, 20 mg, Daily With Dinner  cefazolin, 1,000 mg, Q8H  dexamethasone, 4 mg, Q6H [address]   Followed by  Sandor Najjar ON 8/9/2023] dexamethasone, 4 mg, Q8H [address]   Followed by  Sandor Najjar ON 8/11/2023] dexamethasone, 2 mg, Q6H [address]   Followed by  Sandor Najjar ON 8/13/2023] dexamethasone, 2 mg, Q8H [address]   Followed by  Sandor Najjar ON 8/15/2023] dexamethasone, 2 mg, Q12H [address]   Followed by  Sandor Najjar ON 8/17/2023] dexamethasone, 2 mg, Q24H [address]  docusate sodium, 100 mg, BID  losartan, 100 mg, Daily   And  hydrochlorothiazide, 12.5 mg, Daily  levETIRAcetam, 750 mg, Q12H [address]  [START ON 8/8/2023] metoprolol tartrate, 25 mg, After Breakfast  pantoprazole, 40 mg, Early Morning  senna, 1 tablet, Daily      acetaminophen, 975 mg, Q6H PRN  calcium carbonate, 1,000 mg, Daily PRN  HYDROmorphone, 0.2 mg, Q2H PRN  magnesium hydroxide, 30 mL, Daily PRN  ondansetron, 4 mg, Q6H PRN  oxyCODONE, 5 mg, Q6H PRN  oxyCODONE, 2.5 mg, Q4H PRN       Continuous    sodium chloride, 50 mL/hr  sodium chloride, 125 mL/hr, Last Rate: 125 mL/hr (08/07/23 1055)         Labs:    CBC    Recent Labs     08/07/23  1257   WBC 6.63   HGB 13.8   HCT 38.7        BMP    Recent Labs     08/07/23  1257   SODIUM 140   K 3.8   *   CO2 22   AGAP 7   BUN 12   CREATININE 1.00   CALCIUM 8.2*       Coags    No recent results     Additional Electrolytes  Recent Labs     08/07/23  1257   MG 2.1   PHOS 1.8*          Blood Gas    No recent results  No recent results LFTs  No recent results    Infectious  No recent results  Glucose  Recent Labs     08/07/23  1257   GLUC [address] [facility]                Alivia Giraldo, MS4

## 2023-08-07 NOTE — ANESTHESIA PROCEDURE NOTES
Arterial Line Insertion    Performed by: Xavier Warren CRNA  Authorized by: Gordy Mcburney, MD  Consent: Verbal consent obtained. Written consent obtained. Risks and benefits: risks, benefits and alternatives were discussed  Consent given by: patient  Patient understanding: patient states understanding of the procedure being performed  Patient consent: the patient's understanding of the procedure matches consent given  Procedure consent: procedure consent matches procedure scheduled  Relevant documents: relevant documents present and verified  Test results: test results available and properly labeled  Site marked: the operative site was marked  Radiology Images: Radiology Images displayed and confirmed. If images not available, report reviewed  Required items: required blood products, implants, devices, and special equipment available  Patient identity confirmed: arm band  Time out: Immediately prior to procedure a "time out" was called to verify the correct patient, procedure, equipment, support staff and site/side marked as required. Preparation: Patient was prepped and draped in the usual sterile fashion. Indications: hemodynamic monitoring  Orientation:  Left  Location: radial artery  Sedation:  Patient sedated: GETA.     Procedure Details:  Needle gauge: 20  Number of attempts: 2    Post-procedure:  Post-procedure: dressing applied  Waveform: good waveform  Post-procedure CNS: unchanged  Patient tolerance: Patient tolerated the procedure well with no immediate complications

## 2023-08-07 NOTE — TELEPHONE ENCOUNTER
08/07/2023-PT STILL IN HOSPITAL  08/22/2023-2 WK POV W/NURSE IN [facility]  09/19/2023-6 WK POV W/ESCOBAR IN [facility] (NO IMAGING)

## 2023-08-07 NOTE — OP NOTE
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 Placement of lumbar subarachnoid drain  2. Left endoscopic middle fossa craniotomy for repair of CSF leak and tegmen defect with temporalis muscle flap, Duraflex onlay and Duraseal using neuromonitoring (CN 7 and FRANCIS)      Specimen(s):  None     Estimated Blood Loss:   15 cc     Drains:  Lumbar drain     Anesthesia Type:   General     Operative Indications:  Otorrhea       Operative Findings:  1. Lumbar drain placement with one pass, opening pressure < 30 cm H2O, clear CSF   2. Multifocal areas of bony dehiscence in left middle fossa skull base, involving tegmen bone and arcuate eminence, repaired with autograft bone  3. One small focal encephalocele in left temporal lobe coagulated and dural defect repaired with Durepair onlay with Duraseal  4. Harvesting and use of local temporalis muscle flap for vascularized autograft reconstruction     Procedure:  Prior to induction of anesthesia, an audible huddle was performed confirming site of operation, planned operation, need for antibiotics, and other anticipated needs with the patient, surgical, nursing, and anesthesia teams. All agreed to proceed. First, the patient was positioned in lateral decubitus position for placement of lumbar subarachnoid drain, which was placed with one pass without any complication. Clear CSF, under moderately high opening pressure, encountered. Lumbar drain was confirmed to be functional and clamped.       Then, he was positioned supine with head tilted to the right with left side upright. All pressure points were verified to be padded appropriately. Left temporal region, including the ear, was prepped and draped in sterile fashion. Prior to starting the operation the surgical team paused and performed an audible timeout. The surgical, nursing, and anesthesia personnel agreed with the procedure to be performed. A small S-shaped incision on the left temporal scalp overlying the ear was incised with a #10 blade scalpel. The dermal layer was  from the muscle layer using Metzenbaum scissors and bovie while maintaining hemostasis with bipolar electrocautery. The temporalis muscle was kept intact while harvesting a local flap for closing. Using a high-speed drill, a small bone flap was removed in the floor of the middle fossa then extended accordingly for maximal exposure and visualization of the bony floor. The bone fragments and dust were saved for use as autograft later. 20 cc of CSF was released at this time for optimal brain relaxation within the dural dehiscence. We also administered Mannitol (weight-based dosing). The dura was carefully dissected off the temporal bone using Rhoton microdissecting instruments. The small encephalocele in the middle fossa was cauterized. Hemostasis was achieved with bipolar electrocautery and gelfoam with thrombin. Bone fragments and dust were placed on the areas of bony dehiscence in the tegmen and superior semicircular canal. Then a Duraflex onlay was placed on areas of dural dehiscence at the inferior floor of the middle fossa, with attention to complete onlay coverage of the region with small focal encephalocele. Hemostasis was achieved and confirmed. Finally, a thin layer of Duraseal was placed on the Durepair and repaired dural layer. Then titanium mesh (large issa hole cover with slit) cranioplasty was performed. CN 7 and FRANCIS monitoring returned to baseline at the end of the case.      The local temporalis muscle/fascia flap, previously harvested at the beginning of the case, was placed on top of the mesh cranioplasty. Prior to closure, hemostasis was achieved. The wound was copiously irrigated. All retracting devices were removed from the wound. Prior to closure, surgical count was correct and verified x 1. The wound was closed in the usual multi-layered fashion. The muscle and galeal layers were closed with Vicryl sutures. Skin was reapproximated using 4-0 Monocryl sutures in subcutaneous running fashion. The wound was covered with surgical glue. At the end of the case, a sign out was performed whereby the anesthesia, surgical, and nursing teams re-confirmed the operation performed, any blood products to be distributed, specimens to be sent, or equipment issues. All parties agreed. The following portions of this surgery were performed directly by me:  -Lumbar drain placement  -Dural repair  -Left-sided craniotomy    The following portions of this surgery were performed in conjunction with Dr. Cynthia Zendejas:  -Repair of tegmen tympani defect and superior semicircular canal dehiscence   -Harvesting and use of local temporalis muscle flap   -Initial opening, exposure, and closing of wound     Complications:  None     This surgery required co-surgeons in Neurosurgery (Dr. Victorino Ward) and ENT (Dr. Quinton Byrd).       Disposition:  Neuro intensive care unit     Eduardo Garrido

## 2023-08-07 NOTE — H&P
Surgery Pre-op note/Updated History and Physical    Date of service: 8/7/20237:09 AM      No changes from most recent clinic H&P note. Patient to OR for endoscopic L middle fossa craniotomy for repair of tegmen defect and CSF leak, w/ temporalis fascia or muscle flap w/ neuromonitoring for CN VII/VIII, and intra-op lumbar drain    Pmh: Reviewed  Pshx: Reviewed  Social history: Reviewed  Medications: Reviewed  ROS: as above      Vitals:    08/07/23 0603   BP: (!) 154/103   Pulse: 88   Resp: 16   Temp: 98.1 °F (36.7 °C)   SpO2: 97%       ENT: no changes from most recent clinic visit  Chest/Heart/Lungs: Breathing, perfused, unremarkable  Abd: Unremarkable  Ext: Unremarkable        The procedure was discussed with the patient, including risks, benefits, and alternatives and all questions were answered. Consent signed and in the chart.     Ramon Huggins MD  Otolaryngology--Head and Neck Surgery  [employer] [employer] [employer]  8/7/2023 7:09 AM

## 2023-08-07 NOTE — PROGRESS NOTES
Postop check  Patient is now s/p left endoscopic middle fossa repair of CSF leak and tegmen defect with intraoperative lumbar drain placement, recovering well and neurologically stable. Vitals:    08/07/23 1300 08/07/23 1359 08/07/23 1400 08/07/23 1501   BP: 145/79  118/73    Pulse: 94  84 82   Resp: 13  14    Temp:       TempSrc:       SpO2: 95% 96% 97%    Weight:       Height:           Neurologic exam  Alert, waking up from anesthesia   PERRL, EOMI  Face symmetric  Moving all extremities and following commands throughout  Incision CDI  Lumbar drain in place, remains functional    Assessment  Patient is a 28-year-old male with h/o HTN, HLD, ACS, CAD s/p PCI in 2012 on ASA 81 mg daily who p/w left hearing loss and persistent left CSF otorrhea now s/p left endoscopic middle fossa craniotomy for repair of tegmen defect and CSF leak with temporalis fascia/muscle flap, with neuromonitoring (CN 7/8) and intraoperative lumbar drain placement. Plan  -Q1H neurologic exams  -SBP<160  -LD 10 cc/hr, attempt clamp trial Wednesday night; remove LD Thursday AM if no leak in left ear, incision, or LD site  -Decadron for cerebral edema X 1 week with SSI and PPI  -Keppra 1BID for seizure ppx X 1 week   -No cranial imaging unless clinically indicated  -Ancef while lumbar drain in place   -Advance diet as tolerated with IV fluids for now  -Lenz in place, remove ASAP   -PT/OT, ambulate/mobilize with LD clamped   -ICU care while lumbar drain in place      Brianna Kinsey M.D.   Neurosurgeon

## 2023-08-07 NOTE — OP NOTE
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 retromastoid approach. for repair tegeman defect with temporalis muscle fascia graft  Left - Endoscopic left middle fossa craniotomy for repair of tegmen defect and CSF leak with temporalis muscle fascia flap. with neuromonitoring (CN ) and intraoperative lumbar drain placement    Specimen(s):  * No specimens in log * none    Estimated Blood Loss:   50 mL    Drains:  Urethral Catheter Latex 16 Fr. (Active)   Number of days: 0       Lumbar Drain (Active)   Drain Status Open/CSF collection chamber 23 0820   Number of days: 0       Anesthesia Type:   General    Operative Indications:  CSF otorrhea [G96.01]  Temporal encephalocele ([facility]) [Q01.8]  Epitympanic tegmen dehiscence    Operative Findings:  CSF otorrhea [G96.01]  Temporal encephalocele ([facility]) [Q01.8]  Epitympanic tegmen dehiscence    Complications:   None    Procedure and Technique:  The patient was brought into the operating room and placed supine on the OR table. Following the induction of general anesthesia, an endotracheal tube was placed by the anesthesia staff. The bed was turned 180 degrees. A lumbar drain was placed by the neurosurgery service. Details of this portion of the procedure will be included in their separate operative note. The patient was then placed on a horseshoe head ennis. A 4 cm lazy-S shaped incision was marked out above the left ear and injected with 1% lidocaine, 1:100,000 epinephrine.  ABR monitoring and a facial nerve monitor and  was placed on the face by the Neurophysiology Monitoring Service and was found to be functional. The patient was then prepped and draped in sterile fashion. Working in tandem with the neurosurgery service, the marked incision was made through the skin, and the skin flap was elevated superiorly and inferior. A 3.5 x 1 cm anteriorly pedicled temporalis muscle rotation flap was harvested and secured anteriorly for later use. A issa hole craniotomy was then performed. A issa hole was first drilled, and subsequently the craniectomy was widened using the drill and ronguers, yielding a final craniectomy size of 2 x 1 cm. All of the bone dust and bone chips from this portion of the procedure were saved for later use. The dura was then elevated from the floor of the middle fossa. As the dissection proceeded more medially, visualization was provided using the 0-degree and 30-degree endoscopes. During this portion of the procedure, the following landmarks were identified and preserved: the greater superficial petrosal nerve, the arcuate eminence. The following findings were identified: there was a small dural defect. The area was thoroughly irrigated with sterile saline. A piece of Duragen was trimmed and put over the dura overlying the tegmen. The previously harvested bone dust and bone chips were then used to cover the tegmen dehiscence. The temporalis muscle flap was then rotated into the defect overlying the tegmen. DuraSeal was then applied to all layers of the repair. A cranioplasty was then performed. A titanium EVD cover was placed over the defect, insuring that no pressure was placed on the temporalis muscle rotation flap pedicle. This was secured with 2 self-tapping, self-drilling titanium screws. The skin incision was closed in layers, with the superficial skin layer closed with a running 4-0 Monocryl and coated with a layer of Dermabond.      He was then awoken from general anesthesia and taken to the Neurosurgical Intensive Care Unit in stable condition. His postoperative facial function was I/VI on the House-Brackmann scale bilaterally. The patient tolerated this procedure well without complications. Dr. Suzan Sheehan was present throughout this procedure and performed all key portions   I was present for the entire procedure.     Patient Disposition:  Critical Care Unit and extubated and stable        SIGNATURE: Donato Bridges MD  DATE: August 7, 2023  TIME: 10:32 AM

## 2023-08-08 PROBLEM — Q01.8 TEMPORAL ENCEPHALOCELE (HCC): Status: ACTIVE | Noted: 2023-08-08

## 2023-08-08 LAB
ANION GAP SERPL CALCULATED.3IONS-SCNC: 6 MMOL/L
APTT PPP: 26 SECONDS (ref 23–37)
BILIRUB DIRECT SERPL-MCNC: 0.2 MG/DL (ref 0–0.2)
BUN SERPL-MCNC: 13 MG/DL (ref 5–25)
CALCIUM SERPL-MCNC: 8.2 MG/DL (ref 8.3–10.1)
CHLORIDE SERPL-SCNC: 111 MMOL/L (ref 96–108)
CO2 SERPL-SCNC: 24 MMOL/L (ref 21–32)
CREAT SERPL-MCNC: 0.94 MG/DL (ref 0.6–1.3)
ERYTHROCYTE [DISTWIDTH] IN BLOOD BY AUTOMATED COUNT: 12.3 % (ref 11.6–15.1)
GFR SERPL CREATININE-BSD FRML MDRD: 85 ML/MIN/1.73SQ M
GLUCOSE SERPL-MCNC: 146 MG/DL (ref 65–140)
HCT VFR BLD AUTO: 36.4 % (ref 36.5–49.3)
HGB BLD-MCNC: 13.3 G/DL (ref 12–17)
INR PPP: 1.04 (ref 0.84–1.19)
MCH RBC QN AUTO: 32.1 PG (ref 26.8–34.3)
MCHC RBC AUTO-ENTMCNC: 36.5 G/DL (ref 31.4–37.4)
MCV RBC AUTO: 88 FL (ref 82–98)
PHOSPHATE SERPL-MCNC: 2.8 MG/DL (ref 2.3–4.1)
PLATELET # BLD AUTO: 187 THOUSANDS/UL (ref 149–390)
PMV BLD AUTO: 10 FL (ref 8.9–12.7)
POTASSIUM SERPL-SCNC: 4 MMOL/L (ref 3.5–5.3)
PROTHROMBIN TIME: 13.8 SECONDS (ref 11.6–14.5)
RBC # BLD AUTO: 4.14 MILLION/UL (ref 3.88–5.62)
SODIUM SERPL-SCNC: 141 MMOL/L (ref 135–147)
WBC # BLD AUTO: 9.63 THOUSAND/UL (ref 4.31–10.16)

## 2023-08-08 PROCEDURE — 80048 BASIC METABOLIC PNL TOTAL CA: CPT | Performed by: PHYSICIAN ASSISTANT

## 2023-08-08 PROCEDURE — 97163 PT EVAL HIGH COMPLEX 45 MIN: CPT

## 2023-08-08 PROCEDURE — 85610 PROTHROMBIN TIME: CPT | Performed by: PHYSICIAN ASSISTANT

## 2023-08-08 PROCEDURE — 82248 BILIRUBIN DIRECT: CPT

## 2023-08-08 PROCEDURE — 84100 ASSAY OF PHOSPHORUS: CPT

## 2023-08-08 PROCEDURE — 97167 OT EVAL HIGH COMPLEX 60 MIN: CPT

## 2023-08-08 PROCEDURE — 85027 COMPLETE CBC AUTOMATED: CPT | Performed by: PHYSICIAN ASSISTANT

## 2023-08-08 PROCEDURE — 99024 POSTOP FOLLOW-UP VISIT: CPT | Performed by: OTOLARYNGOLOGY

## 2023-08-08 PROCEDURE — 85730 THROMBOPLASTIN TIME PARTIAL: CPT | Performed by: PHYSICIAN ASSISTANT

## 2023-08-08 PROCEDURE — 99024 POSTOP FOLLOW-UP VISIT: CPT | Performed by: PHYSICIAN ASSISTANT

## 2023-08-08 PROCEDURE — 99291 CRITICAL CARE FIRST HOUR: CPT | Performed by: ANESTHESIOLOGY

## 2023-08-08 RX ORDER — OXYCODONE HYDROCHLORIDE 5 MG/1
5 TABLET ORAL EVERY 6 HOURS PRN
Status: DISCONTINUED | OUTPATIENT
Start: 2023-08-08 | End: 2023-08-09

## 2023-08-08 RX ADMIN — ATORVASTATIN CALCIUM 20 MG: 20 TABLET, FILM COATED ORAL at 17:19

## 2023-08-08 RX ADMIN — PANTOPRAZOLE SODIUM 40 MG: 40 TABLET, DELAYED RELEASE ORAL at 06:24

## 2023-08-08 RX ADMIN — DIBASIC SODIUM PHOSPHATE, MONOBASIC POTASSIUM PHOSPHATE AND MONOBASIC SODIUM PHOSPHATE 1 TABLET: 852; 155; 130 TABLET ORAL at 08:44

## 2023-08-08 RX ADMIN — Medication 2.5 MG: at 09:24

## 2023-08-08 RX ADMIN — LEVETIRACETAM 750 MG: 750 TABLET, FILM COATED ORAL at 20:01

## 2023-08-08 RX ADMIN — CEFAZOLIN SODIUM 1000 MG: 1 SOLUTION INTRAVENOUS at 17:19

## 2023-08-08 RX ADMIN — CEFAZOLIN SODIUM 1000 MG: 1 SOLUTION INTRAVENOUS at 08:57

## 2023-08-08 RX ADMIN — LEVETIRACETAM 750 MG: 750 TABLET, FILM COATED ORAL at 08:44

## 2023-08-08 RX ADMIN — DEXAMETHASONE 4 MG: 4 TABLET ORAL at 00:11

## 2023-08-08 RX ADMIN — ALLOPURINOL 100 MG: 100 TABLET ORAL at 08:44

## 2023-08-08 RX ADMIN — ACETAMINOPHEN 975 MG: 325 TABLET, FILM COATED ORAL at 15:16

## 2023-08-08 RX ADMIN — CEFAZOLIN SODIUM 1000 MG: 1 SOLUTION INTRAVENOUS at 00:11

## 2023-08-08 RX ADMIN — DEXAMETHASONE 4 MG: 4 TABLET ORAL at 11:25

## 2023-08-08 RX ADMIN — OXYCODONE HYDROCHLORIDE 5 MG: 5 TABLET ORAL at 02:30

## 2023-08-08 RX ADMIN — ACETAMINOPHEN 975 MG: 325 TABLET, FILM COATED ORAL at 21:10

## 2023-08-08 RX ADMIN — METOPROLOL TARTRATE 25 MG: 25 TABLET, FILM COATED ORAL at 08:44

## 2023-08-08 RX ADMIN — DEXAMETHASONE 4 MG: 4 TABLET ORAL at 17:18

## 2023-08-08 RX ADMIN — OXYCODONE HYDROCHLORIDE 5 MG: 5 TABLET ORAL at 21:28

## 2023-08-08 RX ADMIN — DEXAMETHASONE 4 MG: 4 TABLET ORAL at 06:24

## 2023-08-08 RX ADMIN — ACETAMINOPHEN 975 MG: 325 TABLET, FILM COATED ORAL at 06:23

## 2023-08-08 RX ADMIN — HYDROCHLOROTHIAZIDE 12.5 MG: 12.5 TABLET ORAL at 08:46

## 2023-08-08 NOTE — CASE MANAGEMENT
Case Management Assessment & Discharge Planning Note    Patient name Kvng Villarreal  Location ICU 03/ICU 86 MRN [id number]  : 1960 Date 2023       Current Admission Date: 2023  Current Admission Diagnosis:MI (myocardial infarction) [facility])   Patient Active Problem List    Diagnosis Date Noted   • Temporal encephalocele ([address]) 2023   • MI (myocardial infarction) ([address]) 2023   • Elevated fasting blood sugar 2023   • Obesity (BMI 30-39.9) 2021   • S/P coronary artery stent placement    • Acute kidney injury (nontraumatic) ([address]) 2020   • Transaminitis 2020   • Pyelonephritis 2020   • Ureteral stone 2020   • Nephrolithiasis 2020   • Coronary artery disease involving native coronary artery of native heart without angina pectoris 2020   • PVC's (premature ventricular contractions) 2020   • Gout 2019   • Essential hypertension 2019   • Hyperlipidemia 2019      LOS (days): 1  Geometric Mean LOS (GMLOS) (days):   Days to GMLOS:     OBJECTIVE:    Risk of Unplanned Readmission Score: 11.34         Current admission status: Inpatient       Preferred Pharmacy:   [facility] [address] - [address] [place] [address] [address] [place] [address]  Phone: [phone] Fax: [phone]    Primary Care Provider: Real Rain DO    Primary Insurance: BLUE CROSS  Secondary Insurance:     ASSESSMENT:  Brown Proxies    There are no active Health Care Proxies on file. Patient Information  Admitted from[de-identified] Home  Mental Status: Alert  During Assessment patient was accompanied by: Not accompanied during assessment  Assessment information provided by[de-identified] Patient  Primary Caregiver: Self  Support Systems: Spouse/significant other  Home entry access options.  Select all that apply.: Stairs  Number of steps to enter home.: One Flight  Do the steps have railings?: Yes  Type of Current Residence: Bi-level  Upon entering residence, is there a bedroom on the main floor (no further steps)?: Yes  Upon entering residence, is there a bathroom on the main floor (no further steps)?: Yes  In the last 12 months, was there a time when you were not able to pay the mortgage or rent on time?: No  In the last 12 months, was there a time when you did not have a steady place to sleep or slept in a shelter (including now)?: No  Homeless/housing insecurity resource given?: N/A  Living Arrangements: Lives w/ Spouse/significant other  Is patient a ?: No    Activities of Daily Living Prior to Admission  Functional Status: Independent  Completes ADLs independently?: Yes  Ambulates independently?: Yes  Does patient use assisted devices?: No  Does patient currently own DME?: No  Does patient have a history of Outpatient Therapy (PT/OT)?: No  Does the patient have a history of Short-Term Rehab?: No  Does patient have a history of HHC?: No  Does patient currently have [facility]?: No         Patient Information Continued  Income Source: Pension/senior care  Does patient have prescription coverage?: Yes  Within the past 12 months, you worried that your food would run out before you got the money to buy more.: Never true  Within the past 12 months, the food you bought just didn't last and you didn't have money to get more.: Never true  Food insecurity resource given?: N/A  Does patient receive dialysis treatments?: No         Means of Transportation  Means of Transport to Appts[de-identified] Drives Self  In the past 12 months, has lack of transportation kept you from medical appointments or from getting medications?: No  In the past 12 months, has lack of transportation kept you from meetings, work, or from getting things needed for daily living?: No  Was application for public transport provided?: N/A        DISCHARGE DETAILS:  Met at bedside with patient. Lives with wife, IADL's, does not own any DME, drives. Wife to transport at [place] per patient.

## 2023-08-08 NOTE — PROGRESS NOTES
[address] [place] [address]  Progress Note: Critical Care  Name: Cher Orourke 58 y.o. male I MRN: [id number]  Unit/Bed#: ICU 03 I Date of Admission: 8/7/2023   Date of Service: 8/8/2023 I Hospital Day: 1    Assessment/Plan   Neuro:   • Diagnosis: endoscopic L middle fossa craniotomy for repair of tegmen tympani defect and CSF leak, w/ temporalis muscle flap and intra-op lumbar drain   ? Plan:   - SBP goal 110-160  - Neuro checks Q2  - Keppra 750mg BID (day2/7)  - Decadron taper (day2/7)  - Seizure precautions   - Pain regimen: Tylenol 975 q6, Dilaudid 0.2mg q2 PRN for breakthrough pain, oxycodone 2.5mg PRN for severe pain, oxycodone 5mg PRN for severe pain   - Hold pharmacologic DVT prophylaxis   • Diagnosis: Lumbar drain   ? Plan:  - Monitor output  - Goal 10cc/hr. If HOB elevated clamp drain.  - Per neurosurgery plan to keep drain x48hrs and remove on Thursday  CV:   • Diagnosis: CAD s/p PCI 2012  ? On ASA 81mg at home  ? Plan:   - Hold ASA   • Diagnosis: HTN  ? Home med Lopressor 25mg BID, Candesartan-HCTZ 16-12.5mg   ? Plan:   - BP goals: 110-160  - Monitor BP closely  - Losartan 100mg, HCTZ 12.5mg, Lopressor 25mg daily. Hold for SBP <110  •  Diagnosis: HLD  ? Plan:   - Continue home Lipitor 20mg daily      Pulm:  No active issues     GI:   • Diagnosis: Post op bowel regemin  ? Plan:   - Colace 100mg BID, Senokot 8.6mg daily        :   No active issues   Lenz in place. Can removed today     F/E/N:   • Fluids: NS 125ml/hr. Can discontinue Itoday  • Electrolytes: Monitor and replete as needed  ? Phos 1.8. Replete w/ Potassium phosphate   • Nutrition: regular diet     Heme/Onc:   No active issues     Endo:   • Diagnosis: Gout  ? Plan:   - Continue home allopurinol 100mg daily        ID:   • Diagnosis: Post op abx  ?  Plan:   - Continue post op ancef (day2/3)        MSK/Skin:   PT/OT     LDA: Lumbar drain, Lenz, Peripheral IV x2     Disposition: Critical care    ICU Core Measures     A: Assess, Prevent, and Manage Pain · Has pain been assessed? Yes  · Need for changes to pain regimen? No   B: Both SAT/SAT  · N/A   C: Choice of Sedation · RASS Goal: 0 Alert and Calm or N/A patient not on sedation  · Need for changes to sedation or analgesia regimen? No   D: Delirium · CAM-ICU: Negative   E: Early Mobility  · Plan for early mobility? Yes   F: Family Engagement · Plan for family engagement today? Yes       Antibiotic Review: Post op requirements     Review of Invasive Devices: Delfin Plan: Plan to remove today        Prophylaxis:  VTE Contraindicated secondary to: post op managment   Stress Ulcer  covered bypantoprazole (PROTONIX) EC tablet 40 mg [[id number]]          Subjective   HPI/24hr events: No acute overnight events. LD draining appropriately. Pain controlled. No ear discharge reported. All systems reviewed and were negative        Objective                            Vitals I/O      Most Recent Min/Max in 24hrs   Temp (!) 97.4 °F (36.3 °C) Temp  Min: 97.4 °F (36.3 °C)  Max: 98.3 °F (36.8 °C)   Pulse 62 Pulse  Min: 62  Max: 108   Resp 12 Resp  Min: 10  Max: 27   /53 BP  Min: 94/47  Max: 145/79   O2 Sat 95 % SpO2  Min: 93 %  Max: 97 %      Intake/Output Summary (Last 24 hours) at 8/8/2023 0641  Last data filed at 8/8/2023 0601  Gross per 24 hour   Intake 5206.14 ml   Output 2975 ml   Net 2231.14 ml         Diet Regular; Regular House     Invasive Monitoring Physical exam    Physical Exam  Constitutional:       General: He is not in acute distress. Appearance: He is not ill-appearing. HENT:      Head: Normocephalic. Nose: Nose normal.      Mouth/Throat:      Mouth: Mucous membranes are moist.      Pharynx: Oropharynx is clear. Eyes:      Extraocular Movements: Extraocular movements intact. Conjunctiva/sclera: Conjunctivae normal.      Pupils: Pupils are equal, round, and reactive to light. Cardiovascular:      Rate and Rhythm: Normal rate and regular rhythm. Pulses: Normal pulses. Heart sounds: No murmur heard. Pulmonary:      Effort: Pulmonary effort is normal. No respiratory distress. Abdominal:      General: Bowel sounds are normal. There is no distension. Palpations: Abdomen is soft. Musculoskeletal:      Right lower leg: No edema. Left lower leg: No edema. Skin:     General: Skin is warm and dry. Neurological:      Mental Status: He is alert and oriented to person, place, and time. Cranial Nerves: Cranial nerves 2-12 are intact. Sensory: Sensation is intact. Motor: No weakness (5/5 strength throughout). Comments: No L ear discharge. Persistent L sided hearing loss. Lumbar drain present            Diagnostic Studies      EKG: Reviewed   Imaging:  I have personally reviewed pertinent reports.        Medications:  Scheduled PRN   acetaminophen, 975 mg, Q8H [address]  allopurinol, 100 mg, Daily  atorvastatin, 20 mg, Daily With Dinner  cefazolin, 1,000 mg, Q8H  dexamethasone, 4 mg, Q6H [address]   Followed by  [facility] ON 8/9/2023] dexamethasone, 4 mg, Q8H [address]   Followed by  [facility] ON 8/11/2023] dexamethasone, 2 mg, Q6H [address]   Followed by  [facility] ON 8/13/2023] dexamethasone, 2 mg, Q8H [address]   Followed by  [facility] ON 8/15/2023] dexamethasone, 2 mg, Q12H [address]   Followed by  [facility] ON 8/17/2023] dexamethasone, 2 mg, Q24H SANGEETHA  docusate sodium, 100 mg, BID  losartan, 100 mg, Daily   And  hydrochlorothiazide, 12.5 mg, Daily  levETIRAcetam, 750 mg, Q12H SANGEETHA  metoprolol tartrate, 25 mg, After Breakfast  pantoprazole, 40 mg, Early Morning  potassium-sodium phosphateS, 1 tablet, Once  senna, 1 tablet, Daily      acetaminophen, 975 mg, Q6H PRN  calcium carbonate, 1,000 mg, Daily PRN  HYDROmorphone, 0.2 mg, Q2H PRN  magnesium hydroxide, 30 mL, Daily PRN  ondansetron, 4 mg, Q6H PRN  oxyCODONE, 5 mg, Q6H PRN  oxyCODONE, 2.5 mg, Q4H PRN       Continuous          Labs:    CBC    Recent Labs     08/07/23  1257 08/08/23  0533   WBC 6.63 9.63   HGB 13.8 13.3   HCT 38.7 36.4*    187     BMP    Recent Labs     08/07/23  0821 08/07/23  1257   SODIUM  --  140   K  --  3.8   CL  --  111*   CO2 28 22   AGAP  --  7   BUN  --  12   CREATININE  --  1.00   CALCIUM  --  8.2*       Coags    Recent Labs     08/08/23  0533   INR 1.04   PTT 26        Additional Electrolytes  Recent Labs     08/07/23  0821 08/07/23  1257   MG  --  2.1   PHOS  --  1.8*   CAIONIZED 1.15  --           Blood Gas    No recent results  No recent results LFTs  No recent results    Infectious  No recent results  Glucose  Recent Labs     08/07/23  1257   GLUC 151*               Alivia Giraldo, MS4

## 2023-08-08 NOTE — PROGRESS NOTES
[address] [facility]  Progress Note  Name: Luis aMcdonald  MRN: [id number]  Unit/Bed#: ICU 03 I Date of Admission: 8/7/2023   Date of Service: 8/8/2023 I Hospital Day: 1    Assessment/Plan   Temporal encephalocele (HCC)  Assessment & Plan  · POD 1 from left middle cranial fossa craniotomy, retromastoid approach for repair of tegmen defect and temporalis muscle fascia grafting  · CSF otorrhea with left temporal encephalocele noted on CT and MRI imaging    Imaging:   · No imaging needed post op     Plan:   · Ongoing neurologic checks  · Ongoing medical management. · Home medication reordered  · Ongoing pain control  · Controlled on oral regimen currently  · Continue bowel regimen as ordered. · Lumbar drain in place. Maintain at this time   · 10 cc/hour output  · Anticipate clamping drain tomorrow evening  · DVT ppx: SCD's, HSQ BID starting tomorrow   · Physical therapy/occupational therapy evaluation- clamp drain when ambulating   · Case management following for disposition  · Rounds completed with ANGELES Gaytan. · Neurosurgery will continue to follow as primary team. Call with questions or concerns. Subjective/Objective   Chief Complaint: None     Subjective: Patient has some mild soreness along his incision on the left side. No reported drainage overnight. Denies any significant headaches. Reports some stiffness from lying in bed. Tolerating full diet     Objective: laying in bed     I/O       08/06 0701  08/07 0700 08/07 0701  08/08 0700 08/08 0701  08/09 0700    P. O.  1240 200    I.V. (mL/kg)  3766.1 (39.9)     IV Piggyback  200 50    Total Intake(mL/kg)  5206.1 (55.2) 250 (2.7)    Urine (mL/kg/hr)  3435 (1.5) 225 (1.2)    Drains  190     Blood  25     Total Output  3650 225    Net  +1556.1 +25                 Invasive Devices     Peripheral Intravenous Line  Duration           Peripheral IV 08/07/23 Left Hand 1 day    Peripheral IV 08/07/23 Left Wrist 1 day Drain  Duration           Lumbar Drain 1 day                Physical Exam:  Vitals: Blood pressure 119/68, pulse 74, temperature 98.9 °F (37.2 °C), temperature source Oral, resp. rate 22, height 5' 8" (1.727 m), weight 94.3 kg (208 lb), SpO2 96 %. ,Body mass index is 31.63 kg/m². General appearance: alert, appears stated age, cooperative and no distress  Head: Normocephalic, without obvious abnormality  Eyes: EOMI, PERRL  Neck: supple, symmetrical, trachea midline  Back: no kyphosis present  Lungs: non labored breathing  Heart: regular heart rate  Neurologic:   Mental status: Alert, oriented x3, thought content appropriate  Cranial nerves: grossly intact (Cranial nerves II-XII)  Sensory: normal to light touch   Motor: moving all extremities without focal weakness 5/5    Lab Results:  Results from last 7 days   Lab Units 08/08/23  0533 08/07/23  1257 08/07/23  0821   WBC Thousand/uL 9.63 6.63  --    HEMOGLOBIN g/dL 13.3 13.8  --    I STAT HEMOGLOBIN g/dl  --   --  11.9*   HEMATOCRIT % 36.4* 38.7  --    HEMATOCRIT, ISTAT %  --   --  35*   PLATELETS Thousands/uL 187 178  --    MONO PCT %  --  0*  --    EOS PCT %  --  0  --      Results from last 7 days   Lab Units 08/08/23  0632 08/07/23  1257 08/07/23  0821   POTASSIUM mmol/L 4.0 3.8  --    CHLORIDE mmol/L 111* 111*  --    CO2 mmol/L 24 22  --    CO2, I-STAT mmol/L  --   --  28   BUN mg/dL 13 12  --    CREATININE mg/dL 0.94 1.00  --    CALCIUM mg/dL 8.2* 8.2*  --    GLUCOSE, ISTAT mg/dl  --   --  124     Results from last 7 days   Lab Units 08/07/23  1257   MAGNESIUM mg/dL 2.1     Results from last 7 days   Lab Units 08/07/23  1257   PHOSPHORUS mg/dL 1.8*     Results from last 7 days   Lab Units 08/08/23  0533   INR  1.04   PTT seconds 26     No results found for: "TROPONINT"  ABG:No results found for: "PHART", "IEO5ZQB", "PO2ART", "NWW1FFD", "N5XWOWGB", "BEART", "SOURCE"    Imaging Studies: I have personally reviewed pertinent reports.    and I have personally reviewed pertinent films in PACS  No results found. EKG, Pathology, and Other Studies: I have personally reviewed pertinent reports.       VTE Pharmacologic Prophylaxis: Reason for no pharmacologic prophylaxis post op     VTE Mechanical Prophylaxis: sequential compression device

## 2023-08-08 NOTE — PLAN OF CARE
Problem: PAIN - ADULT  Goal: Verbalizes/displays adequate comfort level or baseline comfort level  Description: Interventions:  - Encourage patient to monitor pain and request assistance  - Assess pain using appropriate pain scale  - Administer analgesics based on type and severity of pain and evaluate response  - Implement non-pharmacological measures as appropriate and evaluate response  - Consider cultural and social influences on pain and pain management  - Notify physician/advanced practitioner if interventions unsuccessful or patient reports new pain  Outcome: Progressing     Problem: Knowledge Deficit  Goal: Patient/family/caregiver demonstrates understanding of disease process, treatment plan, medications, and discharge instructions  Description: Complete learning assessment and assess knowledge base. Interventions:  - Provide teaching at level of understanding  - Provide teaching via preferred learning methods  Outcome: Progressing     Problem: MOBILITY - ADULT  Goal: Maintain or return to baseline ADL function  Description: INTERVENTIONS:  -  Assess patient's ability to carry out ADLs; assess patient's baseline for ADL function and identify physical deficits which impact ability to perform ADLs (bathing, care of mouth/teeth, toileting, grooming, dressing, etc.)  - Assess/evaluate cause of self-care deficits   - Assess range of motion  - Assess patient's mobility; develop plan if impaired  - Assess patient's need for assistive devices and provide as appropriate  - Encourage maximum independence but intervene and supervise when necessary  - Involve family in performance of ADLs  - Assess for home care needs following discharge   - Consider OT consult to assist with ADL evaluation and planning for discharge  - Provide patient education as appropriate  Outcome: Progressing  Goal: Maintains/Returns to pre admission functional level  Description: INTERVENTIONS:  - Perform BMAT or MOVE assessment daily.    - Set and communicate daily mobility goal to care team and patient/family/caregiver. - Collaborate with rehabilitation services on mobility goals if consulted    Problem: NEUROSENSORY - ADULT  Goal: Achieves stable or improved neurological status  Description: INTERVENTIONS  - Monitor and report changes in neurological status  - Monitor vital signs such as temperature, blood pressure, glucose, and any other labs ordered   - Initiate measures to prevent increased intracranial pressure  - Monitor for seizure activity and implement precautions if appropriate      Outcome: Progressing  Goal: Remains free of injury related to seizures activity  Description: INTERVENTIONS  - Maintain airway, patient safety  and administer oxygen as ordered  - Monitor patient for seizure activity, document and report duration and description of seizure to physician/advanced practitioner  - If seizure occurs,  ensure patient safety during seizure  - Reorient patient post seizure  - Seizure pads on all 4 side rails  - Instruct patient/family to notify RN of any seizure activity including if an aura is experienced  - Instruct patient/family to call for assistance with activity based on nursing assessment  - Administer anti-seizure medications if ordered    Outcome: Progressing  Goal: Achieves maximal functionality and self care  Description: INTERVENTIONS  - Monitor swallowing and airway patency with patient fatigue and changes in neurological status  - Encourage and assist patient to increase activity and self care.    - Encourage visually impaired, hearing impaired and aphasic patients to use assistive/communication devices  Outcome: Progressing   el   Outcome: Progressing

## 2023-08-08 NOTE — UTILIZATION REVIEW
Initial Clinical Review    Elective IP surgical procedure  Age/Sex: 58 y.o. male  Surgery Date: 8/7/23  Procedure:   CSF otorrhea [G96.01]  Temporal encephalocele (HCC) [Q01.8]  Anesthesia: General  ASA Score: 3  Operative Findings:  Epitympanic tegmen dehiscence  1. Lumbar drain placement with one pass, opening pressure < 30 cm H2O, clear CSF   2. Multifocal areas of bony dehiscence in left middle fossa skull base, involving tegmen bone and arcuate eminence, repaired with autograft bone  3. One small focal encephalocele in left temporal lobe coagulated and dural defect repaired with Durepair onlay with Duraseal  4. Harvesting and use of local temporalis muscle flap for vascularized autograft reconstruction      POD#1 Progress Note: Some hypotension overnight, notes pain controlled. No discharge from ear or salty taste in mouth. Lumbar drain working appropriately. On exam, lumbar drain in place. , strength intact, persistent L sided hearing loss. Plan: -160, q2h neuro checks, keppra BID, decadron taper, seizure precautions, analgesics. Hold pharmacological DVT ppx, SCDs. Goal 10 mL/hr for lumbar drain; drain maintained for 48 hours, likely remove on Thursday. Hold ASA. Continue other PTA meds, bowel regimen, IVF, remove boggs today, IVF, Trend labs, replete electrolytes as needed; IV ABX. Remain in critical care.     Admission Orders: Date/Time/Statement:   Admission Orders (From admission, onward)     Ordered        08/07/23 1136  Inpatient Admission  Once                      Orders Placed This Encounter   Procedures   • Inpatient Admission     Standing Status:   Standing     Number of Occurrences:   1     Order Specific Question:   Level of Care     Answer:   Critical Care [15]     Order Specific Question:   Estimated length of stay     Answer:   Inpatient Only Surgery     Vital Signs: B Ht 5' 8" (1.727 m)   Wt 94.3 kg (208 lb)   BMI 31.63 kg/m²     Date/Time Temp Pulse Resp BP MAP (mmHg) Arterial Line BP MAP SpO2 Calculated FIO2 (%) - Nasal Cannula Nasal Cannula O2 Flow Rate (L/min) O2 Device   08/08/23 0815 98.9 °F (37.2 °C) 74 22 119/68 95 -- -- 96 % -- -- --   08/08/23 0700 -- 64 13 104/56 71 -- -- 94 % -- -- --   08/08/23 0600 -- 62 12 101/53 73 -- -- -- -- -- --   08/08/23 0500 -- 62 12 100/42 Abnormal  64 Abnormal  -- -- 95 % -- -- --   08/08/23 0400 97.4 °F (36.3 °C) Abnormal  64 13 94/47 Abnormal  59 Abnormal  -- -- 95 % -- -- --   08/08/23 0300 -- 66 14 100/52 68 -- -- 94 % -- -- --   08/08/23 0230 -- 64 10 Abnormal  101/51 65 -- -- 94 % -- -- --   08/08/23 0200 -- 66 12 96/47 Abnormal  59 Abnormal  -- -- 94 % -- -- --   08/08/23 0100 -- 66 13 103/56 71 -- -- 94 % -- -- --   08/08/23 0000 98 °F (36.7 °C) 70 13 104/50 67 -- -- 95 % -- -- --   08/07/23 2300 -- 70 13 96/50 58 Abnormal  -- -- 94 % -- -- --   08/07/23 2200 -- 74 14 112/60 74 -- -- 94 % -- -- --   08/07/23 2100 -- 74 22 119/67 84 -- -- 95 % -- -- --   08/07/23 2000 97.8 °F (36.6 °C) 78 27 Abnormal  114/68 86 -- -- 95 % -- -- None (Room air)   08/07/23 1900 -- 74 13 96/58 70 -- -- 93 % -- -- --   08/07/23 1700 -- 66 14 113/64 75 -- -- 95 % -- -- --   08/07/23 1600 98.3 °F (36.8 °C) 74 17 97/54 71 -- -- 93 % -- -- --   08/07/23 1501 -- 82 -- -- -- -- -- -- -- -- --   08/07/23 1400 -- 84 14 118/73 86 -- -- 97 % -- -- --   08/07/23 1359 -- -- -- -- -- -- -- 96 % 32 3 L/min Nasal cannula   08/07/23 1300 -- 94 13 145/79 101 114/72 90 mmHg 95 % -- -- --   08/07/23 1244 98.3 °F (36.8 °C) -- 16 138/74 95 -- -- -- -- -- --   08/07/23 1230 -- -- 18 -- -- -- -- 95 % 32 3 L/min Nasal cannula   08/07/23 1215 -- 98 18 124/68 87 -- -- 95 % 32 3 L/min Nasal cannula   08/07/23 1200 -- 98 15 144/69 -- -- -- 95 % 32 3 L/min Nasal cannula   08/07/23 1145 -- 98 17 136/75 99 -- -- 95 % 32 3 L/min Nasal cannula   08/07/23 1130 98.3 °F (36.8 °C) 99 16 134/71 89 -- 264 mmHg 96 % 32 3 L/min Nasal cannula   08/07/23 1115 -- 102 14 124/76 95 -- 256 mmHg 95 % 32 3 L/min Nasal cannula   08/07/23 1100 -- 108 Abnormal  17 137/74 97 108/88 92 mmHg 94 % 32 3 L/min Nasal cannula   08/07/23 1053 98.3 °F (36.8 °C) 105 15 133/75 88 121/62 100 mmHg 93 % 32 3 L/min Nasal cannula   08/07/23 0603 98.1 °F (36.7 °C) 88 16 154/103 Abnormal  -- -- -- 97 % -- -- None (Room air)     Oxford Coma Scale  Date and Time Eye Opening Best Verbal Response Best Motor Response Oxford Coma Scale Score   08/08/23 0500 4 5 6 15   08/08/23 0400 4 5 6 15   08/08/23 0300 4 5 6 15   08/08/23 0200 4 5 6 15   08/08/23 0100 4 5 6 15   08/08/23 0000 4 5 6 15   08/07/23 2300 4 5 6 15   08/07/23 2200 4 5 6 15   08/07/23 2100 4 5 6 15   08/07/23 2000 4 5 6 15   08/07/23 1900 4 5 6 15   08/07/23 1753 4 5 6 15   08/07/23 1700 4 5 6 15   08/07/23 1600 4 5 6 15   08/07/23 1500 4 5 6 15   08/07/23 1400 4 5 6 15   08/07/23 1300 4 5 6 15   08/07/23 1230 4 5 6 15   08/07/23 1200 4 5 6 15   08/07/23 1145 4 5 6 15   08/07/23 1130 4 5 6 15   08/07/23 1115 4 5 6 15   08/07/23 1100 4 5 6 15   08/07/23 1053 4 5 6 15         Pertinent Labs/Diagnostic Test Results:     Results from last 7 days   Lab Units 08/08/23  0533 08/07/23  1257 08/07/23  0821   WBC Thousand/uL 9.63 6.63  --    HEMOGLOBIN g/dL 13.3 13.8  --    I STAT HEMOGLOBIN g/dl  --   --  11.9*   HEMATOCRIT % 36.4* 38.7  --    HEMATOCRIT, ISTAT %  --   --  35*   PLATELETS Thousands/uL 187 178  --          Results from last 7 days   Lab Units 08/08/23  0632 08/07/23  1257 08/07/23  0821   SODIUM mmol/L 141 140  --    POTASSIUM mmol/L 4.0 3.8  --    CHLORIDE mmol/L 111* 111*  --    CO2 mmol/L 24 22  --    CO2, I-STAT mmol/L  --   --  28   ANION GAP mmol/L 6 7  --    BUN mg/dL 13 12  --    CREATININE mg/dL 0.94 1.00  --    EGFR ml/min/1.73sq m 85 79  --    CALCIUM mg/dL 8.2* 8.2*  --    CALCIUM, IONIZED, ISTAT mmol/L  --   --  1.15   MAGNESIUM mg/dL  --  2.1  --    PHOSPHORUS mg/dL  --  1.8*  --      Results from last 7 days   Lab Units 08/08/23  0632   BILIRUBIN DIRECT mg/dL 0.20 Results from last 7 days   Lab Units 08/07/23  1103   POC GLUCOSE mg/dl 125     Results from last 7 days   Lab Units 08/08/23  0632 08/07/23  1257   GLUCOSE RANDOM mg/dL 146* 151*     Results from last 7 days   Lab Units 08/07/23  0821   I STAT BASE EXC mmol/L 2   ISTAT PH ART  7.437   I STAT ART PCO2 mm HG 39.5   I STAT ART PO2 mm HG >400.0*   I STAT ART HCO3 mmol/L 26.7       Results from last 7 days   Lab Units 08/08/23  0533   PROTIME seconds 13.8   INR  1.04   PTT seconds 26         Diet: Regular. Mobility: OOB TID w/ assistance. PT/OT evals. DVT Prophylaxis: SCDs.     Medications/Pain Control:   Scheduled Medications:  acetaminophen, 975 mg, Oral, Q8H [address]  allopurinol, 100 mg, Oral, Daily  atorvastatin, 20 mg, Oral, Daily With Dinner  cefazolin, 1,000 mg, Intravenous, Q8H  dexamethasone, 4 mg, Oral, Q6H [address]   Followed by  Alicia Gabriela ON 8/9/2023] dexamethasone, 4 mg, Oral, Q8H [address]   Followed by  Alicia Gabriela ON 8/11/2023] dexamethasone, 2 mg, Oral, Q6H [address]   Followed by  Alicia Gabriela ON 8/13/2023] dexamethasone, 2 mg, Oral, Q8H [address]   Followed by  Alicia Gabriela ON 8/15/2023] dexamethasone, 2 mg, Oral, Q12H [address]   Followed by  Alicia Gabriela ON 8/17/2023] dexamethasone, 2 mg, Oral, Q24H [address]  docusate sodium, 100 mg, Oral, BID  hydrochlorothiazide, 12.5 mg, Oral, Daily  levETIRAcetam, 750 mg, Oral, Q12H SANGEETHA  metoprolol tartrate, 25 mg, Oral, After Breakfast  pantoprazole, 40 mg, Oral, Early Morning  senna, 1 tablet, Oral, Daily    Continuous IV Infusions:    sodium chloride 0.9 %, 125 mL/hr, Intravenous, Continuous; Start: 08/07/23 1115 End: 08/07/23 2305    PRN Meds:  acetaminophen, 975 mg, Oral, Q6H PRN; 8/7 x1  calcium carbonate, 1,000 mg, Oral, Daily PRN  HYDROmorphone, 0.2 mg, Intravenous, Q2H PRN  magnesium hydroxide, 30 mL, Oral, Daily PRN  ondansetron, 4 mg, Intravenous, Q6H PRN  oxyCODONE, 5 mg, Oral, Q6H PRN; 8/7 x1, 8/8 x1  oxyCODONE, 2.5 mg, Oral, Q4H PRN; 8/7 x1  potassium chloride, 20 mEq, Oral; 8/7 x1  potassium phosphates, 1 tablet, Oral; 8/8 x1  potassium phosphates, 2 tablets, Oral; 8/7 x1    losartan (COZAAR) tablet 100 mg  Dose: 100 mg Freq: Daily Route: PO  Start: 08/07/23 1245 End: 08/08/23 0725        Network Utilization Review Department  ATTENTION: Please call with any questions or concerns to [phone] and carefully listen to the prompts so that you are directed to the right person. All voicemails are confidential.  [place] all requests for admission clinical reviews, approved or denied determinations and any other requests to dedicated fax number below belonging to the campus where the patient is receiving treatment.  List of dedicated fax numbers for the Facilities:  [place] DENIALS (Administrative/Medical Necessity) [phone] [facility] [facility] [phone]   [address] [place] [address] Drive [phone]   [facility] [address] [facility] [phone] [address] [facility] [address] [id number] [facility] [phone]   [address] [facility] [address] [facility]  [facility] [phone]

## 2023-08-08 NOTE — PLAN OF CARE
Problem: PAIN - ADULT  Goal: Verbalizes/displays adequate comfort level or baseline comfort level  Description: Interventions:  - Encourage patient to monitor pain and request assistance  - Assess pain using appropriate pain scale  - Administer analgesics based on type and severity of pain and evaluate response  - Implement non-pharmacological measures as appropriate and evaluate response  - Consider cultural and social influences on pain and pain management  - Notify physician/advanced practitioner if interventions unsuccessful or patient reports new pain  Outcome: Progressing     Problem: INFECTION - ADULT  Goal: Absence or prevention of progression during hospitalization  Description: INTERVENTIONS:  - Assess and monitor for signs and symptoms of infection  - Monitor lab/diagnostic results  - Monitor all insertion sites, i.e. indwelling lines, tubes, and drains  - Monitor endotracheal if appropriate and nasal secretions for changes in amount and color  - [place] appropriate cooling/warming therapies per order  - Administer medications as ordered  - Instruct and encourage patient and family to use good hand hygiene technique  - Identify and instruct in appropriate isolation precautions for identified infection/condition  Outcome: Progressing  Goal: Absence of fever/infection during neutropenic period  Description: INTERVENTIONS:  - Monitor WBC    Outcome: Progressing     Problem: SAFETY ADULT  Goal: Patient will remain free of falls  Description: INTERVENTIONS:  - Educate patient/family on patient safety including physical limitations  - Instruct patient to call for assistance with activity   - Consult OT/PT to assist with strengthening/mobility   - Keep Call bell within reach  - Keep bed low and locked with side rails adjusted as appropriate  - Keep care items and personal belongings within reach  - Initiate and maintain comfort rounds  - Make Fall Risk Sign visible to staff  - Offer Toileting every    Hours, in advance of need  - Initiate/Maintain   alarm  - Obtain necessary fall risk management equipment:   - Apply yellow socks and bracelet for high fall risk patients  - Consider moving patient to room near nurses station  Outcome: Progressing  Goal: Maintain or return to baseline ADL function  Description: INTERVENTIONS:  -  Assess patient's ability to carry out ADLs; assess patient's baseline for ADL function and identify physical deficits which impact ability to perform ADLs (bathing, care of mouth/teeth, toileting, grooming, dressing, etc.)  - Assess/evaluate cause of self-care deficits   - Assess range of motion  - Assess patient's mobility; develop plan if impaired  - Assess patient's need for assistive devices and provide as appropriate  - Encourage maximum independence but intervene and supervise when necessary  - Involve family in performance of ADLs  - Assess for home care needs following discharge   - Consider OT consult to assist with ADL evaluation and planning for discharge  - Provide patient education as appropriate  Outcome: Progressing     Problem: MOBILITY - ADULT  Goal: Maintain or return to baseline ADL function  Description: INTERVENTIONS:  -  Assess patient's ability to carry out ADLs; assess patient's baseline for ADL function and identify physical deficits which impact ability to perform ADLs (bathing, care of mouth/teeth, toileting, grooming, dressing, etc.)  - Assess/evaluate cause of self-care deficits   - Assess range of motion  - Assess patient's mobility; develop plan if impaired  - Assess patient's need for assistive devices and provide as appropriate  - Encourage maximum independence but intervene and supervise when necessary  - Involve family in performance of ADLs  - Assess for home care needs following discharge   - Consider OT consult to assist with ADL evaluation and planning for discharge  - Provide patient education as appropriate  Outcome: Progressing

## 2023-08-08 NOTE — PROGRESS NOTES
OTOLARYNGOLOGY PROGRESS NOTE    Date of Service: 8/8/2023 6:02 AM    HPI  Patient is a 58 y.o. 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 Overnight Events: NAEO, some hypotension overnight (lowest 94/47), pain controlled, no ear dc or salty taste in mouth    WBC (8/8): 9.63  Na (8/7): 140    PHYSICAL EXAM:  Vitals:    08/08/23 0500   BP: (!) 101/45   Pulse: 62   Resp: 12   Temp:    SpO2: 95%        General: No acute distress, AOx4  HEENT: L ear incision C/D/I  Neurology: No focal deficits  Lungs: Breathing easy, unlabored and even on room air  Cardio: RRR, well perfused  Abd: soft, NT, ND  Lumbar drain in place      Intake/Output Summary (Last 24 hours) at 8/8/2023 0602  Last data filed at 8/8/2023 0501  Gross per 24 hour   Intake 4806.14 ml   Output 2965 ml   Net 1841.14 ml     Lumbar drain: 180mL    LABORATORY    Recent Labs     08/07/23  0821 08/07/23  1257 08/08/23  0533   WBC  --  6.63 9.63   HGB 11.9* 13.8 13.3   HCT 35* 38.7 36.4*   PLT  --  178 187       Recent Labs     08/07/23  1257   K 3.8   *   BUN 12   PHOS 1.8*   MG 2.1       Invalid input(s): "PTPAT", "PTINR", "APTTMNNM", "APTTPAT"      Patient Active Problem List   Diagnosis   • Gout   • Essential hypertension   • Hyperlipidemia   • Coronary artery disease involving native coronary artery of native heart without angina pectoris   • PVC's (premature ventricular contractions)   • Nephrolithiasis   • Acute kidney injury (nontraumatic) (HCC)   • Transaminitis   • Pyelonephritis   • Ureteral stone   • S/P coronary artery stent placement   • Obesity (BMI 30-39. 9)   • Elevated fasting blood sugar   • MI (myocardial infarction) [facility]         ASSESSMENT  Patient is a 58 y.o. male w/ acute and chronic problems as above, who is POD1 from endoscopic L middle fossa craniotomy for repair of tegmen tympani defect and CSF leak, w/ temporalis muscle flap and intra-op lumbar drain, stable    PLAN  - pt recovering well from L ear surgery  - lumbar drain management per neurosurgery protocol  - rest of care per primary

## 2023-08-08 NOTE — PLAN OF CARE
Problem: PHYSICAL THERAPY ADULT  Goal: Performs mobility at highest level of function for planned discharge setting. See evaluation for individualized goals. Description: Treatment/Interventions: Functional transfer training, LE strengthening/ROM, Elevations, Therapeutic exercise, Endurance training, Patient/family training, Equipment eval/education, Bed mobility, Gait training, Spoke to nursing, OT          See flowsheet documentation for full assessment, interventions and recommendations. 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 Pt's current comorbidities affecting POC are CAD, HTN, PVC, and nephrolithiasis. Personal factors affecting POC are that the pt lives in a bilevel home w/ 0STE and 6 steps to access other levels and independent w/o AD premorbidly. Pt's current medical status is unstable/unpredictable due to lumbar drain, ongoing telemetry monitoring, and a current BP goal. Impairments are current deficits in strength, endurance, balance, and gait. Will continue to follow pt for above impairments. Currently recommending home w/ no needs pending mobility progress and when medically cleared upon D/C. Barriers to Discharge: None     PT Discharge Recommendation: No rehabilitation needs    See flowsheet documentation for full assessment.

## 2023-08-08 NOTE — OCCUPATIONAL THERAPY NOTE
Occupational Therapy Evaluation     Patient Name: Kathy OLIVA Date: 8/8/2023  Problem List  Active Problems:    MI (myocardial infarction) ([address])    Temporal encephalocele ([address])    Past Medical History  Past Medical History:   Diagnosis Date    Acute myocardial infarction ([address])     "no heart damage" approx 8 yrs ago did get one stent"    Arthralgia     last assessed 7/8/13    Arthritis     Colon polyp     Contusion of skin with intact surface     of the left medial thigh,last assessed 6/28/13    Coronary artery disease     Gout     last assessed 10/29/13    History of sepsis     urinary and was in hosp 3 days /7/2020    Hyperlipidemia     Hypertension     Kidney stone     Lump of skin     last assessed 7/19/13//"fatty tumor and surg removed"    S/P coronary artery stent placement     x1    Wears glasses     at night     Past Surgical History  Past Surgical History:   Procedure Laterality Date    CARDIAC CATHETERIZATION      COLONOSCOPY  12/10/2009    Complete//2020    CORONARY STENT PLACEMENT  08/2012    stenting of the LAD artery 95% lesion to 0% residual stenosis    FL RETROGRADE PYELOGRAM  9/1/2021    KNEE SURGERY Left     x4    LUMBAR EPIDURAL INJECTION      x1    IN CYSTO BLADDER W/URETERAL CATHETERIZATION Left 7/4/2020    Procedure: CYSTOSCOPY WITH INSERTION STENT URETERAL;  Surgeon: Tri Gómez MD;  [facility];  Service: Urology    IN CYSTO/URETERO W/LITHOTRIPSY &INDWELL STENT INSRT Left 8/5/2020    Procedure: CYSTO, URETEROSCOPY STENT exchange;  Surgeon: Asaf Walker MD;  [facility];  Service: Urology    IN CYSTO/URETERO W/LITHOTRIPSY &INDWELL STENT INSRT Right 9/1/2021    Procedure: CYSTOSCOPY URETEROSCOPY WITH BASKET STONE EXTRACTION, RETROGRADE PYELOGRAM AND INSERTION STENT URETERAL;  Surgeon: Larry Fuentes MD;  [facility];  Service: Urology    RETROMASTOID CRANIOTOMY Left 8/7/2023    Procedure: Left middle fossa craniotomy, retromastoid approach, for repair tegeman defect with temporalis muscle fascia graft;  Surgeon: Odilon Rodriguez MD;  [facility];  Service: ENT    RETROMASTOID CRANIOTOMY Left 8/7/2023    Procedure: Endoscopic left middle fossa craniotomy for repair of tegmen defect and CSF leak with temporalis muscle fascia flap, with neuromonitoring (CN 7/8) and intraoperative lumbar drain placement;  Surgeon: Eduardo Garrido MD;  [facility];  Service: Neurosurgery         08/08/23 0900   OT Last Visit   OT Visit Date 08/08/23   Note Type   Note type Evaluation   Pain Assessment   Pain Assessment Tool 0-10   Pain Score 5   Pain Location/Orientation Location: [facility] Pain Intervention(s) Repositioned; Ambulation/increased activity; Emotional support;Relaxation technique   Restrictions/Precautions   Weight Bearing Precautions Per Order No   Other Precautions Chair Alarm; Bed Alarm;Multiple lines; Fall Risk;Pain   Home Living   Type of [address] [place] [address] Two level;Performs ADLs on one level;Stairs to enter with rails  (Bilevel home - all needs on main level)   Prior Function   Level of [place] Independent with ADLs; Independent with functional mobility; Independent with IADLS   Lives With Spouse   Receives Help From Family   IADLs Independent with driving; Independent with meal prep; Independent with medication management   Falls in the last 6 months 0   Vocational Retired   [address] and mobility - i iadls - shares homemaking with spouse   Reciprocal Relationships supportive family   Service to Others retired   [address] [facility] offers no c/o   ADL   Eating Assistance 7  Independent   Grooming Assistance 5  [address] 5  [address] 5  [address] [facility] Supine to Sit 6  Modified independent   Additional items HOB elevated; Bedrails; Increased time required   Transfers   Sit to Stand 5  Supervision   Stand to Sit 5  Supervision   Functional Mobility   Functional Mobility 5  Supervision   Additional items Rolling walker   Balance   Static Sitting Good   Dynamic Sitting Fair +   Static Standing Fair +   Dynamic Standing Fair   Ambulatory Fair -   Activity Tolerance   Activity Tolerance Patient tolerated treatment well   Medical Staff Made Aware Co eval with PT 2* medical complexity, comorbidities and limited overall tolerance to activity   RUE Assessment   RUE Assessment WFL   LUE Assessment   LUE Assessment WFL   Cognition   Overall Cognitive Status WFL   Assessment   Limitation Decreased ADL status; Decreased endurance;Decreased self-care trans;Decreased high-level ADLs   Prognosis Good   Assessment Pt is a 58 y.o. male who was [facility] to [facility] on [facility]23 with L hearing loss and persistent CSF leak s/p endoscopic middle fossa crani for repair of tegman defect and CSF leak . Patient  has a past medical history of Acute myocardial infarction ([address]), Arthralgia, Arthritis, Colon polyp, Contusion of skin with intact surface, Coronary artery disease, Gout, History of sepsis, Hyperlipidemia, Hypertension, Kidney stone, Lump of skin, S/P coronary artery stent placement, and Wears glasses. At baseline pt was completing adls and mobility- I iadls - shares homemaking with spouse. Pt lives with spouse in bilevel home - all needs on 1 level after 6  [employer]. Currently pt requires min assist for overall ADLS and sba for functional mobility/transfers. Pt currently presents with impairments in the following categories -steps to enter environment, difficulty performing ADLS, difficulty performing IADLS  and environment activity tolerance, endurance and standing balance/tolerance.  These impairments, as well as pt's fatigue, pain and home environment  limit pt's ability to safely engage in all baseline areas of occupation, includingbathing, dressing, functional mobility/transfers, community mobility, laundry , driving, house maintenance, medication management, meal prep, cleaning, social participation  and leisure activities however has supportive family who are able to provide assist prn -  From OT standpoint, recommend home with family support upon D/C. No immediate acute OT needs indicated at this time- d/c from caseload   Goals   Patient Goals feel better   Plan   OT Frequency Eval only   Recommendation   OT Discharge Recommendation No rehabilitation needs   AM-PAC Daily Activity Inpatient   Lower Body Dressing 3   Bathing 3   Toileting 4   Upper Body Dressing 4   Grooming 4   Eating 4   Daily Activity Raw Score 22   Daily Activity Standardized Score (Calc for Raw Score >=11) 47. 1   AM-PAC Applied Cognition Inpatient   Following a Speech/Presentation 4   Understanding Ordinary Conversation 4   Taking Medications 4   Remembering Where Things Are Placed or Put Away 4   Remembering List of 4-5 Errands 4   Taking Care of Complicated Tasks 4   Applied Cognition Raw Score 24   Applied Cognition Standardized Score 62.21   End of Consult   Education Provided Yes   Patient Position at End of Consult Bedside chair; All needs within reach;Bed/Chair alarm activated   Nurse Communication Nurse aware of consult       The patient's raw score on the AM-PAC Daily Activity Inpatient Short Form is 22. A raw score of greater than or equal to 19 suggests the patient may benefit from discharge to home. Please refer to the recommendation of the Occupational Therapist for safe discharge planning.       [facility]

## 2023-08-08 NOTE — PHYSICAL THERAPY NOTE
Physical Therapy Evaluation     Patient's Name: Hiram Mistry    Admitting Diagnosis  CSF otorrhea [G96.01]  Temporal encephalocele ([address]) [Q01.8]    Problem List  Patient Active Problem List   Diagnosis    Gout    Essential hypertension    Hyperlipidemia    Coronary artery disease involving native coronary artery of native heart without angina pectoris    PVC's (premature ventricular contractions)    Nephrolithiasis    Acute kidney injury (nontraumatic) (HCC)    Transaminitis    Pyelonephritis    Ureteral stone    S/P coronary artery stent placement    Obesity (BMI 30-39. 9)    Elevated fasting blood sugar    MI (myocardial infarction) ([address])    Temporal encephalocele (HCC)       Past Medical History  Past Medical History:   Diagnosis Date    Acute myocardial infarction ([address])     "no heart damage" approx 8 yrs ago did get one stent"    Arthralgia     last assessed 7/8/13    Arthritis     Colon polyp     Contusion of skin with intact surface     of the left medial thigh,last assessed 6/28/13    Coronary artery disease     Gout     last assessed 10/29/13    History of sepsis     urinary and was in hosp 3 days /7/2020    Hyperlipidemia     Hypertension     Kidney stone     Lump of skin     last assessed 7/19/13//"fatty tumor and surg removed"    S/P coronary artery stent placement     x1    Wears glasses     at night       Past Surgical History  Past Surgical History:   Procedure Laterality Date    CARDIAC CATHETERIZATION      COLONOSCOPY  12/10/2009    Complete//2020    CORONARY STENT PLACEMENT  08/2012    stenting of the LAD artery 95% lesion to 0% residual stenosis    FL RETROGRADE PYELOGRAM  9/1/2021    KNEE SURGERY Left     x4    LUMBAR EPIDURAL INJECTION      x1    VA CYSTO BLADDER W/URETERAL CATHETERIZATION Left 7/4/2020    Procedure: CYSTOSCOPY WITH INSERTION STENT URETERAL;  Surgeon: Jeanie Booth MD;  [facility];  Service: Urology    VA CYSTO/URETERO W/LITHOTRIPSY &INDWELL STENT INSRT Left 8/5/2020    Procedure: CYSTO, URETEROSCOPY STENT exchange;  Surgeon: Ramesh Felix MD;  [facility];  Service: Urology    [place] CYSTO/URETERO W/LITHOTRIPSY &INDWELL STENT INSRT Right 9/1/2021    Procedure: CYSTOSCOPY URETEROSCOPY WITH BASKET STONE EXTRACTION, RETROGRADE PYELOGRAM AND INSERTION STENT URETERAL;  Surgeon: Aquilino Ohara MD;  [facility];  Service: Urology    RETROMASTOID CRANIOTOMY Left 8/7/2023    Procedure: Left middle fossa craniotomy, retromastoid approach, for repair tegeman defect with temporalis muscle fascia graft;  Surgeon: Nina Mccabe MD;  [facility];  Service: ENT    RETROMASTOID CRANIOTOMY Left 8/7/2023    Procedure: Endoscopic left middle fossa craniotomy for repair of tegmen defect and CSF leak with temporalis muscle fascia flap, with neuromonitoring (CN 7/8) and intraoperative lumbar drain placement;  Surgeon: Tamiko Echeverria MD;  [facility];  Service: Neurosurgery          08/08/23 0904   PT Last Visit   PT Visit Date 08/08/23   Note Type   Note type Evaluation   Pain Assessment   Pain Assessment Tool FLACC   Pain Location/Orientation Location: Neck   Pain Onset/Description Onset: Ongoing;Frequency: Intermittent   Effect of Pain on Daily Activities Decreased comfort when resting   Patient's Stated Pain Goal No pain   Hospital Pain Intervention(s) Repositioned; Ambulation/increased activity; Emotional support   Multiple Pain Sites No   Pain Rating: FLACC (Rest) - Face 0   Pain Rating: FLACC (Rest) - Legs 0   Pain Rating: FLACC (Rest) - Activity 0   Pain Rating: FLACC (Rest) - Cry 1   Pain Rating: FLACC (Rest) - Consolability 0   Score: FLACC (Rest) 1   Pain Rating: FLACC (Activity) - Face 0   Pain Rating: FLACC (Activity) - Legs 0   Pain Rating: FLACC (Activity) - Activity 0   Pain Rating: FLACC (Activity) - Cry 1   Pain Rating: FLACC (Activity) - Consolability 0   Score: FLACC (Activity) 1   Restrictions/Precautions   Weight Bearing Precautions Per Order No   Braces or Orthoses   (Denies)   Other Precautions Chair Alarm;Multiple lines;Telemetry;Pain  (Lumbar drain)   Home Living   Type of [address] Two level;Performs ADLs on one level  (Pt lives in a bilevel home, w/ 0STE and 6 steps w/ HR to access other floors.)   [address]   (None)   Prior Function   Level of [place] Independent with functional mobility   Lives With Spouse   IADLs Independent with driving   Falls in the last 6 months 0   Vocational Retired   General   Additional Pertinent History Cleared w/ RN for assessment   Family/Caregiver Present No   Cognition   Overall Cognitive Status WFL   Arousal/Participation Alert   Orientation Level Oriented to person;Oriented to place;Oriented to situation   Memory Within functional limits   Following Commands Follows all commands and directions without difficulty   Subjective   Subjective Pt received in bedside chair. Cooperative and agreeable to therapy. RUE Assessment   RUE Assessment WFL  (AROM)   LUE Assessment   LUE Assessment WFL  (AROM)   RLE Assessment   RLE Assessment WFL  (AROM)   Strength RLE   RLE Overall Strength 4-/5  (Assessed via mobility)   LLE Assessment   LLE Assessment WFL  (AROM)   Strength LLE   LLE Overall Strength 4-/5  (Assessed via mobility)   Bed Mobility   Supine to Sit 6  Modified independent   Additional items HOB elevated; Bedrails; Increased time required   Additional Comments Pt seated in bedside chair following session   Transfers   Sit to Stand 5  Supervision   Additional items Armrests; Increased time required;Verbal cues   Stand to Sit 5  Supervision   Additional items Armrests; Increased time required;Verbal cues   Additional Comments RW   Ambulation/Elevation   Gait pattern Forward Flexion;Decreased foot clearance; Short stride;Decreased hip extension;Decreased heel strike;Decreased toe off   Gait Assistance 4  Minimal assist   Additional items Assist x 1 Assistive Device Rolling walker  (RW --> None)   Distance 360'   Balance   Static Sitting Good   Dynamic Sitting Fair +   Static Standing Fair   Dynamic Standing Fair -   Ambulatory Poor +   Endurance Deficit   Endurance Deficit No   Activity Tolerance   Activity Tolerance Patient tolerated treatment well   Medical Staff Made Aware Deontetereza Parent PT   Nurse Made Aware Cleared by RN   Assessment   Prognosis Good   Problem List Decreased strength;Decreased endurance; Impaired balance;Decreased mobility;Pain   Assessment Pt is a 57 y/o male presenting w/ a history of L ear discharge and hearing loss, 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 Pt's current comorbidities affecting POC are CAD, HTN, PVC, and nephrolithiasis. Personal factors affecting POC are that the pt lives in a bilevel home w/ 0STE and 6 steps to access other levels and independent w/o AD premorbidly. Pt's current medical status is unstable/unpredictable due to lumbar drain, ongoing telemetry monitoring, and a current BP goal. Impairments are current deficits in strength, endurance, balance, and gait. Will continue to follow pt for above impairments. Currently recommending home w/ no needs pending mobility progress and when medically cleared upon D/C. Barriers to Discharge None   Goals   Patient Goals To walk and feel better   STG Expiration Date 08/15/23   Short Term Goal #1 5-7 Days: Pt will be able to ambulate 500' mod I w/o AD to improve community mobility. Pt will be able to negotiate 7 steps to be able to enter and access other levels in his home environment. Pt will be mod I w/ bed mobility to progress to OOB mobility. Pt will be mod I w/ transfers to perform ADLs. PT Treatment Day 0   Plan   Treatment/Interventions Functional transfer training;LE strengthening/ROM; Elevations; Therapeutic exercise; Endurance training;Patient/family training;Equipment eval/education; Bed mobility;Gait training;Spoke to nursing;OT   PT Frequency 3-5x/wk   Recommendation   PT Discharge Recommendation No rehabilitation needs   AM-PAC Basic Mobility Inpatient   Turning in Flat Bed Without Bedrails 4   Lying on Back to Sitting on Edge of Flat Bed Without Bedrails 4   Moving Bed to Chair 3   Standing Up From Chair Using Arms 3   Walk in Room 3   Climb 3-5 Stairs With Railing 3   Basic Mobility Inpatient Raw Score 20   Basic Mobility Standardized Score 43.99   Highest Level Of Mobility   -HLM Goal 6: Walk 10 steps or more   JH-HLM Achieved 8: Walk 250 feet ot more   Modified Reading Scale   Modified Reading Scale 4   End of Consult   Patient Position at End of Consult Bedside chair;Bed/Chair alarm activated; All needs within reach         [facility]

## 2023-08-08 NOTE — ASSESSMENT & PLAN NOTE
· POD 1 from left middle cranial fossa craniotomy, retromastoid approach for repair of tegmen defect and temporalis muscle fascia grafting  · CSF otorrhea with left temporal encephalocele noted on CT and MRI imaging    Imaging:   · No imaging needed post op     Plan:   · Ongoing neurologic checks  · Ongoing medical management. · Home medication reordered  · Ongoing pain control  · Controlled on oral regimen currently  · Continue bowel regimen as ordered. · Lumbar drain in place. Maintain at this time   · 10 cc/hour output  · Anticipate clamping drain tomorrow evening  · DVT ppx: SCD's, HSQ BID starting tomorrow   · Physical therapy/occupational therapy evaluation- clamp drain when ambulating   · Case management following for disposition  · Rounds completed with ANGELES Gaytan. · Neurosurgery will continue to follow as primary team. Call with questions or concerns.

## 2023-08-09 LAB
ABO GROUP BLD BPU: NORMAL
ABO GROUP BLD BPU: NORMAL
ANION GAP SERPL CALCULATED.3IONS-SCNC: 3 MMOL/L
ATRIAL RATE: 56 BPM
BPU ID: NORMAL
BPU ID: NORMAL
BUN SERPL-MCNC: 16 MG/DL (ref 5–25)
CA-I BLD-SCNC: 1 MMOL/L (ref 1.12–1.32)
CALCIUM SERPL-MCNC: 8.4 MG/DL (ref 8.3–10.1)
CHLORIDE SERPL-SCNC: 110 MMOL/L (ref 96–108)
CO2 SERPL-SCNC: 26 MMOL/L (ref 21–32)
CREAT SERPL-MCNC: 1.07 MG/DL (ref 0.6–1.3)
CROSSMATCH: NORMAL
CROSSMATCH: NORMAL
ERYTHROCYTE [DISTWIDTH] IN BLOOD BY AUTOMATED COUNT: 12.6 % (ref 11.6–15.1)
GFR SERPL CREATININE-BSD FRML MDRD: 72 ML/MIN/1.73SQ M
GLUCOSE SERPL-MCNC: 132 MG/DL (ref 65–140)
HCT VFR BLD AUTO: 38.1 % (ref 36.5–49.3)
HGB BLD-MCNC: 14 G/DL (ref 12–17)
MAGNESIUM SERPL-MCNC: 2.3 MG/DL (ref 1.6–2.6)
MCH RBC QN AUTO: 32.3 PG (ref 26.8–34.3)
MCHC RBC AUTO-ENTMCNC: 36.7 G/DL (ref 31.4–37.4)
MCV RBC AUTO: 88 FL (ref 82–98)
P AXIS: 24 DEGREES
PHOSPHATE SERPL-MCNC: 2.9 MG/DL (ref 2.3–4.1)
PLATELET # BLD AUTO: 185 THOUSANDS/UL (ref 149–390)
PMV BLD AUTO: 9.8 FL (ref 8.9–12.7)
POTASSIUM SERPL-SCNC: 4.2 MMOL/L (ref 3.5–5.3)
POTASSIUM SERPL-SCNC: 5.7 MMOL/L (ref 3.5–5.3)
PR INTERVAL: 146 MS
QRS AXIS: 43 DEGREES
QRSD INTERVAL: 104 MS
QT INTERVAL: 433 MS
QTC INTERVAL: 418 MS
RBC # BLD AUTO: 4.34 MILLION/UL (ref 3.88–5.62)
SODIUM SERPL-SCNC: 139 MMOL/L (ref 135–147)
T WAVE AXIS: -9 DEGREES
UNIT DISPENSE STATUS: NORMAL
UNIT DISPENSE STATUS: NORMAL
UNIT PRODUCT CODE: NORMAL
UNIT PRODUCT CODE: NORMAL
UNIT PRODUCT VOLUME: 300 ML
UNIT PRODUCT VOLUME: 350 ML
UNIT RH: NORMAL
UNIT RH: NORMAL
VENTRICULAR RATE: 56 BPM
WBC # BLD AUTO: 11.39 THOUSAND/UL (ref 4.31–10.16)

## 2023-08-09 PROCEDURE — 84132 ASSAY OF SERUM POTASSIUM: CPT

## 2023-08-09 PROCEDURE — 82330 ASSAY OF CALCIUM: CPT

## 2023-08-09 PROCEDURE — 93005 ELECTROCARDIOGRAM TRACING: CPT

## 2023-08-09 PROCEDURE — 84100 ASSAY OF PHOSPHORUS: CPT

## 2023-08-09 PROCEDURE — 99291 CRITICAL CARE FIRST HOUR: CPT | Performed by: ANESTHESIOLOGY

## 2023-08-09 PROCEDURE — 83735 ASSAY OF MAGNESIUM: CPT

## 2023-08-09 PROCEDURE — 93010 ELECTROCARDIOGRAM REPORT: CPT | Performed by: INTERNAL MEDICINE

## 2023-08-09 PROCEDURE — 85027 COMPLETE CBC AUTOMATED: CPT

## 2023-08-09 PROCEDURE — 99024 POSTOP FOLLOW-UP VISIT: CPT | Performed by: OTOLARYNGOLOGY

## 2023-08-09 PROCEDURE — 99024 POSTOP FOLLOW-UP VISIT: CPT | Performed by: PHYSICIAN ASSISTANT

## 2023-08-09 PROCEDURE — 80048 BASIC METABOLIC PNL TOTAL CA: CPT

## 2023-08-09 RX ORDER — METHOCARBAMOL 500 MG/1
500 TABLET, FILM COATED ORAL EVERY 6 HOURS PRN
Status: DISCONTINUED | OUTPATIENT
Start: 2023-08-09 | End: 2023-08-09

## 2023-08-09 RX ORDER — LOSARTAN POTASSIUM 50 MG/1
50 TABLET ORAL DAILY
Status: DISCONTINUED | OUTPATIENT
Start: 2023-08-09 | End: 2023-08-10

## 2023-08-09 RX ORDER — CHLORHEXIDINE GLUCONATE 0.12 MG/ML
15 RINSE ORAL EVERY 12 HOURS SCHEDULED
Status: DISCONTINUED | OUTPATIENT
Start: 2023-08-09 | End: 2023-08-11 | Stop reason: HOSPADM

## 2023-08-09 RX ORDER — TRAZODONE HYDROCHLORIDE 50 MG/1
50 TABLET ORAL
Status: DISCONTINUED | OUTPATIENT
Start: 2023-08-09 | End: 2023-08-10

## 2023-08-09 RX ORDER — HEPARIN SODIUM 5000 [USP'U]/ML
5000 INJECTION, SOLUTION INTRAVENOUS; SUBCUTANEOUS EVERY 12 HOURS SCHEDULED
Status: COMPLETED | OUTPATIENT
Start: 2023-08-09 | End: 2023-08-09

## 2023-08-09 RX ORDER — LIDOCAINE 50 MG/G
1 PATCH TOPICAL DAILY
Status: DISCONTINUED | OUTPATIENT
Start: 2023-08-09 | End: 2023-08-11 | Stop reason: HOSPADM

## 2023-08-09 RX ORDER — HEPARIN SODIUM 5000 [USP'U]/ML
5000 INJECTION, SOLUTION INTRAVENOUS; SUBCUTANEOUS EVERY 12 HOURS SCHEDULED
Status: DISCONTINUED | OUTPATIENT
Start: 2023-08-09 | End: 2023-08-09 | Stop reason: SDUPTHER

## 2023-08-09 RX ORDER — METHOCARBAMOL 500 MG/1
500 TABLET, FILM COATED ORAL EVERY 6 HOURS SCHEDULED
Status: DISCONTINUED | OUTPATIENT
Start: 2023-08-09 | End: 2023-08-11 | Stop reason: HOSPADM

## 2023-08-09 RX ORDER — CALCIUM GLUCONATE 20 MG/ML
1 INJECTION, SOLUTION INTRAVENOUS ONCE
Status: COMPLETED | OUTPATIENT
Start: 2023-08-09 | End: 2023-08-09

## 2023-08-09 RX ORDER — METHOCARBAMOL 500 MG/1
500 TABLET, FILM COATED ORAL EVERY 6 HOURS PRN
Status: DISCONTINUED | OUTPATIENT
Start: 2023-08-09 | End: 2023-08-11 | Stop reason: HOSPADM

## 2023-08-09 RX ADMIN — CEFAZOLIN SODIUM 1000 MG: 1 SOLUTION INTRAVENOUS at 01:00

## 2023-08-09 RX ADMIN — CALCIUM GLUCONATE 1 G: 20 INJECTION, SOLUTION INTRAVENOUS at 08:06

## 2023-08-09 RX ADMIN — DEXAMETHASONE 4 MG: 4 TABLET ORAL at 05:58

## 2023-08-09 RX ADMIN — DEXAMETHASONE 4 MG: 4 TABLET ORAL at 13:14

## 2023-08-09 RX ADMIN — CHLORHEXIDINE GLUCONATE 15 ML: 1.2 SOLUTION ORAL at 08:05

## 2023-08-09 RX ADMIN — HEPARIN SODIUM 5000 UNITS: 5000 INJECTION INTRAVENOUS; SUBCUTANEOUS at 10:06

## 2023-08-09 RX ADMIN — HEPARIN SODIUM 5000 UNITS: 5000 INJECTION INTRAVENOUS; SUBCUTANEOUS at 21:16

## 2023-08-09 RX ADMIN — LIDOCAINE 5% 1 PATCH: 700 PATCH TOPICAL at 08:54

## 2023-08-09 RX ADMIN — METOPROLOL TARTRATE 25 MG: 25 TABLET, FILM COATED ORAL at 08:05

## 2023-08-09 RX ADMIN — TRAZODONE HYDROCHLORIDE 50 MG: 50 TABLET ORAL at 21:16

## 2023-08-09 RX ADMIN — LEVETIRACETAM 750 MG: 750 TABLET, FILM COATED ORAL at 21:16

## 2023-08-09 RX ADMIN — Medication 2.5 MG: at 08:54

## 2023-08-09 RX ADMIN — DEXAMETHASONE 4 MG: 4 TABLET ORAL at 21:16

## 2023-08-09 RX ADMIN — DOCUSATE SODIUM 100 MG: 100 CAPSULE ORAL at 08:05

## 2023-08-09 RX ADMIN — PANTOPRAZOLE SODIUM 40 MG: 40 TABLET, DELAYED RELEASE ORAL at 05:58

## 2023-08-09 RX ADMIN — SENNOSIDES 8.6 MG: 8.6 TABLET, FILM COATED ORAL at 08:05

## 2023-08-09 RX ADMIN — DEXAMETHASONE 4 MG: 4 TABLET ORAL at 01:00

## 2023-08-09 RX ADMIN — ACETAMINOPHEN 975 MG: 325 TABLET, FILM COATED ORAL at 05:58

## 2023-08-09 RX ADMIN — HYDROCHLOROTHIAZIDE 12.5 MG: 12.5 TABLET ORAL at 08:06

## 2023-08-09 RX ADMIN — ACETAMINOPHEN 975 MG: 325 TABLET, FILM COATED ORAL at 21:16

## 2023-08-09 RX ADMIN — CEFAZOLIN SODIUM 1000 MG: 1 SOLUTION INTRAVENOUS at 17:04

## 2023-08-09 RX ADMIN — CHLORHEXIDINE GLUCONATE 15 ML: 1.2 SOLUTION ORAL at 21:16

## 2023-08-09 RX ADMIN — ATORVASTATIN CALCIUM 20 MG: 20 TABLET, FILM COATED ORAL at 17:05

## 2023-08-09 RX ADMIN — CEFAZOLIN SODIUM 1000 MG: 1 SOLUTION INTRAVENOUS at 08:05

## 2023-08-09 RX ADMIN — ALLOPURINOL 100 MG: 100 TABLET ORAL at 08:06

## 2023-08-09 RX ADMIN — DOCUSATE SODIUM 100 MG: 100 CAPSULE ORAL at 17:05

## 2023-08-09 RX ADMIN — ACETAMINOPHEN 975 MG: 325 TABLET, FILM COATED ORAL at 13:14

## 2023-08-09 RX ADMIN — LOSARTAN POTASSIUM 50 MG: 50 TABLET, FILM COATED ORAL at 13:14

## 2023-08-09 RX ADMIN — METHOCARBAMOL 500 MG: 500 TABLET ORAL at 11:28

## 2023-08-09 RX ADMIN — METHOCARBAMOL 500 MG: 500 TABLET ORAL at 17:04

## 2023-08-09 RX ADMIN — LEVETIRACETAM 750 MG: 750 TABLET, FILM COATED ORAL at 08:05

## 2023-08-09 NOTE — PROGRESS NOTES
23 1400   Clinical Encounter Type   Visited With Patient and family together   Holiness Encounters   Holiness Needs Prayer   Sacramental Encounters   Sacrament of Sick-Anointing Patient declined anointing      Pastoral Care Progress Note    2023  Patient: Laura Schwarz : 1960  Admission Date & Time: 2023 0530  MRN: [id number] CSN: [id number]          Fr. [address] visit .

## 2023-08-09 NOTE — PLAN OF CARE
Problem: PAIN - ADULT  Goal: Verbalizes/displays adequate comfort level or baseline comfort level  Description: Interventions:  - Encourage patient to monitor pain and request assistance  - Assess pain using appropriate pain scale  - Administer analgesics based on type and severity of pain and evaluate response  - Implement non-pharmacological measures as appropriate and evaluate response  - Consider cultural and social influences on pain and pain management  - Notify physician/advanced practitioner if interventions unsuccessful or patient reports new pain  Outcome: Progressing     Problem: INFECTION - ADULT  Goal: Absence or prevention of progression during hospitalization  Description: INTERVENTIONS:  - Assess and monitor for signs and symptoms of infection  - Monitor lab/diagnostic results  - Monitor all insertion sites, i.e. indwelling lines, tubes, and drains  - Monitor endotracheal if appropriate and nasal secretions for changes in amount and color  - Bonnie appropriate cooling/warming therapies per order  - Administer medications as ordered  - Instruct and encourage patient and family to use good hand hygiene technique  - Identify and instruct in appropriate isolation precautions for identified infection/condition  Outcome: Progressing  Goal: Absence of fever/infection during neutropenic period  Description: INTERVENTIONS:  - Monitor WBC    Outcome: Progressing     Problem: SAFETY ADULT  Goal: Patient will remain free of falls  Description: INTERVENTIONS:  - Educate patient/family on patient safety including physical limitations  - Instruct patient to call for assistance with activity   - Consult OT/PT to assist with strengthening/mobility   - Keep Call bell within reach  - Keep bed low and locked with side rails adjusted as appropriate  - Keep care items and personal belongings within reach  - Initiate and maintain comfort rounds  - Make Fall Risk Sign visible to staff  - Offer Toileting every  Hours, in advance of need  - Initiate/Maintain alarm  - Obtain necessary fall risk management equipment:   - Apply yellow socks and bracelet for high fall risk patients  - Consider moving patient to room near nurses station  Outcome: Progressing  Goal: Maintain or return to baseline ADL function  Description: INTERVENTIONS:  -  Assess patient's ability to carry out ADLs; assess patient's baseline for ADL function and identify physical deficits which impact ability to perform ADLs (bathing, care of mouth/teeth, toileting, grooming, dressing, etc.)  - Assess/evaluate cause of self-care deficits   - Assess range of motion  - Assess patient's mobility; develop plan if impaired  - Assess patient's need for assistive devices and provide as appropriate  - Encourage maximum independence but intervene and supervise when necessary  - Involve family in performance of ADLs  - Assess for home care needs following discharge   - Consider OT consult to assist with ADL evaluation and planning for discharge  - Provide patient education as appropriate  Outcome: Progressing  Goal: Maintains/Returns to pre admission functional level  Description: INTERVENTIONS:  - Perform BMAT or MOVE assessment daily.   - Set and communicate daily mobility goal to care team and patient/family/caregiver. - Collaborate with rehabilitation services on mobility goals if consulted  - Perform Range of Motion  times a day. - Reposition patient every  hours.   - Dangle patient  times a day  - Stand patient  times a day  - Ambulate patient  times a day  - Out of bed to chair  times a day   - Out of bed for meals  times a day  - Out of bed for toileting  - Record patient progress and toleration of activity level   Outcome: Progressing     Problem: MOBILITY - ADULT  Goal: Maintain or return to baseline ADL function  Description: INTERVENTIONS:  -  Assess patient's ability to carry out ADLs; assess patient's baseline for ADL function and identify physical deficits which impact ability to perform ADLs (bathing, care of mouth/teeth, toileting, grooming, dressing, etc.)  - Assess/evaluate cause of self-care deficits   - Assess range of motion  - Assess patient's mobility; develop plan if impaired  - Assess patient's need for assistive devices and provide as appropriate  - Encourage maximum independence but intervene and supervise when necessary  - Involve family in performance of ADLs  - Assess for home care needs following discharge   - Consider OT consult to assist with ADL evaluation and planning for discharge  - Provide patient education as appropriate  Outcome: Progressing  Goal: Maintains/Returns to pre admission functional level  Description: INTERVENTIONS:  - Perform BMAT or MOVE assessment daily.   - Set and communicate daily mobility goal to care team and patient/family/caregiver. - Collaborate with rehabilitation services on mobility goals if consulted  - Perform Range of Motion  times a day. - Reposition patient every  hours.   - Dangle patient  times a day  - Stand patient  times a day  - Ambulate patient  times a day  - Out of bed to chair  times a day   - Out of bed for meals  times a day  - Out of bed for toileting  - Record patient progress and toleration of activity level   Outcome: Progressing

## 2023-08-09 NOTE — ASSESSMENT & PLAN NOTE
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 · Home medication reordered  · Ongoing pain control  · Controlled on oral regimen currently  · Complaining of neck pain- will add adjuvant pain control methods   · Continue bowel regimen as ordered. · Lumbar drain in place. Maintain at this time   · 10 cc/hour output  · Anticipate clamping drain at 1800. · Will hold morning HSQ in preparation for drain removal.  · DVT ppx: SCD's, HSQ BID   · Physical therapy/occupational therapy evaluation- clamp drain when ambulating   · Case management following for disposition  · Rounds completed with ANGELES Gaytan. · Neurosurgery will continue to follow as primary team. Call with questions or concerns.

## 2023-08-09 NOTE — PROGRESS NOTES
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 · Home medication reordered  · Ongoing pain control  · Controlled on oral regimen currently  · Complaining of neck pain- will add adjuvant pain control methods   · Continue bowel regimen as ordered. · Lumbar drain in place. Maintain at this time   · 10 cc/hour output  · Anticipate clamping drain at 1800. · Will hold morning HSQ in preparation for drain removal.  · DVT ppx: SCD's, HSQ BID   · Physical therapy/occupational therapy evaluation- clamp drain when ambulating   · Case management following for disposition  · Rounds completed with ANGELES Gaytan. · Neurosurgery will continue to follow as primary team. Call with questions or concerns. Subjective/Objective   Chief Complaint: neck pain    Subjective: Patient reports increased neck pain compared to yesterday. States similar to yesterday it was worse initially in the morning. Heating pad applied to the neck. Will trial non narcotic medication for pain control. No otorrhea. Monitor drain and plan to clamp at 6pm.     Objective: Sitting in bedside chair    I/O       08/07 0701  08/08 0700 08/08 0701  08/09 0700 08/09 0701  08/10 0700    P. O. 1240 640     I.V. (mL/kg) 3766.1 (39.9)      IV Piggyback 200 280 160    Total Intake(mL/kg) 5206.1 (55.2) 920 (9.8) 160 (1.7)    Urine (mL/kg/hr) 3435 (1.5) 575 (0.3)     Drains 200 230 20    Blood 25      Total Output 3660 805 20    Net +1546.1 +115 +140           Unmeasured Urine Occurrence  3 x           Invasive Devices     Peripheral Intravenous Line  Duration           Peripheral IV 08/07/23 Left Hand 2 days    Peripheral IV 08/07/23 Left Wrist 2 days          Drain  Duration           Lumbar Drain 2 days                Physical Exam:  Vitals: Blood pressure 135/85, pulse 66, temperature 97.5 °F (36.4 °C), resp. rate 20, height 5' 8" (1.727 m), weight 94.3 kg (208 lb), SpO2 96 %. ,Body mass index is 31.63 kg/m². General appearance: alert, appears stated age, cooperative and no distress  Head: Normocephalic, without obvious abnormality.  Intact incision just superior to the L auricle   Eyes: EOMI, PERRL  Neck: supple, symmetrical, trachea midline  Back: no kyphosis present  Lungs: non labored breathing  Heart: regular heart rate  Neurologic:   Mental status: Alert, oriented x3, thought content appropriate  Cranial nerves: grossly intact (Cranial nerves II-XII)  Sensory: normal to light touch   Motor: moving all extremities without focal weakness 5/5     Lab Results:  Results from last 7 days   Lab Units 08/09/23  0604 08/08/23  0533 08/07/23  1257   WBC Thousand/uL 11.39* 9.63 6.63   HEMOGLOBIN g/dL 14.0 13.3 13.8   HEMATOCRIT % 38.1 36.4* 38.7   PLATELETS Thousands/uL 185 187 178   MONO PCT %  --   --  0*   EOS PCT %  --   --  0     Results from last 7 days   Lab Units 08/09/23  0604 08/08/23  0632 08/07/23  1257 08/07/23  0821   POTASSIUM mmol/L 5.7* 4.0 3.8  --    CHLORIDE mmol/L 110* 111* 111*  --    CO2 mmol/L 26 24 22  --    CO2, I-STAT mmol/L  --   --   --  28   BUN mg/dL 16 13 12  --    CREATININE mg/dL 1.07 0.94 1.00  --    CALCIUM mg/dL 8.4 8.2* 8.2*  --    GLUCOSE, ISTAT mg/dl  --   --   --  124     Results from last 7 days   Lab Units 08/09/23  0604 08/07/23  1257   MAGNESIUM mg/dL 2.3 2.1     Results from last 7 days   Lab Units 08/09/23  0604 08/08/23  0632 08/07/23  1257   PHOSPHORUS mg/dL 2.9 2.8 1.8*     Results from last 7 days   Lab Units 08/08/23  0533   INR  1.04   PTT seconds 26     No results found for: "TROPONINT"  ABG:No results found for: "PHART", "TXG7LGE", "PO2ART", "SNI2FZD", "Y6UOYDMR", "BEART", "SOURCE"    Imaging Studies: I have personally reviewed pertinent reports. and I have personally reviewed pertinent films in PACS  No results found. EKG, Pathology, and Other Studies: I have personally reviewed pertinent reports.       VTE Pharmacologic Prophylaxis: Heparin    VTE Mechanical Prophylaxis: sequential compression device

## 2023-08-09 NOTE — PROGRESS NOTES
[address] [place] [address]  Progress Note: Critical Care  Name: Mesha Flores 58 y.o. male I MRN: [id number]  Unit/Bed#: ICU 03 I Date of Admission: 8/7/2023   Date of Service: 8/9/2023 I Hospital Day: 2    Assessment/Plan   Neuro:   • Diagnosis: endoscopic L middle fossa craniotomy for repair of tegmen tympani defect and CSF leak, w/ temporalis muscle flap and intra-op lumbar drain   ? Plan:   - SBP goal 110-160  - Neuro checks Q4  - Keppra 750mg BID (day3/7)  - Decadron taper (day3/7)  - Seizure precautions   - Pain regimen: Tylenol 975 q6, Dilaudid 0.2mg q2 PRN for breakthrough pain, oxycodone 2.5mg PRN for severe pain, oxycodone 5mg PRN for severe pain   - Start SQH BID for DVT prophylaxis   • Diagnosis: Lumbar drain   ? Plan:  - Monitor output  - Goal 10cc/hr. If HOB elevated clamp drain.  - Per neurosurgery plan to keep drain x48hrs, clamp tonight, and remove on Thursday  CV:   • Diagnosis: CAD s/p PCI 2012  ? On ASA 81mg at home  ? Plan:   - Hold ASA per neurosurgery  • Diagnosis: HTN  ? Home med Lopressor 25mg BID, Candesartan-HCTZ 16-12.5mg   ? Plan:   - BP goals: 110-160  - Monitor BP closely  - Hold Losartan 100mg   - Continue HCTZ 12.5mg, Lopressor 25mg daily. •  Diagnosis: HLD  ? Plan:   - Continue home Lipitor 20mg daily      Pulm:  No active issues     GI:   • Diagnosis: Post op bowel regemin  ? Plan:   - Colace 100mg BID, Senokot 8.6mg daily        :   No active issues      F/E/N:   • Fluids: None continuous  • Electrolytes: Monitor and replete as needed   • Nutrition: regular diet     Heme/Onc:   No active issues     Endo:   • Diagnosis: Gout  ? Plan:   - Continue home allopurinol 100mg daily     ID:   • Diagnosis: Post op abx  ?  Plan:   - Continue post op ancef until LD drain removed (day3/3)        MSK/Skin:   PT/OT     LDA: Lumbar drain, Peripheral IV x2     Disposition: Critical care    ICU Core Measures     A: Assess, Prevent, and Manage Pain · Has pain been assessed? Yes  · Need for changes to pain regimen? No   B: Both SAT/SAT  · N/A   C: Choice of Sedation · RASS Goal: 0 Alert and Calm or N/A patient not on sedation  · Need for changes to sedation or analgesia regimen? No   D: Delirium · CAM-ICU: Negative   E: Early Mobility  · Plan for early mobility? Yes   F: Family Engagement · Plan for family engagement today? Yes       Antibiotic Review: Post op requirements     Review of Invasive Devices:            Prophylaxis:  VTE  Start SQH BID today   Stress Ulcer  covered bypantoprazole (PROTONIX) EC tablet 40 mg [[id number]]          Subjective   HPI/24hr events: No acute events overnight. LD draining appropriately 10cc/hr. Pain controlled. No L ear drainage. All systems reviewed and were negative     Objective                            Vitals I/O      Most Recent Min/Max in 24hrs   Temp 98.7 °F (37.1 °C) Temp  Min: 97.9 °F (36.6 °C)  Max: 98.9 °F (37.2 °C)   Pulse 60 Pulse  Min: 58  Max: 74   Resp 12 Resp  Min: 11  Max: 22   /83 BP  Min: 113/72  Max: 149/82   O2 Sat 95 % SpO2  Min: 94 %  Max: 96 %      Intake/Output Summary (Last 24 hours) at 8/9/2023 0725  Last data filed at 8/9/2023 0701  Gross per 24 hour   Intake 920 ml   Output 815 ml   Net 105 ml         Diet Regular; Regular House     Invasive Monitoring Physical exam    Physical Exam  Constitutional:       General: He is not in acute distress. Appearance: He is not ill-appearing. HENT:      Head: Normocephalic. Nose: Nose normal.      Mouth/Throat:      Mouth: Mucous membranes are moist.      Pharynx: Oropharynx is clear. Eyes:      Extraocular Movements: Extraocular movements intact. Conjunctiva/sclera: Conjunctivae normal.      Pupils: Pupils are equal, round, and reactive to light. Cardiovascular:      Rate and Rhythm: Normal rate and regular rhythm. Pulses: Normal pulses. Heart sounds: No murmur heard.   Pulmonary:      Effort: Pulmonary effort is normal. No respiratory distress. Abdominal:      General: Bowel sounds are normal. There is no distension. Palpations: Abdomen is soft. Musculoskeletal:      Right lower leg: No edema. Left lower leg: No edema. Skin:     General: Skin is warm and dry. Neurological:      Mental Status: He is alert and oriented to person, place, and time. Cranial Nerves: Cranial nerves 2-12 are intact. Sensory: Sensation is intact. Motor: No weakness (5/5 strength throughout). Comments: No L ear discharge. Persistent L sided hearing loss (mildly improved) . Lumbar drain present w/ stable dressing. Draining clear fluid          Diagnostic Studies      EKG: Reviewed   Imaging:  I have personally reviewed pertinent reports.        Medications:  Scheduled PRN   acetaminophen, 975 mg, Q8H [facility]  allopurinol, 100 mg, Daily  atorvastatin, 20 mg, Daily With Dinner  cefazolin, 1,000 mg, Q8H  chlorhexidine, 15 mL, Q12H S[facility]HA  dexamethasone, 4 mg, Q8H [facility]   Followed by  [START ON 8/11/2023] dexamethasone, 2 mg, Q6H [facility]   Followed by  Tanner Tony ON 8/13/2023] dexamethasone, 2 mg, Q8H [facility]   Followed by  Tanner Tony ON 8/15/2023] dexamethasone, 2 mg, Q12H [facility]   Followed by  Tanner Tony ON 8/17/2023] dexamethasone, 2 mg, Q24H [facility]  docusate sodium, 100 mg, BID  hydrochlorothiazide, 12.5 mg, Daily  levETIRAcetam, 750 mg, Q12H SANGE[facility]HA  metoprolol tartrate, 25 mg, After Breakfast  pantoprazole, 40 mg, Early Morning  senna, 1 tablet, Daily      acetaminophen, 975 mg, Q6H PRN  calcium carbonate, 1,000 mg, Daily PRN  HYDROmorphone, 0.2 mg, Q2H PRN  magnesium hydroxide, 30 mL, Daily PRN  ondansetron, 4 mg, Q6H PRN  oxyCODONE, 5 mg, Q6H PRN  oxyCODONE, 2.5 mg, Q4H PRN       Continuous          Labs:    CBC    Recent Labs     08/08/23  0533 08/09/23  0604   WBC 9.63 11.39*   HGB 13.3 14.0   HCT 36.4* 38.1    185     BMP    Recent Labs     08/07/23  1257 08/08/23  0632   SODIUM 140 141   K 3.8 4.0   * 111*   CO2 22 24   AGAP 7 6   BUN 12 13 CREATININE 1.00 0.94   CALCIUM 8.2* 8.2*       Coags    Recent Labs     08/08/23  0533   INR 1.04   PTT 26        Additional Electrolytes  Recent Labs     08/07/23  0821 08/07/23  1257 08/08/23  0632 08/09/23  0604   MG  --  2.1  --   --    PHOS  --  1.8* 2.8  --    CAIONIZED 1.15  --   --  1.00*          Blood Gas    No recent results  No recent results LFTs  No recent results    Infectious  No recent results  Glucose  Recent Labs     08/07/23  1257 08/08/23  0632   GLUC 151* [address] [facility], MS4

## 2023-08-09 NOTE — PROGRESS NOTES
OTOLARYNGOLOGY PROGRESS NOTE    Date of Service: 8/9/2023 6:06 AM    HPI  Patient is a 58 y.o. 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 Overnight Events: NAEO, VSS, pain controlled, no ear dc or salty taste in mouth    WBC (8/8): 9.63  Na (8/8): 141    PHYSICAL EXAM:  Vitals:    08/09/23 0600   BP: 143/77   Pulse: 62   Resp: 14   Temp:    SpO2: 96%        General: No acute distress, AOx4  HEENT: L ear incision C/D/I  Neurology: No focal deficits  Lungs: Breathing easy, unlabored and even on room air  Cardio: RRR, well perfused  Abd: soft, NT, ND  Lumbar drain in place      Intake/Output Summary (Last 24 hours) at 8/9/2023 0606  Last data filed at 8/9/2023 0601  Gross per 24 hour   Intake 920 ml   Output 815 ml   Net 105 ml     Lumbar drain: 180mL    LABORATORY    Recent Labs     08/07/23  0821 08/07/23  1257 08/08/23  0533   WBC  --  6.63 9.63   HGB 11.9* 13.8 13.3   HCT 35* 38.7 36.4*   PLT  --  178 187       Recent Labs     08/07/23  1257 08/08/23  0632   K 3.8 4.0   * 111*   BUN 12 13   PHOS 1.8* 2.8   MG 2.1  --        Invalid input(s): "PTPAT", "PTINR", "APTTMNNM", "APTTPAT"      Patient Active Problem List   Diagnosis   • Gout   • Essential hypertension   • Hyperlipidemia   • Coronary artery disease involving native coronary artery of native heart without angina pectoris   • PVC's (premature ventricular contractions)   • Nephrolithiasis   • Acute kidney injury (nontraumatic) ([facility])   • Transaminitis   • Pyelonephritis   • Ureteral stone   • S/P coronary artery stent placement   • Obesity (BMI 30-39. 9)   • Elevated fasting blood sugar   • MI (myocardial infarction) ([address])   • Temporal encephalocele [facility])         ASSESSMENT  Patient is a 58 y.o. male w/ acute and chronic problems as above, who is POD2 from endoscopic L middle fossa craniotomy for repair of tegmen tympani defect and CSF leak, w/ temporalis muscle flap and intra-op lumbar drain, stable    PLAN  - pt recovering well from L ear surgery  - lumbar drain management per neurosurgery protocol: planned for clamping today  - rest of care per primary

## 2023-08-10 PROBLEM — I21.9 MI (MYOCARDIAL INFARCTION) (HCC): Status: RESOLVED | Noted: 2023-08-06 | Resolved: 2023-08-10

## 2023-08-10 PROBLEM — G96.01 CSF OTORRHEA: Status: ACTIVE | Noted: 2023-08-10

## 2023-08-10 LAB
ANION GAP SERPL CALCULATED.3IONS-SCNC: 2 MMOL/L
BUN SERPL-MCNC: 18 MG/DL (ref 5–25)
CA-I BLD-SCNC: 1 MMOL/L (ref 1.12–1.32)
CALCIUM SERPL-MCNC: 8.7 MG/DL (ref 8.3–10.1)
CHLORIDE SERPL-SCNC: 110 MMOL/L (ref 96–108)
CO2 SERPL-SCNC: 24 MMOL/L (ref 21–32)
CREAT SERPL-MCNC: 1.1 MG/DL (ref 0.6–1.3)
ERYTHROCYTE [DISTWIDTH] IN BLOOD BY AUTOMATED COUNT: 12.9 % (ref 11.6–15.1)
GFR SERPL CREATININE-BSD FRML MDRD: 70 ML/MIN/1.73SQ M
GLUCOSE SERPL-MCNC: 122 MG/DL (ref 65–140)
HCT VFR BLD AUTO: 38.7 % (ref 36.5–49.3)
HGB BLD-MCNC: 13.5 G/DL (ref 12–17)
MCH RBC QN AUTO: 31.9 PG (ref 26.8–34.3)
MCHC RBC AUTO-ENTMCNC: 34.9 G/DL (ref 31.4–37.4)
MCV RBC AUTO: 92 FL (ref 82–98)
PLATELET # BLD AUTO: 157 THOUSANDS/UL (ref 149–390)
PMV BLD AUTO: 9.9 FL (ref 8.9–12.7)
POTASSIUM SERPL-SCNC: 3.9 MMOL/L (ref 3.5–5.3)
POTASSIUM SERPL-SCNC: 6.2 MMOL/L (ref 3.5–5.3)
RBC # BLD AUTO: 4.23 MILLION/UL (ref 3.88–5.62)
SODIUM SERPL-SCNC: 136 MMOL/L (ref 135–147)
WBC # BLD AUTO: 9.6 THOUSAND/UL (ref 4.31–10.16)

## 2023-08-10 PROCEDURE — 93005 ELECTROCARDIOGRAM TRACING: CPT

## 2023-08-10 PROCEDURE — 80048 BASIC METABOLIC PNL TOTAL CA: CPT

## 2023-08-10 PROCEDURE — 99024 POSTOP FOLLOW-UP VISIT: CPT | Performed by: PHYSICIAN ASSISTANT

## 2023-08-10 PROCEDURE — 84132 ASSAY OF SERUM POTASSIUM: CPT

## 2023-08-10 PROCEDURE — 85027 COMPLETE CBC AUTOMATED: CPT

## 2023-08-10 PROCEDURE — 99024 POSTOP FOLLOW-UP VISIT: CPT | Performed by: OTOLARYNGOLOGY

## 2023-08-10 PROCEDURE — 99291 CRITICAL CARE FIRST HOUR: CPT | Performed by: ANESTHESIOLOGY

## 2023-08-10 PROCEDURE — 82330 ASSAY OF CALCIUM: CPT

## 2023-08-10 RX ORDER — LOSARTAN POTASSIUM 50 MG/1
100 TABLET ORAL DAILY
Status: DISCONTINUED | OUTPATIENT
Start: 2023-08-11 | End: 2023-08-11 | Stop reason: HOSPADM

## 2023-08-10 RX ORDER — CALCIUM GLUCONATE 20 MG/ML
2 INJECTION, SOLUTION INTRAVENOUS ONCE
Status: COMPLETED | OUTPATIENT
Start: 2023-08-10 | End: 2023-08-10

## 2023-08-10 RX ORDER — LANOLIN ALCOHOL/MO/W.PET/CERES
6 CREAM (GRAM) TOPICAL
Status: DISCONTINUED | OUTPATIENT
Start: 2023-08-10 | End: 2023-08-11 | Stop reason: HOSPADM

## 2023-08-10 RX ADMIN — METOPROLOL TARTRATE 25 MG: 25 TABLET, FILM COATED ORAL at 10:02

## 2023-08-10 RX ADMIN — DEXAMETHASONE 4 MG: 4 TABLET ORAL at 06:22

## 2023-08-10 RX ADMIN — ACETAMINOPHEN 975 MG: 325 TABLET, FILM COATED ORAL at 21:25

## 2023-08-10 RX ADMIN — ACETAMINOPHEN 975 MG: 325 TABLET, FILM COATED ORAL at 13:02

## 2023-08-10 RX ADMIN — DEXAMETHASONE 4 MG: 4 TABLET ORAL at 13:02

## 2023-08-10 RX ADMIN — LEVETIRACETAM 750 MG: 750 TABLET, FILM COATED ORAL at 10:02

## 2023-08-10 RX ADMIN — METHOCARBAMOL 500 MG: 500 TABLET ORAL at 13:00

## 2023-08-10 RX ADMIN — PANTOPRAZOLE SODIUM 40 MG: 40 TABLET, DELAYED RELEASE ORAL at 06:22

## 2023-08-10 RX ADMIN — MELATONIN 6 MG: at 21:24

## 2023-08-10 RX ADMIN — METHOCARBAMOL 500 MG: 500 TABLET ORAL at 18:13

## 2023-08-10 RX ADMIN — DOCUSATE SODIUM 100 MG: 100 CAPSULE ORAL at 18:12

## 2023-08-10 RX ADMIN — CALCIUM GLUCONATE 2 G: 20 INJECTION, SOLUTION INTRAVENOUS at 10:03

## 2023-08-10 RX ADMIN — HYDROCHLOROTHIAZIDE 12.5 MG: 12.5 TABLET ORAL at 10:03

## 2023-08-10 RX ADMIN — METHOCARBAMOL 500 MG: 500 TABLET ORAL at 00:34

## 2023-08-10 RX ADMIN — ALLOPURINOL 100 MG: 100 TABLET ORAL at 10:03

## 2023-08-10 RX ADMIN — DEXAMETHASONE 4 MG: 4 TABLET ORAL at 21:24

## 2023-08-10 RX ADMIN — DOCUSATE SODIUM 100 MG: 100 CAPSULE ORAL at 10:01

## 2023-08-10 RX ADMIN — LOSARTAN POTASSIUM 50 MG: 50 TABLET, FILM COATED ORAL at 10:01

## 2023-08-10 RX ADMIN — CEFAZOLIN SODIUM 1000 MG: 1 SOLUTION INTRAVENOUS at 08:27

## 2023-08-10 RX ADMIN — ACETAMINOPHEN 975 MG: 325 TABLET, FILM COATED ORAL at 06:21

## 2023-08-10 RX ADMIN — LEVETIRACETAM 750 MG: 750 TABLET, FILM COATED ORAL at 21:24

## 2023-08-10 RX ADMIN — CEFAZOLIN SODIUM 1000 MG: 1 SOLUTION INTRAVENOUS at 00:34

## 2023-08-10 RX ADMIN — ATORVASTATIN CALCIUM 20 MG: 20 TABLET, FILM COATED ORAL at 18:12

## 2023-08-10 RX ADMIN — METHOCARBAMOL 500 MG: 500 TABLET ORAL at 06:21

## 2023-08-10 RX ADMIN — SENNOSIDES 8.6 MG: 8.6 TABLET, FILM COATED ORAL at 10:01

## 2023-08-10 RX ADMIN — CHLORHEXIDINE GLUCONATE 15 ML: 1.2 SOLUTION ORAL at 21:25

## 2023-08-10 NOTE — ASSESSMENT & PLAN NOTE
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 · Home medication reordered  · Ongoing pain control  · Controlled on oral regimen currently  · Complaining of neck pain- will add adjuvant pain control methods   · Continue bowel regimen as ordered. · Lumbar drain removed 8/10  · DVT ppx: SCD's, HSQ BID   · Physical therapy/occupational therapy evaluation  · Case management following for disposition  · Rounds completed with ANGELES Tam. · Neurosurgery will continue to follow as primary team. Lauren Box tomorrow. Call with questions or concerns.

## 2023-08-10 NOTE — PROGRESS NOTES
The patient is now being transferred to Room 724 via wheelchair. The patient's wife is here, also accompanying the patient on his transfer to the new room with the [employer] personnel escorting him there.

## 2023-08-10 NOTE — PROGRESS NOTES
[address] [facility]  Progress Note: Critical Care  Name: Cher Orourke 58 y.o. male I MRN: [id number]  Unit/Bed#: ICU 03 I Date of Admission: 8/7/2023   Date of Service: 8/10/2023 I Hospital Day: 3    Assessment/Plan   Neuro:   • Diagnosis: endoscopic L middle fossa craniotomy for repair of tegmen tympani defect and CSF leak, w/ temporalis muscle flap and intra-op lumbar drain   ? Plan:   - SBP goal 110-160  - Neuro checks Q4  - Keppra 750mg BID (day4/7)  - Decadron taper (day4/7)  - Seizure precautions   - Pain regimen: Tylenol 975 q6, Robaxin 500mg q6 PRN, Lidocaine patch, oxycodone 2.5mg PRN for severe pain  - Hold am SQ heparin BID DVT prophylaxis for LD removal today   • Diagnosis: Lumbar drain   ? Plan:  - Plan to remove LD today  • Diagnosis: Insomnia   ? Plan:  - Trazodone 50mg PRN  - Melatonin PRN  - Can consider Seroquel     CV:  • Diagnosis: CAD s/p PCI 2012  ? On ASA 81mg at home  ? Plan:   - Hold ASA per neurosurgery  • Diagnosis: HTN  ? Home med Lopressor 25mg BID, Candesartan-HCTZ 16-12.5mg   ? Plan:   - BP goals: 110-160  - Monitor BP closely  - Resume Losartan 50mg   - Continue HCTZ 12.5mg, Lopressor 25mg daily. •  Diagnosis: HLD  ? Plan:   - Continue home Lipitor 20mg daily      Pulm:  No active issues     GI:   • Diagnosis: Post op bowel regemin  ? Plan:   - Colace 100mg BID, Senokot 8.6mg daily        :   No active issues      F/E/N:   • Fluids: None continuous  • Electrolytes: Monitor and replete as needed   • Ca 1.0 repleted. K 6.7  • Nutrition: regular diet     Heme/Onc:   No active issues     Endo:   • Diagnosis: Gout  ? Plan:   - Continue home allopurinol 100mg daily     ID:   • Diagnosis: Post op abx  ? Plan:   - Continue post op ancef until LD drain removed         MSK/Skin:   · PT/OT     LDA: Lumbar drain, Peripheral IV x2     Disposition: Critical care.  Plan to transfer out today    ICU Core Measures     A: Assess, Prevent, and Manage Pain · Has pain been assessed? Yes  · Need for changes to pain regimen? No   B: Both SAT/SAT  · N/A   C: Choice of Sedation · RASS Goal: 0 Alert and Calm or N/A patient not on sedation  · Need for changes to sedation or analgesia regimen? No   D: Delirium · CAM-ICU: Negative   E: Early Mobility  · Plan for early mobility? Yes   F: Family Engagement · Plan for family engagement today? Yes       Antibiotic Review: Post op requirements     Review of Invasive Devices:            Prophylaxis:  VTE Contraindicated secondary to: LD removal today   Stress Ulcer  covered bypantoprazole (PROTONIX) EC tablet 40 mg [[id number]]          Subjective   HPI/24hr events: No overnight events. LD was clamped at 6pm last night. Plan for removal today. No L ear drainage. L sided hearing improved. Neck pain improved. ROS  Neck pain: positive. All other systems reviewed and were negative     Objective                            Vitals I/O      Most Recent Min/Max in 24hrs   Temp 97.8 °F (36.6 °C) Temp  Min: 97.5 °F (36.4 °C)  Max: 98.9 °F (37.2 °C)   Pulse 58 Pulse  Min: 52  Max: 66   Resp (!) 29 Resp  Min: 11  Max: 29   /79 BP  Min: 122/78  Max: 149/82   O2 Sat 96 % SpO2  Min: 94 %  Max: 97 %      Intake/Output Summary (Last 24 hours) at 8/10/2023 0550  Last data filed at 8/9/2023 2000  Gross per 24 hour   Intake 960 ml   Output 130 ml   Net 830 ml         Diet Regular; Regular House     Invasive Monitoring Physical exam    Physical Exam  Constitutional:       General: He is not in acute distress. Appearance: He is not ill-appearing. HENT:      Head: Normocephalic. Nose: Nose normal.      Mouth/Throat:      Mouth: Mucous membranes are moist.      Pharynx: Oropharynx is clear. Eyes:      Extraocular Movements: Extraocular movements intact. Conjunctiva/sclera: Conjunctivae normal.      Pupils: Pupils are equal, round, and reactive to light. Cardiovascular:      Rate and Rhythm: Normal rate and regular rhythm.       Pulses: Normal pulses. Heart sounds: No murmur heard. Pulmonary:      Effort: Pulmonary effort is normal. No respiratory distress. Abdominal:      General: Bowel sounds are normal. There is no distension. Palpations: Abdomen is soft. Musculoskeletal:      Right lower leg: No edema. Left lower leg: No edema. Skin:     General: Skin is warm and dry. Neurological:      Mental Status: He is alert and oriented to person, place, and time. Cranial Nerves: Cranial nerves 2-12 are intact. Sensory: Sensation is intact. Motor: No weakness (5/5 strength throughout). Comments: No L ear discharge. Persistent L sided hearing loss (mildly improved) . Lumbar drain present          Diagnostic Studies      EKG: Reviewed  Imaging:  I have personally reviewed pertinent reports.        Medications:  Scheduled PRN   acetaminophen, 975 mg, Q8H [address]  allopurinol, 100 mg, Daily  atorvastatin, 20 mg, Daily With Dinner  cefazolin, 1,000 mg, Q8H  chlorhexidine, 15 mL, Q12H SANGEETHA  dexamethasone, 4 mg, Q8H [address]   Followed by  Marthenia Perone ON 8/11/2023] dexamethasone, 2 mg, Q6H [address]   Followed by  Marthenia Perone ON 8/13/2023] dexamethasone, 2 mg, Q8H [address]   Followed by  Marthenia Perone ON 8/15/2023] dexamethasone, 2 mg, Q12H [address]   Followed by  Marthenia Perone ON 8/17/2023] dexamethasone, 2 mg, Q24H [address]  docusate sodium, 100 mg, BID  hydrochlorothiazide, 12.5 mg, Daily  levETIRAcetam, 750 mg, Q12H SANGEETHA  lidocaine, 1 patch, Daily  losartan, 50 mg, Daily  methocarbamol, 500 mg, Q6H SANGEETHA  metoprolol tartrate, 25 mg, After Breakfast  pantoprazole, 40 mg, Early Morning  senna, 1 tablet, Daily      acetaminophen, 975 mg, Q6H PRN  calcium carbonate, 1,000 mg, Daily PRN  magnesium hydroxide, 30 mL, Daily PRN  methocarbamol, 500 mg, Q6H PRN  ondansetron, 4 mg, Q6H PRN  oxyCODONE, 2.5 mg, Q4H PRN  traZODone, 50 mg, HS PRN       Continuous          Labs:    CBC    Recent Labs     08/09/23  0604   WBC 11.39*   HGB 14.0   HCT 38.1        BMP    Recent Labs 08/08/23  7049 08/09/23  0604 08/09/23  1149   SODIUM 141 139  --    K 4.0 5.7* 4.2   * 110*  --    CO2 24 26  --    AGAP 6 3  --    BUN 13 16  --    CREATININE 0.94 1.07  --    CALCIUM 8.2* 8.4  --        Coags    No recent results     Additional Electrolytes  Recent Labs     08/08/23  0632 08/09/23  0604   MG  --  2.3   PHOS 2.8 2.9   CAIONIZED  --  1.00*          Blood Gas    No recent results  No recent results LFTs  No recent results    Infectious  No recent results  Glucose  Recent Labs     08/08/23  0632 08/09/23  0604   GLUC 146* [address] [place], [place]

## 2023-08-10 NOTE — PLAN OF CARE
Problem: PAIN - ADULT  Goal: Verbalizes/displays adequate comfort level or baseline comfort level  Description: Interventions:  - Encourage patient to monitor pain and request assistance  - Assess pain using appropriate pain scale  - Administer analgesics based on type and severity of pain and evaluate response  - Implement non-pharmacological measures as appropriate and evaluate response  - Consider cultural and social influences on pain and pain management  - Notify physician/advanced practitioner if interventions unsuccessful or patient reports new pain  Outcome: Progressing     Problem: INFECTION - ADULT  Goal: Absence or prevention of progression during hospitalization  Description: INTERVENTIONS:  - Assess and monitor for signs and symptoms of infection  - Monitor lab/diagnostic results  - Monitor all insertion sites, i.e. indwelling lines, tubes, and drains  - Monitor endotracheal if appropriate and nasal secretions for changes in amount and color  - [place] appropriate cooling/warming therapies per order  - Administer medications as ordered  - Instruct and encourage patient and family to use good hand hygiene technique  - Identify and instruct in appropriate isolation precautions for identified infection/condition  Outcome: Progressing     Problem: SAFETY ADULT  Goal: Patient will remain free of falls  Description: INTERVENTIONS:  - Educate patient/family on patient safety including physical limitations  - Instruct patient to call for assistance with activity   - Consult OT/PT to assist with strengthening/mobility   - Keep Call bell within reach  - Keep bed low and locked with side rails adjusted as appropriate  - Keep care items and personal belongings within reach  - Initiate and maintain comfort rounds  - Make Fall Risk Sign visible to staff  - Offer Toileting every 2 Hours, in advance of need  - Initiate/Maintain Bed/Chair alarm  - Obtain necessary fall risk management equipmen  - Apply yellow socks and bracelet for high fall risk patients  - Consider moving patient to room near nurses station  Outcome: Progressing  Goal: Maintain or return to baseline ADL function  Description: INTERVENTIONS:  -  Assess patient's ability to carry out ADLs; assess patient's baseline for ADL function and identify physical deficits which impact ability to perform ADLs (bathing, care of mouth/teeth, toileting, grooming, dressing, etc.)  - Assess/evaluate cause of self-care deficits   - Assess range of motion  - Assess patient's mobility; develop plan if impaired  - Assess patient's need for assistive devices and provide as appropriate  - Encourage maximum independence but intervene and supervise when necessary  - Involve family in performance of ADLs  - Assess for home care needs following discharge   - Consider OT consult to assist with ADL evaluation and planning for discharge  - Provide patient education as appropriate  Outcome: Progressing  Goal: Maintains/Returns to pre admission functional level  Description: INTERVENTIONS:  - Perform BMAT or MOVE assessment daily.   - Set and communicate daily mobility goal to care team and patient/family/caregiver. - Collaborate with rehabilitation services on mobility goals if consulted  - Perform Range of Motion 4 times a day. - Reposition patient every 2 hours.   - Dangle patient 2 times a day  - Stand patient 2 times a day  - Ambulate patient 2 times a day  - Out of bed to chair 2 times a day   - Out of bed for meals 2 times a day  - Out of bed for toileting  - Record patient progress and toleration of activity level   Outcome: Progressing     Problem: DISCHARGE PLANNING  Goal: Discharge to home or other facility with appropriate resources  Description: INTERVENTIONS:  - Identify barriers to discharge w/patient and caregiver  - Arrange for needed discharge resources and transportation as appropriate  - Identify discharge learning needs (meds, wound care, etc.)  - Arrange for interpretive services to assist at discharge as needed  - Refer to Case Management Department for coordinating discharge planning if the patient needs post-hospital services based on physician/advanced practitioner order or complex needs related to functional status, cognitive ability, or social support system  Outcome: Progressing     Problem: Knowledge Deficit  Goal: Patient/family/caregiver demonstrates understanding of disease process, treatment plan, medications, and discharge instructions  Description: Complete learning assessment and assess knowledge base.   Interventions:  - Provide teaching at level of understanding  - Provide teaching via preferred learning methods  Outcome: Progressing     Problem: NEUROSENSORY - ADULT  Goal: Achieves stable or improved neurological status  Description: INTERVENTIONS  - Monitor and report changes in neurological status  - Monitor vital signs such as temperature, blood pressure, glucose, and any other labs ordered   - Initiate measures to prevent increased intracranial pressure  - Monitor for seizure activity and implement precautions if appropriate      Outcome: Progressing  Goal: Remains free of injury related to seizures activity  Description: INTERVENTIONS  - Maintain airway, patient safety  and administer oxygen as ordered  - Monitor patient for seizure activity, document and report duration and description of seizure to physician/advanced practitioner  - If seizure occurs,  ensure patient safety during seizure  - Reorient patient post seizure  - Seizure pads on all 4 side rails  - Instruct patient/family to notify RN of any seizure activity including if an aura is experienced  - Instruct patient/family to call for assistance with activity based on nursing assessment  - Administer anti-seizure medications if ordered    Outcome: Progressing  Goal: Achieves maximal functionality and self care  Description: INTERVENTIONS  - Monitor swallowing and airway patency with patient fatigue and changes in neurological status  - Encourage and assist patient to increase activity and self care. - Encourage visually impaired, hearing impaired and aphasic patients to use assistive/communication devices  Outcome: Progressing     Problem: MOBILITY - ADULT  Goal: Maintain or return to baseline ADL function  Description: INTERVENTIONS:  -  Assess patient's ability to carry out ADLs; assess patient's baseline for ADL function and identify physical deficits which impact ability to perform ADLs (bathing, care of mouth/teeth, toileting, grooming, dressing, etc.)  - Assess/evaluate cause of self-care deficits   - Assess range of motion  - Assess patient's mobility; develop plan if impaired  - Assess patient's need for assistive devices and provide as appropriate  - Encourage maximum independence but intervene and supervise when necessary  - Involve family in performance of ADLs  - Assess for home care needs following discharge   - Consider OT consult to assist with ADL evaluation and planning for discharge  - Provide patient education as appropriate  Outcome: Progressing  Goal: Maintains/Returns to pre admission functional level  Description: INTERVENTIONS:  - Perform BMAT or MOVE assessment daily.   - Set and communicate daily mobility goal to care team and patient/family/caregiver. - Collaborate with rehabilitation services on mobility goals if consulted  - Perform Range of Motion 4 times a day. - Reposition patient every 2 hours.   - Dangle patient 2 times a day  - Stand patient 2 times a day  - Ambulate patient 2 times a day  - Out of bed to chair 2 times a day   - Out of bed for meals 2 times a day  - Out of bed for toileting  - Record patient progress and toleration of activity level   Outcome: Progressing     Problem: CARDIOVASCULAR - ADULT  Goal: Maintains optimal cardiac output and hemodynamic stability  Description: INTERVENTIONS:  - Monitor I/O, vital signs and rhythm  - Monitor for S/S and trends of decreased cardiac output  - Administer and titrate ordered vasoactive medications to optimize hemodynamic stability  - Assess quality of pulses, skin color and temperature  - Assess for signs of decreased coronary artery perfusion  - Instruct patient to report change in severity of symptoms  Outcome: Progressing  Goal: Absence of cardiac dysrhythmias or at baseline rhythm  Description: INTERVENTIONS:  - Continuous cardiac monitoring, vital signs, obtain 12 lead EKG if ordered  - Administer antiarrhythmic and heart rate control medications as ordered  - Monitor electrolytes and administer replacement therapy as ordered  Outcome: Progressing     Problem: RESPIRATORY - ADULT  Goal: Achieves optimal ventilation and oxygenation  Description: INTERVENTIONS:  - Assess for changes in respiratory status  - Assess for changes in mentation and behavior  - Position to facilitate oxygenation and minimize respiratory effort  - Oxygen administered by appropriate delivery if ordered  - Initiate smoking cessation education as indicated  - Encourage broncho-pulmonary hygiene including cough, deep breathe, Incentive Spirometry  - Assess the need for suctioning and aspirate as needed  - Assess and instruct to report SOB or any respiratory difficulty  - Respiratory Therapy support as indicated  Outcome: Progressing     Problem: GASTROINTESTINAL - ADULT  Goal: Minimal or absence of nausea and/or vomiting  Description: INTERVENTIONS:  - Administer IV fluids if ordered to ensure adequate hydration  - Maintain NPO status until nausea and vomiting are resolved  - Nasogastric tube if ordered  - Administer ordered antiemetic medications as needed  - Provide nonpharmacologic comfort measures as appropriate  - Advance diet as tolerated, if ordered  - Consider nutrition services referral to assist patient with adequate nutrition and appropriate food choices  Outcome: Progressing  Goal: Maintains or returns to baseline bowel function  Description: INTERVENTIONS:  - Assess bowel function  - Encourage oral fluids to ensure adequate hydration  - Administer IV fluids if ordered to ensure adequate hydration  - Administer ordered medications as needed  - Encourage mobilization and activity  - Consider nutritional services referral to assist patient with adequate nutrition and appropriate food choices  Outcome: Progressing  Goal: Maintains adequate nutritional intake  Description: INTERVENTIONS:  - Monitor percentage of each meal consumed  - Identify factors contributing to decreased intake, treat as appropriate  - Assist with meals as needed  - Monitor I&O, weight, and lab values if indicated  - Obtain nutrition services referral as needed  Outcome: Progressing     Problem: GENITOURINARY - ADULT  Goal: Maintains or returns to baseline urinary function  Description: INTERVENTIONS:  - Assess urinary function  - Encourage oral fluids to ensure adequate hydration if ordered  - Administer IV fluids as ordered to ensure adequate hydration  - Administer ordered medications as needed  - Offer frequent toileting  - Follow urinary retention protocol if ordered  Outcome: Progressing     Problem: METABOLIC, FLUID AND ELECTROLYTES - ADULT  Goal: Electrolytes maintained within normal limits  Description: INTERVENTIONS:  - Monitor labs and assess patient for signs and symptoms of electrolyte imbalances  - Administer electrolyte replacement as ordered  - Monitor response to electrolyte replacements, including repeat lab results as appropriate  - Instruct patient on fluid and nutrition as appropriate  Outcome: Progressing  Goal: Fluid balance maintained  Description: INTERVENTIONS:  - Monitor labs   - Monitor I/O and WT  - Instruct patient on fluid and nutrition as appropriate  - Assess for signs & symptoms of volume excess or deficit  Outcome: Progressing     Problem: SKIN/TISSUE INTEGRITY - ADULT  Goal: Skin Integrity remains intact(Skin Breakdown Prevention)  Description: Assess:  -Perform Basil assessment every shift  -Clean and moisturize skin every shift  -Inspect skin when repositioning, toileting, and assisting with ADLS  -Assess under medical device every shift  -Assess extremities for adequate circulation and sensation     Bed Management:  -Have minimal linens on bed & keep smooth, unwrinkled  -Change linens as needed when moist or perspiring  -Avoid sitting or lying in one position for more than 2 hours while in bed  -Keep HOB at 30 degrees     Toileting:  -Offer bedside commode  -Assess for incontinence every 2 hours  -Use incontinent care products after each incontinent episode    Activity:  -Mobilize patient 2 times a day  -Encourage activity and walks on unit  -Encourage or provide ROM exercises   -Turn and reposition patient every 2 Hours  -Use appropriate equipment to lift or move patient in bed  -Instruct/ Assist with weight shifting every 30 minutes when out of bed in chair  -Consider limitation of chair time 3 hour intervals    Skin Care:  -Avoid use of baby powder, tape, friction and shearing, hot water or constrictive clothing  -Relieve pressure over bony prominences   -Do not massage red bony areas    Next Steps:  -Teach patient strategies to minimize risks    -Consider consults to  interdisciplinary teams   Outcome: Progressing  Goal: Incision(s), wounds(s) or drain site(s) healing without S/S of infection  Description: INTERVENTIONS  - Assess and document dressing, incision, wound bed, drain sites and surrounding tissue  - Provide patient and family education  - Perform skin care/dressing changes every shift  Outcome: Progressing     Problem: HEMATOLOGIC - ADULT  Goal: Maintains hematologic stability  Description: INTERVENTIONS  - Assess for signs and symptoms of bleeding or hemorrhage  - Monitor labs  - Administer supportive blood products/factors as ordered and appropriate  Outcome: Progressing     Problem: MUSCULOSKELETAL - ADULT  Goal: Maintain or return mobility to safest level of function  Description: INTERVENTIONS:  - Assess patient's ability to carry out ADLs; assess patient's baseline for ADL function and identify physical deficits which impact ability to perform ADLs (bathing, care of mouth/teeth, toileting, grooming, dressing, etc.)  - Assess/evaluate cause of self-care deficits   - Assess range of motion  - Assess patient's mobility  - Assess patient's need for assistive devices and provide as appropriate  - Encourage maximum independence but intervene and supervise when necessary  - Involve family in performance of ADLs  - Assess for home care needs following discharge   - Consider OT consult to assist with ADL evaluation and planning for discharge  - Provide patient education as appropriate  Outcome: Progressing     Problem: Potential for Falls  Goal: Patient will remain free of falls  Description: INTERVENTIONS:  - Educate patient/family on patient safety including physical limitations  - Instruct patient to call for assistance with activity   - Consult OT/PT to assist with strengthening/mobility   - Keep Call bell within reach  - Keep bed low and locked with side rails adjusted as appropriate  - Keep care items and personal belongings within reach  - Initiate and maintain comfort rounds  - Make Fall Risk Sign visible to staff  - Offer Toileting every 2 Hours, in advance of need  - Initiate/Maintain Bed/Chair alarm  - Obtain necessary fall risk management equipment  - Apply yellow socks and bracelet for high fall risk patients  - Consider moving patient to room near nurses station  Outcome: Progressing     Problem: COPING  Goal: Pt/Family able to verbalize concerns and demonstrate effective coping strategies  Description: INTERVENTIONS:  - Assist patient/family to identify coping skills, available support systems and cultural and spiritual values  - Provide emotional support, including active listening and acknowledgement of concerns of patient and caregivers  - Reduce environmental stimuli, as able  - Provide patient education  - Assess for spiritual pain/suffering and initiate spiritual care, including notification of Pastoral Care or ctilalli based community as needed  - Assess effectiveness of coping strategies  Outcome: Progressing  Goal: Will report anxiety at manageable levels  Description: INTERVENTIONS:  - Administer medication as ordered  - Teach and encourage coping skills  - Provide emotional support  - Assess patient/family for anxiety and ability to cope  Outcome: Progressing     Problem: Nutrition/Hydration-ADULT  Goal: Nutrient/Hydration intake appropriate for improving, restoring or maintaining nutritional needs  Description: Monitor and assess patient's nutrition/hydration status for malnutrition. Collaborate with interdisciplinary team and initiate plan and interventions as ordered. Monitor patient's weight and dietary intake as ordered or per policy. Utilize nutrition screening tool and intervene as necessary. Determine patient's food preferences and provide high-protein, high-caloric foods as appropriate.      INTERVENTIONS:  - Monitor oral intake, urinary output, labs, and treatment plans  - Assess nutrition and hydration status and recommend course of action  - Evaluate amount of meals eaten  - Assist patient with eating if necessary   - Allow adequate time for meals  - Recommend/ encourage appropriate diets, oral nutritional supplements, and vitamin/mineral supplements  - Order, calculate, and assess calorie counts as needed  - Recommend, monitor, and adjust tube feedings and TPN/PPN based on assessed needs  - Assess need for intravenous fluids  - Provide specific nutrition/hydration education as appropriate  - Include patient/family/caregiver in decisions related to nutrition  Outcome: Progressing

## 2023-08-10 NOTE — PROGRESS NOTES
[address] [facility]  Progress Note  Name: Andres Evans  MRN: [id number]  Unit/Bed#: ICU 03 I Date of Admission: 8/7/2023   Date of Service: 8/10/2023 I Hospital Day: 3    Assessment/Plan   Temporal encephalocele (HCC)  Assessment & Plan  · POD 3 from left middle cranial fossa craniotomy, retromastoid approach for repair of tegmen defect and temporalis muscle fascia grafting  · CSF otorrhea with left temporal encephalocele noted on CT and MRI imaging    Imaging:   · No imaging needed post op     Plan:   · Ongoing neurologic checks  · Ongoing medical management. · Home medication reordered  · Ongoing pain control  · Controlled on oral regimen currently  · Complaining of neck pain- will add adjuvant pain control methods   · Continue bowel regimen as ordered. · Lumbar drain removed 8/10  · DVT ppx: SCD's, HSQ BID   · Physical therapy/occupational therapy evaluation  · Case management following for disposition  · Rounds completed with ANGELES Story. · Neurosurgery will continue to follow as primary team. Raffi Gonzalez tomorrow. Call with questions or concerns. Subjective/Objective   Chief Complaint: tired    Subjective: Patient was laying in bed this morning. No acute issues overnight. Reports improved neck pain. Tolerated the drain removal and suture placement with only mild pain. Objective: laying in bed     I/O       08/08 0701  08/09 0700 08/09 0701  08/10 0700 08/10 0701  08/11 0700    P. O. 640 720     I.V. (mL/kg)       IV Piggyback 280 290     Total Intake(mL/kg) 920 (9.8) 1010 (10.7)     Urine (mL/kg/hr) 575 (0.3) 0 (0)     Drains 230 120     Blood       Total Output 805 120     Net +115 +890            Unmeasured Urine Occurrence 3 x 3 x           Invasive Devices     Peripheral Intravenous Line  Duration           Peripheral IV 08/07/23 Left Hand 3 days    Peripheral IV 08/07/23 Left Wrist 3 days          Drain  Duration           Lumbar Drain 3 days Physical Exam:  Vitals: Blood pressure 149/79, pulse 62, temperature 97.8 °F (36.6 °C), temperature source Oral, resp. rate 16, height 5' 8" (1.727 m), weight 94.3 kg (208 lb), SpO2 96 %. ,Body mass index is 31.63 kg/m². General appearance: alert, appears stated age, cooperative and no distress  Head: Normocephalic, without obvious abnormality  Eyes: EOMI, PERRL  Neck: supple, symmetrical, trachea midline   Back: no kyphosis present  Lungs: non labored breathing  Heart: regular heart rate  Neurologic:   Mental status: Alert, oriented x3, thought content appropriate  Cranial nerves: grossly intact (Cranial nerves II-XII)  Sensory: normal to light touch   Motor: moving all extremities without focal weakness 5/5    Lab Results:  Results from last 7 days   Lab Units 08/10/23  0620 08/09/23  0604 08/08/23  0533 08/07/23  1257   WBC Thousand/uL 9.60 11.39* 9.63 6.63   HEMOGLOBIN g/dL 13.5 14.0 13.3 13.8   HEMATOCRIT % 38.7 38.1 36.4* 38.7   PLATELETS Thousands/uL 157 185 187 178   MONO PCT %  --   --   --  0*   EOS PCT %  --   --   --  0     Results from last 7 days   Lab Units 08/10/23  0756 08/10/23  0620 08/09/23  1149 08/09/23  0604 08/08/23  0632 08/07/23  1257 08/07/23  0821   SODIUM mmol/L  --  136  --  139 141   < >  --    POTASSIUM mmol/L 3.9 6.2* 4.2 5.7* 4.0   < >  --    CHLORIDE mmol/L  --  110*  --  110* 111*   < >  --    CO2 mmol/L  --  24  --  26 24   < >  --    CO2, I-STAT mmol/L  --   --   --   --   --   --  28   BUN mg/dL  --  18  --  16 13   < >  --    CREATININE mg/dL  --  1.10  --  1.07 0.94   < >  --    CALCIUM mg/dL  --  8.7  --  8.4 8.2*   < >  --    GLUCOSE, ISTAT mg/dl  --   --   --   --   --   --  124    < > = values in this interval not displayed.      Results from last 7 days   Lab Units 08/09/23  0604 08/07/23  1257   MAGNESIUM mg/dL 2.3 2.1     Results from last 7 days   Lab Units 08/09/23  0604 08/08/23  0632 08/07/23  1257   PHOSPHORUS mg/dL 2.9 2.8 1.8*     Results from last 7 days Lab Units 08/08/23  0533   INR  1.04   PTT seconds 26     No results found for: "TROPONINT"  ABG:No results found for: "PHART", "HZS7PKN", "PO2ART", "TVO4RLH", "H7GCAOIX", "BEART", "SOURCE"    Imaging Studies: I have personally reviewed pertinent reports. and I have personally reviewed pertinent films in PACS  No results found. EKG, Pathology, and Other Studies: I have personally reviewed pertinent reports.       VTE Pharmacologic Prophylaxis: Heparin to resume this evening     VTE Mechanical Prophylaxis: sequential compression device

## 2023-08-10 NOTE — PROGRESS NOTES
OTOLARYNGOLOGY PROGRESS NOTE    Date of Service: 8/10/2023 6:57 AM    HPI  Patient is a 58 y.o. 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 Overnight Events: NAEO, VSS, pain controlled, no ear dc or salty taste in mouth    WBC (8/8): 9.63  Na (8/8): 141    PHYSICAL EXAM:  Vitals:    08/10/23 0600   BP: 133/68   Pulse: 58   Resp: 14   Temp:    SpO2: 96%        General: No acute distress, AOx4  HEENT: L ear incision C/D/I, no otorrhea   Neurology: No focal deficits  Lungs: Breathing easy, unlabored and even on room air  Cardio: RRR, well perfused  Abd: soft, NT, ND  Lumbar drain in place      Intake/Output Summary (Last 24 hours) at 8/10/2023 0657  Last data filed at 8/10/2023 0100  Gross per 24 hour   Intake 1010 ml   Output 120 ml   Net 890 ml     Lumbar drain: 180mL    LABORATORY    Recent Labs     08/07/23  0821 08/07/23  1257 08/08/23  0533 08/09/23  0604 08/10/23  0620   WBC  --  6.63 9.63 11.39* 9.60   HGB 11.9* 13.8 13.3 14.0 13.5   HCT 35* 38.7 36.4* 38.1 38.7   PLT  --  178 187 185 157       Recent Labs     08/07/23  1257 08/08/23  0632 08/09/23  0604 08/09/23  1149   K 3.8 4.0 5.7* 4.2   * 111* 110*  --    BUN 12 13 16  --    PHOS 1.8* 2.8 2.9  --    MG 2.1  --  2.3  --        Invalid input(s): "PTPAT", "PTINR", "APTTMNNM", "APTTPAT"      Patient Active Problem List   Diagnosis   • Gout   • Essential hypertension   • Hyperlipidemia   • Coronary artery disease involving native coronary artery of native heart without angina pectoris   • PVC's (premature ventricular contractions)   • Nephrolithiasis   • Acute kidney injury (nontraumatic) (HCC)   • Transaminitis   • Pyelonephritis   • Ureteral stone   • S/P coronary artery stent placement   • Obesity (BMI 30-39. 9)   • Elevated fasting blood sugar • MI (myocardial infarction) ([address])   • Temporal encephalocele (HCC)         ASSESSMENT  Patient is a 58 y.o. male w/ acute and chronic problems as above, who is POD3 from endoscopic L middle fossa craniotomy for repair of tegmen tympani defect and CSF leak, w/ temporalis muscle flap and intra-op lumbar drain, stable    PLAN  - pt recovering well from L ear surgery  - lumbar drain management per neurosurgery protocol  - rest of care per primary

## 2023-08-11 VITALS
SYSTOLIC BLOOD PRESSURE: 134 MMHG | DIASTOLIC BLOOD PRESSURE: 80 MMHG | BODY MASS INDEX: 31.52 KG/M2 | HEIGHT: 68 IN | TEMPERATURE: 97.9 F | OXYGEN SATURATION: 98 % | HEART RATE: 64 BPM | WEIGHT: 208 LBS | RESPIRATION RATE: 16 BRPM

## 2023-08-11 LAB
ANION GAP SERPL CALCULATED.3IONS-SCNC: 2 MMOL/L
ATRIAL RATE: 57 BPM
BUN SERPL-MCNC: 20 MG/DL (ref 5–25)
CA-I BLD-SCNC: 1.15 MMOL/L (ref 1.12–1.32)
CALCIUM SERPL-MCNC: 9 MG/DL (ref 8.3–10.1)
CHLORIDE SERPL-SCNC: 107 MMOL/L (ref 96–108)
CO2 SERPL-SCNC: 31 MMOL/L (ref 21–32)
CREAT SERPL-MCNC: 1.06 MG/DL (ref 0.6–1.3)
ERYTHROCYTE [DISTWIDTH] IN BLOOD BY AUTOMATED COUNT: 12.4 % (ref 11.6–15.1)
GFR SERPL CREATININE-BSD FRML MDRD: 73 ML/MIN/1.73SQ M
GLUCOSE SERPL-MCNC: 133 MG/DL (ref 65–140)
HCT VFR BLD AUTO: 40.8 % (ref 36.5–49.3)
HGB BLD-MCNC: 14.4 G/DL (ref 12–17)
MCH RBC QN AUTO: 31.4 PG (ref 26.8–34.3)
MCHC RBC AUTO-ENTMCNC: 35.3 G/DL (ref 31.4–37.4)
MCV RBC AUTO: 89 FL (ref 82–98)
P AXIS: 32 DEGREES
PLATELET # BLD AUTO: 204 THOUSANDS/UL (ref 149–390)
PMV BLD AUTO: 9.9 FL (ref 8.9–12.7)
POTASSIUM SERPL-SCNC: 4.7 MMOL/L (ref 3.5–5.3)
PR INTERVAL: 146 MS
QRS AXIS: 31 DEGREES
QRSD INTERVAL: 104 MS
QT INTERVAL: 433 MS
QTC INTERVAL: 422 MS
RBC # BLD AUTO: 4.59 MILLION/UL (ref 3.88–5.62)
SODIUM SERPL-SCNC: 140 MMOL/L (ref 135–147)
T WAVE AXIS: -8 DEGREES
VENTRICULAR RATE: 57 BPM
WBC # BLD AUTO: 7.78 THOUSAND/UL (ref 4.31–10.16)

## 2023-08-11 PROCEDURE — 85027 COMPLETE CBC AUTOMATED: CPT | Performed by: PHYSICIAN ASSISTANT

## 2023-08-11 PROCEDURE — 99024 POSTOP FOLLOW-UP VISIT: CPT | Performed by: NURSE PRACTITIONER

## 2023-08-11 PROCEDURE — 82330 ASSAY OF CALCIUM: CPT | Performed by: PHYSICIAN ASSISTANT

## 2023-08-11 PROCEDURE — 80048 BASIC METABOLIC PNL TOTAL CA: CPT | Performed by: PHYSICIAN ASSISTANT

## 2023-08-11 PROCEDURE — 93010 ELECTROCARDIOGRAM REPORT: CPT | Performed by: INTERNAL MEDICINE

## 2023-08-11 RX ORDER — DEXAMETHASONE 2 MG/1
2 TABLET ORAL EVERY 6 HOURS SCHEDULED
Qty: 8 TABLET | Refills: 0 | Status: SHIPPED | OUTPATIENT
Start: 2023-08-11 | End: 2023-08-13

## 2023-08-11 RX ORDER — LIDOCAINE 50 MG/G
1 PATCH TOPICAL DAILY
Qty: 7 PATCH | Refills: 0 | Status: SHIPPED | OUTPATIENT
Start: 2023-08-12 | End: 2023-08-19

## 2023-08-11 RX ORDER — DEXAMETHASONE 2 MG/1
2 TABLET ORAL
Qty: 2 TABLET | Refills: 0 | Status: SHIPPED | OUTPATIENT
Start: 2023-08-17 | End: 2023-08-19

## 2023-08-11 RX ORDER — ACETAMINOPHEN 325 MG/1
975 TABLET ORAL EVERY 6 HOURS PRN
Qty: 50 TABLET | Refills: 0 | Status: SHIPPED | OUTPATIENT
Start: 2023-08-11 | End: 2023-08-18

## 2023-08-11 RX ORDER — DEXAMETHASONE 2 MG/1
2 TABLET ORAL EVERY 8 HOURS SCHEDULED
Qty: 6 TABLET | Refills: 0 | Status: SHIPPED | OUTPATIENT
Start: 2023-08-13 | End: 2023-08-15

## 2023-08-11 RX ORDER — LEVETIRACETAM 750 MG/1
750 TABLET ORAL EVERY 12 HOURS SCHEDULED
Qty: 6 TABLET | Refills: 0 | Status: SHIPPED | OUTPATIENT
Start: 2023-08-11 | End: 2023-08-14

## 2023-08-11 RX ORDER — DEXAMETHASONE 2 MG/1
2 TABLET ORAL EVERY 12 HOURS SCHEDULED
Qty: 4 TABLET | Refills: 0 | Status: SHIPPED | OUTPATIENT
Start: 2023-08-15 | End: 2023-08-17

## 2023-08-11 RX ADMIN — SENNOSIDES 8.6 MG: 8.6 TABLET, FILM COATED ORAL at 08:47

## 2023-08-11 RX ADMIN — METHOCARBAMOL 500 MG: 500 TABLET ORAL at 00:47

## 2023-08-11 RX ADMIN — ACETAMINOPHEN 975 MG: 325 TABLET, FILM COATED ORAL at 05:38

## 2023-08-11 RX ADMIN — ALLOPURINOL 100 MG: 100 TABLET ORAL at 08:47

## 2023-08-11 RX ADMIN — PANTOPRAZOLE SODIUM 40 MG: 40 TABLET, DELAYED RELEASE ORAL at 05:39

## 2023-08-11 RX ADMIN — DOCUSATE SODIUM 100 MG: 100 CAPSULE ORAL at 08:47

## 2023-08-11 RX ADMIN — METHOCARBAMOL 500 MG: 500 TABLET ORAL at 05:39

## 2023-08-11 RX ADMIN — CHLORHEXIDINE GLUCONATE 15 ML: 1.2 SOLUTION ORAL at 08:47

## 2023-08-11 RX ADMIN — LIDOCAINE 5% 1 PATCH: 700 PATCH TOPICAL at 08:48

## 2023-08-11 RX ADMIN — DEXAMETHASONE 4 MG: 4 TABLET ORAL at 05:39

## 2023-08-11 RX ADMIN — HYDROCHLOROTHIAZIDE 12.5 MG: 12.5 TABLET ORAL at 08:47

## 2023-08-11 RX ADMIN — LOSARTAN POTASSIUM 100 MG: 50 TABLET, FILM COATED ORAL at 08:47

## 2023-08-11 RX ADMIN — LEVETIRACETAM 750 MG: 750 TABLET, FILM COATED ORAL at 08:47

## 2023-08-11 RX ADMIN — METHOCARBAMOL 500 MG: 500 TABLET ORAL at 11:49

## 2023-08-11 RX ADMIN — METOPROLOL TARTRATE 25 MG: 25 TABLET, FILM COATED ORAL at 08:47

## 2023-08-11 NOTE — DISCHARGE SUMMARY
[address] [facility]  Discharge- [address] [facility] 1960, 58 y.o. male MRN: [id number]  [facility] Encounter: [id number]  Primary Care Provider: Fernanda Bains DO   Date and time admitted to hospital: 8/7/2023  5:30 AM    Temporal encephalocele (HCC)  Assessment & Plan  POD 4 from left middle cranial fossa craniotomy, retromastoid approach for repair of tegmen defect and temporalis muscle fascia grafting  · CSF otorrhea with left temporal encephalocele noted on CT and MRI imaging    Imaging:   · No imaging needed post op     Plan:   · Ongoing neurologic checks  · Ongoing medical management. · Home medication reordered  · Ongoing pain control  · Controlled on oral regimen currently  · Continue bowel regimen as ordered. · Lumbar drain removed 8/10  · DVT ppx: SCD's, HSQ BID   · Physical therapy/occupational therapy evaluation  · Case management following for disposition  · Rounds completed with RN . Neurosurgery will continue to follow as primary team. Plan to d/c home today. Call with questions or concerns. Subjective/Objective   Chief Complaint: "I'd like to go home."    Subjective: Denies headache or discomfort. Denies N/V. No positional symptoms. Tolerating PO. Voiding without issue. Planning to attempt BM. Objective: NAD. Left craniotomy incision clean, dry and intact. I/O       08/09 0701  08/10 0700 08/10 0701  08/11 0700 08/11 0701  08/12 0700    P. O. 720 240 200    IV Piggyback 290 150     Total Intake(mL/kg) 1010 (10.7) 390 (4.1) 200 (2.1)    Urine (mL/kg/hr) 0 (0)      Drains 120      Total Output 120      Net +890 +390 +200           Unmeasured Urine Occurrence 3 x            Invasive Devices     Peripheral Intravenous Line  Duration           Peripheral IV 08/07/23 Left Wrist 4 days                Physical Exam:  Vitals: Blood pressure 134/80, pulse 64, temperature 97.9 °F (36.6 °C), resp.  rate 16, height 5' 8" (1.727 m), weight 94.3 kg (208 lb), SpO2 98 %. ,Body mass index is 31.63 kg/m². General appearance: alert, appears stated age, cooperative and no distress  Head: left craniotomy incision clean and dry, no drainage or ottorhea  Eyes: EOMI, PERRL  Neck: supple, symmetrical, trachea midline and NT  Back: no kyphosis present, no tenderness to percussion or palpation  Lungs: non labored breathing  Heart: regular heart rate  Neurologic:   Mental status: Alert, oriented x3, thought content appropriate  Cranial nerves: grossly intact (Cranial nerves II-XII)  Sensory: normal to LT  Motor: moving all extremities without focal weakness, strength grossly 5/5  Coordination: finger to nose normal bilaterally, no drift bilaterally      Lab Results:  Results from last 7 days   Lab Units 08/11/23  0548 08/10/23  0620 08/09/23  0604 08/08/23  0533 08/07/23  1257   WBC Thousand/uL 7.78 9.60 11.39*   < > 6.63   HEMOGLOBIN g/dL 14.4 13.5 14.0   < > 13.8   HEMATOCRIT % 40.8 38.7 38.1   < > 38.7   PLATELETS Thousands/uL 204 157 185   < > 178   MONO PCT %  --   --   --   --  0*   EOS PCT %  --   --   --   --  0    < > = values in this interval not displayed. Results from last 7 days   Lab Units 08/11/23  0548 08/10/23  0756 08/10/23  0620 08/09/23  1149 08/09/23  0604 08/07/23  1257 08/07/23  0821   POTASSIUM mmol/L 4.7 3.9 6.2*   < > 5.7*   < >  --    CHLORIDE mmol/L 107  --  110*  --  110*   < >  --    CO2 mmol/L 31  --  24  --  26   < >  --    CO2, I-STAT mmol/L  --   --   --   --   --   --  28   BUN mg/dL 20  --  18  --  16   < >  --    CREATININE mg/dL 1.06  --  1.10  --  1.07   < >  --    CALCIUM mg/dL 9.0  --  8.7  --  8.4   < >  --    GLUCOSE, ISTAT mg/dl  --   --   --   --   --   --  124    < > = values in this interval not displayed.      Results from last 7 days   Lab Units 08/09/23  0604 08/07/23  1257   MAGNESIUM mg/dL 2.3 2.1     Results from last 7 days   Lab Units 08/09/23  0604 08/08/23  0632 08/07/23  1257   PHOSPHORUS mg/dL 2.9 2.8 1.8* Results from last 7 days   Lab Units 08/08/23  0533   INR  1.04   PTT seconds 26     No results found for: "TROPONINT"  ABG:No results found for: "PHART", "AQZ1MPL", "PO2ART", "QEF4ZDK", "N8ESLMCH", "BEART", "SOURCE"    Imaging Studies: I have personally reviewed pertinent reports. and I have personally reviewed pertinent films in PACS    No results found. EKG, Pathology, and Other Studies: I have personally reviewed pertinent reports. VTE Pharmacologic Prophylaxis: Sequential compression device (Venodyne)     VTE Mechanical Prophylaxis: sequential compression device     Discharge Summary- Neurosurgery   Laura Schwarz 58 y.o. male MRN: [id number]  [facility] Encounter: [id number]      Admission Date:   Admission Orders (From admission, onward)     Ordered        08/07/23 1136  Inpatient Admission  Once                        Discharge Date: 8/11/2023    Admitting Diagnosis: CSF otorrhea [G96.01]  Temporal encephalocele (HCC) [Q01.8]    Discharge Diagnosis: CSF otorrhea, temporal encephalocele  Medical Problems     Resolved Problems  Date Reviewed: 5/23/2023          Resolved    MI (myocardial infarction) ([address]) 8/10/2023     Resolved by  Syed Dunham MD          Attending Physician: Syed Dunham MD    Consulting Physician: Collette Dolin ([facility])    Procedures Performed: Procedure(s):  Left middle fossa craniotomy, retromastoid approach, for repair tegeman defect with temporalis muscle fascia graft  Endoscopic left middle fossa craniotomy for repair of tegmen defect and CSF leak with temporalis muscle fascia flap, with neuromonitoring (CN 7/8) and intraoperative lumbar drain placement    Pathology: N/A    Hospital Course: The patient presented on 8/7/2023 for elective left middle fossa craniotomy, repair of CSF leak with temporalis muscle flap, and lumbar drain placement with Dr. Otoniel Barrios. This was in the setting of chronic left otitis media and myringotomy placement beginning in November 2022.   Since that time he had developed progressive hearing loss and constant otorrhea. Workup indicated CSF leak and tegmen defect. He was transferred to the PACU and then the ICU in stable condition. He continued to progress well and tolerated lumbar drain. On postoperative day 3 lumbar drain was discontinued. He was discharged home in good condition after working with physical therapy and Occupational Therapy on postoperative day 4. Condition at Discharge: good     Discharge Instructions/Information to Patient and Family:   See after visit summary for information provided to patient and family. Provisions for Follow-Up Care:  See after visit summary for information related to follow-up care and any pertinent home health orders. Disposition: Home    Planned Readmission: No    Discharge Statement   I spent 20 minutes discharging the patient. This time was spent on the day of discharge. I had direct contact with the patient on the day of discharge. Additional documentation is required if more than 30 minutes were spent on discharge. Discharge Medications:  See after visit summary for reconciled discharge medications provided to patient and family.

## 2023-08-11 NOTE — RESTORATIVE TECHNICIAN NOTE
Restorative Technician Note      Patient Name: Naman Schwarz     Note Type: Mobility  Patient Position Upon Consult: Supine  Activity Performed: Ambulated; Dangled; Stood  Assistive Device: Other (Comment) (none)  Education Provided: Yes  Patient Position at End of Consult: Supine;  All needs within reach; Bed/Chair alarm activated      Charlotte BEEBE, Restorative Technician, [employer]

## 2023-08-11 NOTE — ASSESSMENT & PLAN NOTE
POD 4 from left middle cranial fossa craniotomy, retromastoid approach for repair of tegmen defect and temporalis muscle fascia grafting  · CSF otorrhea with left temporal encephalocele noted on CT and MRI imaging    Imaging:   · No imaging needed post op     Plan:   · Ongoing neurologic checks  · Ongoing medical management. · Home medication reordered  · Ongoing pain control  · Controlled on oral regimen currently  · Continue bowel regimen as ordered. · Lumbar drain removed 8/10  · DVT ppx: SCD's, HSQ BID   · Physical therapy/occupational therapy evaluation  · Case management following for disposition  · Rounds completed with RN . Neurosurgery will continue to follow as primary team. Plan to d/c home today. Call with questions or concerns.

## 2023-08-11 NOTE — PROGRESS NOTES
Pastoral Care Progress Note    2023  Patient: Maciej Kirk : 1960  Admission Date & Time: 2023 0530  MRN: [id number] CSN: [id number]           23 1000   Clinical Encounter Type   Visited With Patient and family together   Routine Visit Follow-up     Fr Fernandez Spangler followed-up with the pt and pt's wife and conducted a pastoral visit. No further needs were expressed at this time. Chaplains still remain available.

## 2023-08-11 NOTE — PROGRESS NOTES
OTOLARYNGOLOGY PROGRESS NOTE    Date of Service: 8/11/2023 6:07 AM    HPI  Patient is a 58 y.o. 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 Lumbar drain removed on 8/10    Overnight Events: NAEO, VSS, pain controlled, no ear dc or salty taste in mouth    WBC (8/10): 9.60  Na (8/10): 136    PHYSICAL EXAM:  Vitals:    08/10/23 2151   BP: 138/80   Pulse: 59   Resp: 16   Temp: 98 °F (36.7 °C)   SpO2: 98%        General: No acute distress, AOx4  HEENT: L ear incision C/D/I, no otorrhea   Neurology: No focal deficits  Lungs: Breathing easy, unlabored and even on room air  Cardio: RRR, well perfused  Abd: soft, NT, ND      Intake/Output Summary (Last 24 hours) at 8/11/2023 6812  Last data filed at 8/10/2023 1300  Gross per 24 hour   Intake 390 ml   Output --   Net 390 ml       LABORATORY    Recent Labs     08/09/23  0604 08/10/23  0620   WBC 11.39* 9.60   HGB 14.0 13.5   HCT 38.1 38.7    157       Recent Labs     08/08/23  0632 08/09/23  0604 08/09/23  1149 08/10/23  0620 08/10/23  0756   K 4.0 5.7* 4.2 6.2* 3.9   * 110*  --  110*  --    BUN 13 16  --  18  --    PHOS 2.8 2.9  --   --   --    MG  --  2.3  --   --   --        Invalid input(s): "PTPAT", "PTINR", "APTTMNNM", "APTTPAT"      Patient Active Problem List   Diagnosis   • Gout   • Essential hypertension   • Hyperlipidemia   • Coronary artery disease involving native coronary artery of native heart without angina pectoris   • PVC's (premature ventricular contractions)   • Nephrolithiasis   • Acute kidney injury (nontraumatic) (HCC)   • Transaminitis   • Pyelonephritis   • Ureteral stone   • S/P coronary artery stent placement   • Obesity (BMI 30-39. 9)   • Elevated fasting blood sugar   • Temporal encephalocele (HCC)   • CSF otorrhea ASSESSMENT  Patient is a 58 y.o. male w/ acute and chronic problems as above, who is POD4 from endoscopic L middle fossa craniotomy for repair of tegmen tympani defect and CSF leak, w/ temporalis muscle flap and intra-op lumbar drain, stable    PLAN  - pt recovering well from L ear surgery  - plan for dc today (8/11)  - rest of care per primary

## 2023-08-11 NOTE — DISCHARGE INSTR - AVS FIRST PAGE
Discharge Instructions  Craniotomy for repair of Tegman defect     Surgical incision care:  Keep dressings in place for 2 days. After 2 days, incisions may be left open to air, but should remain clean. THREE days after surgery you START showering using a baby shampoo including head incision. Rinse off shampoo and pat dry. Wash hair AT LEAST every other day  Continue to use clean towel and washcloth for 2 weeks post-op. Avoid rubbing the incision but gently massage hair. Do NOT immerse the incisions in water for 6 weeks. Staples/suture will be removed at your 2 week postoperative visit. Do not apply any creams or ointments to the incision, unless otherwise instructed by [facility]. Contact office if increasing redness, drainage, pain or swelling occurs around the incisions or if you develop a fever greater than 101F  Do not dye/perm hair or use any hair products until cleared by Neurosurgery. Activity:  Do not lift, push or pull more than 10 pounds for 2 weeks. Avoid bending, lifting and twisting for 2 weeks. No running. No athletic activities until cleared. No driving for at least 2 weeks or until cleared by Neurosurgery. Do not use a hair dryer, and NO hair products such as mousse, oils, and gels. Do not brush your hair away from the incision since this will put strain on the suture line. Do NOT dye or perm hair for 6 weeks or until cleared by physician. Continue to change bed linens and pajamas more frequently. Wear clean clothes daily. May walk as tolerated. Recommend 4 short walks daily. Postoperative medication:  [facility] will provide pain medication in the postoperative period. All prescriptions must come from a single practice. Take medications as prescribed. Call office with any questions/concerns. May use over the counter Tylenol. No NSAIDs (ie.  Ibuprofen, Aleve, Advil, Naproxen)  Please contact office for questions regarding dosage and modifications. No antiplatelet or anticoagulation medication (ie. Coumadin, Aspirin, Plavix) until cleared by [address], unless otherwise instructed. Please contact [facility] if you have any questions about the effects of any of your medications on blood clotting. Do not operate heavy machinery or vehicles while taking sedating medications. Use a bowel regimen while on opioids as they induce constipation. Ie. Senokot-S, Miralax, Colace, etc. Increase fiber and water intake. Follow-up as scheduled for a 2 week post-operative visit for an incision check. ** Please notify the office if incision becomes red, swollen, tender, or has increased drainage, and temp>101. Return to the ER if you experience increased headache, drowsiness, weakness, nausea/vomiting, or seizures. **

## 2023-08-11 NOTE — PLAN OF CARE
Problem: INFECTION - ADULT  Goal: Absence or prevention of progression during hospitalization  Description: INTERVENTIONS:  - Assess and monitor for signs and symptoms of infection  - Monitor lab/diagnostic results  - Monitor all insertion sites, i.e. indwelling lines, tubes, and drains  - Monitor endotracheal if appropriate and nasal secretions for changes in amount and color  - [place] appropriate cooling/warming therapies per order  - Administer medications as ordered  - Instruct and encourage patient and family to use good hand hygiene technique  - Identify and instruct in appropriate isolation precautions for identified infection/condition  Outcome: Progressing

## 2023-08-14 ENCOUNTER — TRANSITIONAL CARE MANAGEMENT (OUTPATIENT)
Dept: FAMILY MEDICINE CLINIC | Facility: CLINIC | Age: 63
End: 2023-08-14

## 2023-08-14 NOTE — TELEPHONE ENCOUNTER
Called patient to see how he is doing after surgery. Patient reports he is doing well overall and denies any incisional issues or fevers. Patient is able to ambulate around the house and complete ADLs. Educated the patient about the importance of preventing blood clots and provided measures how to prevent them. Patient denies any headaches, dizziness, nausea, vomiting, changes to his vision or mental status. Patient is aware to call 911 or present to the nearest ED with any neurological decline. Patient has moved his bowels since the surgery. Encouraged patient to take an over the counter stool softener, if he is taking narcotic pain medication. Encouraged fiber intake and fluids. Reviewed incision care with the patient. Advised that  he may take a shower and gently wash the surgical site with soap and water or baby shampoo. Use clean wash cloth, towels, and clothing. Do not submerge in water until cleared by the surgeon. Do not apply any creams, ointments, or lotions to the site. Patient is aware to call the office if any redness, swelling, drainage, dehiscence of incision, or fever >100 F occurs. Patient is aware to call the office if any concerns or questions may arise. Reminded patient of his upcoming appointments with the date/time/location. Patient was appreciative for the call.

## 2023-08-22 ENCOUNTER — CLINICAL SUPPORT (OUTPATIENT)
Dept: NEUROSURGERY | Facility: CLINIC | Age: 63
End: 2023-08-22

## 2023-08-22 VITALS
HEART RATE: 64 BPM | SYSTOLIC BLOOD PRESSURE: 110 MMHG | RESPIRATION RATE: 16 BRPM | WEIGHT: 201 LBS | DIASTOLIC BLOOD PRESSURE: 78 MMHG | TEMPERATURE: 97.1 F | BODY MASS INDEX: 30.46 KG/M2 | OXYGEN SATURATION: 98 % | HEIGHT: 68 IN

## 2023-08-22 DIAGNOSIS — Z98.890 STATUS POST SURGERY: Primary | ICD-10-CM

## 2023-08-22 DIAGNOSIS — Z48.89 ENCOUNTER FOR POST SURGICAL WOUND CHECK: ICD-10-CM

## 2023-08-22 PROCEDURE — 99024 POSTOP FOLLOW-UP VISIT: CPT

## 2023-08-22 NOTE — PATIENT INSTRUCTIONS
Continue incision care as instructed. Cleans incision area(s) with soap and water and pat dry. Avoid lotions, creams or ointments for two more weeks. Avoid soaking in water (bath tubs, hot tubs) for two more weeks. Maintain activity restrictions until cleared by the surgeon. Avoid heavy lifting and frequent bending/twisting. Call the office immediately with any signs or symptoms of infection including: increasing redness, swelling, drainage or fevers (101 or greater). Please call 911 or present to the nearest ED with any neurological decline: severe headaches, dizziness, nausea, vomiting, changes to your vision or mental status.

## 2023-08-22 NOTE — PROGRESS NOTES
Error in my note below: Patient does NOT require any imaging for his upcoming appointment with Dr. Tru Escobar.

## 2023-08-22 NOTE — PROGRESS NOTES
Post-Op Visit- Neurosurgery    Ro Schwarz 58 y.o. male MRN: [id number]    Chief Complaint:  Patient presents post: Left middle fossa craniotomy, retromastoid approach, for repair tegeman defect with temporalis muscle fascia graft - Left and Endoscopic left middle fossa craniotomy for repair of tegmen defect and CSF leak with temporalis muscle fascia flap, with neuromonitoring (CN 7/8) and intraoperative lumbar drain placement. History of Present Illness:  Patient presents for 2 week POV for incision check accompanied by spouse and ambulating without an assistive device. Patient reports he is doing well overall and denies any incisional issues or fevers. He denies any new weakness, numbness or tingling since the surgery. Patient admits to much improved surgical pain at this time and only experiences an occasional mild headache which goes away with lying down and/or taking tylenol. Patient denies any severe headaches, dizziness, nausea, vomiting, changes to his vision or mental status. He does report some hearing disturbances/hearing loss in his left ear which he had prior to surgery as well. He feels it might be slightly worse now. Has not followed up with ENT since the procedure. Patient also inquiring if he may restart his baby asa at this time. Assessment:  Vitals:    08/22/23 1116   BP: 110/78   Pulse: 64   Resp: 16   Temp: (!) 97.1 °F (36.2 °C)   SpO2: 98%       Wound Exam:   Incisions well approximated. No erythema, edema or drainage present. Location: lumbar drain site, left side of scalp (lainey-auricular)    Procedure:  Staple/suture removal.   Procedure Note:1 drain stitch removed from lumbar drain site. Cleansed area with NSS. ROBERT  Patient Status: the patient tolerated the procedure well. Complications: None. Discussion/Summary:  Doing well postoperatively.  Reviewed incision care with patient including daily observation for s/s infection including: increased erythema, edema, drainage, dehiscence of incision or fever >101. Should these be observed, he understands that he is to call and/or return immediately for reassessment. Advised patient to continue cleansing area with mild soap and water or baby shampoo and pat dry. Not to apply any lotions, creams, or ointments, & not to submerge in any water for 2 more weeks. He is to maintain activity restrictions until cleared by the surgeon. Activity levels were also reviewed with the patient in detail, he is to lift no greater than 10-15 pounds and ambulation is encouraged as tolerated. Verified date/time/location of upcoming POV and reminded him to complete [facility] prior to his next appointment. He is to call the office with any further questions or concerns, or if any incisional issues or fevers would arise. Patient is aware to call 911 or present to the nearest ED with any neurological decline. Patient assisted at [address] with scheduling post-op visit with ENT. ENT will contact the patient after speaking with Dr. Ivett Acharya to see when she would like patient to follow up in the office. Discussed restarting baby asa with BRE TERAN in the office today and she stated that it is okay for patient to take this medication at this time. Patient aware.

## 2023-09-15 ENCOUNTER — TELEPHONE (OUTPATIENT)
Dept: NEUROSURGERY | Facility: CLINIC | Age: 63
End: 2023-09-15

## 2023-09-19 ENCOUNTER — OFFICE VISIT (OUTPATIENT)
Dept: NEUROSURGERY | Facility: CLINIC | Age: 63
End: 2023-09-19

## 2023-09-19 VITALS
TEMPERATURE: 97.6 F | HEIGHT: 68 IN | DIASTOLIC BLOOD PRESSURE: 78 MMHG | BODY MASS INDEX: 30.46 KG/M2 | OXYGEN SATURATION: 97 % | WEIGHT: 201 LBS | SYSTOLIC BLOOD PRESSURE: 114 MMHG | RESPIRATION RATE: 16 BRPM | HEART RATE: 70 BPM

## 2023-09-19 DIAGNOSIS — G96.01 CSF LEAK FROM EAR: Primary | ICD-10-CM

## 2023-09-19 PROCEDURE — 99024 POSTOP FOLLOW-UP VISIT: CPT | Performed by: NEUROLOGICAL SURGERY

## 2023-09-19 RX ORDER — ASPIRIN 81 MG/1
TABLET, CHEWABLE ORAL
COMMUNITY
Start: 2023-08-28 | End: 2023-09-19 | Stop reason: SDUPTHER

## 2023-09-19 RX ORDER — ASPIRIN 81 MG/1
81 TABLET, CHEWABLE ORAL DAILY
COMMUNITY

## 2023-09-19 NOTE — PROGRESS NOTES
Neurosurgery Office Note  Geena Schwarz 58 y.o. male MRN: [id number]      Assessment/Plan        Problem List Items Addressed This Visit       Visit Diagnoses     CSF leak from ear    -  Primary            Discussion:  Patient is a 79-year-old male with h/o HTN, HLD, ACS, CAD s/p PCI in 2012 on ASA 81 mg daily who p/w left hearing loss and persistent left CSF otorrhea now s/p left endoscopic middle fossa craniotomy for repair of tegmen defect and CSF leak with temporalis fascia/muscle flap, with neuromonitoring (CN 7/8) and intraoperative lumbar drain placement on 8/7/23. Today, he reports no new neurologic symptoms or deficits. Denies any significant pain or headache. Preoperative symptoms, including left CSF otorrhea, have resolved since surgery. Left hearing loss unchanged, not worse. His only complaint is lack of sufficient sleep recently, which is being treated with Ambien per PCP. Incision is healing well without erythema, edema, dehiscence, drainage, underlying fluid collection. Prior LD site also C/D/I. At this time, I have no acute neurosurgical or postoperative concerns. Continue routine postoperative follow-up, next for 3-month POV (no imaging unless clinically indicated). Also continue follow-up with Dr. Chung Reynolds (ENT) as scheduled. All questions and concerns were addressed during this visit. CHIEF COMPLAINT    No chief complaint on file. HISTORY    History of Present Illness     58y.o. year old male     HPI    See Discussion    REVIEW OF SYSTEMS    Review of Systems   HENT: Positive for hearing loss (Present left ear). Negative for ear discharge ( ), ear pain and tinnitus. Numbness around surgical site     Eyes: Negative for visual disturbance. Cardiovascular: Negative for chest pain. Gastrointestinal: Negative. Genitourinary: Negative. Musculoskeletal: Negative for gait problem and neck pain.    Neurological: Positive for light-headedness (Occ with acticity) and headaches (Occ slight pain that comes and goes quickly). Negative for dizziness, seizures, speech difficulty, weakness and numbness. Hematological: Bruises/bleeds easily (asa81). Meds/Allergies     Current Outpatient Medications   Medication Sig Dispense Refill   • allopurinol (ZYLOPRIM) 100 mg tablet Take 1 tablet (100 mg total) by mouth daily 30 tablet 5   • aspirin 81 mg chewable tablet Chew 81 mg daily     • atorvastatin (LIPITOR) 20 mg tablet Take 1 tablet (20 mg total) by mouth daily 30 tablet 5   • candesartan-hydrochlorothiazide (ATACAND HCT) 16-12.5 MG per tablet Take 1 tablet by mouth daily 30 tablet 5   • metoprolol tartrate (LOPRESSOR) 25 mg tablet Take 1 tablet (25 mg total) by mouth every 12 (twelve) hours (Patient taking differently: Take 25 mg by mouth daily after breakfast) 30 tablet 5   • lidocaine (LIDODERM) 5 % Apply 1 patch topically over 12 hours daily for 7 days Remove & Discard patch within 12 hours or as directed by MD Do not start before August 12, 2023. 7 patch 0     No current facility-administered medications for this visit.        No Known Allergies    PAST HISTORY    Past Medical History:   Diagnosis Date   • Acute myocardial infarction [facility])     "no heart damage" approx 8 yrs ago did get one stent"   • Arthralgia     last assessed 7/8/13   • Arthritis    • Colon polyp    • Contusion of skin with intact surface     of the left medial thigh,last assessed 6/28/13   • Coronary artery disease    • Gout     last assessed 10/29/13   • History of sepsis     urinary and was in hosp 3 days /7/2020   • Hyperlipidemia    • Hypertension    • Kidney stone    • Lump of skin     last assessed 7/19/13//"fatty tumor and surg removed"   • S/P coronary artery stent placement     x1   • Wears glasses     at night       Past Surgical History:   Procedure Laterality Date   • CARDIAC CATHETERIZATION     • COLONOSCOPY  12/10/2009    Complete//2020   • CORONARY STENT PLACEMENT  08/2012    stenting of the LAD artery 95% lesion to 0% residual stenosis   • FL RETROGRADE PYELOGRAM  9/1/2021   • KNEE SURGERY Left     x4   • LUMBAR EPIDURAL INJECTION      x1   • LA CYSTO BLADDER W/URETERAL CATHETERIZATION Left 7/4/2020    Procedure: CYSTOSCOPY WITH INSERTION STENT URETERAL;  Surgeon: Aria Sosa MD;  [facility];  Service: Urology   • LA CYSTO/URETERO W/LITHOTRIPSY &INDWELL STENT INSRT Left 8/5/2020    Procedure: CYSTO, URETEROSCOPY STENT exchange;  Surgeon: Ronel Garza MD;  [facility];  Service: Urology   • LA CYSTO/URETERO W/LITHOTRIPSY &INDWELL STENT INSRT Right 9/1/2021    Procedure: CYSTOSCOPY URETEROSCOPY WITH BASKET STONE EXTRACTION, RETROGRADE PYELOGRAM AND INSERTION STENT URETERAL;  Surgeon: Tereza Wan MD;  [facility];  Service: Urology   • RETROMASTOID CRANIOTOMY Left 8/7/2023    Procedure: Left middle fossa craniotomy, retromastoid approach, for repair tegeman defect with temporalis muscle fascia graft;  Surgeon: Isabell Jacobs MD;  [facility];  Service: ENT   • RETROMASTOID CRANIOTOMY Left 8/7/2023    Procedure: Endoscopic left middle fossa craniotomy for repair of tegmen defect and CSF leak with temporalis muscle fascia flap, with neuromonitoring (CN 7/8) and intraoperative lumbar drain placement;  Surgeon: Carolyn Marsh MD;  [facility];  Service: Neurosurgery       Social History     Tobacco Use   • Smoking status: Never   • Smokeless tobacco: Never   Vaping Use   • Vaping Use: Never used   Substance Use Topics   • Alcohol use:  Yes     Alcohol/week: 1.0 standard drink of alcohol     Types: 1 Cans of beer per week   • Drug use: Never       Family History   Problem Relation Age of Onset   • Edema Mother         Cerebral   • Aneurysm Father         of the cerebellar artery   • Aneurysm Brother         of the cerebellar artery         The following portions of the patient's history were reviewed in this encounter and updated as appropriate: Past medical, surgical, family, and social history, as well as medications, allergies, and review of systems. EXAM    Vitals:Blood pressure 114/78, pulse 70, temperature 97.6 °F (36.4 °C), temperature source Tympanic, resp. rate 16, height 5' 8" (1.727 m), weight 91.2 kg (201 lb), SpO2 97 %. ,Body mass index is 30.56 kg/m². Physical Exam  Vitals and nursing note reviewed. Constitutional:       Appearance: Normal appearance. He is normal weight. HENT:      Head: Normocephalic and atraumatic. Eyes:      Extraocular Movements: Extraocular movements intact and EOM normal.      Pupils: Pupils are equal, round, and reactive to light. Cardiovascular:      Rate and Rhythm: Normal rate and regular rhythm. Pulses: Normal pulses. Heart sounds: Normal heart sounds. Pulmonary:      Effort: Pulmonary effort is normal.      Breath sounds: Normal breath sounds. Abdominal:      General: Abdomen is flat. Palpations: Abdomen is soft. Musculoskeletal:         General: Normal range of motion. Cervical back: Normal range of motion. Neurological:      General: No focal deficit present. Mental Status: He is alert and oriented to person, place, and time. Mental status is at baseline. GCS: GCS eye subscore is 4. GCS verbal subscore is 5. GCS motor subscore is 6. Sensory: Sensation is intact. Motor: Motor strength is normal.Motor function is intact. Coordination: Coordination is intact. Gait: Gait is intact. Deep Tendon Reflexes: Reflexes are normal and symmetric. Psychiatric:         Mood and Affect: Mood normal.         Speech: Speech normal.         Behavior: Behavior normal.         Neurologic Exam     Mental Status   Oriented to person, place, and time. Attention: normal.   Speech: speech is normal   Level of consciousness: alert    Cranial Nerves     CN II   Visual fields full to confrontation.    Visual acuity: normal  Right visual field deficit: none  Left visual field deficit: none     CN III, IV, VI   Pupils are equal, round, and reactive to light. Extraocular motions are normal.   CN III: no CN III palsy  CN VI: no CN VI palsy  Nystagmus: none   Diplopia: none  Ophthalmoparesis: none  Upgaze: normal  Downgaze: normal  Conjugate gaze: present    CN V   Facial sensation intact. Right facial sensation deficit: none  Left facial sensation deficit: none    CN VII   Facial expression full, symmetric. Right facial weakness: none  Left facial weakness: none    CN IX, X   CN IX normal.   CN X normal.   Palate: symmetric    CN XI   CN XI normal.   Right sternocleidomastoid strength: normal  Left sternocleidomastoid strength: normal  Right trapezius strength: normal  Left trapezius strength: normal    CN XII   CN XII normal.   Tongue deviation: none  Left hearing loss unchanged     Motor Exam   Muscle bulk: normal  Overall muscle tone: normal  Right arm pronator drift: absent  Left arm pronator drift: absent    Strength   Strength 5/5 throughout. Sensory Exam   Light touch normal.     Gait, Coordination, and Reflexes     Gait  Gait: normal    Reflexes   Reflexes 2+ except as noted. Left CSF otorrhea resolved after surgery   Incision healing well      MEDICAL DECISION MAKING    Imaging Studies:     No results found. I have personally reviewed pertinent reports. and I have personally reviewed pertinent films in Allegra Urrutia M.D.   Neurosurgeon

## 2023-12-12 ENCOUNTER — RA CDI HCC (OUTPATIENT)
Dept: OTHER | Facility: HOSPITAL | Age: 63
End: 2023-12-12

## 2023-12-12 NOTE — PROGRESS NOTES
[address] [facility] coding opportunities       Chart reviewed, no opportunity found: CHART REVIEWED, NO OPPORTUNITY FOUND        Patients Insurance        Commercial Insurance: Bill Davila

## 2023-12-13 ENCOUNTER — OFFICE VISIT (OUTPATIENT)
Dept: NEUROSURGERY | Facility: CLINIC | Age: 63
End: 2023-12-13
Payer: COMMERCIAL

## 2023-12-13 VITALS
SYSTOLIC BLOOD PRESSURE: 124 MMHG | BODY MASS INDEX: 30.31 KG/M2 | WEIGHT: 200 LBS | OXYGEN SATURATION: 96 % | TEMPERATURE: 98.1 F | RESPIRATION RATE: 16 BRPM | DIASTOLIC BLOOD PRESSURE: 86 MMHG | HEART RATE: 76 BPM | HEIGHT: 68 IN

## 2023-12-13 DIAGNOSIS — G96.01 CSF LEAK FROM EAR: Primary | ICD-10-CM

## 2023-12-13 PROCEDURE — 99213 OFFICE O/P EST LOW 20 MIN: CPT | Performed by: NEUROLOGICAL SURGERY

## 2023-12-13 NOTE — PROGRESS NOTES
Neurosurgery Office Note  Geena Schwarz 58 y.o. male MRN: [id number]      Assessment/Plan        Problem List Items Addressed This Visit       Visit Diagnoses       CSF leak from ear    -  Primary              Discussion:    Patient was previously evaluated on 9/19/23. He is a 79-year-old male with h/o HTN, HLD, ACS, CAD s/p PCI in 2012 on ASA 81 mg daily who p/w left hearing loss and persistent left CSF otorrhea now s/p left endoscopic middle fossa craniotomy for repair of tegmen defect and CSF leak with temporalis fascia/muscle flap, with neuromonitoring (CN 7/8) and intraoperative lumbar drain placement on 8/7/23. Recently also evaluated by Dr. Chung Reynolds with repeat audiogram on 11/16/23. Today, he reports no new neurologic symptoms or deficits. Denies any significant pain or headache. Left CSF otorrhea has resolved since surgery. Left hearing loss improving. Reports feeling a "little tickle inside the ear", which is occurring less frequently than before surgery. Incision is healing well without erythema, edema, dehiscence, drainage, underlying fluid collection. Prior LD site also C/D/I. At this time, I have no acute neurosurgical or postoperative concerns. Follow up as needed. All questions and concerns were addressed during this visit. CHIEF COMPLAINT    Chief Complaint   Patient presents with    Follow-up       HISTORY    History of Present Illness     58y.o. year old male     HPI    See Discussion    REVIEW OF SYSTEMS    Review of Systems   HENT:  Positive for hearing loss (Present left ear). Negative for ear discharge ( ), ear pain and tinnitus. Hearing has improved in L ear but not resolved. Reports he "feels something in ear"   Eyes:  Negative for visual disturbance. Cardiovascular:  Negative for chest pain. Gastrointestinal: Negative. Genitourinary: Negative. Musculoskeletal:  Negative for gait problem and neck pain.    Neurological:  Negative for dizziness, seizures, speech difficulty, weakness, light-headedness, numbness and headaches. Hematological:  Bruises/bleeds easily (asa81). Psychiatric/Behavioral:  Positive for sleep disturbance (Ongoing). Meds/Allergies     Current Outpatient Medications   Medication Sig Dispense Refill    allopurinol (ZYLOPRIM) 100 mg tablet Take 1 tablet (100 mg total) by mouth daily 30 tablet 5    aspirin 81 mg chewable tablet Chew 81 mg daily      atorvastatin (LIPITOR) 20 mg tablet Take 1 tablet (20 mg total) by mouth daily 30 tablet 5    candesartan-hydrochlorothiazide (ATACAND HCT) 16-12.5 MG per tablet Take 1 tablet by mouth daily 30 tablet 5    metoprolol tartrate (LOPRESSOR) 25 mg tablet Take 1 tablet (25 mg total) by mouth every 12 (twelve) hours (Patient taking differently: Take 25 mg by mouth daily after breakfast) 30 tablet 5    lidocaine (LIDODERM) 5 % Apply 1 patch topically over 12 hours daily for 7 days Remove & Discard patch within 12 hours or as directed by MD Do not start before August 12, 2023. 7 patch 0     No current facility-administered medications for this visit.        No Known Allergies    PAST HISTORY    Past Medical History:   Diagnosis Date    Acute myocardial infarction ([address])     "no heart damage" approx 8 yrs ago did get one stent"    Arthralgia     last assessed 7/8/13    Arthritis     Colon polyp     Contusion of skin with intact surface     of the left medial thigh,last assessed 6/28/13    Coronary artery disease     Gout     last assessed 10/29/13    History of sepsis     urinary and was in hosp 3 days /7/2020    Hyperlipidemia     Hypertension     Kidney stone     Lump of skin     last assessed 7/19/13//"fatty tumor and surg removed"    S/P coronary artery stent placement     x1    Wears glasses     at night       Past Surgical History:   Procedure Laterality Date    CARDIAC CATHETERIZATION      COLONOSCOPY  12/10/2009    Complete//2020    CORONARY STENT PLACEMENT  08/2012 stenting of the LAD artery 95% lesion to 0% residual stenosis    FL RETROGRADE PYELOGRAM  9/1/2021    KNEE SURGERY Left     x4    LUMBAR EPIDURAL INJECTION      x1    [place] CYSTO BLADDER W/URETERAL CATHETERIZATION Left 7/4/2020    Procedure: CYSTOSCOPY WITH INSERTION STENT URETERAL;  Surgeon: Paolo Beckett MD;  [facility];  Service: Urology    [place] CYSTO/URETERO W/LITHOTRIPSY &INDWELL STENT INSRT Left 8/5/2020    Procedure: CYSTO, URETEROSCOPY STENT exchange;  Surgeon: Sin Gabriel MD;  [facility];  Service: Urology    [place] CYSTO/URETERO W/LITHOTRIPSY &INDWELL STENT INSRT Right 9/1/2021    Procedure: CYSTOSCOPY URETEROSCOPY WITH BASKET STONE EXTRACTION, RETROGRADE PYELOGRAM AND INSERTION STENT URETERAL;  Surgeon: Melita Nugent MD;  [facility];  Service: Urology    RETROMASTOID CRANIOTOMY Left 8/7/2023    Procedure: Left middle fossa craniotomy, retromastoid approach, for repair tegeman defect with temporalis muscle fascia graft;  Surgeon: Magalie Sales MD;  [facility];  Service: ENT    RETROMASTOID CRANIOTOMY Left 8/7/2023    Procedure: Endoscopic left middle fossa craniotomy for repair of tegmen defect and CSF leak with temporalis muscle fascia flap, with neuromonitoring (CN 7/8) and intraoperative lumbar drain placement;  Surgeon: Misa Mejias MD;  [facility];  Service: Neurosurgery       Social History     Tobacco Use    Smoking status: Never    Smokeless tobacco: Never   Vaping Use    Vaping status: Never Used   Substance Use Topics    Alcohol use:  Yes     Alcohol/week: 1.0 standard drink of alcohol     Types: 1 Cans of beer per week    Drug use: Never       Family History   Problem Relation Age of Onset    Edema Mother         Cerebral    Aneurysm Father         of the cerebellar artery    Aneurysm Brother         of the cerebellar artery         The following portions of the patient's history were reviewed in this encounter and updated as appropriate: Past medical, surgical, family, and social history, as well as medications, allergies, and review of systems. EXAM    Vitals:Blood pressure 124/86, pulse 76, temperature 98.1 °F (36.7 °C), temperature source Tympanic, resp. rate 16, height 5' 8" (1.727 m), weight 90.7 kg (200 lb), SpO2 96%. ,Body mass index is 30.41 kg/m². Physical Exam  Vitals and nursing note reviewed. Constitutional:       Appearance: Normal appearance. He is normal weight. HENT:      Head: Normocephalic and atraumatic. Eyes:      Extraocular Movements: Extraocular movements intact and EOM normal.      Pupils: Pupils are equal, round, and reactive to light. Cardiovascular:      Rate and Rhythm: Normal rate and regular rhythm. Pulses: Normal pulses. Heart sounds: Normal heart sounds. Pulmonary:      Effort: Pulmonary effort is normal.      Breath sounds: Normal breath sounds. Abdominal:      General: Abdomen is flat. Palpations: Abdomen is soft. Musculoskeletal:         General: Normal range of motion. Cervical back: Normal range of motion. Neurological:      General: No focal deficit present. Mental Status: He is alert and oriented to person, place, and time. Mental status is at baseline. GCS: GCS eye subscore is 4. GCS verbal subscore is 5. GCS motor subscore is 6. Sensory: Sensation is intact. Motor: Motor strength is normal.Motor function is intact. Coordination: Coordination is intact. Gait: Gait is intact. Deep Tendon Reflexes: Reflexes are normal and symmetric. Psychiatric:         Mood and Affect: Mood normal.         Speech: Speech normal.         Behavior: Behavior normal.         Neurologic Exam     Mental Status   Oriented to person, place, and time. Attention: normal.   Speech: speech is normal   Level of consciousness: alert    Cranial Nerves     CN II   Visual fields full to confrontation.    Visual acuity: normal  Right visual field deficit: none  Left visual field deficit: none     CN III, IV, VI   Pupils are equal, round, and reactive to light. Extraocular motions are normal.   CN III: no CN III palsy  CN VI: no CN VI palsy  Nystagmus: none   Diplopia: none  Ophthalmoparesis: none  Upgaze: normal  Downgaze: normal  Conjugate gaze: present    CN V   Facial sensation intact. Right facial sensation deficit: none  Left facial sensation deficit: none    CN VII   Facial expression full, symmetric. Right facial weakness: none  Left facial weakness: none    CN IX, X   CN IX normal.   CN X normal.   Palate: symmetric    CN XI   CN XI normal.   Right sternocleidomastoid strength: normal  Left sternocleidomastoid strength: normal  Right trapezius strength: normal  Left trapezius strength: normal    CN XII   CN XII normal.   Tongue deviation: none  L hearing loss unchanged     Motor Exam   Muscle bulk: normal  Overall muscle tone: normal  Right arm pronator drift: absent  Left arm pronator drift: absent    Strength   Strength 5/5 throughout. Sensory Exam   Light touch normal.     Gait, Coordination, and Reflexes     Gait  Gait: normal    Reflexes   Reflexes 2+ except as noted. Incision healing well       MEDICAL DECISION MAKING    Imaging Studies:     No results found. I have personally reviewed pertinent reports. and I have personally reviewed pertinent films in Daly Chau M.D.   Neurosurgeon

## 2023-12-19 ENCOUNTER — OFFICE VISIT (OUTPATIENT)
Dept: FAMILY MEDICINE CLINIC | Facility: CLINIC | Age: 63
End: 2023-12-19
Payer: COMMERCIAL

## 2023-12-19 VITALS
SYSTOLIC BLOOD PRESSURE: 132 MMHG | HEART RATE: 83 BPM | TEMPERATURE: 96.3 F | OXYGEN SATURATION: 97 % | BODY MASS INDEX: 31.54 KG/M2 | HEIGHT: 68 IN | DIASTOLIC BLOOD PRESSURE: 80 MMHG | RESPIRATION RATE: 16 BRPM | WEIGHT: 208.1 LBS

## 2023-12-19 DIAGNOSIS — G47.00 INSOMNIA, UNSPECIFIED TYPE: ICD-10-CM

## 2023-12-19 DIAGNOSIS — Z12.5 SCREENING FOR PROSTATE CANCER: ICD-10-CM

## 2023-12-19 DIAGNOSIS — R73.01 ELEVATED FASTING BLOOD SUGAR: ICD-10-CM

## 2023-12-19 DIAGNOSIS — M10.40 OTHER SECONDARY GOUT, UNSPECIFIED CHRONICITY, UNSPECIFIED SITE: ICD-10-CM

## 2023-12-19 DIAGNOSIS — Z95.5 S/P CORONARY ARTERY STENT PLACEMENT: ICD-10-CM

## 2023-12-19 DIAGNOSIS — I10 ESSENTIAL HYPERTENSION: Primary | ICD-10-CM

## 2023-12-19 DIAGNOSIS — E78.2 MIXED HYPERLIPIDEMIA: ICD-10-CM

## 2023-12-19 PROCEDURE — 99214 OFFICE O/P EST MOD 30 MIN: CPT | Performed by: FAMILY MEDICINE

## 2023-12-19 RX ORDER — ZOLPIDEM TARTRATE 10 MG/1
10 TABLET ORAL
Qty: 30 TABLET | Refills: 0 | Status: SHIPPED | OUTPATIENT
Start: 2023-12-19

## 2023-12-19 NOTE — PATIENT INSTRUCTIONS
Here for recheck and will rec taking all meds as directed and will need updated labs in 6 months with office visit. Gout stable and BP stable. Low sugar and low cholesterol diet and rec calling if any problems.

## 2023-12-19 NOTE — PROGRESS NOTES
Chief Complaint   Patient presents with    Follow-up     Follow up of medical conditions      Patient Instructions   Here for recheck and will rec taking all meds as directed and will need updated labs in 6 months with office visit. Gout stable and BP stable. Low sugar and low cholesterol diet and rec calling if any problems.   Assessment/Plan:    No problem-specific Assessment & Plan notes found for this encounter.       Diagnoses and all orders for this visit:    Essential hypertension  -     Comprehensive metabolic panel; Future    Mixed hyperlipidemia  -     Comprehensive metabolic panel; Future  -     Lipid Panel with Direct LDL reflex; Future    Other secondary gout, unspecified chronicity, unspecified site  -     Uric acid    S/P coronary artery stent placement  -     Comprehensive metabolic panel; Future  -     CBC and differential; Future    Elevated fasting blood sugar  -     Comprehensive metabolic panel; Future  -     Hemoglobin A1C    Screening for prostate cancer  -     PSA, Total Screen; Future    Insomnia, unspecified type  -     zolpidem (AMBIEN) 10 mg tablet; Take 1 tablet (10 mg total) by mouth daily at bedtime as needed for sleep          Subjective:      Patient ID: Pancho Schwarz is a 63 y.o. male.    Here for recheck and doing well and takes all meds as directed for HTN and gout stable and takes cholesterol med.         The following portions of the patient's history were reviewed and updated as appropriate: allergies, current medications, past family history, past medical history, past social history, past surgical history, and problem list.    Review of Systems   Constitutional: Negative.    HENT: Negative.     Eyes: Negative.    Respiratory: Negative.     Cardiovascular: Negative.    Gastrointestinal: Negative.    Endocrine: Negative.    Genitourinary: Negative.    Musculoskeletal: Negative.    Skin: Negative.    Allergic/Immunologic: Negative.    Neurological: Negative.    Hematological:  "Negative.    Psychiatric/Behavioral: Negative.           Objective:      /80 (BP Location: Left arm, Patient Position: Sitting, Cuff Size: Adult)   Pulse 83   Temp (!) 96.3 °F (35.7 °C) (Temporal)   Resp 16   Ht 5' 8\" (1.727 m)   Wt 94.4 kg (208 lb 1.6 oz)   SpO2 97%   BMI 31.64 kg/m²          Physical Exam  Constitutional:       Appearance: He is well-developed. He is obese.   HENT:      Head: Normocephalic and atraumatic.      Right Ear: External ear normal.      Left Ear: External ear normal.      Nose: Nose normal.      Mouth/Throat:      Mouth: Mucous membranes are moist.   Eyes:      Conjunctiva/sclera: Conjunctivae normal.      Pupils: Pupils are equal, round, and reactive to light.   Cardiovascular:      Rate and Rhythm: Normal rate and regular rhythm.      Pulses: Normal pulses.      Heart sounds: Normal heart sounds.   Pulmonary:      Effort: Pulmonary effort is normal.      Breath sounds: Normal breath sounds.   Musculoskeletal:         General: Normal range of motion.      Cervical back: Normal range of motion and neck supple.   Skin:     General: Skin is warm and dry.      Capillary Refill: Capillary refill takes less than 2 seconds.   Neurological:      General: No focal deficit present.      Mental Status: He is alert and oriented to person, place, and time. Mental status is at baseline.      Deep Tendon Reflexes: Reflexes are normal and symmetric.   Psychiatric:         Mood and Affect: Mood normal.         Behavior: Behavior normal.         Thought Content: Thought content normal.         Judgment: Judgment normal.            "

## 2024-02-04 DIAGNOSIS — E78.2 MIXED HYPERLIPIDEMIA: ICD-10-CM

## 2024-02-04 RX ORDER — ATORVASTATIN CALCIUM 20 MG/1
20 TABLET, FILM COATED ORAL DAILY
Qty: 30 TABLET | Refills: 0 | Status: SHIPPED | OUTPATIENT
Start: 2024-02-04

## 2024-02-21 PROBLEM — N12 PYELONEPHRITIS: Status: RESOLVED | Noted: 2020-07-04 | Resolved: 2024-02-21

## 2024-02-25 DIAGNOSIS — I10 ESSENTIAL HYPERTENSION: ICD-10-CM

## 2024-03-23 DIAGNOSIS — I10 ESSENTIAL HYPERTENSION: ICD-10-CM

## 2024-03-24 RX ORDER — CANDESARTAN CILEXETIL AND HYDROCHLOROTHIAZIDE 16; 12.5 MG/1; MG/1
1 TABLET ORAL DAILY
Qty: 30 TABLET | Refills: 0 | Status: SHIPPED | OUTPATIENT
Start: 2024-03-24

## 2024-03-30 DIAGNOSIS — M10.9 GOUT, UNSPECIFIED CAUSE, UNSPECIFIED CHRONICITY, UNSPECIFIED SITE: ICD-10-CM

## 2024-03-30 RX ORDER — ALLOPURINOL 100 MG/1
100 TABLET ORAL DAILY
Qty: 30 TABLET | Refills: 5 | Status: SHIPPED | OUTPATIENT
Start: 2024-03-30

## 2024-04-11 ENCOUNTER — OFFICE VISIT (OUTPATIENT)
Dept: FAMILY MEDICINE CLINIC | Facility: CLINIC | Age: 64
End: 2024-04-11
Payer: COMMERCIAL

## 2024-04-11 ENCOUNTER — HOSPITAL ENCOUNTER (OUTPATIENT)
Dept: RADIOLOGY | Facility: HOSPITAL | Age: 64
Discharge: HOME | End: 2024-04-11
Payer: COMMERCIAL

## 2024-04-11 VITALS
DIASTOLIC BLOOD PRESSURE: 82 MMHG | OXYGEN SATURATION: 95 % | BODY MASS INDEX: 31.67 KG/M2 | WEIGHT: 209 LBS | SYSTOLIC BLOOD PRESSURE: 140 MMHG | HEART RATE: 110 BPM | TEMPERATURE: 98 F | HEIGHT: 68 IN

## 2024-04-11 DIAGNOSIS — G56.03 BILATERAL CARPAL TUNNEL SYNDROME: ICD-10-CM

## 2024-04-11 DIAGNOSIS — M25.511 RIGHT SHOULDER PAIN, UNSPECIFIED CHRONICITY: Primary | ICD-10-CM

## 2024-04-11 DIAGNOSIS — M65.331 TRIGGER MIDDLE FINGER OF RIGHT HAND: ICD-10-CM

## 2024-04-11 DIAGNOSIS — E66.09 CLASS 1 OBESITY DUE TO EXCESS CALORIES WITH BODY MASS INDEX (BMI) OF 31.0 TO 31.9 IN ADULT, UNSPECIFIED WHETHER SERIOUS COMORBIDITY PRESENT: ICD-10-CM

## 2024-04-11 DIAGNOSIS — I10 ESSENTIAL HYPERTENSION: ICD-10-CM

## 2024-04-11 DIAGNOSIS — M25.511 RIGHT SHOULDER PAIN, UNSPECIFIED CHRONICITY: ICD-10-CM

## 2024-04-11 PROCEDURE — 99214 OFFICE O/P EST MOD 30 MIN: CPT | Performed by: FAMILY MEDICINE

## 2024-04-11 PROCEDURE — 73030 X-RAY EXAM OF SHOULDER: CPT

## 2024-04-11 RX ORDER — MELOXICAM 7.5 MG/1
7.5 TABLET ORAL DAILY PRN
Qty: 30 TABLET | Refills: 1 | Status: SHIPPED | OUTPATIENT
Start: 2024-04-11

## 2024-04-11 NOTE — PATIENT INSTRUCTIONS
Here for right shoulder pain and and right hand middle finger trigger finger and also b/l carpal tunnel. Check xray right shoulder and rec orthopedics as directed. Rest right shoulder and call if any problems.

## 2024-04-11 NOTE — PROGRESS NOTES
Chief Complaint   Patient presents with    Shoulder Pain     Right shoulder pain x 2 wks. Taking tylenol      Patient Instructions   Here for right shoulder pain and and right hand middle finger trigger finger and also b/l carpal tunnel. Check xray right shoulder and rec orthopedics as directed. Rest right shoulder and call if any problems.   Assessment/Plan:    No problem-specific Assessment & Plan notes found for this encounter.       Diagnoses and all orders for this visit:    Right shoulder pain, unspecified chronicity  -     XR shoulder 2+ vw right; Future  -     meloxicam (MOBIC) 7.5 mg tablet; Take 1 tablet (7.5 mg total) by mouth daily as needed for moderate pain  -     Ambulatory Referral to Orthopedic Surgery; Future    Trigger middle finger of right hand  -     meloxicam (MOBIC) 7.5 mg tablet; Take 1 tablet (7.5 mg total) by mouth daily as needed for moderate pain  -     Ambulatory Referral to Orthopedic Surgery; Future    Bilateral carpal tunnel syndrome  -     meloxicam (MOBIC) 7.5 mg tablet; Take 1 tablet (7.5 mg total) by mouth daily as needed for moderate pain  -     Ambulatory Referral to Orthopedic Surgery; Future    Class 1 obesity due to excess calories with body mass index (BMI) of 31.0 to 31.9 in adult, unspecified whether serious comorbidity present          Subjective:      Patient ID: Pancho Schwarz is a 63 y.o. male.    Shoulder Pain (Right shoulder pain x 2 wks. Taking tylenol ) can lift arm above head but golfing is difficult. Trigger finger middle finger right hand.     Shoulder Pain         The following portions of the patient's history were reviewed and updated as appropriate: allergies, current medications, past family history, past medical history, past social history, past surgical history, and problem list.    Review of Systems   Constitutional: Negative.    HENT: Negative.     Eyes: Negative.    Respiratory: Negative.     Cardiovascular: Negative.    Gastrointestinal: Negative.  "   Endocrine: Negative.    Genitourinary: Negative.    Musculoskeletal:         B/l carpal tunnel, right shoulder pain and right middle finger trigger finger   Skin: Negative.    Allergic/Immunologic: Negative.    Neurological: Negative.    Hematological: Negative.    Psychiatric/Behavioral: Negative.           Objective:      /82   Pulse (!) 110   Temp 98 °F (36.7 °C)   Ht 5' 8\" (1.727 m)   Wt 94.8 kg (209 lb)   SpO2 95%   BMI 31.78 kg/m²          Physical Exam  Constitutional:       Appearance: He is well-developed.   HENT:      Head: Normocephalic and atraumatic.      Right Ear: External ear normal.      Left Ear: External ear normal.      Nose: Nose normal.   Eyes:      Conjunctiva/sclera: Conjunctivae normal.      Pupils: Pupils are equal, round, and reactive to light.   Cardiovascular:      Rate and Rhythm: Normal rate and regular rhythm.      Heart sounds: Normal heart sounds.   Pulmonary:      Effort: Pulmonary effort is normal.      Breath sounds: Normal breath sounds.   Musculoskeletal:      Cervical back: Normal range of motion and neck supple.      Comments: Right shoulder pain and right middle finger trigger finger and b/l carpal tunnel   Skin:     General: Skin is warm and dry.      Capillary Refill: Capillary refill takes less than 2 seconds.   Neurological:      General: No focal deficit present.      Mental Status: He is alert and oriented to person, place, and time. Mental status is at baseline.      Deep Tendon Reflexes: Reflexes are normal and symmetric.   Psychiatric:         Mood and Affect: Mood normal.         Behavior: Behavior normal.         Thought Content: Thought content normal.         Judgment: Judgment normal.            "

## 2024-04-18 ENCOUNTER — OFFICE VISIT (OUTPATIENT)
Dept: OBGYN CLINIC | Facility: CLINIC | Age: 64
End: 2024-04-18
Payer: COMMERCIAL

## 2024-04-18 VITALS
SYSTOLIC BLOOD PRESSURE: 140 MMHG | WEIGHT: 209 LBS | HEIGHT: 68 IN | DIASTOLIC BLOOD PRESSURE: 80 MMHG | BODY MASS INDEX: 31.67 KG/M2

## 2024-04-18 DIAGNOSIS — M25.511 RIGHT SHOULDER PAIN, UNSPECIFIED CHRONICITY: ICD-10-CM

## 2024-04-18 DIAGNOSIS — M67.911 ROTATOR CUFF DYSFUNCTION, RIGHT: Primary | ICD-10-CM

## 2024-04-18 PROCEDURE — 99243 OFF/OP CNSLTJ NEW/EST LOW 30: CPT | Performed by: FAMILY MEDICINE

## 2024-04-18 NOTE — PATIENT INSTRUCTIONS
Shoulder Exercises   WHAT YOU NEED TO KNOW:   What do I need to know about rotator cuff injury exercises?  Exercises help improve shoulder movement and strength, and decrease pain. Your physical therapist or healthcare provider will tell you when to start doing the exercises. He or she will tell you how often to do them. You will need to start slowly to prevent more injury. You will move through several levels over time as you get stronger and more flexible.       What are some safety guidelines to follow?   Always warm up before you do the exercises.  Walk or ride a stationary bike for 5 to 10 minutes to help you warm up.    Do not put your arm over your head until directed.  You will need to wait until your injury has healed. The movement of some exercises could continue until your arm is over your head. You will need to stop the movement where directed.    Stop if you feel pain.  You may feel some tight or stiff areas when you start. This should get better as you continue the exercises. You should not feel pain. Pain means you are not ready to do the exercise yet. Stop and call your physical therapist or healthcare provider right away.    Always work both rotator cuffs.  Even if your injury is only on 1 side, it is important to do each exercise on both sides. This helps prevent injury and maintain balance in your shoulders and back.    Use correct posture.  Your physical therapist or healthcare provider will show you the proper posture for each exercise. You will be shown how to pull your shoulders back and down to engage the correct muscles. Remember not to let your shoulders shrug during an exercise unless it is part of the movement.    How should I do stretching exercises?  You will not feel every exercise in your shoulder area. You may feel some of the stretches in your back, side, or upper arm muscles. You need to work muscles in and around your rotator cuffs and down your arms. This helps stabilize your  shoulders. Your physical therapist or healthcare provider will tell you how long to hold each stretch. He or she will also tell you how many times to repeat each stretch during an exercise session. You may be told to do only certain exercises, or to do them in a specific order. The following are general directions to help you remember the exercises you are taught:  Pendulum swings:  Lean forward and rest your arm on a table. Do not round your back or lock your knees during the exercise. Let your other arm hang freely by your side. Gently swing your free arm forward and backward, side to side, and in circles.         Crossover arm stretch:  Relax your shoulders. Hold your upper arm with the opposite hand. Pull your arm across your chest until you feel a stretch. Hold the stretch. Return to the starting position.         Sleeper stretch:  Lie on your side on a firm, flat surface. Bend the elbow of your top arm 90°. Use your other hand to slowly push your arm down. Stop when you feel a stretch at the back of your shoulder. Hold the stretch. Slowly return to the starting position.         Shoulder movement, up and down:  This exercise may also be called shoulder extension. Sit in a chair that has a back but no armrests. Raise your arm like you are reaching for the wall in front of you. Continue to raise the arm until it is over your head, or as high as directed. Bring your arm back down to your side. Bring it back as far as possible behind your body. Return your arm to the starting position.         Shoulder movement, side to side:  These movements may be called flexion, internal rotation, and external rotation. Sit in a chair that has a back but no armrests. Raise your arm to the side and then up over your head as far as directed. Return your arm to your side. Bring your arm across the front of your body and reach for the opposite shoulder. Return your arm to the starting position.         Shoulder rolls:  Stand and  raise both shoulders toward your ears. Lower them to the starting position. Relax your shoulders. Pull your shoulders back. Then relax them again. Roll your shoulders in a smooth [place]. Then roll your shoulders in a smooth [place] in the other direction.         Wall reach exercise:  This may also be called a flexion stretch. Stand facing a wall. Slowly walk your fingers up the wall until you feel a stretch. Hold the stretch. Return to the starting position.         Arm reach exercise:  Lie on your back with your legs straight. Reach your arms like you are trying to touch the ceiling. Reach as high as you can so you feel a stretch in the back of your arms. Hold the stretch. Then lower your arms to your sides.         Elbow bends:  Stand with your arms down to your sides. Keep your palm facing forward. Bend your elbow and try to touch your shoulder with your fingertips. Return your arm to the starting position.         Up the back stretch:  Stand and put both arms behind your back. Put one hand under the other. Move the bottom hand and slowly push the upper hand up toward your head. You should feel a stretch in the front of your arm and shoulder. Be careful not to push too hard. Hold the stretch. Then return to the starting position.         Triceps stretch:  Stand and drop your forearm down your back so your hand is pointing to the ground behind you. Your elbow should be pointing at the ceiling. Take your other hand and place it on your elbow. Gently and slowly push on the elbow until you feel a stretch in the back of your arm. Hold the stretch. Let go of your elbow and relax your arm. You may be shown how to do this stretch with a towel. The towel can be pulled gently with a hand behind your back at waist level.       How should I do strengthening exercises?  Strengthening exercises may include handheld weights or resistance bands. Your physical therapist or healthcare provider will tell you how much weight or  resistance to use. The general guide is to use light weights or low resistance and to do a high number of repetitions. You may be told to do only certain exercises, or to do them in a specific order. The following are general directions to help you remember the exercises you are taught:  Scapular squeeze:  Stand with your arms at your sides. Squeeze your shoulder blades together and hold this position. Then relax the muscles. Keep your shoulder back during the entire exercise.         Wall pushups:  This exercise is similar to a pushup done on the ground. The goal is to use your back and shoulder muscles to bring your upper body toward and away from the wall. Stand facing a wall. Put your hands on the wall. Bend your elbows to bring your upper body toward the wall. Straighten your arms to return to the starting position. Keep your feet close enough to the wall that you do not take a step when you bend your elbows.         Standing row with exercise band:  Wrap the exercise band around a heavy, stable object at waist level. Make loop in the end of the band to create a handle, if needed. Hold the handle or loop and pull the band straight back toward your hip. Keep your shoulder down. Squeeze your shoulder blade. Hold this position. Then slowly return to the starting position.         External rotation with arm abducted 90 degrees:  Wrap the exercise band around a heavy, stable object at waist level. Make loop in the end of the band to create a handle, if needed. Stand and hold the handle or loop. Bend your elbow and raise your arm to shoulder height. Keep your arm in this position. Raise your hand like you are pointing at the ceiling. Slowly return to the starting position. You may also be shown how to do this exercise lying down and with a weight.         Shoulder abduction with weight:  Stand and hold a weight in your hand with your palm facing your body. Slowly raise your arm to the side with your thumb pointing  up. Then raise your arm as far as you can without pain. Hold this position. Then return to the starting position.         Shoulder abduction with exercise band:  Wrap the exercise band around a heavy, stable object near your foot. Grab the band. Keep your arm straight. Slowly raise your arm to the side with your thumb pointing up. Then, slowly pull the band as far as you can without pain. Slowly return to the starting position.         Shoulder adduction with weight:  Lie on your back on a firm surface. Hold a weight in your hand at your shoulder. Slowly raise your arm toward the ceiling and straighten your elbow. Hold this position. Then slowly return to the starting position.         Shoulder adduction with exercise band:  Wrap the exercise band around a heavy, stable object. Stand and face away from where the band is anchored. Hold each end of the band in both hands with your elbows bent. Your elbows should not be behind your body. Keep your arms parallel to the floor and slowly straighten your elbows. Hold this position. Slowly return to the starting position.       When should I call my doctor or physical therapist?   You have sharp or worsening pain during exercise or at rest.    You have questions or concerns about your rotator cuff injury exercises.    CARE AGREEMENT:   You have the right to help plan your care. Learn about your health condition and how it may be treated. Discuss treatment options with your healthcare providers to decide what care you want to receive. You always have the right to refuse treatment. The above information is an  only. It is not intended as medical advice for individual conditions or treatments. Talk to your doctor, nurse or pharmacist before following any medical regimen to see if it is safe and effective for you.  © Copyright [employer] 2023 Information is for End User's use only and may not be sold, redistributed or otherwise used for commercial purposes.

## 2024-04-18 NOTE — LETTER
April 18, 2024     Pernell Nayak DO  [id number]  [address]  [facility] [id number]    Patient: Pancho Schwarz   YOB: 1960   Date of Visit: 4/18/2024       Dear Dr. Nayak:    Thank you for referring Pancho Schwarz to me for evaluation. Below are the relevant portions of my assessment and plan of care.         If you have questions, please do not hesitate to call me. I look forward to following Pancho along with you.         Sincerely,        Ailyn Martin DO        CC: No Recipients

## 2024-04-18 NOTE — PROGRESS NOTES
Assessment:     1. Rotator cuff dysfunction, right  Ambulatory Referral to Physical Therapy      2. Right shoulder pain, unspecified chronicity  Ambulatory Referral to Orthopedic Surgery    Ambulatory Referral to Physical Therapy        Orders Placed This Encounter   Procedures    Ambulatory Referral to Physical Therapy        Impression:   Right shoulder pain likely multifactorial secondary to rotator cuff tendinitis, glenohumeral joint osteoarthritis.      Conservative Management   We discussed different treatment options:  Reviewed PCP documentation completed on 04/11/2024.  Treatment plan consisted of meloxicam.  As well as x-rays and referral to Ortho/sports medicine.  Ice or Heat Therapy as needed 1-2 times daily for 10-20 minutes. As tolerated.   Over the counter Tylenol and/or NSAIDs  as needed based off your Past Medical Hx. Please follow product label for dosing and maximum limits.    Trial of over the counter Topical Analgesics such as Lidocaine cream or Voltaren Gel, as tolerated. If skin becomes irritated, discontinue use.   Formal Handout provided on General Information of shoulder exercises  Please range joint through gentle range of motion as tolerated.   Initiate Formal Physical Therapy at any preferred location.  Prescription provided        Imaging   Reviewed prior xrays obtain in Urgent or Emergency Department. These were reviewed in office with patient today.   04/11/2024 right shoulder x-ray: No acute osseous abnormality, there is mild osteoarthritis to the glenohumeral joint  IMPRESSION:      Preserved glenohumeral joint with mild marginal osteophyte formation of the glenoid.  Subtle vacuum phenomenon within the joint space.  Intact acromioclavicular joint.  No fracture or dislocation    Procedure  No Injection performed today. May consider future corticosteroid injection depending on clinical exam/imaging.  Reviewed both landmark guided subacromial bursa injection versus ultrasound-guided  "glenohumeral joint injection.    Shared decision making, patient agreeable to plan.      Return for Follow up after 6-8 wks of formal therapy.    HPI:   Pancho Schwarz is a right-hand-dominant 63 y.o. male  who presents for evaluation of   Chief Complaint   Patient presents with    Right Shoulder - Swelling, Pain   PCP , Pernell Nayak DO referred patient for right shoulder pain    Occupation: Retired  Injury Related: No     Onset/Mechanism: Right shoulder pain started 3 to 4 weeks ago.  The pain has been worsening..  Location: Lateral deltoid.  Severity: Current severity: 0/10. Max severity: 4-5/10.  Pain described as: Stabbing  Radiation: Denies pain radiating up into neck.  Pain will radiate down the upper arm but not past the elbow..  Provocative: Shoulder flexion, shoulder abduction.  Associated symptoms: Denies any numbness/tingling in the upper arm.    Denies any hx of fracture of affected limb.   Denies any surgical history of affected limb.      Summary of treatment to-date:   NSAID therapy  Denies formal physical therapy  Denies corticosteroid injection  Denies any topical gels      Following History Reviewed and Updated     Past Medical History:   Diagnosis Date    Acute myocardial infarction (HCC)     \"no heart damage\" approx 8 yrs ago did get one stent\"    Arthralgia     last assessed 7/8/13    Arthritis     Colon polyp     Contusion of skin with intact surface     of the left medial thigh,last assessed 6/28/13    Coronary artery disease     Gout     last assessed 10/29/13    History of sepsis     urinary and was in hosp 3 days /7/2020    Hyperlipidemia     Hypertension     Kidney stone     Lump of skin     last assessed 7/19/13//\"fatty tumor and surg removed\"    S/P coronary artery stent placement     x1    Wears glasses     at night     Past Surgical History:   Procedure Laterality Date    CARDIAC CATHETERIZATION      COLONOSCOPY  12/10/2009    Complete//2020    CORONARY STENT PLACEMENT  08/2012 "    stenting of the LAD artery 95% lesion to 0% residual stenosis    FL RETROGRADE PYELOGRAM  9/1/2021    KNEE SURGERY Left     x4    LUMBAR EPIDURAL INJECTION      x1    PA CYSTO BLADDER W/URETERAL CATHETERIZATION Left 7/4/2020    Procedure: CYSTOSCOPY WITH INSERTION STENT URETERAL;  Surgeon: Ferny Gates MD;  [facility];  Service: Urology    PA CYSTO/URETERO W/LITHOTRIPSY &INDWELL STENT INSRT Left 8/5/2020    Procedure: CYSTO, URETEROSCOPY STENT exchange;  Surgeon: Ward Forbes MD;  Location: [facility];  Service: Urology    PA CYSTO/URETERO W/LITHOTRIPSY &INDWELL STENT INSRT Right 9/1/2021    Procedure: CYSTOSCOPY URETEROSCOPY WITH BASKET STONE EXTRACTION, RETROGRADE PYELOGRAM AND INSERTION STENT URETERAL;  Surgeon: Christoph Ordaz MD;  [facility];  Service: Urology    RETROMASTOID CRANIOTOMY Left 8/7/2023    Procedure: Left middle fossa craniotomy, retromastoid approach, for repair tegeman defect with temporalis muscle fascia graft;  Surgeon: Bria Gary MD;  [facility];  Service: ENT    RETROMASTOID CRANIOTOMY Left 8/7/2023    Procedure: Endoscopic left middle fossa craniotomy for repair of tegmen defect and CSF leak with temporalis muscle fascia flap, with neuromonitoring (CN 7/8) and intraoperative lumbar drain placement;  Surgeon: Gómez Rodriguez MD;  [facility];  Service: Neurosurgery     Family History   Problem Relation Age of Onset    Edema Mother         Cerebral    Aneurysm Father         of the cerebellar artery    Aneurysm Brother         of the cerebellar artery       Social History     Substance and Sexual Activity   Alcohol Use Yes    Alcohol/week: 1.0 standard drink of alcohol    Types: 1 Cans of beer per week     Social History     Substance and Sexual Activity   Drug Use Never     Social History     Tobacco Use   Smoking Status Never   Smokeless Tobacco Never       Social Determinants of Health     Tobacco Use: Low Risk  (4/18/2024)    Patient  "History     Smoking Tobacco Use: Never     Smokeless Tobacco Use: Never     Passive Exposure: Not on file   Alcohol Use: Unknown (3/18/2021)    AUDIT-C     Frequency of Alcohol Consumption: Not on file     Average Number of Drinks: Not on file     Frequency of Binge Drinking: Weekly   Financial Resource Strain: Not on file   Food Insecurity: No Food Insecurity (8/8/2023)    Hunger Vital Sign     Worried About Running Out of Food in the Last Year: Never true     Ran Out of Food in the Last Year: Never true   Transportation Needs: No Transportation Needs (8/8/2023)    PRAPARE - Transportation     Lack of Transportation (Medical): No     Lack of Transportation (Non-Medical): No   Physical Activity: Not on file   Stress: Not on file   Social Connections: Not on file   Intimate Partner Violence: Not on file   Depression: Not at risk (4/11/2024)    PHQ-2     PHQ-2 Score: 0   Housing Stability: Unknown (8/8/2023)    Housing Stability Vital Sign     Unable to Pay for Housing in the Last Year: No     Number of Places Lived in the Last Year: Not on file     Unstable Housing in the Last Year: No   Utilities: Not on file   Health Literacy: Not on file        No Known Allergies    Review of Systems      Review of Systems     Review of Systems   Constitutional: Negative for chills and fever.   HENT: Negative for drooling and sneezing.    Eyes: Negative for redness.   Respiratory: Negative for cough and wheezing.    Gastrointestinal: Negative for vomiting.   Psychiatric/Behavioral: Negative for behavioral problems. The patient is not nervous/anxious.      All other systems negative.   Physical Exam   Physical Exam    Vitals and nursing note reviewed.  Constitutional:   Appearance. Normal Appearance.  /80   Ht 5' 8\" (1.727 m)   Wt 94.8 kg (209 lb)   BMI 31.78 kg/m²     Body mass index is 31.78 kg/m².   HENT:  Head: Atraumatic.  Nose: Nose normal  Eyes: Conjunctiva/sclera: Conjunctivae normal.  Cardiovascular:   Rate and " Rhythm: Bilateral equal distal pulses  Pulmonary:   Effort: Pulmonary effort is normal  Skin:   General: Skin is warm and dry.  Neurological:   General: No focal deficit present.  Mental Status: Alert and oriented to person, place, and time.   Psychiatric:   Mood and Affect: mood normal.  Behavior: Behavior normal     Musculoskeletal Exam     Ortho Exam   Right shoulder:     INSPECTION:  Erythema Swelling Ecchymosis Increased warmth   Negative Neg. Neg. Neg.     PALPATION/TENDERNESS:  Acromion Clavicle Scapula/spine of scapula AC joint   Negative Neg. Neg. Neg.     Subacromial bursa Long head of the biceps Coracoid process Trapezius/periscapular region   Neg. Neg. Neg. Neg.     RANGE OF MOTION:  C-Spine Flexion C-Spine Extension C-Spine Sidebending C-Spine Rotation    intact intact intact intact     Internal rotation in 90 degrees Abduction External rotation in 90 degrees Abduction Internal rotation in Adduction External rotation in Adduction   Intact intact Lumbar spine intact      Forward Flexion Abduction   intact intact     STRENGTH:  Flexion Abduction Adduction   Intact Intact Intact       Okay Sign Finger abduction Thumb extension   Intact, bilaterally Intact, bilaterally Intact, bilaterally       ROTATOR CUFF:  Rotator cuff tear  Supraspinatus  Infraspinatus  Subscapularis    (Drop-Arm) (Empty can) (External rotation against resistance) (Belly press)   negative Discomfort without weakness negative negative     IMPINGEMENT:  Neer's Curtis-Sumeet   negative negative     BICEPS TENDINOPATHY:  Resisted forward flexion  Resisted supination   (Speed's) (Yergason's):    Negative  N/a      AC JOINT:  Forced cross body adduction (Scarf cross-arm) Job's AC compression   Negative N/a      LABRUM:  Waters's: Labral Crank Test:    Negative N/a      APPREHENSION  Apprehension Juan Antonio's Relocation Maneuver:    N/A N/A     Special test:  Spurlings    Negative     Distal Sensation  Radial Pulse   Intact Bilaterally  Present  "and Equal Bilaterally               Procedures       Portions of the record may have been created with voice recognition software. Occasional wrong word or \"sound alike\" substitutions may have occurred due to the inherent limitations of voice recognition software. Please review the chart carefully and recognize, using context, where substitutions/typographical errors may have occurred.   "

## 2024-04-24 ENCOUNTER — OFFICE VISIT (OUTPATIENT)
Dept: CARDIOLOGY CLINIC | Facility: CLINIC | Age: 64
End: 2024-04-24
Payer: COMMERCIAL

## 2024-04-24 VITALS
HEART RATE: 78 BPM | WEIGHT: 214.2 LBS | HEIGHT: 68 IN | SYSTOLIC BLOOD PRESSURE: 118 MMHG | BODY MASS INDEX: 32.46 KG/M2 | DIASTOLIC BLOOD PRESSURE: 64 MMHG | OXYGEN SATURATION: 98 %

## 2024-04-24 DIAGNOSIS — E78.5 DYSLIPIDEMIA: ICD-10-CM

## 2024-04-24 DIAGNOSIS — Z95.5 S/P CORONARY ARTERY STENT PLACEMENT: ICD-10-CM

## 2024-04-24 DIAGNOSIS — I25.10 CORONARY ARTERY DISEASE INVOLVING NATIVE CORONARY ARTERY OF NATIVE HEART WITHOUT ANGINA PECTORIS: ICD-10-CM

## 2024-04-24 DIAGNOSIS — I10 ESSENTIAL HYPERTENSION: Primary | ICD-10-CM

## 2024-04-24 PROCEDURE — 99214 OFFICE O/P EST MOD 30 MIN: CPT | Performed by: INTERNAL MEDICINE

## 2024-04-24 NOTE — PROGRESS NOTES
"      Cardiology             Pancho GRANDE Davonlajohny  1960  [id number]              Assessment/Plan:    CAD status post PCI 8/2012 in the setting of ACS  Hypertension  Dyslipidemia        Patient without symptoms of angina, continue low-dose aspirin  Continue atorvastatin 20 mg daily.  Lipid panel in 2023 was well-controlled.  Repeat lipid panel has been ordered by PCP  Blood pressure controlled, continue candesartan/HCTZ, metoprolol          Follow-up in 1 year          Interval History:     This is a 62-year-old nice gentleman with history of CAD status post stenting 8/2012 in the setting of ACS.  He has been following Dr. Cerda in the past.    Nuclear stress test 9/15/2022 was a normal study with no evidence of myocardial ischemia.    Prior echocardiogram 12/2020 revealed ejection fraction 65% with grade 1 diastolic dysfunction.    He presents today for follow-up with no complaints.  He feels well without exertional chest pain, shortness of breath, dizziness, palpitations, lower extreme edema.                  Vitals:  Vitals:    04/24/24 0833   BP: 118/64   BP Location: Left arm   Patient Position: Sitting   Cuff Size: Standard   Pulse: 78   SpO2: 98%   Weight: 97.2 kg (214 lb 3.2 oz)   Height: 5' 8\" (1.727 m)         Past Medical History:   Diagnosis Date   • Acute myocardial infarction (HCC)     \"no heart damage\" approx 8 yrs ago did get one stent\"   • Arthralgia     last assessed 7/8/13   • Arthritis    • Colon polyp    • Contusion of skin with intact surface     of the left medial thigh,last assessed 6/28/13   • Coronary artery disease    • Gout     last assessed 10/29/13   • History of sepsis     urinary and was in hosp 3 days /7/2020   • Hyperlipidemia    • Hypertension    • Kidney stone    • Lump of skin     last assessed 7/19/13//\"fatty tumor and surg removed\"   • S/P coronary artery stent placement     x1   • Wears glasses     at night     Social History     Socioeconomic History   • Marital status: " /Civil Union     Spouse name: Not on file   • Number of children: Not on file   • Years of education: Not on file   • Highest education level: Not on file   Occupational History   • Not on file   Tobacco Use   • Smoking status: Never   • Smokeless tobacco: Never   Vaping Use   • Vaping status: Never Used   Substance and Sexual Activity   • Alcohol use: Yes     Alcohol/week: 1.0 standard drink of alcohol     Types: 1 Cans of beer per week   • Drug use: Never   • Sexual activity: Not on file   Other Topics Concern   • Not on file   Social History Narrative   • Not on file     Social Determinants of Health     Financial Resource Strain: Not on file   Food Insecurity: No Food Insecurity (8/8/2023)    Hunger Vital Sign    • Worried About Running Out of Food in the Last Year: Never true    • Ran Out of Food in the Last Year: Never true   Transportation Needs: No Transportation Needs (8/8/2023)    PRAPARE - Transportation    • Lack of Transportation (Medical): No    • Lack of Transportation (Non-Medical): No   Physical Activity: Not on file   Stress: Not on file   Social Connections: Not on file   Intimate Partner Violence: Not on file   Housing Stability: Unknown (8/8/2023)    Housing Stability Vital Sign    • Unable to Pay for Housing in the Last Year: No    • Number of Places Lived in the Last Year: Not on file    • Unstable Housing in the Last Year: No      Family History   Problem Relation Age of Onset   • Edema Mother         Cerebral   • Aneurysm Father         of the cerebellar artery   • Aneurysm Brother         of the cerebellar artery     Past Surgical History:   Procedure Laterality Date   • CARDIAC CATHETERIZATION     • COLONOSCOPY  12/10/2009    Complete//2020   • CORONARY STENT PLACEMENT  08/2012    stenting of the LAD artery 95% lesion to 0% residual stenosis   • FL RETROGRADE PYELOGRAM  9/1/2021   • KNEE SURGERY Left     x4   • LUMBAR EPIDURAL INJECTION      x1   • [place] CYSTO BLADDER W/URETERAL  CATHETERIZATION Left 7/4/2020    Procedure: CYSTOSCOPY WITH INSERTION STENT URETERAL;  Surgeon: Ferny Gates MD;  [facility];  Service: Urology   • [place] CYSTO/URETERO W/LITHOTRIPSY &INDWELL STENT INSRT Left 8/5/2020    Procedure: CYSTO, URETEROSCOPY STENT exchange;  Surgeon: Ward Forbes MD;  [facility];  Service: Urology   • [place] CYSTO/URETERO W/LITHOTRIPSY &INDWELL STENT INSRT Right 9/1/2021    Procedure: CYSTOSCOPY URETEROSCOPY WITH BASKET STONE EXTRACTION, RETROGRADE PYELOGRAM AND INSERTION STENT URETERAL;  Surgeon: Christoph Ordaz MD;  [facility];  Service: Urology   • RETROMASTOID CRANIOTOMY Left 8/7/2023    Procedure: Left middle fossa craniotomy, retromastoid approach, for repair tegeman defect with temporalis muscle fascia graft;  Surgeon: Bria Gary MD;  [facility];  Service: ENT   • RETROMASTOID CRANIOTOMY Left 8/7/2023    Procedure: Endoscopic left middle fossa craniotomy for repair of tegmen defect and CSF leak with temporalis muscle fascia flap, with neuromonitoring (CN 7/8) and intraoperative lumbar drain placement;  Surgeon: Gómez Rodriguez MD;  [facility];  Service: Neurosurgery       Current Outpatient Medications:   •  allopurinol (ZYLOPRIM) 100 mg tablet, TAKE ONE TABLET BY MOUTH ONCE DAILY, Disp: 30 tablet, Rfl: 5  •  aspirin 81 mg chewable tablet, Chew 81 mg daily, Disp: , Rfl:   •  atorvastatin (LIPITOR) 20 mg tablet, TAKE ONE TABLET BY MOUTH ONCE DAILY, Disp: 30 tablet, Rfl: 0  •  candesartan-hydrochlorothiazide (ATACAND HCT) 16-12.5 MG per tablet, TAKE ONE TABLET BY MOUTH ONCE DAILY, Disp: 30 tablet, Rfl: 0  •  metoprolol tartrate (LOPRESSOR) 25 mg tablet, Take 0.5 tablets (12.5 mg total) by mouth every 12 (twelve) hours, Disp: 180 tablet, Rfl: 1  •  zolpidem (AMBIEN) 10 mg tablet, Take 1 tablet (10 mg total) by mouth daily at bedtime as needed for sleep, Disp: 30 tablet, Rfl: 0        Review of Systems:  Review of Systems    Respiratory: Negative.  Negative for chest tightness.    Cardiovascular: Negative.    All other systems reviewed and are negative.        Physical Exam:  Physical Exam  Constitutional:       General: He is not in acute distress.     Appearance: He is well-developed. He is not diaphoretic.   HENT:      Head: Normocephalic and atraumatic.   Eyes:      General: No scleral icterus.        Right eye: No discharge.      Pupils: Pupils are equal, round, and reactive to light.   Neck:      Thyroid: No thyromegaly.   Cardiovascular:      Rate and Rhythm: Normal rate and regular rhythm.      Heart sounds: Normal heart sounds. No murmur heard.     No friction rub. No gallop.   Pulmonary:      Effort: Pulmonary effort is normal.      Breath sounds: Normal breath sounds.   Abdominal:      General: There is no distension.      Tenderness: There is no abdominal tenderness. There is no guarding or rebound.   Musculoskeletal:         General: Normal range of motion.      Cervical back: Normal range of motion and neck supple.      Right lower leg: No edema.      Left lower leg: No edema.   Skin:     General: Skin is warm and dry.      Coloration: Skin is not pale.      Findings: No erythema or rash.   Neurological:      Mental Status: He is alert and oriented to person, place, and time.      Coordination: Coordination normal.   Psychiatric:         Behavior: Behavior normal.         Thought Content: Thought content normal.         Judgment: Judgment normal.         This note was completed in part utilizing M-Modal Fluency Direct Software.  Grammatical errors, random word insertions, spelling mistakes, and incomplete sentences can be an occasional consequence of this system secondary to software limitations, ambient noise, and hardware issues.  If you have any questions or concerns about the content, text, or information contained within the body of this dictation, please contact the provider for clarification.

## 2024-04-25 ENCOUNTER — EVALUATION (OUTPATIENT)
Dept: PHYSICAL THERAPY | Facility: CLINIC | Age: 64
End: 2024-04-25
Payer: COMMERCIAL

## 2024-04-25 DIAGNOSIS — M67.911 ROTATOR CUFF DYSFUNCTION, RIGHT: ICD-10-CM

## 2024-04-25 DIAGNOSIS — I10 ESSENTIAL HYPERTENSION: ICD-10-CM

## 2024-04-25 DIAGNOSIS — M25.511 RIGHT SHOULDER PAIN, UNSPECIFIED CHRONICITY: ICD-10-CM

## 2024-04-25 PROCEDURE — 97110 THERAPEUTIC EXERCISES: CPT | Performed by: PHYSICAL THERAPIST

## 2024-04-25 PROCEDURE — 97140 MANUAL THERAPY 1/> REGIONS: CPT | Performed by: PHYSICAL THERAPIST

## 2024-04-25 PROCEDURE — 97161 PT EVAL LOW COMPLEX 20 MIN: CPT | Performed by: PHYSICAL THERAPIST

## 2024-04-25 NOTE — PROGRESS NOTES
PT Evaluation     Today's date: 2024  Patient name: Pancho Schwarz  : 1960  MRN: [id number]  Referring provider: Modesto Martin*  Dx:   Encounter Diagnosis     ICD-10-CM    1. Right shoulder pain, unspecified chronicity  M25.511 Ambulatory Referral to Physical Therapy      2. Rotator cuff dysfunction, right  M67.911 Ambulatory Referral to Physical Therapy                     Assessment  Assessment details: Pancho Schwarz is a pleasant 63 y.o. male presents with right shoulder pain.  Pt with hypomobile R scapula and associated shoulder tendonitis with reaching overhead. Educated on stretching and postural restrengthening for home.   No further referral appears necessary at this time.   Skilled PT services appropriate to facilitate return to prior level of function with transition to home exercise program for independent management when appropriate.    Impairments: abnormal muscle firing, abnormal muscle tone, abnormal or restricted ROM, impaired physical strength, lacks appropriate home exercise program and pain with function  Understanding of Dx/Px/POC: good   Prognosis: good    Goals  Patient will be able to manage symptoms independently  LT weeks  1. pt will reach foto predicted  2. pt will decrease worst pain 75%   ST weeks  1. Pt will decrease worst pain 50%  2. Patient will successfully manage HEP        Plan  Patient would benefit from: skilled PT  Referral necessary: No  Planned modality interventions: thermotherapy: hydrocollator packs  Planned therapy interventions: home exercise program, manual therapy, neuromuscular re-education, patient education, functional ROM exercises, strengthening, stretching, joint mobilization, graded activity, graded exercise, therapeutic exercise, body mechanics training, motor coordination training and activity modification  Frequency: 2x week  Duration in weeks: 12  Treatment plan discussed with: patient        Subjective  Evaluation    History of Present Illness  Mechanism of injury: Pt notes soreness in the R shoulder that gradually worsened over the last few weeks. Pain has been lateral shoulder and occasionally as distal as the forearm. Golfing has been good but reaching overhead is fairly painful. He does have some pain when sleeping. He does note some mid range pain and improvement in reaching pain if he supports the arm.   Pain  Current pain ratin  At worst pain ratin        Objective     Passive Range of Motion     Right Shoulder   Flexion: 165 degrees   External rotation 45°: WFL  Internal rotation 45°: WFL    Strength/Myotome Testing     Right Shoulder     Planes of Motion   Flexion: 3   External rotation at 0°: 4+   Internal rotation at 0°: 4+     Tests     Right Shoulder   Positive painful arc.   Negative drop arm, empty can, Hawkin's and Speed's.     General Comments:      Shoulder Comments   Pain in shoulder flexion 70* to end range AROM  R scap protracted and anterior tilt  R pec, lat and subscap restricted             Precautions: none      Manuals             R pec, lat subscap STM CB            R scap mobs CB                                      Neuro Re-Ed             Scap retract tband at wrists reviewed            Tband extension alla gold                                                                Ther Ex             LTR pec stretch reviewed            Scalene towel stretch reviewed                                                                                          Ther Activity                                       Gait Training                                       Modalities

## 2024-04-26 RX ORDER — CANDESARTAN CILEXETIL AND HYDROCHLOROTHIAZIDE 16; 12.5 MG/1; MG/1
1 TABLET ORAL DAILY
Qty: 90 TABLET | Refills: 1 | Status: SHIPPED | OUTPATIENT
Start: 2024-04-26

## 2024-04-30 ENCOUNTER — OFFICE VISIT (OUTPATIENT)
Dept: PHYSICAL THERAPY | Facility: CLINIC | Age: 64
End: 2024-04-30
Payer: COMMERCIAL

## 2024-04-30 DIAGNOSIS — M67.911 ROTATOR CUFF DYSFUNCTION, RIGHT: ICD-10-CM

## 2024-04-30 DIAGNOSIS — M25.511 RIGHT SHOULDER PAIN, UNSPECIFIED CHRONICITY: Primary | ICD-10-CM

## 2024-04-30 PROCEDURE — 97112 NEUROMUSCULAR REEDUCATION: CPT | Performed by: PHYSICAL THERAPIST

## 2024-04-30 PROCEDURE — 97110 THERAPEUTIC EXERCISES: CPT | Performed by: PHYSICAL THERAPIST

## 2024-04-30 PROCEDURE — 97140 MANUAL THERAPY 1/> REGIONS: CPT | Performed by: PHYSICAL THERAPIST

## 2024-04-30 NOTE — PROGRESS NOTES
Daily Note     Today's date: 2024  Patient name: Pancho Schwarz  : 1960  MRN: [id number]  Referring provider: Modesto Martin*  Dx:   Encounter Diagnosis     ICD-10-CM    1. Right shoulder pain, unspecified chronicity  M25.511       2. Rotator cuff dysfunction, right  M67.911                      Subjective: Pt notes increased pain with lifting for 2 days after lv. He feels it may have been some of the STM to the pec region.      Objective: See treatment diary below      Assessment: Tolerated treatment well. Patient would benefit from continued PT. Continued limitation in scap mobility and limited strength in scaption. Educated on further stretching and restrengthening to address.       Plan: Continue per plan of care.      Precautions: none      Manuals            R pec, lat subscap STM CB            R scap mobs CB CB                                     Neuro Re-Ed             Scap retract tband at wrists reviewed 10x10: GTB           Tband extension nv 10x10: GTB           robbery nv 10x10: GTB                                                               Ther Ex             LTR pec stretch reviewed 10x10:           Scalene towel stretch reviewed 10x10:                                                                                         Ther Activity                                       Gait Training                                       Modalities

## 2024-05-01 ENCOUNTER — APPOINTMENT (OUTPATIENT)
Dept: LAB | Age: 64
End: 2024-05-01
Payer: COMMERCIAL

## 2024-05-01 DIAGNOSIS — R73.01 ELEVATED FASTING BLOOD SUGAR: ICD-10-CM

## 2024-05-01 DIAGNOSIS — I10 ESSENTIAL HYPERTENSION: ICD-10-CM

## 2024-05-01 DIAGNOSIS — E78.2 MIXED HYPERLIPIDEMIA: ICD-10-CM

## 2024-05-01 DIAGNOSIS — Z12.5 SCREENING FOR PROSTATE CANCER: ICD-10-CM

## 2024-05-01 DIAGNOSIS — Z95.5 S/P CORONARY ARTERY STENT PLACEMENT: ICD-10-CM

## 2024-05-01 LAB
BASOPHILS # BLD AUTO: 0.07 THOUSANDS/ÂΜL (ref 0–0.1)
BASOPHILS NFR BLD AUTO: 1 % (ref 0–1)
CHOLEST SERPL-MCNC: 130 MG/DL
EOSINOPHIL # BLD AUTO: 0.13 THOUSAND/ÂΜL (ref 0–0.61)
EOSINOPHIL NFR BLD AUTO: 3 % (ref 0–6)
ERYTHROCYTE [DISTWIDTH] IN BLOOD BY AUTOMATED COUNT: 12.5 % (ref 11.6–15.1)
EST. AVERAGE GLUCOSE BLD GHB EST-MCNC: 97 MG/DL
HBA1C MFR BLD: 5 %
HCT VFR BLD AUTO: 45 % (ref 36.5–49.3)
HDLC SERPL-MCNC: 35 MG/DL
HGB BLD-MCNC: 16 G/DL (ref 12–17)
IMM GRANULOCYTES # BLD AUTO: 0.01 THOUSAND/UL (ref 0–0.2)
IMM GRANULOCYTES NFR BLD AUTO: 0 % (ref 0–2)
LDLC SERPL CALC-MCNC: 62 MG/DL (ref 0–100)
LYMPHOCYTES # BLD AUTO: 1.51 THOUSANDS/ÂΜL (ref 0.6–4.47)
LYMPHOCYTES NFR BLD AUTO: 30 % (ref 14–44)
MCH RBC QN AUTO: 31.7 PG (ref 26.8–34.3)
MCHC RBC AUTO-ENTMCNC: 35.6 G/DL (ref 31.4–37.4)
MCV RBC AUTO: 89 FL (ref 82–98)
MONOCYTES # BLD AUTO: 0.37 THOUSAND/ÂΜL (ref 0.17–1.22)
MONOCYTES NFR BLD AUTO: 8 % (ref 4–12)
NEUTROPHILS # BLD AUTO: 2.87 THOUSANDS/ÂΜL (ref 1.85–7.62)
NEUTS SEG NFR BLD AUTO: 58 % (ref 43–75)
NRBC BLD AUTO-RTO: 0 /100 WBCS
PLATELET # BLD AUTO: 179 THOUSANDS/UL (ref 149–390)
PMV BLD AUTO: 10.1 FL (ref 8.9–12.7)
PSA SERPL-MCNC: 0.82 NG/ML (ref 0–4)
RBC # BLD AUTO: 5.05 MILLION/UL (ref 3.88–5.62)
TRIGL SERPL-MCNC: 166 MG/DL
URATE SERPL-MCNC: 7.5 MG/DL (ref 3.5–8.5)
WBC # BLD AUTO: 4.96 THOUSAND/UL (ref 4.31–10.16)

## 2024-05-01 PROCEDURE — 85025 COMPLETE CBC W/AUTO DIFF WBC: CPT

## 2024-05-01 PROCEDURE — 80061 LIPID PANEL: CPT

## 2024-05-01 PROCEDURE — 80053 COMPREHEN METABOLIC PANEL: CPT

## 2024-05-01 PROCEDURE — 36415 COLL VENOUS BLD VENIPUNCTURE: CPT

## 2024-05-01 PROCEDURE — G0103 PSA SCREENING: HCPCS

## 2024-05-02 ENCOUNTER — OFFICE VISIT (OUTPATIENT)
Dept: PHYSICAL THERAPY | Facility: CLINIC | Age: 64
End: 2024-05-02
Payer: COMMERCIAL

## 2024-05-02 DIAGNOSIS — M67.911 ROTATOR CUFF DYSFUNCTION, RIGHT: ICD-10-CM

## 2024-05-02 DIAGNOSIS — M25.511 RIGHT SHOULDER PAIN, UNSPECIFIED CHRONICITY: Primary | ICD-10-CM

## 2024-05-02 PROCEDURE — 97112 NEUROMUSCULAR REEDUCATION: CPT

## 2024-05-02 PROCEDURE — 97110 THERAPEUTIC EXERCISES: CPT

## 2024-05-02 PROCEDURE — 97140 MANUAL THERAPY 1/> REGIONS: CPT

## 2024-05-02 NOTE — PROGRESS NOTES
Daily Note     Today's date: 2024  Patient name: Pancho Schwarz  : 1960  MRN: [id number]  Referring provider: Modesto Martin*  Dx:   Encounter Diagnosis     ICD-10-CM    1. Right shoulder pain, unspecified chronicity  M25.511       2. Rotator cuff dysfunction, right  M67.911                      Subjective: Pt reports less soreness after last visit.  Continued c/o right anterior shoulder pain and difficulty with elevation above shoulder level.  Pt reports sleeping better last night and less pain this AM.         Objective: See treatment diary below      Assessment: Tolerated treatment well. Good tolerance to manual therapy and exercise as noted.  Verbal/tactile cues required with exercise for posture and scap setting.  Minimal muscle fatigue reported with scapular strengthening. Patient would benefit from continued PT.        Plan: Continue per plan of care.      Precautions: none      Manuals           R pec, lat subscap STM CB  GH          R scap mobs CB CB GH                                    Neuro Re-Ed             Scap retract tband at wrists reviewed 10x10: GTB GH          Tband extension nv 10x10: GTB GH          robbery nv 10x10: GTB GH                                                              Ther Ex             LTR pec stretch reviewed 10x10: GH          Scalene towel stretch reviewed 10x10: GH                                                                                        Ther Activity                                       Gait Training                                       Modalities

## 2024-05-06 ENCOUNTER — OFFICE VISIT (OUTPATIENT)
Dept: PHYSICAL THERAPY | Facility: CLINIC | Age: 64
End: 2024-05-06
Payer: COMMERCIAL

## 2024-05-06 DIAGNOSIS — M25.511 RIGHT SHOULDER PAIN, UNSPECIFIED CHRONICITY: Primary | ICD-10-CM

## 2024-05-06 DIAGNOSIS — M67.911 ROTATOR CUFF DYSFUNCTION, RIGHT: ICD-10-CM

## 2024-05-06 PROCEDURE — 97112 NEUROMUSCULAR REEDUCATION: CPT | Performed by: PHYSICAL THERAPIST

## 2024-05-06 PROCEDURE — 97140 MANUAL THERAPY 1/> REGIONS: CPT | Performed by: PHYSICAL THERAPIST

## 2024-05-06 PROCEDURE — 97110 THERAPEUTIC EXERCISES: CPT | Performed by: PHYSICAL THERAPIST

## 2024-05-06 NOTE — PROGRESS NOTES
Daily Note     Today's date: 2024  Patient name: Pancho Schwarz  : 1960  MRN: [id number]  Referring provider: Modesto Martin*  Dx:   Encounter Diagnosis     ICD-10-CM    1. Right shoulder pain, unspecified chronicity  M25.511       2. Rotator cuff dysfunction, right  M67.911                      Subjective: Pt continues with mid range catch when lifting the shoulder.       Objective: See treatment diary below      Assessment: Tolerated treatment well. Patient would benefit from continued PT. Increased RTC strengthening today as scap mobility has improved. Rapid fatigue noted with minimal increase in pain. Educated on performing all the new exercises over the next 2 weeks and will recheck strength/pain with reach nv.       Plan: Continue per plan of care.      Precautions: none      Manuals          R pec, lat subscap STM CB  GH CB         R scap mobs CB CB GH CB                                   Neuro Re-Ed             Scap retract tband at wrists reviewed 10x10: GTB GH nv         Tband extension nv 10x10: GTB GH nv         robbery nv 10x10: GTB GH nv         Wall clock flexion    5:x10 YTB         IR    GTB 2x10         ER    GTB 2x10         EOT SA press    10x10:         Ther Ex             LTR pec stretch reviewed 10x10: GH home         Scalene towel stretch reviewed 10x10: GH 10x10:                                                                                       Ther Activity                                       Gait Training                                       Modalities

## 2024-05-08 LAB
ALBUMIN SERPL BCP-MCNC: 4.6 G/DL (ref 3.5–5)
ALP SERPL-CCNC: 68 U/L (ref 34–104)
ALT SERPL W P-5'-P-CCNC: 25 U/L (ref 7–52)
ANION GAP SERPL CALCULATED.3IONS-SCNC: 9 MMOL/L (ref 4–13)
AST SERPL W P-5'-P-CCNC: 27 U/L (ref 13–39)
BILIRUB SERPL-MCNC: 1.54 MG/DL (ref 0.2–1)
BUN SERPL-MCNC: 21 MG/DL (ref 5–25)
CALCIUM SERPL-MCNC: 9.1 MG/DL (ref 8.4–10.2)
CHLORIDE SERPL-SCNC: 100 MMOL/L (ref 96–108)
CO2 SERPL-SCNC: 30 MMOL/L (ref 21–32)
CREAT SERPL-MCNC: 1.03 MG/DL (ref 0.6–1.3)
GFR SERPL CREATININE-BSD FRML MDRD: 76 ML/MIN/1.73SQ M
GLUCOSE P FAST SERPL-MCNC: 99 MG/DL (ref 65–99)
POTASSIUM SERPL-SCNC: 3.8 MMOL/L (ref 3.5–5.3)
PROT SERPL-MCNC: 7.1 G/DL (ref 6.4–8.4)
SODIUM SERPL-SCNC: 139 MMOL/L (ref 135–147)

## 2024-05-20 ENCOUNTER — OFFICE VISIT (OUTPATIENT)
Dept: PHYSICAL THERAPY | Facility: CLINIC | Age: 64
End: 2024-05-20
Payer: COMMERCIAL

## 2024-05-20 DIAGNOSIS — M67.911 ROTATOR CUFF DYSFUNCTION, RIGHT: ICD-10-CM

## 2024-05-20 DIAGNOSIS — M25.511 RIGHT SHOULDER PAIN, UNSPECIFIED CHRONICITY: Primary | ICD-10-CM

## 2024-05-20 PROCEDURE — 97112 NEUROMUSCULAR REEDUCATION: CPT | Performed by: PHYSICAL THERAPIST

## 2024-05-20 PROCEDURE — 97110 THERAPEUTIC EXERCISES: CPT | Performed by: PHYSICAL THERAPIST

## 2024-05-20 PROCEDURE — 97140 MANUAL THERAPY 1/> REGIONS: CPT | Performed by: PHYSICAL THERAPIST

## 2024-05-20 NOTE — PROGRESS NOTES
Daily Note     Today's date: 2024  Patient name: Pancho Shcwarz  : 1960  MRN: [id number]  Referring provider: Modesto Martin*  Dx:   Encounter Diagnosis     ICD-10-CM    1. Right shoulder pain, unspecified chronicity  M25.511       2. Rotator cuff dysfunction, right  M67.911                      Subjective: Pt reports some increasing pain in the shoulder when reaching for things lower down whereas before it was mostly with overhead motions. He notes some pain just when resting it as well. He doesn't feel pain with the exercises.      Objective: See treatment diary below      Assessment: Tolerated treatment well. Patient would benefit from continued PT. Decreased pain post visit today with reaching. Continued weakness with RTC strengthening. Limited R scap depression and required cuing for appropriate scap mechanics.       Plan: Continue per plan of care.      Precautions: none      Manuals         R pec, lat subscap STM CB  GH CB CB        R scap mobs CB CB GH CB CB                                  Neuro Re-Ed             Scap retract tband at wrists reviewed 10x10: GTB GH nv         Tband extension nv 10x10: GTB GH nv         robbery nv 10x10: GTB GH nv         Wall clock flexion    5:x10 YTB CB        IR    GTB 2x10 BTB 2x10        ER    GTB 2x10 BTB 2x10        EOT SA press    10x10: nv        Ther Ex             LTR pec stretch reviewed 10x10: GH home         Scalene towel stretch reviewed 10x10: GH 10x10: CB        Scap retract w/tband at wrists     10x10: GTB                                                                         Ther Activity                                       Gait Training                                       Modalities

## 2024-05-23 ENCOUNTER — OFFICE VISIT (OUTPATIENT)
Dept: PHYSICAL THERAPY | Facility: CLINIC | Age: 64
End: 2024-05-23
Payer: COMMERCIAL

## 2024-05-23 ENCOUNTER — RA CDI HCC (OUTPATIENT)
Dept: OTHER | Facility: HOSPITAL | Age: 64
End: 2024-05-23

## 2024-05-23 DIAGNOSIS — M25.511 RIGHT SHOULDER PAIN, UNSPECIFIED CHRONICITY: Primary | ICD-10-CM

## 2024-05-23 DIAGNOSIS — M67.911 ROTATOR CUFF DYSFUNCTION, RIGHT: ICD-10-CM

## 2024-05-23 PROCEDURE — 97140 MANUAL THERAPY 1/> REGIONS: CPT | Performed by: PHYSICAL THERAPIST

## 2024-05-23 PROCEDURE — 97112 NEUROMUSCULAR REEDUCATION: CPT | Performed by: PHYSICAL THERAPIST

## 2024-05-23 PROCEDURE — 97110 THERAPEUTIC EXERCISES: CPT | Performed by: PHYSICAL THERAPIST

## 2024-05-23 NOTE — PROGRESS NOTES
Daily Note     Today's date: 2024  Patient name: Pancho Schwarz  : 1960  MRN: [id number]  Referring provider: Modesto Martin*  Dx:   Encounter Diagnosis     ICD-10-CM    1. Right shoulder pain, unspecified chronicity  M25.511       2. Rotator cuff dysfunction, right  M67.911                      Subjective: Pt reports continued similar pain in the shoulder with reaching overhead. He doesn't fele there has been much progress and would like to hold for physician f/u for next steps.       Objective: See treatment diary below      Assessment: Tolerated treatment well. Patient would benefit from continued PT. Pt continues with RTC weakness and poor scapular mechanics. Improvement in pain noted with appropriate scap set but not resolved.       Plan:  hold PT for physician f/u     Precautions: none      Manuals        R pec, lat subscap STM CB  GH CB CB CB       R scap mobs CB CB GH CB CB CB                                 Neuro Re-Ed             Scap retract tband at wrists reviewed 10x10: GTB GH nv 10x10: GTB CB       Tband extension nv 10x10: GTB GH nv         robbery nv 10x10: GTB GH nv         Wall clock flexion    5:x10 YTB CB CB       IR    GTB 2x10 BTB 2x10        ER    GTB 2x10 BTB 2x10 BTB 2x10       EOT SA press    10x10: nv        Ther Ex             LTR pec stretch reviewed 10x10: GH home         Scalene towel stretch reviewed 10x10: GH 10x10: CB        Iso flexion from 90*      [id number]                                                                        Ther Activity                                       Gait Training                                       Modalities

## 2024-05-30 ENCOUNTER — OFFICE VISIT (OUTPATIENT)
Dept: FAMILY MEDICINE CLINIC | Facility: CLINIC | Age: 64
End: 2024-05-30
Payer: COMMERCIAL

## 2024-05-30 VITALS
DIASTOLIC BLOOD PRESSURE: 88 MMHG | WEIGHT: 207 LBS | OXYGEN SATURATION: 99 % | HEIGHT: 68 IN | TEMPERATURE: 97.3 F | SYSTOLIC BLOOD PRESSURE: 124 MMHG | BODY MASS INDEX: 31.37 KG/M2 | HEART RATE: 74 BPM

## 2024-05-30 DIAGNOSIS — E66.09 CLASS 1 OBESITY DUE TO EXCESS CALORIES WITH BODY MASS INDEX (BMI) OF 31.0 TO 31.9 IN ADULT, UNSPECIFIED WHETHER SERIOUS COMORBIDITY PRESENT: ICD-10-CM

## 2024-05-30 DIAGNOSIS — E78.2 MIXED HYPERLIPIDEMIA: ICD-10-CM

## 2024-05-30 DIAGNOSIS — I10 ESSENTIAL HYPERTENSION: Primary | ICD-10-CM

## 2024-05-30 DIAGNOSIS — I25.10 CORONARY ARTERY DISEASE INVOLVING NATIVE CORONARY ARTERY OF NATIVE HEART WITHOUT ANGINA PECTORIS: ICD-10-CM

## 2024-05-30 PROBLEM — E66.811 CLASS 1 OBESITY DUE TO EXCESS CALORIES WITH BODY MASS INDEX (BMI) OF 31.0 TO 31.9 IN ADULT: Status: ACTIVE | Noted: 2024-05-30

## 2024-05-30 PROCEDURE — 99214 OFFICE O/P EST MOD 30 MIN: CPT | Performed by: FAMILY MEDICINE

## 2024-05-30 NOTE — PATIENT INSTRUCTIONS
Take all meds as directed and lose weight to help get BMI lower than 25. Rec sunscreen and monitor for ticks. Low sugar and low cholesterol diet encouraged, consider Bariatrics and wegovy. See Cardiology as directed for hx of CAD. Labs reviewed.

## 2024-05-30 NOTE — PROGRESS NOTES
"Ambulatory Visit  Name: Pancho Schwarz      : 1960      MRN: [id number]  Encounter Provider: Pernell Nayak DO  Encounter Date: 2024   Encounter department: [facility] PRIMARY [facility]  Chief Complaint   Patient presents with    Follow-up     Lab review      Patient Instructions   Take all meds as directed and lose weight to help get BMI lower than 25. Rec sunscreen and monitor for ticks. Low sugar and low cholesterol diet encouraged, consider Bariatrics and wegovy. See Cardiology as directed for hx of CAD. Labs reviewed.     Assessment & Plan   1. Essential hypertension  Comments:  BP stable and take BP med as directed.  2. Mixed hyperlipidemia  Comments:  rec exercising to increase hdl and lower trigs.  3. Coronary artery disease involving native coronary artery of native heart without angina pectoris  Comments:  sees cardiology, taking baby aspirin as directed.  4. Class 1 obesity due to excess calories with body mass index (BMI) of 31.0 to 31.9 in adult, unspecified whether serious comorbidity present  Comments:  lose weight as directed, consider Bariatrics and patien tis interested in wegovy, mounjaro, or zeppound.    @[email or web address]@  History of Present Illness     Follow-up (Lab review ) labs stable except low hdl and borderline elevated trigs and does see cardiology as directed. No cp or sob, or ha.         Review of Systems   Constitutional: Negative.    HENT: Negative.     Eyes: Negative.    Respiratory: Negative.     Cardiovascular: Negative.    Gastrointestinal: Negative.    Endocrine: Negative.    Genitourinary: Negative.    Musculoskeletal: Negative.    Skin: Negative.    Allergic/Immunologic: Negative.    Neurological: Negative.    Hematological: Negative.    Psychiatric/Behavioral: Negative.         Objective     /88 (BP Location: Left arm, Patient Position: Sitting, Cuff Size: Large)   Pulse 74   Temp (!) 97.3 °F (36.3 °C) (Temporal)   Ht 5' 8\" (1.727 m)   Wt 93.9 " kg (207 lb)   SpO2 99%   BMI 31.47 kg/m²     Physical Exam  Constitutional:       Appearance: He is well-developed. He is obese.   HENT:      Head: Normocephalic and atraumatic.      Right Ear: External ear normal.      Left Ear: External ear normal.      Nose: Nose normal.      Mouth/Throat:      Mouth: Mucous membranes are moist.   Eyes:      Conjunctiva/sclera: Conjunctivae normal.      Pupils: Pupils are equal, round, and reactive to light.   Cardiovascular:      Rate and Rhythm: Normal rate and regular rhythm.      Pulses: Normal pulses.      Heart sounds: Normal heart sounds.   Pulmonary:      Effort: Pulmonary effort is normal.      Breath sounds: Normal breath sounds.   Musculoskeletal:         General: Normal range of motion.      Cervical back: Normal range of motion and neck supple.   Skin:     General: Skin is warm and dry.      Capillary Refill: Capillary refill takes less than 2 seconds.   Neurological:      General: No focal deficit present.      Mental Status: He is alert and oriented to person, place, and time. Mental status is at baseline.      Deep Tendon Reflexes: Reflexes are normal and symmetric.   Psychiatric:         Mood and Affect: Mood normal.         Behavior: Behavior normal.         Thought Content: Thought content normal.         Judgment: Judgment normal.       Administrative Statements   I have spent a total time of 30 minutes on 05/30/24 In caring for this patient including Diagnostic results, Prognosis, Risks and benefits of tx options, Instructions for management, Patient and family education, Importance of tx compliance, Risk factor reductions, Impressions, Counseling / Coordination of care, Documenting in the medical record, Reviewing / ordering tests, medicine, procedures  , and Obtaining or reviewing history  .

## 2024-06-04 ENCOUNTER — TELEPHONE (OUTPATIENT)
Age: 64
End: 2024-06-04

## 2024-06-04 ENCOUNTER — OFFICE VISIT (OUTPATIENT)
Dept: OBGYN CLINIC | Facility: CLINIC | Age: 64
End: 2024-06-04
Payer: MEDICARE

## 2024-06-04 VITALS
HEIGHT: 68 IN | BODY MASS INDEX: 31.37 KG/M2 | WEIGHT: 207 LBS | SYSTOLIC BLOOD PRESSURE: 124 MMHG | DIASTOLIC BLOOD PRESSURE: 88 MMHG

## 2024-06-04 DIAGNOSIS — M25.511 RIGHT SHOULDER PAIN, UNSPECIFIED CHRONICITY: Primary | ICD-10-CM

## 2024-06-04 DIAGNOSIS — F41.9 ANXIETY: Primary | ICD-10-CM

## 2024-06-04 DIAGNOSIS — M67.911 ROTATOR CUFF DYSFUNCTION, RIGHT: ICD-10-CM

## 2024-06-04 PROCEDURE — 99213 OFFICE O/P EST LOW 20 MIN: CPT | Performed by: FAMILY MEDICINE

## 2024-06-04 RX ORDER — ALPRAZOLAM 0.5 MG/1
0.5 TABLET ORAL DAILY PRN
Qty: 1 TABLET | Refills: 0 | Status: SHIPPED | OUTPATIENT
Start: 2024-06-04

## 2024-06-04 NOTE — TELEPHONE ENCOUNTER
I spoke with the patient and made him aware, he is open to either or. He stated whichever you think will work better for him.

## 2024-06-04 NOTE — PROGRESS NOTES
Assessment:     1. Right shoulder pain, unspecified chronicity        2. Rotator cuff dysfunction, right          No orders of the defined types were placed in this encounter.       Impression:   Right shoulder pain likely multifactorial secondary to rotator cuff tendinitis, glenohumeral joint osteoarthritis.       Conservative Management   We discussed different treatment options:  Previously reviewed/discussed  Reviewed PCP documentation completed on 04/11/2024.  Treatment plan consisted of meloxicam.  As well as x-rays and referral to Ortho/sports medicine.  Ice or Heat Therapy as needed 1-2 times daily for 10-20 minutes. As tolerated.   Over the counter Tylenol and/or NSAIDs  as needed based off your Past Medical Hx. Please follow product label for dosing and maximum limits.    Trial of over the counter Topical Analgesics such as Lidocaine cream or Voltaren Gel, as tolerated. If skin becomes irritated, discontinue use.   Formal Handout provided on General Information of shoulder exercises  Please range joint through gentle range of motion as tolerated.   Initiate Formal Physical Therapy at any preferred location.  Prescription provided  Today's discussion/review  Reviewed PT documentation completed on 05/23/2024  Will obtain MRI of right shoulder at this time for further evaluation of rotator cuff        Imaging   Reviewed prior xrays obtain in Urgent or Emergency Department. These were reviewed in office with patient today.   04/11/2024 right shoulder x-ray: No acute osseous abnormality, there is mild osteoarthritis to the glenohumeral joint  IMPRESSION:      Preserved glenohumeral joint with mild marginal osteophyte formation of the glenoid.  Subtle vacuum phenomenon within the joint space.  Intact acromioclavicular joint.  No fracture or dislocation     Procedure  No Injection performed today. May consider future corticosteroid injection depending on clinical exam/imaging.  Reviewed both landmark guided  "subacromial bursa injection versus ultrasound-guided glenohumeral joint injection.     Shared decision making, patient agreeable to plan.      Shared decision making, patient agreeable to plan.      No follow-ups on file.    HPI:   Pancho Schwarz is a 64 y.o. male  who presents for evaluation of   Chief Complaint   Patient presents with    Right Shoulder - Pain, Swelling     Today's visit  Denies any new trauma  Location: Lateral .  Severity: Current severity: 0-3/10. Max severity: 5-6/10.  Pain described as: Stabbing  Radiation: Denies pain rating up into neck will radiate into upper arm.  Provocative: Shoulder abduction/flexion.  Feels approximately 40% improved.  Has been compliant with formal physical therapy    Previous visit 04/18/2024  PCP , Pernell Nayak DO referred patient for right shoulder pain   Occupation: Retired  Injury Related: No      Onset/Mechanism: Right shoulder pain started 3 to 4 weeks ago.  The pain has been worsening..  Location: Lateral deltoid.  Severity: Current severity: 0/10. Max severity: 4-5/10.  Pain described as: Stabbing  Radiation: Denies pain radiating up into neck.  Pain will radiate down the upper arm but not past the elbow..  Provocative: Shoulder flexion, shoulder abduction.  Associated symptoms: Denies any numbness/tingling in the upper arm.     Denies any hx of fracture of affected limb.   Denies any surgical history of affected limb.       Summary of treatment to-date:   NSAID therapy  Denies formal physical therapy  Denies corticosteroid injection  Denies any topical gels       Following History Reviewed and Updated     Past Medical History:   Diagnosis Date    Acute myocardial infarction (HCC)     \"no heart damage\" approx 8 yrs ago did get one stent\"    Arthralgia     last assessed 7/8/13    Arthritis     Colon polyp     Contusion of skin with intact surface     of the left medial thigh,last assessed 6/28/13    Coronary artery disease     Gout     last assessed " "10/29/13    History of sepsis     urinary and was in hosp 3 days /7/2020    Hyperlipidemia     Hypertension     Kidney stone     Lump of skin     last assessed 7/19/13//\"fatty tumor and surg removed\"    S/P coronary artery stent placement     x1    Wears glasses     at night     Past Surgical History:   Procedure Laterality Date    CARDIAC CATHETERIZATION      COLONOSCOPY  12/10/2009    Complete//2020    CORONARY STENT PLACEMENT  08/2012    stenting of the LAD artery 95% lesion to 0% residual stenosis    FL RETROGRADE PYELOGRAM  9/1/2021    KNEE SURGERY Left     x4    LUMBAR EPIDURAL INJECTION      x1    MS CYSTO BLADDER W/URETERAL CATHETERIZATION Left 7/4/2020    Procedure: CYSTOSCOPY WITH INSERTION STENT URETERAL;  Surgeon: Ferny Gates MD;  Location: [facility];  Service: Urology    MS CYSTO/URETERO W/LITHOTRIPSY &INDWELL STENT INSRT Left 8/5/2020    Procedure: CYSTO, URETEROSCOPY STENT exchange;  Surgeon: Ward Forbes MD;  Location: [facility];  Service: Urology    MS CYSTO/URETERO W/LITHOTRIPSY &INDWELL STENT INSRT Right 9/1/2021    Procedure: CYSTOSCOPY URETEROSCOPY WITH BASKET STONE EXTRACTION, RETROGRADE PYELOGRAM AND INSERTION STENT URETERAL;  Surgeon: Christoph Ordaz MD;  Location: [facility];  Service: Urology    RETROMASTOID CRANIOTOMY Left 8/7/2023    Procedure: Left middle fossa craniotomy, retromastoid approach, for repair tegeman defect with temporalis muscle fascia graft;  Surgeon: Bria Gary MD;  Location: [facility];  Service: ENT    RETROMASTOID CRANIOTOMY Left 8/7/2023    Procedure: Endoscopic left middle fossa craniotomy for repair of tegmen defect and CSF leak with temporalis muscle fascia flap, with neuromonitoring (CN 7/8) and intraoperative lumbar drain placement;  Surgeon: Gómez Rodriguez MD;  Location: [facility];  Service: Neurosurgery     Family History   Problem Relation Age of Onset    Edema Mother         Cerebral    Aneurysm Father         of the cerebellar artery    " Aneurysm Brother         of the cerebellar artery       Social History     Substance and Sexual Activity   Alcohol Use Yes    Alcohol/week: 1.0 standard drink of alcohol    Types: 1 Cans of beer per week     Social History     Substance and Sexual Activity   Drug Use Never     Social History     Tobacco Use   Smoking Status Never   Smokeless Tobacco Never       Social Determinants of Health     Tobacco Use: Low Risk  (6/4/2024)    Patient History     Smoking Tobacco Use: Never     Smokeless Tobacco Use: Never     Passive Exposure: Not on file   Alcohol Use: Unknown (3/18/2021)    AUDIT-C     Frequency of Alcohol Consumption: Not on file     Average Number of Drinks: Not on file     Frequency of Binge Drinking: Weekly   Financial Resource Strain: Not on file   Food Insecurity: No Food Insecurity (8/8/2023)    Hunger Vital Sign     Worried About Running Out of Food in the Last Year: Never true     Ran Out of Food in the Last Year: Never true   Transportation Needs: No Transportation Needs (8/8/2023)    PRAPARE - Transportation     Lack of Transportation (Medical): No     Lack of Transportation (Non-Medical): No   Physical Activity: Not on file   Stress: Not on file   Social Connections: Not on file   Intimate Partner Violence: Not on file   Depression: Not at risk (4/11/2024)    PHQ-2     PHQ-2 Score: 0   Housing Stability: Unknown (8/8/2023)    Housing Stability Vital Sign     Unable to Pay for Housing in the Last Year: No     Number of Places Lived in the Last Year: Not on file     Unstable Housing in the Last Year: No   Utilities: Not on file   Health Literacy: Not on file        No Known Allergies    Review of Systems      Review of Systems     Review of Systems   Constitutional: Negative for chills and fever.   HENT: Negative for drooling and sneezing.    Eyes: Negative for redness.   Respiratory: Negative for cough and wheezing.    Gastrointestinal: Negative for vomiting.   Psychiatric/Behavioral: Negative for  "behavioral problems. The patient is not nervous/anxious.      All other systems negative.   Physical Exam   Physical Exam    Vitals and nursing note reviewed.  Constitutional:   Appearance. Normal Appearance.  /88   Ht 5' 8\" (1.727 m)   Wt 93.9 kg (207 lb)   BMI 31.47 kg/m²     Body mass index is 31.47 kg/m².   HENT:  Head: Atraumatic.  Nose: Nose normal  Eyes: Conjunctiva/sclera: Conjunctivae normal.  Cardiovascular:   Rate and Rhythm: Bilateral equal distal pulses  Pulmonary:   Effort: Pulmonary effort is normal  Skin:   General: Skin is warm and dry.  Neurological:   General: No focal deficit present.  Mental Status: Alert and oriented to person, place, and time.   Psychiatric:   Mood and Affect: mood normal.  Behavior: Behavior normal     Musculoskeletal Exam     Ortho Exam      Right Shoulder:     INSPECTION:  Erythema Swelling Ecchymosis Increased warmth   Negative Neg. Neg. Neg.     PALPATION/TENDERNESS:  Acromion Clavicle Scapula/spine of scapula AC joint   Negative Neg. Neg. Neg.     Subacromial bursa Long head of the biceps Coracoid process Trapezius/periscapular region   Neg. Neg. Neg. Neg.     RANGE OF MOTION:  C-Spine Flexion C-Spine Extension C-Spine Sidebending C-Spine Rotation    intact intact intact intact     Internal rotation in 90 degrees Abduction External rotation in 90 degrees Abduction Internal rotation in Adduction External rotation in Adduction   Intact intact Lumbar spine intact      Forward Flexion Abduction   intact intact     STRENGTH:  Flexion Abduction Adduction   Intact Intact Intact       Okay Sign Finger abduction Thumb extension   Intact, bilaterally Intact, bilaterally Intact, bilaterally       ROTATOR CUFF:  Rotator cuff tear  Supraspinatus  Infraspinatus  Subscapularis    (Drop-Arm) (Empty can) (External rotation against resistance) (Belly press)   negative Discomfort without weakness Discomfort without weakness negative     IMPINGEMENT:  Neer's Curtis-Sumeet " "  negative negative     BICEPS TENDINOPATHY:  Resisted forward flexion  Resisted supination   (Speed's) (Yergason's):    Negative  N/a      AC JOINT:  Forced cross body adduction (Scarf cross-arm) Job's AC compression   Negative N/a      LABRUM:  Waters's: Labral Crank Test:    Equivocal N/a      APPREHENSION  Apprehension Juan Antonio's Relocation Maneuver:    N/A N/A     Special test:  Spurlings    N/A         Distal Sensation  Radial Pulse   Intact Bilaterally  Present and Equal Bilaterally               Procedures       Portions of the record may have been created with voice recognition software. Occasional wrong word or \"sound alike\" substitutions may have occurred due to the inherent limitations of voice recognition software. Please review the chart carefully and recognize, using context, where substitutions/typographical errors may have occurred.   "

## 2024-06-04 NOTE — TELEPHONE ENCOUNTER
Pt called is scheduled for an MRI on 6/18 and is requesting Valium for the test.  This is not an open MRI and states he will need something to help.      Pt stated last year valium did help when getting an MRI.    Please advise Pt [phone]

## 2024-06-27 ENCOUNTER — OFFICE VISIT (OUTPATIENT)
Dept: OBGYN CLINIC | Facility: CLINIC | Age: 64
End: 2024-06-27
Payer: COMMERCIAL

## 2024-06-27 VITALS
DIASTOLIC BLOOD PRESSURE: 84 MMHG | SYSTOLIC BLOOD PRESSURE: 128 MMHG | HEIGHT: 68 IN | WEIGHT: 207 LBS | BODY MASS INDEX: 31.37 KG/M2

## 2024-06-27 DIAGNOSIS — M75.101 TEAR OF RIGHT ROTATOR CUFF, UNSPECIFIED TEAR EXTENT, UNSPECIFIED WHETHER TRAUMATIC: ICD-10-CM

## 2024-06-27 DIAGNOSIS — M25.511 RIGHT SHOULDER PAIN, UNSPECIFIED CHRONICITY: Primary | ICD-10-CM

## 2024-06-27 PROCEDURE — 99214 OFFICE O/P EST MOD 30 MIN: CPT | Performed by: FAMILY MEDICINE

## 2024-06-27 NOTE — PROGRESS NOTES
Assessment:     1. Right shoulder pain, unspecified chronicity  Ambulatory Referral to Orthopedic Surgery      2. Tear of right rotator cuff, unspecified tear extent, unspecified whether traumatic  Ambulatory Referral to Orthopedic Surgery        Orders Placed This Encounter   Procedures    Ambulatory Referral to Orthopedic Surgery        Impression:   Right shoulder pain likely multifactorial secondary to rotator cuff pathology, glenohumeral joint osteoarthritis.       Conservative Management   We discussed different treatment options:  Previously reviewed/discussed  Reviewed PCP documentation completed on 04/11/2024.  Treatment plan consisted of meloxicam.  As well as x-rays and referral to Ortho/sports medicine.  Ice or Heat Therapy as needed 1-2 times daily for 10-20 minutes. As tolerated.   Over the counter Tylenol and/or NSAIDs  as needed based off your Past Medical Hx. Please follow product label for dosing and maximum limits.    Trial of over the counter Topical Analgesics such as Lidocaine cream or Voltaren Gel, as tolerated. If skin becomes irritated, discontinue use.   Formal Handout provided on General Information of shoulder exercises  Please range joint through gentle range of motion as tolerated.   Initiate Formal Physical Therapy at any preferred location.  Prescription provided  Reviewed PT documentation completed on 05/23/2024  Today's discussion/review  Reviewed MRI with patient.  Referral placed for orthopedic surgeon to discuss conservative or surgical management of rotator cuff tear.        Imaging   Reviewed prior xrays obtain in Urgent or Emergency Department. These were reviewed in office with patient today.   04/11/2024 right shoulder x-ray: No acute osseous abnormality, there is mild osteoarthritis to the glenohumeral joint  IMPRESSION:    Preserved glenohumeral joint with mild marginal osteophyte formation of the glenoid.  Subtle vacuum phenomenon within the joint space.  Intact  acromioclavicular joint.  No fracture or dislocation  06/19/2024 MRI right shoulder: Pathology to the rotator cuff  Radiological impression: Rotator cuff tear may be acute.  There is bursal edema and effusion.  Posterior inferior and anterior labral tear are demonstrated and there is partial subscapularis tear with medial biceps subluxation     Procedure  No Injection performed today. May consider future corticosteroid injection depending on clinical exam/imaging.  Reviewed both landmark guided subacromial bursa injection versus ultrasound-guided glenohumeral joint injection.    Shared decision making, patient agreeable to plan.      Return for Follow up with Orthopedic Surgeon.    HPI:   Pancho Schwarz is a 64 y.o. male  who presents for evaluation of   Chief Complaint   Patient presents with    Right Shoulder - Follow-up, Pain, Swelling     Pt feels possible swelling. Right arm feels bigger than left arm.        Today's Visit   Location: Right shoulder.  Denies any new trauma.  Remains the same.  Severity: Current severity: 1-2/10. Max severity: 3-4/10.      Previous visit 04/18/2024 and 06/04/2024   PCP , Pernell Nayak DO referred patient for right shoulder pain   Occupation: Retired  Injury Related: No      Onset/Mechanism: Right shoulder pain started 3 to 4 weeks ago.  The pain has been worsening..  Location: Lateral deltoid.  Severity: Current severity: 0/10. Max severity: 4-5/10.  Pain described as: Stabbing  Radiation: Denies pain radiating up into neck.  Pain will radiate down the upper arm but not past the elbow..  Provocative: Shoulder flexion, shoulder abduction.  Associated symptoms: Denies any numbness/tingling in the upper arm.     Denies any hx of fracture of affected limb.   Denies any surgical history of affected limb.       Summary of treatment to-date:   NSAID therapy  Denies formal physical therapy  Denies corticosteroid injection  Denies any topical gels    Following History Reviewed and  "Updated     Past Medical History:   Diagnosis Date    Acute myocardial infarction (HCC)     \"no heart damage\" approx 8 yrs ago did get one stent\"    Arthralgia     last assessed 7/8/13    Arthritis     Colon polyp     Contusion of skin with intact surface     of the left medial thigh,last assessed 6/28/13    Coronary artery disease     Gout     last assessed 10/29/13    History of sepsis     urinary and was in hosp 3 days /7/2020    Hyperlipidemia     Hypertension     Kidney stone     Lump of skin     last assessed 7/19/13//\"fatty tumor and surg removed\"    S/P coronary artery stent placement     x1    Wears glasses     at night     Past Surgical History:   Procedure Laterality Date    CARDIAC CATHETERIZATION      COLONOSCOPY  12/10/2009    Complete//2020    CORONARY STENT PLACEMENT  08/2012    stenting of the LAD artery 95% lesion to 0% residual stenosis    FL RETROGRADE PYELOGRAM  9/1/2021    KNEE SURGERY Left     x4    LUMBAR EPIDURAL INJECTION      x1    MO CYSTO BLADDER W/URETERAL CATHETERIZATION Left 7/4/2020    Procedure: CYSTOSCOPY WITH INSERTION STENT URETERAL;  Surgeon: Ferny Gates MD;  Location: [facility];  Service: Urology    MO CYSTO/URETERO W/LITHOTRIPSY &INDWELL STENT INSRT Left 8/5/2020    Procedure: CYSTO, URETEROSCOPY STENT exchange;  Surgeon: Ward Forbes MD;  Location: [facility];  Service: Urology    MO CYSTO/URETERO W/LITHOTRIPSY &INDWELL STENT INSRT Right 9/1/2021    Procedure: CYSTOSCOPY URETEROSCOPY WITH BASKET STONE EXTRACTION, RETROGRADE PYELOGRAM AND INSERTION STENT URETERAL;  Surgeon: Christoph Ordaz MD;  Location: [facility];  Service: Urology    RETROMASTOID CRANIOTOMY Left 8/7/2023    Procedure: Left middle fossa craniotomy, retromastoid approach, for repair tegeman defect with temporalis muscle fascia graft;  Surgeon: Bria Gary MD;  Location: [facility];  Service: ENT    RETROMASTOID CRANIOTOMY Left 8/7/2023    Procedure: Endoscopic left middle fossa craniotomy for " repair of tegmen defect and CSF leak with temporalis muscle fascia flap, with neuromonitoring (CN 7/8) and intraoperative lumbar drain placement;  Surgeon: Gómez Rodriguez MD;  Location: [facility];  Service: Neurosurgery     Family History   Problem Relation Age of Onset    Edema Mother         Cerebral    Aneurysm Father         of the cerebellar artery    Aneurysm Brother         of the cerebellar artery       Social History     Substance and Sexual Activity   Alcohol Use Yes    Alcohol/week: 1.0 standard drink of alcohol    Types: 1 Cans of beer per week     Social History     Substance and Sexual Activity   Drug Use Never     Social History     Tobacco Use   Smoking Status Never   Smokeless Tobacco Never       Social Determinants of Health     Tobacco Use: Low Risk  (6/27/2024)    Patient History     Smoking Tobacco Use: Never     Smokeless Tobacco Use: Never     Passive Exposure: Not on file   Alcohol Use: Unknown (3/18/2021)    AUDIT-C     Frequency of Alcohol Consumption: Not on file     Average Number of Drinks: Not on file     Frequency of Binge Drinking: Weekly   Financial Resource Strain: Not on file   Food Insecurity: No Food Insecurity (8/8/2023)    Hunger Vital Sign     Worried About Running Out of Food in the Last Year: Never true     Ran Out of Food in the Last Year: Never true   Transportation Needs: No Transportation Needs (8/8/2023)    PRAPARE - Transportation     Lack of Transportation (Medical): No     Lack of Transportation (Non-Medical): No   Physical Activity: Not on file   Stress: Not on file   Social Connections: Not on file   Intimate Partner Violence: Not on file   Depression: Not at risk (4/11/2024)    PHQ-2     PHQ-2 Score: 0   Housing Stability: Unknown (8/8/2023)    Housing Stability Vital Sign     Unable to Pay for Housing in the Last Year: No     Number of Times Moved in the Last Year: Not on file     Homeless in the Last Year: No   Utilities: Not on file   Health Literacy: Not on  "file        No Known Allergies    Review of Systems      Review of Systems     Review of Systems   Constitutional: Negative for chills and fever.   HENT: Negative for drooling and sneezing.    Eyes: Negative for redness.   Respiratory: Negative for cough and wheezing.    Gastrointestinal: Negative for vomiting.   Psychiatric/Behavioral: Negative for behavioral problems. The patient is not nervous/anxious.      All other systems negative.   Physical Exam   Physical Exam    Vitals and nursing note reviewed.  Constitutional:   Appearance. Normal Appearance.  /84 (BP Location: Left arm, Patient Position: Sitting, Cuff Size: Standard)   Ht 5' 8\" (1.727 m)   Wt 93.9 kg (207 lb)   BMI 31.47 kg/m²     Body mass index is 31.47 kg/m².   HENT:  Head: Atraumatic.  Nose: Nose normal  Eyes: Conjunctiva/sclera: Conjunctivae normal.  Cardiovascular:   Rate and Rhythm: Bilateral equal distal pulses  Pulmonary:   Effort: Pulmonary effort is normal  Skin:   General: Skin is warm and dry.  Neurological:   General: No focal deficit present.  Mental Status: Alert and oriented to person, place, and time.   Psychiatric:   Mood and Affect: mood normal.  Behavior: Behavior normal     Musculoskeletal Exam     Ortho Exam      Right Shoulder:     INSPECTION:  Gross deformity Swelling Ecchymosis Increased warmth   Negative            RANGE OF MOTION:  Cervical spine grossly intact  C-Spine Flexion C-Spine Extension C-Spine Sidebending C-Spine Rotation            Internal rotation in 90 degrees Abduction External rotation in 90 degrees Abduction Internal rotation in Adduction External rotation in Adduction     Lumbar spine intact      Forward Flexion Abduction   intact intact     STRENGTH:    ROTATOR CUFF:  Rotator cuff tear  Supraspinatus  Infraspinatus  Subscapularis    (Drop-Arm) (Empty can) (External rotation against resistance) (Belly press)   negative Discomfort without jorge weakness Weakness with discomfort negative       BICEPS " "TENDINOPATHY:  Resisted forward flexion  Resisted supination   (Speed's) (Yergason's):    Minimal discomfort, no weakness N/a      AC JOINT:  Forced cross body adduction (Scarf cross-arm) Job's AC compression    N/a      LABRUM:  Waters's: Labral Crank Test:     N/a      APPREHENSION  Apprehension Juan Antonio's Relocation Maneuver:    N/A N/A     Special test:  Spurlings    N/A         Distal Sensation  Radial Pulse   Intact Bilaterally  Present and Equal Bilaterally               Procedures       Portions of the record may have been created with voice recognition software. Occasional wrong word or \"sound alike\" substitutions may have occurred due to the inherent limitations of voice recognition software. Please review the chart carefully and recognize, using context, where substitutions/typographical errors may have occurred.   "

## 2024-07-08 ENCOUNTER — OFFICE VISIT (OUTPATIENT)
Dept: OBGYN CLINIC | Facility: OTHER | Age: 64
End: 2024-07-08
Payer: COMMERCIAL

## 2024-07-08 VITALS
WEIGHT: 211 LBS | SYSTOLIC BLOOD PRESSURE: 133 MMHG | HEIGHT: 68 IN | DIASTOLIC BLOOD PRESSURE: 76 MMHG | BODY MASS INDEX: 31.98 KG/M2 | HEART RATE: 58 BPM

## 2024-07-08 DIAGNOSIS — M75.101 TEAR OF RIGHT SUPRASPINATUS TENDON: Primary | ICD-10-CM

## 2024-07-08 DIAGNOSIS — S43.002A SUBLUXATION OF TENDON OF LONG HEAD OF RIGHT BICEPS: ICD-10-CM

## 2024-07-08 PROBLEM — S43.001A SUBLUXATION OF TENDON OF LONG HEAD OF RIGHT BICEPS: Status: ACTIVE | Noted: 2024-07-08

## 2024-07-08 PROCEDURE — 99204 OFFICE O/P NEW MOD 45 MIN: CPT | Performed by: ORTHOPAEDIC SURGERY

## 2024-07-08 NOTE — PROGRESS NOTES
Assessment  Diagnoses and all orders for this visit:    Tear of right supraspinatus tendon    Subluxation of tendon of long head of right biceps      Discussion and Plan:    Discussed with the patient that the MRI shows a full-thickness supraspinatus tear without evidence of chronicity will as long head biceps tendon subluxation.  Surgical intervention is recommended in the form of right shoulder arthroscopic rotator cuff repair with biceps tenodesis.  Patient would like to wait until November as he has trips planned which I do feel is safe to do.  Patient does understand that by waiting there is a chance that this tear will progress significantly, typically that is associated with worsening symptoms so if he does develop any worsening symptoms and decreasing functional use of the shoulder and I would highly recommend he consider addressing this sooner rather than later.  Patient will return to the office in September to pick a surgical date for him in November as long as he does not develop worsening symptoms.  Patient will continue his home exercise program from physical therapy in the interim  Subjective:   Patient ID: Pancho Schwarz is a 64 y.o. male      HPI  The patient presents with a chief complaint of right shoulder pain.   The pain began a few month(s) ago and is not associated with an acute injury.  However he reports pain started after spackling and painting. The patient describes the pain as aching and dull in intensity,  intermittent in timing, and localizes the pain to the  right subacromial joint.  The pain is worse with movement and overuse and relieved by rest.  The pain is not associated with numbness and tingling.  The pain is not associated with constitutional symptoms. The patient is awoken at night by the pain.    The patient was initially seen by Dr. Patrick who ordered physical therapy which failed to provided relief.  So MRI was ordered and patient was referred here today for follow up.  "         The following portions of the patient's history were reviewed and updated as appropriate: allergies, current medications, past family history, past medical history, past social history, past surgical history and problem list.        Objective:  /76 (BP Location: Left arm, Patient Position: Sitting, Cuff Size: Adult)   Pulse 58   Ht 5' 8\" (1.727 m)   Wt 95.7 kg (211 lb)   BMI 32.08 kg/m²       Right Shoulder Exam     Tenderness   The patient is experiencing no tenderness.    Range of Motion   The patient has normal right shoulder ROM.    Muscle Strength   Abduction: 4/5   External rotation: 5/5     Tests   Curtis test: positive  Impingement: positive    Other   Erythema: absent  Sensation: normal  Pulse: present            Physical Exam  Vitals reviewed.   Constitutional:       Appearance: He is well-developed.   HENT:      Head: Normocephalic.   Eyes:      Pupils: Pupils are equal, round, and reactive to light.   Pulmonary:      Effort: Pulmonary effort is normal.   Abdominal:      General: Abdomen is flat. There is no distension.   Skin:     General: Skin is warm and dry.           I have personally reviewed pertinent films in PACS and my interpretation is as follows.  MRI right shoulder demonstrates a full thickness supraspinatus tear, degenerative labrum, subluxation long head biceps     Scribe Attestation      I,:  Ivonne Rodriguez am acting as a scribe while in the presence of the attending physician.:       I,:  Rolo Shah MD personally performed the services described in this documentation    as scribed in my presence.:               "

## 2024-08-19 ENCOUNTER — OFFICE VISIT (OUTPATIENT)
Dept: OBGYN CLINIC | Facility: CLINIC | Age: 64
End: 2024-08-19
Payer: COMMERCIAL

## 2024-08-19 VITALS
DIASTOLIC BLOOD PRESSURE: 64 MMHG | WEIGHT: 211 LBS | BODY MASS INDEX: 31.98 KG/M2 | SYSTOLIC BLOOD PRESSURE: 120 MMHG | HEIGHT: 68 IN

## 2024-08-19 DIAGNOSIS — M65.331 TRIGGER FINGER, RIGHT MIDDLE FINGER: Primary | ICD-10-CM

## 2024-08-19 PROCEDURE — 99213 OFFICE O/P EST LOW 20 MIN: CPT | Performed by: SURGERY

## 2024-08-19 PROCEDURE — 20550 NJX 1 TENDON SHEATH/LIGAMENT: CPT | Performed by: PHYSICIAN ASSISTANT

## 2024-08-19 RX ORDER — BETAMETHASONE SODIUM PHOSPHATE AND BETAMETHASONE ACETATE 3; 3 MG/ML; MG/ML
3 INJECTION, SUSPENSION INTRA-ARTICULAR; INTRALESIONAL; INTRAMUSCULAR; SOFT TISSUE
Status: COMPLETED | OUTPATIENT
Start: 2024-08-19 | End: 2024-08-19

## 2024-08-19 RX ORDER — LIDOCAINE HYDROCHLORIDE 10 MG/ML
2.5 INJECTION, SOLUTION INFILTRATION; PERINEURAL
Status: COMPLETED | OUTPATIENT
Start: 2024-08-19 | End: 2024-08-19

## 2024-08-19 RX ADMIN — LIDOCAINE HYDROCHLORIDE 2.5 ML: 10 INJECTION, SOLUTION INFILTRATION; PERINEURAL at 12:00

## 2024-08-19 RX ADMIN — BETAMETHASONE SODIUM PHOSPHATE AND BETAMETHASONE ACETATE 3 MG: 3; 3 INJECTION, SUSPENSION INTRA-ARTICULAR; INTRALESIONAL; INTRAMUSCULAR; SOFT TISSUE at 12:00

## 2024-08-19 NOTE — PROGRESS NOTES
Assessment    Right middle trigger finger       Plan    Steroid injection provided today and the patient tolerated well.  Activities as tolerated.  May follow-up PRN - call us in the future if symptoms persist or return.        Subjective     HPI    Patient ID:  aPncho Schwarz is a right hand dominant 64 y.o. male here for the evaluation of his right middle finger.  According to the patient, he has an 8 month history of right middle finger pain and swelling, with associated locking.  No injury or trauma to the area.  No associated numbness and tingling.  This has become increasingly bothersome to him over this timeframe.  He has not had any formal treatment for it thus far.      The following portions of the patient's history were reviewed and updated as appropriate: allergies, current medications, past family history, past medical history, past social history, past surgical history, and problem list.    Review of Systems     Objective    Imaging:  None     Physical Exam     Vitals:    08/19/24 1155   BP: 120/64       General appearance:  NAD   Cardiac:  Regular rate  Lungs:  Unlabored breathing  Abdomen:  Non-distended    Orthopedic Examination:  Right middle finger     Inspection:  No open wounds or erythema.  No ecchymosis.  Mild swelling of the PIP joint compared to contralateral side.    Palpation:  + TTP A1 pulley with palpable nodule    Range-of-motion:  + crepitus and clicking with ROM, no active locking.    Strength:  Normal    Sensation:  ILT    Special Tests:  Good cap refill at the fingertip  Palpable radial pulse     Hand/upper extremity injection: R long A1  Universal Protocol:  Consent: Verbal consent obtained.  Risks and benefits: risks, benefits and alternatives were discussed  Consent given by: patient  Patient identity confirmed: verbally with patient  Supporting Documentation  Indications: pain and tendon swelling   Procedure Details  Condition:trigger finger Location: long finger - R long A1  Received report from [facility] 5/17/22, MRI brain without contrast    Scan was read per their radiologist. No scans available for review.    \"1. No acute intracranial abnormality. There are no findings to suggest acute/subacute intracranial hemorrhage.  2. Small chronic infarct in the right cerebellar hemisphere with presumed focus of chronic microvascular ischemia noted in the inferior left frontal lobe white matter.  3. Probable chronic punctate micro hemorrhages in the white matter of the right cerebral hemisphere. At least 2 of these were imaged on a 2021 head MRA study. These may be the sequela of small vessel disease or prior head trauma\".     Discussed findings with patient, no acute intracranial cause for his temporal headaches. 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 He had his valve replaced in 12/2020 and CRVO 1/2021.   Discussed how it is possible during his cardiac surgery that the microhemmorhages occurred. It is unclear when or how either finding happened.     Unfortunately, with this information, he should no longer be on triptans due to h/o CVA. He has tried Ubrelvy and Nurtec w/o much benefit.     For current headache, new for him, left temporal. Did see optho last week and nothing was found.   Given age, location, do feel prudently need to check ESR/CRP, he will go to [facility].     He does note some tightness in his right shoulder/trap. Discussed possibility of cervicogenic cause, he is open to referral to PT/dry needling, order placed.     Trial of nabumetone + baclofen BID x 3 days. He does have slight elevation in his creatinine function so would need very short coarse.          Preparation: Patient was prepped and draped in the usual sterile fashion  Needle size: 22 G  Ultrasound guidance: no  Medications administered: 2.5 mL lidocaine 1 %; 3 mg betamethasone acetate-betamethasone sodium phosphate 6 (3-3) mg/mL  Patient tolerance: patient tolerated the procedure well with no immediate complications  Dressing:  Sterile dressing applied

## 2024-09-17 DIAGNOSIS — M10.9 GOUT, UNSPECIFIED CAUSE, UNSPECIFIED CHRONICITY, UNSPECIFIED SITE: ICD-10-CM

## 2024-09-18 RX ORDER — ALLOPURINOL 100 MG/1
100 TABLET ORAL DAILY
Qty: 90 TABLET | Refills: 1 | Status: SHIPPED | OUTPATIENT
Start: 2024-09-18

## 2024-09-24 DIAGNOSIS — E78.2 MIXED HYPERLIPIDEMIA: ICD-10-CM

## 2024-09-24 RX ORDER — ATORVASTATIN CALCIUM 20 MG/1
20 TABLET, FILM COATED ORAL DAILY
Qty: 30 TABLET | Refills: 5 | Status: SHIPPED | OUTPATIENT
Start: 2024-09-24

## 2024-09-24 NOTE — TELEPHONE ENCOUNTER
Reason for call:   [x] Refill   [] Prior Auth  [] Other:     Office:   [x] PCP/Provider - [facility]   [] Specialty/Provider -     Medication: atorvastatin (LIPITOR) 20 mg tablet     Dose/Frequency: 20 mg, Daily     Quantity: 90    Pharmacy: [id number]    Does the patient have enough for 3 days?   [x] Yes   [] No - Send as HP to POD

## 2024-10-13 DIAGNOSIS — I10 ESSENTIAL HYPERTENSION: ICD-10-CM

## 2024-10-15 RX ORDER — CANDESARTAN CILEXETIL AND HYDROCHLOROTHIAZIDE 16; 12.5 MG/1; MG/1
1 TABLET ORAL DAILY
Qty: 90 TABLET | Refills: 0 | Status: SHIPPED | OUTPATIENT
Start: 2024-10-15

## 2024-10-29 ENCOUNTER — OFFICE VISIT (OUTPATIENT)
Dept: OBGYN CLINIC | Facility: CLINIC | Age: 64
End: 2024-10-29
Payer: COMMERCIAL

## 2024-10-29 VITALS
HEIGHT: 68 IN | SYSTOLIC BLOOD PRESSURE: 120 MMHG | BODY MASS INDEX: 31.98 KG/M2 | WEIGHT: 211 LBS | DIASTOLIC BLOOD PRESSURE: 64 MMHG

## 2024-10-29 DIAGNOSIS — M65.331 TRIGGER FINGER, RIGHT MIDDLE FINGER: Primary | ICD-10-CM

## 2024-10-29 PROCEDURE — 99214 OFFICE O/P EST MOD 30 MIN: CPT | Performed by: SURGERY

## 2024-10-29 NOTE — H&P (VIEW-ONLY)
Assessment    Right middle trigger finger       Plan    Patient is a candidate for right middle finger trigger release under local anesthesia.  He wishes to pursue this option.  Earliest date for surgery would be 11/19 given recent steroid injection.  Patient understands this.  Risks, benefits and alternative treatments were discussed with the patient. These included but are not limited to: bleeding, infection, damage to nerves, vessels or tendons, allergic reaction to agents, possible increase in pain, tendon or ligament rupture, weakening of bone or soft tissues, and/or elevation in blood sugar. Patient understands and would like to proceed with the proposed procedure.    Follow-up 2 weeks after surgery.      Subjective     HPI    Patient ID:  Pancho Schwarz is a right hand dominant 64 y.o. male here for follow-up the evaluation of his right middle finger.  Today he states the previous steroid injection lasted for about 2 weeks then his same symptoms of pain and locking came back.  He is interested in surgery for this problem.      Initial Hand H&P:  According to the patient, he has an 8 month history of right middle finger pain and swelling, with associated locking.  No injury or trauma to the area.  No associated numbness and tingling.  This has become increasingly bothersome to him over this timeframe.  He has not had any formal treatment for it thus far.      The following portions of the patient's history were reviewed and updated as appropriate: allergies, current medications, past family history, past medical history, past social history, past surgical history, and problem list.    Review of Systems     Objective    Imaging:  None     Physical Exam     Vitals:    10/29/24 1050   BP: 120/64       General appearance:  NAD   Cardiac:  Regular rate  Lungs:  Unlabored breathing  Abdomen:  Non-distended    Orthopedic Examination:  Right middle finger     Inspection:  No open wounds or erythema.  No  ecchymosis.  Mild swelling of the PIP joint compared to contralateral side.    Palpation:  + TTP A1 pulley with palpable nodule    Range-of-motion:  + crepitus and clicking with ROM, no active locking.    Strength:  Normal    Sensation:  ILT    Special Tests:  Good cap refill at the fingertip  Palpable radial pulse

## 2024-11-22 ENCOUNTER — HOSPITAL ENCOUNTER (OUTPATIENT)
Age: 64
Setting detail: OUTPATIENT SURGERY
Discharge: HOME | End: 2024-11-22
Attending: SURGERY | Admitting: SURGERY
Payer: COMMERCIAL

## 2024-11-22 VITALS
WEIGHT: 206.6 LBS | OXYGEN SATURATION: 98 % | HEART RATE: 58 BPM | SYSTOLIC BLOOD PRESSURE: 132 MMHG | RESPIRATION RATE: 18 BRPM | DIASTOLIC BLOOD PRESSURE: 79 MMHG | TEMPERATURE: 98 F | HEIGHT: 67 IN | BODY MASS INDEX: 32.43 KG/M2

## 2024-11-22 DIAGNOSIS — Z48.89 AFTERCARE FOLLOWING SURGERY: Primary | ICD-10-CM

## 2024-11-22 PROCEDURE — 26055 INCISE FINGER TENDON SHEATH: CPT | Performed by: PHYSICIAN ASSISTANT

## 2024-11-22 PROCEDURE — 26055 INCISE FINGER TENDON SHEATH: CPT | Performed by: SURGERY

## 2024-11-22 RX ORDER — NAPROXEN 500 MG/1
500 TABLET ORAL 2 TIMES DAILY WITH MEALS
Qty: 10 TABLET | Refills: 0 | Status: SHIPPED | OUTPATIENT
Start: 2024-11-22 | End: 2024-11-27

## 2024-11-22 RX ORDER — MAGNESIUM HYDROXIDE 1200 MG/15ML
LIQUID ORAL AS NEEDED
Status: DISCONTINUED | OUTPATIENT
Start: 2024-11-22 | End: 2024-11-22 | Stop reason: HOSPADM

## 2024-11-22 RX ORDER — ACETAMINOPHEN 325 MG/1
650 TABLET ORAL EVERY 6 HOURS PRN
Status: DISCONTINUED | OUTPATIENT
Start: 2024-11-22 | End: 2024-11-22 | Stop reason: HOSPADM

## 2024-11-22 NOTE — INTERVAL H&P NOTE
H&P reviewed. After examining the patient I find no changes in the patients condition since the H&P had been written.    Vitals:    11/22/24 0726   BP: 149/81   Pulse: 64   Resp: 20   Temp: 97.5 °F (36.4 °C)   SpO2: 97%

## 2024-11-22 NOTE — OP NOTE
OPERATIVE REPORT  PATIENT NAME: Pancho Schwarz    :  1960  MRN: [id number]  Pt Location: [facility] ROOM 06    SURGERY DATE: 2024    Surgeons and Role:     * Jose M Yoder MD - Primary     * Armand Muñoz PA-C - Assisting    Preop Diagnosis:  Trigger finger, right middle finger [M65.331]    Post-Op Diagnosis Codes:     * Trigger finger, right middle finger [M65.331]    Procedure(s):  Right - RELEASE TRIGGER FINGER. RIGHT MIDDLE    Specimen(s):  * No specimens in log *    Estimated Blood Loss:   Minimal    Drains:  * No LDAs found *    Anesthesia Type:   Local    Operative Indications:  Trigger finger, right middle finger [M65.331]      Operative Findings:  Thickened A1 pulley; no active locking or catching after A1 pulley release      Complications:   None    Procedure and Technique:  The patient's right hand was cleansed with alcohol.  A field block was performed with marcaine 0.25% with epinephrine and lidocaine 1% with epinephrine.  The right upper extremity was then prepped and draped in a sterile fashion.  An incision was made in the distal palmar crease in line with the middle finger.  Dissection was performed down to the flexor tendon sheath.  The A1 pulley was identified, and incised with a scalpel.  The release was extended proximally and distally with tenotomy scissors.  The patient was asked to flex and extend the finger which he was able to do without any evidence of locking.  Preoperatively he could not make a fist complex.  The wound was irrigated with normal saline, and closed with 4-0 nylon sutures in an horizontal mattress interrupted manner.  Xeroform was applied followed by 4x4 gauze.  An ace bandage over wrap was applied.  The patient was transferred to phase 2 recovery in stable condition.      I was present for the entire procedure.    Patient Disposition:  PACU        My Assistant was necessary throughout the procedure(s) for retraction and positioning.    I understand that  section 1842 (b)(7)(D) of the Social Security Act generally prohibits Medicare physician fee schedule payment for the services of assistants-at-surgery in teaching hospitals when qualified residents are available to furnish such services. I certify that the services for which payment is claimed were medically necessary, and that no qualified resident was available to perform the services. I further understand that these services are subject to post-payment review by the Medicare carrier.        SIGNATURE: Jose M Yoder MD  DATE: November 22, 2024  TIME: 8:24 AM

## 2024-12-05 ENCOUNTER — OFFICE VISIT (OUTPATIENT)
Dept: OBGYN CLINIC | Facility: CLINIC | Age: 64
End: 2024-12-05

## 2024-12-05 VITALS
BODY MASS INDEX: 32.33 KG/M2 | HEIGHT: 67 IN | WEIGHT: 206 LBS | DIASTOLIC BLOOD PRESSURE: 70 MMHG | SYSTOLIC BLOOD PRESSURE: 122 MMHG

## 2024-12-05 DIAGNOSIS — M65.331 TRIGGER FINGER, RIGHT MIDDLE FINGER: Primary | ICD-10-CM

## 2024-12-05 PROCEDURE — 99024 POSTOP FOLLOW-UP VISIT: CPT | Performed by: SURGERY

## 2024-12-05 NOTE — PROGRESS NOTES
HPI:  64M here for his first post-op visit.  He is s/p right middle trigger finger release 11/22/2024.  Today he states overall he is doing well with no further locking of the finger.  He has no issues since his surgery.  The finger does still feel tight and stiff.  No fever or chills.  No issues with the incision.      PE:  Right middle finger: Incision is healed, skin edges approximated with suture.  No signs of infection.  Dry skin is noted.  Just short of full flexion of the finger, no locking.  Sensation intact to light touch radial and ulnar side of the digit.  Good cap refill at the fingertip.  Palpable radial pulse.      A/P:  S/p right middle trigger finger release 11/22/2024  -Sutures removed.  Massage and moisturize the incisional area to soften scar.  -Activities as tolerated, no restrictions.  Use the hand normally.  -Follow-up as needed, call us with any questions or concerns.  -All questions answered.    Suture removal    Date/Time: 12/5/2024 10:15 AM    Performed by: Armand Muñoz PA-C  Authorized by: Jose M Yoder MD  Universal Protocol:  Consent: Verbal consent obtained.  Risks and benefits: risks, benefits and alternatives were discussed  Consent given by: patient  Patient identity confirmed: verbally with patient      Patient location:  Clinic  Location:     Laterality:  Right    Location:  Upper extremity    Upper extremity location:  Hand    Hand location:  R long finger  Procedure details:     Tools used:  Suture removal kit    Wound appearance:  No sign(s) of infection, good wound healing and clean  Post-procedure details:     Post-removal:  No dressing applied    Patient tolerance of procedure:  Tolerated well, no immediate complications

## 2025-01-09 DIAGNOSIS — I10 ESSENTIAL HYPERTENSION: ICD-10-CM

## 2025-01-10 RX ORDER — CANDESARTAN CILEXETIL AND HYDROCHLOROTHIAZIDE 16; 12.5 MG/1; MG/1
1 TABLET ORAL DAILY
Qty: 30 TABLET | Refills: 0 | Status: SHIPPED | OUTPATIENT
Start: 2025-01-10

## 2025-01-16 ENCOUNTER — OFFICE VISIT (OUTPATIENT)
Dept: FAMILY MEDICINE CLINIC | Facility: CLINIC | Age: 65
End: 2025-01-16
Payer: COMMERCIAL

## 2025-01-16 VITALS
BODY MASS INDEX: 32.9 KG/M2 | TEMPERATURE: 97.3 F | OXYGEN SATURATION: 98 % | DIASTOLIC BLOOD PRESSURE: 80 MMHG | HEIGHT: 67 IN | SYSTOLIC BLOOD PRESSURE: 118 MMHG | HEART RATE: 68 BPM | WEIGHT: 209.6 LBS

## 2025-01-16 DIAGNOSIS — Z12.5 SCREENING FOR PROSTATE CANCER: ICD-10-CM

## 2025-01-16 DIAGNOSIS — Z00.00 HEALTH CARE MAINTENANCE: ICD-10-CM

## 2025-01-16 DIAGNOSIS — E66.9 OBESITY (BMI 30-39.9): ICD-10-CM

## 2025-01-16 DIAGNOSIS — G47.00 INSOMNIA, UNSPECIFIED TYPE: ICD-10-CM

## 2025-01-16 DIAGNOSIS — I25.10 CORONARY ARTERY DISEASE INVOLVING NATIVE CORONARY ARTERY OF NATIVE HEART WITHOUT ANGINA PECTORIS: ICD-10-CM

## 2025-01-16 DIAGNOSIS — Z00.00 MEDICARE ANNUAL WELLNESS VISIT, INITIAL: Primary | ICD-10-CM

## 2025-01-16 DIAGNOSIS — M10.40 OTHER SECONDARY GOUT, UNSPECIFIED CHRONICITY, UNSPECIFIED SITE: ICD-10-CM

## 2025-01-16 DIAGNOSIS — M10.9 GOUT, UNSPECIFIED CAUSE, UNSPECIFIED CHRONICITY, UNSPECIFIED SITE: ICD-10-CM

## 2025-01-16 DIAGNOSIS — I10 ESSENTIAL HYPERTENSION: ICD-10-CM

## 2025-01-16 DIAGNOSIS — E78.2 MIXED HYPERLIPIDEMIA: ICD-10-CM

## 2025-01-16 PROBLEM — I73.9 PERIPHERAL VASCULAR DISEASE (HCC): Status: ACTIVE | Noted: 2025-01-16

## 2025-01-16 PROCEDURE — 99214 OFFICE O/P EST MOD 30 MIN: CPT | Performed by: FAMILY MEDICINE

## 2025-01-16 PROCEDURE — G0439 PPPS, SUBSEQ VISIT: HCPCS | Performed by: FAMILY MEDICINE

## 2025-01-16 NOTE — PATIENT INSTRUCTIONS
Medicare Preventive Visit Patient Instructions  Thank you for completing your Welcome to Medicare Visit or Medicare Annual Wellness Visit today. Your next wellness visit will be due in one year (1/17/2026).  The screening/preventive services that you may require over the next 5-10 years are detailed below. Some tests may not apply to you based off risk factors and/or age. Screening tests ordered at today's visit but not completed yet may show as past due. Also, please note that scanned in results may not display below.  Preventive Screenings:  Service Recommendations Previous Testing/Comments   Colorectal Cancer Screening  Colonoscopy    Fecal Occult Blood Test (FOBT)/Fecal Immunochemical Test (FIT)  Fecal DNA/Cologuard Test  Flexible Sigmoidoscopy Age: 45-75 years old   Colonoscopy: every 10 years (May be performed more frequently if at higher risk)  OR  FOBT/FIT: every 1 year  OR  Cologuard: every 3 years  OR  Sigmoidoscopy: every 5 years  Screening may be recommended earlier than age 45 if at higher risk for colorectal cancer. Also, an individualized decision between you and your healthcare provider will decide whether screening between the ages of 76-85 would be appropriate. Colonoscopy: 01/13/2020  FOBT/FIT: Not on file  Cologuard: Not on file  Sigmoidoscopy: Not on file    Screening Current     Prostate Cancer Screening Individualized decision between patient and health care provider in men between ages of 55-69   Medicare will cover every 12 months beginning on the day after your 50th birthday PSA: 0.82 ng/mL     Screening Current     Hepatitis C Screening Once for adults born between 1945 and 1965  More frequently in patients at high risk for Hepatitis C Hep C Antibody: Not on file        Diabetes Screening 1-2 times per year if you're at risk for diabetes or have pre-diabetes Fasting glucose: 99 mg/dL (5/1/2024)  A1C: 5.0 % (5/1/2024)  Screening Current   Cholesterol Screening Once every 5 years if you  don't have a lipid disorder. May order more often based on risk factors. Lipid panel: 05/01/2024  Screening Not Indicated  History Lipid Disorder      Other Preventive Screenings Covered by Medicare:  Abdominal Aortic Aneurysm (AAA) Screening: covered once if your at risk. You're considered to be at risk if you have a family history of AAA or a male between the age of 65-75 who smoking at least 100 cigarettes in your lifetime.  Lung Cancer Screening: covers low dose CT scan once per year if you meet all of the following conditions: (1) Age 55-77; (2) No signs or symptoms of lung cancer; (3) Current smoker or have quit smoking within the last 15 years; (4) You have a tobacco smoking history of at least 20 pack years (packs per day x number of years you smoked); (5) You get a written order from a healthcare provider.  Glaucoma Screening: covered annually if you're considered high risk: (1) You have diabetes OR (2) Family history of glaucoma OR (3)  aged 50 and older OR (4)  American aged 65 and older  Osteoporosis Screening: covered every 2 years if you meet one of the following conditions: (1) Have a vertebral abnormality; (2) On glucocorticoid therapy for more than 3 months; (3) Have primary hyperparathyroidism; (4) On osteoporosis medications and need to assess response to drug therapy.  HIV Screening: covered annually if you're between the age of 15-65. Also covered annually if you are younger than 15 and older than 65 with risk factors for HIV infection. For pregnant patients, it is covered up to 3 times per pregnancy.    Immunizations:  Immunization Recommendations   Influenza Vaccine Annual influenza vaccination during flu season is recommended for all persons aged >= 6 months who do not have contraindications   Pneumococcal Vaccine   * Pneumococcal conjugate vaccine = PCV13 (Prevnar 13), PCV15 (Vaxneuvance), PCV20 (Prevnar 20)  * Pneumococcal polysaccharide vaccine = PPSV23 (Pneumovax)  Adults 19-63 yo with certain risk factors or if 65+ yo  If never received any pneumonia vaccine: recommend Prevnar 20 (PCV20)  Give PCV20 if previously received 1 dose of PCV13 or PPSV23   Hepatitis B Vaccine 3 dose series if at intermediate or high risk (ex: diabetes, end stage renal disease, liver disease)   Respiratory syncytial virus (RSV) Vaccine - COVERED BY MEDICARE PART D  * RSVPreF3 (Arexvy) CDC recommends that adults 60 years of age and older may receive a single dose of RSV vaccine using shared clinical decision-making (SCDM)   Tetanus (Td) Vaccine - COST NOT COVERED BY MEDICARE PART B Following completion of primary series, a booster dose should be given every 10 years to maintain immunity against tetanus. Td may also be given as tetanus wound prophylaxis.   Tdap Vaccine - COST NOT COVERED BY MEDICARE PART B Recommended at least once for all adults. For pregnant patients, recommended with each pregnancy.   Shingles Vaccine (Shingrix) - COST NOT COVERED BY MEDICARE PART B  2 shot series recommended in those 19 years and older who have or will have weakened immune systems or those 50 years and older     Health Maintenance Due:      Topic Date Due    Hepatitis C Screening  Never done    HIV Screening  Never done    Colorectal Cancer Screening  01/13/2025     Immunizations Due:      Topic Date Due    Pneumococcal Vaccine: 65+ Years (1 of 2 - PCV) Never done    Influenza Vaccine (1) 09/01/2024    COVID-19 Vaccine (4 - 2024-25 season) 09/01/2024     Advance Directives   What are advance directives?  Advance directives are legal documents that state your wishes and plans for medical care. These plans are made ahead of time in case you lose your ability to make decisions for yourself. Advance directives can apply to any medical decision, such as the treatments you want, and if you want to donate organs.   What are the types of advance directives?  There are many types of advance directives, and each state has  rules about how to use them. You may choose a combination of any of the following:  Living will:  This is a written record of the treatment you want. You can also choose which treatments you do not want, which to limit, and which to stop at a certain time. This includes surgery, medicine, IV fluid, and tube feedings.   Durable power of  for healthcare (DPAHC):  This is a written record that states who you want to make healthcare choices for you when you are unable to make them for yourself. This person, called a proxy, is usually a family member or a friend. You may choose more than 1 proxy.  Do not resuscitate (DNR) order:  A DNR order is used in case your heart stops beating or you stop breathing. It is a request not to have certain forms of treatment, such as CPR. A DNR order may be included in other types of advance directives.  Medical directive:  This covers the care that you want if you are in a coma, near death, or unable to make decisions for yourself. You can list the treatments you want for each condition. Treatment may include pain medicine, surgery, blood transfusions, dialysis, IV or tube feedings, and a ventilator (breathing machine).  Values history:  This document has questions about your views, beliefs, and how you feel and think about life. This information can help others choose the care that you would choose.  Why are advance directives important?  An advance directive helps you control your care. Although spoken wishes may be used, it is better to have your wishes written down. Spoken wishes can be misunderstood, or not followed. Treatments may be given even if you do not want them. An advance directive may make it easier for your family to make difficult choices about your care.   Weight Management   Why it is important to manage your weight:  Being overweight increases your risk of health conditions such as heart disease, high blood pressure, type 2 diabetes, and certain types of  cancer. It can also increase your risk for osteoarthritis, sleep apnea, and other respiratory problems. Aim for a slow, steady weight loss. Even a small amount of weight loss can lower your risk of health problems.  How to lose weight safely:  A safe and healthy way to lose weight is to eat fewer calories and get regular exercise. You can lose up about 1 pound a week by decreasing the number of calories you eat by 500 calories each day.   Healthy meal plan for weight management:  A healthy meal plan includes a variety of foods, contains fewer calories, and helps you stay healthy. A healthy meal plan includes the following:  Eat whole-grain foods more often.  A healthy meal plan should contain fiber. Fiber is the part of grains, fruits, and vegetables that is not broken down by your body. Whole-grain foods are healthy and provide extra fiber in your diet. Some examples of whole-grain foods are whole-wheat breads and pastas, oatmeal, brown rice, and bulgur.  Eat a variety of vegetables every day.  Include dark, leafy greens such as spinach, kale, tarik greens, and mustard greens. Eat yellow and orange vegetables such as carrots, sweet potatoes, and winter squash.   Eat a variety of fruits every day.  Choose fresh or canned fruit (canned in its own juice or light syrup) instead of juice. Fruit juice has very little or no fiber.  Eat low-fat dairy foods.  Drink fat-free (skim) milk or 1% milk. Eat fat-free yogurt and low-fat cottage cheese. Try low-fat cheeses such as mozzarella and other reduced-fat cheeses.  Choose meat and other protein foods that are low in fat.  Choose beans or other legumes such as split peas or lentils. Choose fish, skinless poultry (chicken or turkey), or lean cuts of red meat (beef or pork). Before you cook meat or poultry, cut off any visible fat.   Use less fat and oil.  Try baking foods instead of frying them. Add less fat, such as margarine, sour cream, regular salad dressing and  "mayonnaise to foods. Eat fewer high-fat foods. Some examples of high-fat foods include french fries, doughnuts, ice cream, and cakes.  Eat fewer sweets.  Limit foods and drinks that are high in sugar. This includes candy, cookies, regular soda, and sweetened drinks.  Exercise:  Exercise at least 30 minutes per day on most days of the week. Some examples of exercise include walking, biking, dancing, and swimming. You can also fit in more physical activity by taking the stairs instead of the elevator or parking farther away from stores. Ask your healthcare provider about the best exercise plan for you.   Alcohol Use and Your Health    Drinking too much can harm your health.  Excessive alcohol use leads to about 88,000 death in the [place] each year, and shortens the life of those who diet by almost 30 years.  Further, excessive drinking cost the economy $249 billion in 2010.  Most excessive drinkers are not alcohol dependent.    Excessive alcohol use has immediate effects that increase the risk of many harmful health conditions.  These are most often the result of binge drinking.  Over time, excessive alcohol use can lead to the development of chronic diseases and other series health problems.    What is considered a \"drink\"?        Excessive alcohol use includes:  Binge Drinking: For women, 4 or more drinks consumed on one occasion. For men, 5 or more drinks consumed on one occasion.  Heavy Drinking: For women, 8 or more drinks per week. For men, 15 or more drinks per week  Any alcohol used by pregnant women  Any alcohol used by those under the age of 21 years    If you choose to drink, do so in moderation:  Do not drink at all if you are under the age of 21, or if you are or may be pregnant, or have health problems that could be made worse by drinking.  For women, up to 1 drink per day  For men, up to 2 drinks a day    No one should begin drinking or drink more frequently based on potential health " benefits    Short-Term Health Risks:  Injuries: motor vehicle crashes, falls, drownings, burns  Violence: homicide, suicide, sexual assault, intimate partner violence  Alcohol poisoning  Reproductive health: risky sexual behaviors, unintended prengnacy, sexually transmitted diseases, miscarriage, stillbirth, fetal alcohol syndrome    Long-Term Health Risks:  Chronic diseases: high blood pressure, heart disease, stroke, liver disease, digestive problems  Cancers: breast, mouth and throat, liver, colon  Learning and memory problems: dementia, poor school performance  Mental health: depression, anxiety, insomnia  Social problems: lost productivity, family problems, unemployment  Alcohol dependence    For support and more information:  Substance Abuse and Mental Health Services Administration  [address]  [place] [address]  Web Address: http://www.samhsa.gov    Alcoholics Anonymous        Web Address: http://www.aa.org    https://www.cdc.gov/alcohol/fact-sheets/alcohol-use.htm     © Copyright [employer] 2018 Information is for End User's use only and may not be sold, redistributed or otherwise used for commercial purposes. All illustrations and images included in CareNotes® are the copyrighted property of [employer] or [employer]      Here for AWV and is UTD and home is safe and ADL's intact. Take all meds as directed and trial of benadryl prn sleep and failed ambien in past. Take BP med and see cardiologist. Check all labs. Gout stable. Take cholesterol med.

## 2025-01-16 NOTE — ASSESSMENT & PLAN NOTE
stable  Orders:  •  Comprehensive metabolic panel; Future  •  Lipid Panel with Direct LDL reflex; Future

## 2025-01-16 NOTE — PROGRESS NOTES
Name: Pancho Schwarz      : 1960      MRN: [id number]  Encounter Provider: Pernell Nayak DO  Encounter Date: 2025   Encounter department: [facility] PRIMARY CARE  Chief Complaint   Patient presents with   • Medicare Wellness Visit     Patient Instructions   Medicare Preventive Visit Patient Instructions  Thank you for completing your Welcome to Medicare Visit or Medicare Annual Wellness Visit today. Your next wellness visit will be due in one year (2026).  The screening/preventive services that you may require over the next 5-10 years are detailed below. Some tests may not apply to you based off risk factors and/or age. Screening tests ordered at today's visit but not completed yet may show as past due. Also, please note that scanned in results may not display below.  Preventive Screenings:  Service Recommendations Previous Testing/Comments   Colorectal Cancer Screening  Colonoscopy    Fecal Occult Blood Test (FOBT)/Fecal Immunochemical Test (FIT)  Fecal DNA/Cologuard Test  Flexible Sigmoidoscopy Age: 45-75 years old   Colonoscopy: every 10 years (May be performed more frequently if at higher risk)  OR  FOBT/FIT: every 1 year  OR  Cologuard: every 3 years  OR  Sigmoidoscopy: every 5 years  Screening may be recommended earlier than age 45 if at higher risk for colorectal cancer. Also, an individualized decision between you and your healthcare provider will decide whether screening between the ages of 76-85 would be appropriate. Colonoscopy: 2020  FOBT/FIT: Not on file  Cologuard: Not on file  Sigmoidoscopy: Not on file    Screening Current     Prostate Cancer Screening Individualized decision between patient and health care provider in men between ages of 55-69   Medicare will cover every 12 months beginning on the day after your 50th birthday PSA: 0.82 ng/mL     Screening Current     Hepatitis C Screening Once for adults born between 1945 and 1965  More frequently in  patients at high risk for Hepatitis C Hep C Antibody: Not on file        Diabetes Screening 1-2 times per year if you're at risk for diabetes or have pre-diabetes Fasting glucose: 99 mg/dL (5/1/2024)  A1C: 5.0 % (5/1/2024)  Screening Current   Cholesterol Screening Once every 5 years if you don't have a lipid disorder. May order more often based on risk factors. Lipid panel: 05/01/2024  Screening Not Indicated  History Lipid Disorder      Other Preventive Screenings Covered by Medicare:  Abdominal Aortic Aneurysm (AAA) Screening: covered once if your at risk. You're considered to be at risk if you have a family history of AAA or a male between the age of 65-75 who smoking at least 100 cigarettes in your lifetime.  Lung Cancer Screening: covers low dose CT scan once per year if you meet all of the following conditions: (1) Age 55-77; (2) No signs or symptoms of lung cancer; (3) Current smoker or have quit smoking within the last 15 years; (4) You have a tobacco smoking history of at least 20 pack years (packs per day x number of years you smoked); (5) You get a written order from a healthcare provider.  Glaucoma Screening: covered annually if you're considered high risk: (1) You have diabetes OR (2) Family history of glaucoma OR (3)  aged 50 and older OR (4)  American aged 65 and older  Osteoporosis Screening: covered every 2 years if you meet one of the following conditions: (1) Have a vertebral abnormality; (2) On glucocorticoid therapy for more than 3 months; (3) Have primary hyperparathyroidism; (4) On osteoporosis medications and need to assess response to drug therapy.  HIV Screening: covered annually if you're between the age of 15-65. Also covered annually if you are younger than 15 and older than 65 with risk factors for HIV infection. For pregnant patients, it is covered up to 3 times per pregnancy.    Immunizations:  Immunization Recommendations   Influenza Vaccine Annual influenza  vaccination during flu season is recommended for all persons aged >= 6 months who do not have contraindications   Pneumococcal Vaccine   * Pneumococcal conjugate vaccine = PCV13 (Prevnar 13), PCV15 (Vaxneuvance), PCV20 (Prevnar 20)  * Pneumococcal polysaccharide vaccine = PPSV23 (Pneumovax) Adults 19-65 yo with certain risk factors or if 65+ yo  If never received any pneumonia vaccine: recommend Prevnar 20 (PCV20)  Give PCV20 if previously received 1 dose of PCV13 or PPSV23   Hepatitis B Vaccine 3 dose series if at intermediate or high risk (ex: diabetes, end stage renal disease, liver disease)   Respiratory syncytial virus (RSV) Vaccine - COVERED BY MEDICARE PART D  * RSVPreF3 (Arexvy) CDC recommends that adults 60 years of age and older may receive a single dose of RSV vaccine using shared clinical decision-making (SCDM)   Tetanus (Td) Vaccine - COST NOT COVERED BY MEDICARE PART B Following completion of primary series, a booster dose should be given every 10 years to maintain immunity against tetanus. Td may also be given as tetanus wound prophylaxis.   Tdap Vaccine - COST NOT COVERED BY MEDICARE PART B Recommended at least once for all adults. For pregnant patients, recommended with each pregnancy.   Shingles Vaccine (Shingrix) - COST NOT COVERED BY MEDICARE PART B  2 shot series recommended in those 19 years and older who have or will have weakened immune systems or those 50 years and older     Health Maintenance Due:      Topic Date Due   • Hepatitis C Screening  Never done   • HIV Screening  Never done   • Colorectal Cancer Screening  01/13/2025     Immunizations Due:      Topic Date Due   • Pneumococcal Vaccine: 65+ Years (1 of 2 - PCV) Never done   • Influenza Vaccine (1) 09/01/2024   • COVID-19 Vaccine (4 - 2024-25 season) 09/01/2024     Advance Directives   What are advance directives?  Advance directives are legal documents that state your wishes and plans for medical care. These plans are made ahead of  time in case you lose your ability to make decisions for yourself. Advance directives can apply to any medical decision, such as the treatments you want, and if you want to donate organs.   What are the types of advance directives?  There are many types of advance directives, and each state has rules about how to use them. You may choose a combination of any of the following:  Living will:  This is a written record of the treatment you want. You can also choose which treatments you do not want, which to limit, and which to stop at a certain time. This includes surgery, medicine, IV fluid, and tube feedings.   Durable power of  for healthcare (DPAHC):  This is a written record that states who you want to make healthcare choices for you when you are unable to make them for yourself. This person, called a proxy, is usually a family member or a friend. You may choose more than 1 proxy.  Do not resuscitate (DNR) order:  A DNR order is used in case your heart stops beating or you stop breathing. It is a request not to have certain forms of treatment, such as CPR. A DNR order may be included in other types of advance directives.  Medical directive:  This covers the care that you want if you are in a coma, near death, or unable to make decisions for yourself. You can list the treatments you want for each condition. Treatment may include pain medicine, surgery, blood transfusions, dialysis, IV or tube feedings, and a ventilator (breathing machine).  Values history:  This document has questions about your views, beliefs, and how you feel and think about life. This information can help others choose the care that you would choose.  Why are advance directives important?  An advance directive helps you control your care. Although spoken wishes may be used, it is better to have your wishes written down. Spoken wishes can be misunderstood, or not followed. Treatments may be given even if you do not want them. An advance  directive may make it easier for your family to make difficult choices about your care.   Weight Management   Why it is important to manage your weight:  Being overweight increases your risk of health conditions such as heart disease, high blood pressure, type 2 diabetes, and certain types of cancer. It can also increase your risk for osteoarthritis, sleep apnea, and other respiratory problems. Aim for a slow, steady weight loss. Even a small amount of weight loss can lower your risk of health problems.  How to lose weight safely:  A safe and healthy way to lose weight is to eat fewer calories and get regular exercise. You can lose up about 1 pound a week by decreasing the number of calories you eat by 500 calories each day.   Healthy meal plan for weight management:  A healthy meal plan includes a variety of foods, contains fewer calories, and helps you stay healthy. A healthy meal plan includes the following:  Eat whole-grain foods more often.  A healthy meal plan should contain fiber. Fiber is the part of grains, fruits, and vegetables that is not broken down by your body. Whole-grain foods are healthy and provide extra fiber in your diet. Some examples of whole-grain foods are whole-wheat breads and pastas, oatmeal, brown rice, and bulgur.  Eat a variety of vegetables every day.  Include dark, leafy greens such as spinach, kale, tarik greens, and mustard greens. Eat yellow and orange vegetables such as carrots, sweet potatoes, and winter squash.   Eat a variety of fruits every day.  Choose fresh or canned fruit (canned in its own juice or light syrup) instead of juice. Fruit juice has very little or no fiber.  Eat low-fat dairy foods.  Drink fat-free (skim) milk or 1% milk. Eat fat-free yogurt and low-fat cottage cheese. Try low-fat cheeses such as mozzarella and other reduced-fat cheeses.  Choose meat and other protein foods that are low in fat.  Choose beans or other legumes such as split peas or lentils.  "Choose fish, skinless poultry (chicken or turkey), or lean cuts of red meat (beef or pork). Before you cook meat or poultry, cut off any visible fat.   Use less fat and oil.  Try baking foods instead of frying them. Add less fat, such as margarine, sour cream, regular salad dressing and mayonnaise to foods. Eat fewer high-fat foods. Some examples of high-fat foods include french fries, doughnuts, ice cream, and cakes.  Eat fewer sweets.  Limit foods and drinks that are high in sugar. This includes candy, cookies, regular soda, and sweetened drinks.  Exercise:  Exercise at least 30 minutes per day on most days of the week. Some examples of exercise include walking, biking, dancing, and swimming. You can also fit in more physical activity by taking the stairs instead of the elevator or parking farther away from stores. Ask your healthcare provider about the best exercise plan for you.   Alcohol Use and Your Health    Drinking too much can harm your health.  Excessive alcohol use leads to about 88,000 death in the [place] each year, and shortens the life of those who diet by almost 30 years.  Further, excessive drinking cost the economy $249 billion in 2010.  Most excessive drinkers are not alcohol dependent.    Excessive alcohol use has immediate effects that increase the risk of many harmful health conditions.  These are most often the result of binge drinking.  Over time, excessive alcohol use can lead to the development of chronic diseases and other series health problems.    What is considered a \"drink\"?        Excessive alcohol use includes:  Binge Drinking: For women, 4 or more drinks consumed on one occasion. For men, 5 or more drinks consumed on one occasion.  Heavy Drinking: For women, 8 or more drinks per week. For men, 15 or more drinks per week  Any alcohol used by pregnant women  Any alcohol used by those under the age of 21 years    If you choose to drink, do so in moderation:  Do not drink at all " if you are under the age of 21, or if you are or may be pregnant, or have health problems that could be made worse by drinking.  For women, up to 1 drink per day  For men, up to 2 drinks a day    No one should begin drinking or drink more frequently based on potential health benefits    Short-Term Health Risks:  Injuries: motor vehicle crashes, falls, drownings, burns  Violence: homicide, suicide, sexual assault, intimate partner violence  Alcohol poisoning  Reproductive health: risky sexual behaviors, unintended prengnacy, sexually transmitted diseases, miscarriage, stillbirth, fetal alcohol syndrome    Long-Term Health Risks:  Chronic diseases: high blood pressure, heart disease, stroke, liver disease, digestive problems  Cancers: breast, mouth and throat, liver, colon  Learning and memory problems: dementia, poor school performance  Mental health: depression, anxiety, insomnia  Social problems: lost productivity, family problems, unemployment  Alcohol dependence    For support and more information:  Substance Abuse and Mental Health Services Administration  [address]  [place] [address]  Web Address: http://www.samhsa.gov    Alcoholics Anonymous        Web Address: http://www.aa.org    https://www.cdc.gov/alcohol/fact-sheets/alcohol-use.htm     © Copyright [employer] 2018 Information is for End User's use only and may not be sold, redistributed or otherwise used for commercial purposes. All illustrations and images included in CareNotes® are the copyrighted property of [employer] or [employer]      Here for AWV and is UTD and home is safe and ADL's intact. Take all meds as directed and trial of benadryl prn sleep and failed ambien in past. Take BP med and see cardiologist. Check all labs. Gout stable. Take cholesterol med.     Assessment & Plan  Medicare annual wellness visit, initial         Health care maintenance    Orders:  •  Comprehensive metabolic panel; Future  •  CBC and  differential; Future  •  Lipid Panel with Direct LDL reflex; Future  •  PSA, Total Screen; Future  •  Uric acid; Future    Essential hypertension  stable  Orders:  •  Comprehensive metabolic panel; Future    Mixed hyperlipidemia  stable  Orders:  •  Comprehensive metabolic panel; Future  •  Lipid Panel with Direct LDL reflex; Future    Other secondary gout, unspecified chronicity, unspecified site  Stable on med       Obesity (BMI 30-39.9)  Lose weight as directed to get BMI lower than 25         Insomnia, unspecified type  Trial of benadryl       Coronary artery disease involving native coronary artery of native heart without angina pectoris  Stable and sees Cardiology       Screening for prostate cancer    Orders:  •  PSA, Total Screen; Future    Gout, unspecified cause, unspecified chronicity, unspecified site    Orders:  •  Comprehensive metabolic panel; Future  •  Uric acid; Future      Depression Screening and Follow-up Plan: Patient was screened for depression during today's encounter. They screened negative with a PHQ-2 score of 0.      Preventive health issues were discussed with patient, and age appropriate screening tests were ordered as noted in patient's After Visit Summary. Personalized health advice and appropriate referrals for health education or preventive services given if needed, as noted in patient's After Visit Summary.    History of Present Illness     Here for awv and advanced care planning is UTD and adl's is UTD, home is safe and no memory concerns. No cp or sob, or ha.       Patient Care Team:  Pernell Nayak DO as PCP - General  Pernell Nayak DO as PCP - PCP-[facility]  DO Ward Villatoro MD    Review of Systems   Constitutional: Negative.    HENT: Negative.     Eyes: Negative.    Respiratory: Negative.     Cardiovascular: Negative.    Gastrointestinal: Negative.    Endocrine: Negative.    Genitourinary: Negative.    Musculoskeletal:  Negative.    Skin: Negative.    Allergic/Immunologic: Negative.    Neurological: Negative.    Hematological: Negative.    Psychiatric/Behavioral: Negative.       Medical History Reviewed by provider this encounter:  Tobacco  Allergies  Meds  Problems  Med Hx  Surg Hx  Fam Hx       Annual Wellness Visit Questionnaire   Pancho is here for his Subsequent Wellness visit.     Health Risk Assessment:   Patient rates overall health as very good. Patient feels that their physical health rating is slightly worse. Patient is very satisfied with their life. Eyesight was rated as same. Hearing was rated as same. Patient feels that their emotional and mental health rating is slightly better. Patients states they are never, rarely angry. Patient states they are sometimes unusually tired/fatigued. Pain experienced in the last 7 days has been none. Patient states that he has experienced no weight loss or gain in last 6 months.     Depression Screening:   PHQ-2 Score: 0  PHQ-9 Score: 0      Fall Risk Screening:   In the past year, patient has experienced: no history of falling in past year      Home Safety:  Patient does not have trouble with stairs inside or outside of their home. Patient has working smoke alarms and has working carbon monoxide detector. Home safety hazards include: none.     Nutrition:   Current diet is Unhealthy.     Medications:   Patient is not currently taking any over-the-counter supplements. Patient is able to manage medications.     Activities of Daily Living (ADLs)/Instrumental Activities of Daily Living (IADLs):   Walk and transfer into and out of bed and chair?: Yes  Dress and groom yourself?: Yes    Bathe or shower yourself?: Yes    Feed yourself? Yes  Do your laundry/housekeeping?: Yes  Manage your money, pay your bills and track your expenses?: Yes  Make your own meals?: Yes    Do your own shopping?: Yes    Previous Hospitalizations:   Any hospitalizations or ED visits within the last 12 months?:  No      Advance Care Planning:   Living will: Yes    Durable POA for healthcare: Yes    Advanced directive: Yes      Cognitive Screening:   Provider or family/friend/caregiver concerned regarding cognition?: No    PREVENTIVE SCREENINGS      Cardiovascular Screening:    General: Screening Not Indicated and History Lipid Disorder      Diabetes Screening:     General: Screening Current      Colorectal Cancer Screening:     General: Screening Current      Prostate Cancer Screening:    General: Screening Current      Abdominal Aortic Aneurysm (AAA) Screening:    Risk factors include: age between 65-74 yo        Lung Cancer Screening:     General: Screening Not Indicated    Screening, Brief Intervention, and Referral to Treatment (SBIRT)    Screening  Typical number of drinks in a day: 0  Typical number of drinks in a week: 5  Interpretation: Low risk drinking behavior.    AUDIT-C Screenin) How often did you have a drink containing alcohol in the past year? 2 to 4 times a month  2) How many drinks did you have on a typical day when you were drinking in the past year? 3 to 4  3) How often did you have 6 or more drinks on one occasion in the past year? monthly    AUDIT-C Score: 4  Interpretation: Score 4-12 (male): POSITIVE screen for alcohol misuse    AUDIT Screenin) How often during the last year have you found that you were not able to stop drinking once you had started? 0 - never  5) How often during the last year have you failed to do what was normally expected from you because of drinking? 0 - never  6) How often during the last year have you needed a first drink in the morning to get yourself going after a heavy drinking session? 0 - never  7) How often during the last year have you had a feeling of guilt or remorse after drinking? 0 - never  8) How often during the last year have you been unable to remember what happened the night before because you had been drinking? 0 - never  9) Have you or someone else  "been injured as a result of your drinking? 0 - no  10) Has a relative or friend or a doctor or another health worker been concerned about your drinking or suggested you cut down? 0 - no    AUDIT Score: 4  Interpretation: Low risk alcohol consumption    Single Item Drug Screening:  How often have you used an illegal drug (including marijuana) or a prescription medication for non-medical reasons in the past year? never    Single Item Drug Screen Score: 0  Interpretation: Negative screen for possible drug use disorder    Social Drivers of Health     Food Insecurity: No Food Insecurity (1/16/2025)    Hunger Vital Sign    • Worried About Running Out of Food in the Last Year: Never true    • Ran Out of Food in the Last Year: Never true   Transportation Needs: No Transportation Needs (1/16/2025)    PRAPARE - Transportation    • Lack of Transportation (Medical): No    • Lack of Transportation (Non-Medical): No   Housing Stability: Unknown (1/16/2025)    Housing Stability Vital Sign    • Unable to Pay for Housing in the Last Year: No    • Homeless in the Last Year: No   Utilities: Not At Risk (1/16/2025)    [facility] Utilities    • Threatened with loss of utilities: No     No results found.    Objective   /80   Pulse 68   Temp (!) 97.3 °F (36.3 °C)   Ht 5' 7\" (1.702 m)   Wt 95.1 kg (209 lb 9.6 oz)   SpO2 98%   BMI 32.83 kg/m²     Physical Exam  Constitutional:       Appearance: He is well-developed. He is obese.   HENT:      Head: Normocephalic and atraumatic.      Right Ear: External ear normal.      Left Ear: External ear normal.      Nose: Nose normal.      Mouth/Throat:      Mouth: Mucous membranes are moist.   Eyes:      Conjunctiva/sclera: Conjunctivae normal.      Pupils: Pupils are equal, round, and reactive to light.   Cardiovascular:      Rate and Rhythm: Normal rate and regular rhythm.      Pulses: Normal pulses.      Heart sounds: Normal heart sounds.   Pulmonary:      Effort: Pulmonary effort is normal.    "   Breath sounds: Normal breath sounds.   Abdominal:      General: Abdomen is flat. Bowel sounds are normal.      Palpations: Abdomen is soft.   Musculoskeletal:         General: Normal range of motion.      Cervical back: Normal range of motion and neck supple.   Skin:     General: Skin is warm and dry.      Capillary Refill: Capillary refill takes less than 2 seconds.   Neurological:      General: No focal deficit present.      Mental Status: He is alert and oriented to person, place, and time. Mental status is at baseline.      Deep Tendon Reflexes: Reflexes are normal and symmetric.   Psychiatric:         Mood and Affect: Mood normal.         Behavior: Behavior normal.         Thought Content: Thought content normal.         Judgment: Judgment normal.       Administrative Statements   I have spent a total time of 30 minutes in caring for this patient on the day of the visit/encounter including Diagnostic results, Prognosis, Risks and benefits of tx options, Instructions for management, Patient and family education, Importance of tx compliance, Risk factor reductions, Impressions, Counseling / Coordination of care, Documenting in the medical record, Reviewing / ordering tests, medicine, procedures  , and Obtaining or reviewing history  .

## 2025-02-05 DIAGNOSIS — I10 ESSENTIAL HYPERTENSION: ICD-10-CM

## 2025-02-06 RX ORDER — CANDESARTAN CILEXETIL AND HYDROCHLOROTHIAZIDE 16; 12.5 MG/1; MG/1
1 TABLET ORAL DAILY
Qty: 30 TABLET | Refills: 0 | Status: SHIPPED | OUTPATIENT
Start: 2025-02-06

## 2025-03-04 DIAGNOSIS — M10.9 GOUT, UNSPECIFIED CAUSE, UNSPECIFIED CHRONICITY, UNSPECIFIED SITE: ICD-10-CM

## 2025-03-04 DIAGNOSIS — E78.2 MIXED HYPERLIPIDEMIA: ICD-10-CM

## 2025-03-04 DIAGNOSIS — I10 ESSENTIAL HYPERTENSION: ICD-10-CM

## 2025-03-05 RX ORDER — ALLOPURINOL 100 MG/1
100 TABLET ORAL DAILY
Qty: 90 TABLET | Refills: 1 | Status: SHIPPED | OUTPATIENT
Start: 2025-03-05

## 2025-03-05 RX ORDER — CANDESARTAN CILEXETIL AND HYDROCHLOROTHIAZIDE 16; 12.5 MG/1; MG/1
1 TABLET ORAL DAILY
Qty: 90 TABLET | Refills: 1 | Status: SHIPPED | OUTPATIENT
Start: 2025-03-05

## 2025-03-05 RX ORDER — ATORVASTATIN CALCIUM 20 MG/1
20 TABLET, FILM COATED ORAL DAILY
Qty: 90 TABLET | Refills: 1 | Status: SHIPPED | OUTPATIENT
Start: 2025-03-05

## 2025-04-24 ENCOUNTER — OFFICE VISIT (OUTPATIENT)
Dept: CARDIOLOGY CLINIC | Facility: CLINIC | Age: 65
End: 2025-04-24
Payer: COMMERCIAL

## 2025-04-24 VITALS
SYSTOLIC BLOOD PRESSURE: 122 MMHG | DIASTOLIC BLOOD PRESSURE: 70 MMHG | HEIGHT: 67 IN | HEART RATE: 63 BPM | BODY MASS INDEX: 32.65 KG/M2 | WEIGHT: 208 LBS

## 2025-04-24 DIAGNOSIS — I25.10 CORONARY ARTERY DISEASE INVOLVING NATIVE CORONARY ARTERY OF NATIVE HEART WITHOUT ANGINA PECTORIS: ICD-10-CM

## 2025-04-24 DIAGNOSIS — I10 ESSENTIAL HYPERTENSION: Primary | ICD-10-CM

## 2025-04-24 PROCEDURE — 93000 ELECTROCARDIOGRAM COMPLETE: CPT | Performed by: INTERNAL MEDICINE

## 2025-04-24 PROCEDURE — 99214 OFFICE O/P EST MOD 30 MIN: CPT | Performed by: INTERNAL MEDICINE

## 2025-04-24 NOTE — PROGRESS NOTES
"      Cardiology             Pancho Schwarz  1960  [id number]              Assessment/Plan:    CAD status post PCI 8/2012 in the setting of ACS  Hypertension  Dyslipidemia        No symptoms of angina, continue low-dose aspirin  Continue atorvastatin 20 mg daily.  Prior lipid panel reviewed from 2024, well-controlled.  Repeat lipid panel has been ordered and pending   Blood pressure controlled, continue candesartan/HCTZ, metoprolol          Follow-up in 1 year          Interval History:     This is a 64-year-old nice gentleman with history of CAD status post stenting 8/2012 in the setting of ACS.  He has been following Dr. Cerda in the past.    Nuclear stress test 9/15/2022 was a normal study with no evidence of myocardial ischemia.    Prior echocardiogram 12/2020 revealed ejection fraction 65% with grade 1 diastolic dysfunction.    He presents today for follow-up with no complaints.  He feels well without exertional chest pain, shortness of breath, dizziness, palpitations, lower extreme edema.                  Vitals:  Vitals:    04/24/25 0810   BP: 122/70   Pulse: 63   Weight: 94.3 kg (208 lb)   Height: 5' 7\" (1.702 m)         Past Medical History:   Diagnosis Date   • Acute myocardial infarction (HCC)     \"no heart damage\" approx 8 yrs ago did get one stent\"   • Arthralgia     last assessed 7/8/13   • Arthritis    • Colon polyp    • Contusion of skin with intact surface     of the left medial thigh,last assessed 6/28/13   • Coronary artery disease    • Gout     last assessed 10/29/13   • History of sepsis     urinary and was in hosp 3 days /7/2020   • Hyperlipidemia    • Hypertension    • Kidney stone    • Lump of skin     last assessed 7/19/13//\"fatty tumor and surg removed\"   • S/P coronary artery stent placement     x1   • Wears glasses     at night     Social History     Socioeconomic History   • Marital status: /Civil Union     Spouse name: Not on file   • Number of children: Not on file   • " Years of education: Not on file   • Highest education level: Not on file   Occupational History   • Not on file   Tobacco Use   • Smoking status: Never   • Smokeless tobacco: Never   Vaping Use   • Vaping status: Never Used   Substance and Sexual Activity   • Alcohol use: Yes     Alcohol/week: 5.0 standard drinks of alcohol     Types: 5 Shots of liquor per week   • Drug use: No   • Sexual activity: Yes     Partners: Female     Birth control/protection: Male Sterilization   Other Topics Concern   • Not on file   Social History Narrative   • Not on file     Social Drivers of Health     Financial Resource Strain: Not on file   Food Insecurity: No Food Insecurity (1/16/2025)    Hunger Vital Sign    • Worried About Running Out of Food in the Last Year: Never true    • Ran Out of Food in the Last Year: Never true   Transportation Needs: No Transportation Needs (1/16/2025)    PRAPARE - Transportation    • Lack of Transportation (Medical): No    • Lack of Transportation (Non-Medical): No   Physical Activity: Not on file   Stress: Not on file   Social Connections: Not on file   Intimate Partner Violence: Not on file   Housing Stability: Unknown (1/16/2025)    Housing Stability Vital Sign    • Unable to Pay for Housing in the Last Year: No    • Number of Times Moved in the Last Year: Not on file    • Homeless in the Last Year: No      Family History   Problem Relation Age of Onset   • Edema Mother         Cerebral   • Stroke Mother    • Aneurysm Father         of the cerebellar artery   • Stroke Father    • Aneurysm Brother         of the cerebellar artery     Past Surgical History:   Procedure Laterality Date   • CARDIAC CATHETERIZATION     • COLONOSCOPY  12/10/2009    Complete//2020   • CORONARY STENT PLACEMENT  08/2012    stenting of the LAD artery 95% lesion to 0% residual stenosis   • FL RETROGRADE PYELOGRAM  9/1/2021   • KNEE SURGERY Left     x4   • LUMBAR EPIDURAL INJECTION      x1   • [place] CYSTO/URETERO W/LITHOTRIPSY  &INDWELL STENT INSRT Left 8/5/2020    Procedure: CYSTO, URETEROSCOPY STENT exchange;  Surgeon: Ward Forbes MD;  [facility];  Service: Urology   • OR CYSTO/URETERO W/LITHOTRIPSY &INDWELL STENT INSRT Right 9/1/2021    Procedure: CYSTOSCOPY URETEROSCOPY WITH BASKET STONE EXTRACTION, RETROGRADE PYELOGRAM AND INSERTION STENT URETERAL;  Surgeon: Christoph Ordaz MD;  [facility];  Service: Urology   • OR CYSTOURETHROSCOPY W/URETERAL CATHETERIZATION Left 7/4/2020    Procedure: CYSTOSCOPY WITH INSERTION STENT URETERAL;  Surgeon: Ferny Gates MD;  [facility];  Service: Urology   • OR TENDON SHEATH INCISION Right 11/22/2024    Procedure: RELEASE TRIGGER FINGER, RIGHT MIDDLE;  Surgeon: Jose M Yoder MD;  [facility];  Service: Orthopedics   • RETROMASTOID CRANIOTOMY Left 8/7/2023    Procedure: Left middle fossa craniotomy, retromastoid approach, for repair tegeman defect with temporalis muscle fascia graft;  Surgeon: Bria Gary MD;  [facility];  Service: ENT   • RETROMASTOID CRANIOTOMY Left 8/7/2023    Procedure: Endoscopic left middle fossa craniotomy for repair of tegmen defect and CSF leak with temporalis muscle fascia flap, with neuromonitoring (CN 7/8) and intraoperative lumbar drain placement;  Surgeon: Gómez Rodriguez MD;  [facility];  Service: Neurosurgery       Current Outpatient Medications:   •  allopurinol (ZYLOPRIM) 100 mg tablet, TAKE ONE TABLET BY MOUTH ONCE DAILY, Disp: 90 tablet, Rfl: 1  •  aspirin 81 mg chewable tablet, Chew 81 mg daily, Disp: , Rfl:   •  atorvastatin (LIPITOR) 20 mg tablet, Take 1 tablet (20 mg total) by mouth daily, Disp: 90 tablet, Rfl: 1  •  candesartan-hydrochlorothiazide (ATACAND HCT) 16-12.5 MG per tablet, TAKE ONE TABLET BY MOUTH ONCE DAILY., Disp: 90 tablet, Rfl: 1  •  metoprolol tartrate (LOPRESSOR) 25 mg tablet, Take 0.5 tablets (12.5 mg total) by mouth every 12 (twelve) hours, Disp: 180 tablet, Rfl: 1        Review  of Systems:  Review of Systems   Respiratory: Negative.  Negative for chest tightness.    Cardiovascular: Negative.    All other systems reviewed and are negative.        Physical Exam:  Physical Exam  Constitutional:       General: He is not in acute distress.     Appearance: He is well-developed. He is not diaphoretic.   HENT:      Head: Normocephalic and atraumatic.   Eyes:      General: No scleral icterus.        Right eye: No discharge.      Pupils: Pupils are equal, round, and reactive to light.   Neck:      Thyroid: No thyromegaly.   Cardiovascular:      Rate and Rhythm: Normal rate and regular rhythm.      Heart sounds: Normal heart sounds. No murmur heard.     No friction rub. No gallop.   Pulmonary:      Effort: Pulmonary effort is normal.      Breath sounds: Normal breath sounds.   Abdominal:      General: There is no distension.      Tenderness: There is no abdominal tenderness. There is no guarding or rebound.   Musculoskeletal:         General: Normal range of motion.      Cervical back: Normal range of motion and neck supple.      Right lower leg: No edema.      Left lower leg: No edema.   Skin:     General: Skin is warm and dry.      Coloration: Skin is not pale.      Findings: No erythema or rash.   Neurological:      Mental Status: He is alert and oriented to person, place, and time.      Coordination: Coordination normal.   Psychiatric:         Behavior: Behavior normal.         Thought Content: Thought content normal.         Judgment: Judgment normal.         This note was completed in part utilizing [employer] Software.  Grammatical errors, random word insertions, spelling mistakes, and incomplete sentences can be an occasional consequence of this system secondary to software limitations, ambient noise, and hardware issues.  If you have any questions or concerns about the content, text, or information contained within the body of this dictation, please contact the provider for  clarification.

## 2025-07-01 ENCOUNTER — APPOINTMENT (OUTPATIENT)
Dept: LAB | Age: 65
End: 2025-07-01
Payer: COMMERCIAL

## 2025-07-01 DIAGNOSIS — I10 ESSENTIAL HYPERTENSION: ICD-10-CM

## 2025-07-01 DIAGNOSIS — M10.9 GOUT, UNSPECIFIED CAUSE, UNSPECIFIED CHRONICITY, UNSPECIFIED SITE: ICD-10-CM

## 2025-07-01 DIAGNOSIS — Z00.00 HEALTH CARE MAINTENANCE: ICD-10-CM

## 2025-07-01 DIAGNOSIS — Z12.5 SCREENING FOR PROSTATE CANCER: ICD-10-CM

## 2025-07-01 DIAGNOSIS — E78.2 MIXED HYPERLIPIDEMIA: ICD-10-CM

## 2025-07-01 LAB
ALBUMIN SERPL BCG-MCNC: 4.3 G/DL (ref 3.5–5)
ALP SERPL-CCNC: 78 U/L (ref 34–104)
ALT SERPL W P-5'-P-CCNC: 33 U/L (ref 7–52)
ANION GAP SERPL CALCULATED.3IONS-SCNC: 7 MMOL/L (ref 4–13)
AST SERPL W P-5'-P-CCNC: 32 U/L (ref 13–39)
BASOPHILS # BLD AUTO: 0.05 THOUSANDS/ÂΜL (ref 0–0.1)
BASOPHILS NFR BLD AUTO: 1 % (ref 0–1)
BILIRUB SERPL-MCNC: 1.24 MG/DL (ref 0.2–1)
BUN SERPL-MCNC: 18 MG/DL (ref 5–25)
CALCIUM SERPL-MCNC: 8.9 MG/DL (ref 8.4–10.2)
CHLORIDE SERPL-SCNC: 102 MMOL/L (ref 96–108)
CHOLEST SERPL-MCNC: 125 MG/DL (ref ?–200)
CO2 SERPL-SCNC: 29 MMOL/L (ref 21–32)
CREAT SERPL-MCNC: 0.96 MG/DL (ref 0.6–1.3)
EOSINOPHIL # BLD AUTO: 0.16 THOUSAND/ÂΜL (ref 0–0.61)
EOSINOPHIL NFR BLD AUTO: 4 % (ref 0–6)
ERYTHROCYTE [DISTWIDTH] IN BLOOD BY AUTOMATED COUNT: 12.6 % (ref 11.6–15.1)
GFR SERPL CREATININE-BSD FRML MDRD: 82 ML/MIN/1.73SQ M
GLUCOSE P FAST SERPL-MCNC: 105 MG/DL (ref 65–99)
HCT VFR BLD AUTO: 39.4 % (ref 36.5–49.3)
HDLC SERPL-MCNC: 32 MG/DL
HGB BLD-MCNC: 14 G/DL (ref 12–17)
IMM GRANULOCYTES # BLD AUTO: 0.01 THOUSAND/UL (ref 0–0.2)
IMM GRANULOCYTES NFR BLD AUTO: 0 % (ref 0–2)
LDLC SERPL CALC-MCNC: 49 MG/DL (ref 0–100)
LYMPHOCYTES # BLD AUTO: 1.39 THOUSANDS/ÂΜL (ref 0.6–4.47)
LYMPHOCYTES NFR BLD AUTO: 31 % (ref 14–44)
MCH RBC QN AUTO: 31.7 PG (ref 26.8–34.3)
MCHC RBC AUTO-ENTMCNC: 35.5 G/DL (ref 31.4–37.4)
MCV RBC AUTO: 89 FL (ref 82–98)
MONOCYTES # BLD AUTO: 0.38 THOUSAND/ÂΜL (ref 0.17–1.22)
MONOCYTES NFR BLD AUTO: 9 % (ref 4–12)
NEUTROPHILS # BLD AUTO: 2.46 THOUSANDS/ÂΜL (ref 1.85–7.62)
NEUTS SEG NFR BLD AUTO: 55 % (ref 43–75)
NRBC BLD AUTO-RTO: 0 /100 WBCS
PLATELET # BLD AUTO: 175 THOUSANDS/UL (ref 149–390)
PMV BLD AUTO: 10.2 FL (ref 8.9–12.7)
POTASSIUM SERPL-SCNC: 4.3 MMOL/L (ref 3.5–5.3)
PROT SERPL-MCNC: 6.7 G/DL (ref 6.4–8.4)
PSA SERPL-MCNC: 0.71 NG/ML (ref 0–4)
RBC # BLD AUTO: 4.41 MILLION/UL (ref 3.88–5.62)
SODIUM SERPL-SCNC: 138 MMOL/L (ref 135–147)
TRIGL SERPL-MCNC: 218 MG/DL (ref ?–150)
URATE SERPL-MCNC: 6.3 MG/DL (ref 3.5–8.5)
WBC # BLD AUTO: 4.45 THOUSAND/UL (ref 4.31–10.16)

## 2025-07-01 PROCEDURE — 84550 ASSAY OF BLOOD/URIC ACID: CPT

## 2025-07-01 PROCEDURE — G0103 PSA SCREENING: HCPCS

## 2025-07-01 PROCEDURE — 80061 LIPID PANEL: CPT

## 2025-07-01 PROCEDURE — 85025 COMPLETE CBC W/AUTO DIFF WBC: CPT

## 2025-07-01 PROCEDURE — 80053 COMPREHEN METABOLIC PANEL: CPT

## 2025-07-01 PROCEDURE — 36415 COLL VENOUS BLD VENIPUNCTURE: CPT

## 2025-07-10 ENCOUNTER — OFFICE VISIT (OUTPATIENT)
Dept: FAMILY MEDICINE CLINIC | Facility: CLINIC | Age: 65
End: 2025-07-10
Payer: COMMERCIAL

## 2025-07-10 VITALS
HEIGHT: 68 IN | HEART RATE: 71 BPM | DIASTOLIC BLOOD PRESSURE: 84 MMHG | OXYGEN SATURATION: 100 % | BODY MASS INDEX: 30.77 KG/M2 | TEMPERATURE: 97.7 F | SYSTOLIC BLOOD PRESSURE: 120 MMHG | WEIGHT: 203 LBS

## 2025-07-10 DIAGNOSIS — I10 ESSENTIAL HYPERTENSION: Primary | ICD-10-CM

## 2025-07-10 DIAGNOSIS — Z12.11 SCREENING FOR COLON CANCER: ICD-10-CM

## 2025-07-10 DIAGNOSIS — E78.6 LOW HDL (UNDER 40): ICD-10-CM

## 2025-07-10 DIAGNOSIS — M10.9 GOUT, UNSPECIFIED CAUSE, UNSPECIFIED CHRONICITY, UNSPECIFIED SITE: ICD-10-CM

## 2025-07-10 DIAGNOSIS — G56.03 BILATERAL CARPAL TUNNEL SYNDROME: ICD-10-CM

## 2025-07-10 DIAGNOSIS — R73.01 ELEVATED FASTING BLOOD SUGAR: ICD-10-CM

## 2025-07-10 DIAGNOSIS — L98.9 FACIAL SKIN LESION: ICD-10-CM

## 2025-07-10 DIAGNOSIS — E78.1 HYPERTRIGLYCERIDEMIA: ICD-10-CM

## 2025-07-10 DIAGNOSIS — E78.2 MIXED HYPERLIPIDEMIA: ICD-10-CM

## 2025-07-10 PROCEDURE — G2211 COMPLEX E/M VISIT ADD ON: HCPCS | Performed by: FAMILY MEDICINE

## 2025-07-10 PROCEDURE — 99214 OFFICE O/P EST MOD 30 MIN: CPT | Performed by: FAMILY MEDICINE

## 2025-07-10 RX ORDER — CANDESARTAN CILEXETIL AND HYDROCHLOROTHIAZIDE 16; 12.5 MG/1; MG/1
1 TABLET ORAL DAILY
Qty: 90 TABLET | Refills: 1 | Status: SHIPPED | OUTPATIENT
Start: 2025-07-10

## 2025-07-10 RX ORDER — METOPROLOL TARTRATE 25 MG/1
12.5 TABLET, FILM COATED ORAL EVERY 12 HOURS SCHEDULED
Qty: 180 TABLET | Refills: 1 | Status: SHIPPED | OUTPATIENT
Start: 2025-07-10

## 2025-07-10 RX ORDER — ALLOPURINOL 100 MG/1
100 TABLET ORAL DAILY
Qty: 90 TABLET | Refills: 1 | Status: SHIPPED | OUTPATIENT
Start: 2025-07-10

## 2025-07-10 RX ORDER — ATORVASTATIN CALCIUM 20 MG/1
20 TABLET, FILM COATED ORAL DAILY
Qty: 90 TABLET | Refills: 1 | Status: SHIPPED | OUTPATIENT
Start: 2025-07-10

## 2025-07-10 NOTE — ASSESSMENT & PLAN NOTE
stable  Orders:  •  allopurinol (ZYLOPRIM) 100 mg tablet; Take 1 tablet (100 mg total) by mouth daily

## 2025-07-10 NOTE — PROGRESS NOTES
Name: Pancho Schwarz      : 1960      MRN: [id number]  Encounter Provider: Pernell Nayak DO  Encounter Date: 7/10/2025   Encounter department: [facility] PRIMARY CARE  Chief Complaint   Patient presents with   • Follow-up     6 month f/u- go over blood work     Patient Instructions   Take BP med and cholesterol diet and meds as directed, gout stable, consult orthopedics for b/l hand pain and tingling and rec low sugar diet and exercise to increase hdl and lower trigs. Needs colon screening.     Assessment & Plan  Essential hypertension  stable  Orders:  •  candesartan-hydrochlorothiazide (ATACAND HCT) 16-12.5 MG per tablet; Take 1 tablet by mouth daily  •  metoprolol tartrate (LOPRESSOR) 25 mg tablet; Take 0.5 tablets (12.5 mg total) by mouth every 12 (twelve) hours    Mixed hyperlipidemia  Low cholesterol diet encouraged  Orders:  •  atorvastatin (LIPITOR) 20 mg tablet; Take 1 tablet (20 mg total) by mouth daily    Gout, unspecified cause, unspecified chronicity, unspecified site  stable  Orders:  •  allopurinol (ZYLOPRIM) 100 mg tablet; Take 1 tablet (100 mg total) by mouth daily    Bilateral carpal tunnel syndrome  Consult orthopedics  Orders:  •  Ambulatory Referral to Orthopedic Surgery; Future    Facial skin lesion  Consult derm  Orders:  •  Ambulatory Referral to Dermatology; Future    Elevated fasting blood sugar  Low sugar diet       Hypertriglyceridemia  Low fat diet and avoid processed foods       Low HDL (under 40)  exercise       Screening for colon cancer  Check colon screening  Orders:  •  Ambulatory Referral to Gastroenterology; Future      Depression Screening and Follow-up Plan: Patient was screened for depression during today's encounter. They screened negative with a PHQ-2 score of 0.          History of Present Illness     Here for recheck and doing wel.       Review of Systems   Constitutional: Negative.    HENT: Negative.     Eyes: Negative.    Respiratory:  "Negative.     Cardiovascular: Negative.    Gastrointestinal: Negative.    Endocrine: Negative.    Genitourinary: Negative.    Musculoskeletal: Negative.    Skin: Negative.    Allergic/Immunologic: Negative.    Neurological: Negative.    Hematological: Negative.    Psychiatric/Behavioral: Negative.       Past Medical History[1]  Past Surgical History[2]  Family History[3]  Social History[4]  Medications[5]  No Known Allergies  Immunization History   Administered Date(s) Administered   • COVID-19 PFIZER VACCINE 0.3 ML IM 01/13/2021, 02/03/2021, 11/10/2021   • INFLUENZA 11/18/2020, 10/06/2021, 12/08/2022   • Influenza, recombinant, quadrivalent,injectable, preservative free 11/18/2020, 10/06/2021, 12/08/2022   • Tdap 05/22/2019     Objective   /84 (BP Location: Left arm, Patient Position: Sitting)   Pulse 71   Temp 97.7 °F (36.5 °C) (Tympanic)   Ht 5' 7.5\" (1.715 m)   Wt 92.1 kg (203 lb)   SpO2 100%   BMI 31.33 kg/m²     Physical Exam  Constitutional:       Appearance: He is well-developed. He is obese.   HENT:      Head: Normocephalic and atraumatic.      Right Ear: External ear normal.      Left Ear: External ear normal.      Nose: Nose normal.      Mouth/Throat:      Mouth: Mucous membranes are moist.     Eyes:      Conjunctiva/sclera: Conjunctivae normal.      Pupils: Pupils are equal, round, and reactive to light.       Cardiovascular:      Rate and Rhythm: Normal rate and regular rhythm.      Pulses: Normal pulses.      Heart sounds: Normal heart sounds.   Pulmonary:      Effort: Pulmonary effort is normal.      Breath sounds: Normal breath sounds.     Musculoskeletal:         General: Normal range of motion.      Cervical back: Normal range of motion and neck supple.     Skin:     General: Skin is warm and dry.      Capillary Refill: Capillary refill takes less than 2 seconds.     Neurological:      General: No focal deficit present.      Mental Status: He is alert and oriented to person, place, and " "time. Mental status is at baseline.      Deep Tendon Reflexes: Reflexes are normal and symmetric.     Psychiatric:         Mood and Affect: Mood normal.         Behavior: Behavior normal.         Thought Content: Thought content normal.         Judgment: Judgment normal.       Administrative Statements   I have spent a total time of 32 minutes in caring for this patient on the day of the visit/encounter including Diagnostic results, Prognosis, Risks and benefits of tx options, Instructions for management, Patient and family education, Importance of tx compliance, Risk factor reductions, Impressions, Counseling / Coordination of care, Documenting in the medical record, Reviewing/placing orders in the medical record (including tests, medications, and/or procedures), and Obtaining or reviewing history  .         [1]  Past Medical History:  Diagnosis Date   • Acute myocardial infarction (HCC)     \"no heart damage\" approx 8 yrs ago did get one stent\"   • Arthralgia     last assessed 7/8/13   • Arthritis    • Colon polyp    • Contusion of skin with intact surface     of the left medial thigh,last assessed 6/28/13   • Coronary artery disease    • Gout     last assessed 10/29/13   • History of sepsis     urinary and was in hosp 3 days /7/2020   • Hyperlipidemia    • Hypertension    • Kidney stone    • Lump of skin     last assessed 7/19/13//\"fatty tumor and surg removed\"   • S/P coronary artery stent placement     x1   • Wears glasses     at night   [2]  Past Surgical History:  Procedure Laterality Date   • CARDIAC CATHETERIZATION     • COLONOSCOPY  12/10/2009    Complete//2020   • CORONARY STENT PLACEMENT  08/2012    stenting of the LAD artery 95% lesion to 0% residual stenosis   • FL RETROGRADE PYELOGRAM  9/1/2021   • KNEE SURGERY Left     x4   • LUMBAR EPIDURAL INJECTION      x1   • [place] CYSTO/URETERO W/LITHOTRIPSY &INDWELL STENT INSRT Left 8/5/2020    Procedure: CYSTO, URETEROSCOPY STENT exchange;  Surgeon: Ward Forbes, " MD;  [facility];  Service: Urology   • [place] CYSTO/URETERO W/LITHOTRIPSY &INDWELL STENT INSRT Right 9/1/2021    Procedure: CYSTOSCOPY URETEROSCOPY WITH BASKET STONE EXTRACTION, RETROGRADE PYELOGRAM AND INSERTION STENT URETERAL;  Surgeon: Christoph Ordaz MD;  [facility];  Service: Urology   • [place] CYSTOURETHROSCOPY W/URETERAL CATHETERIZATION Left 7/4/2020    Procedure: CYSTOSCOPY WITH INSERTION STENT URETERAL;  Surgeon: Ferny Gates MD;  [facility];  Service: Urology   • [place] TENDON SHEATH INCISION Right 11/22/2024    Procedure: RELEASE TRIGGER FINGER, RIGHT MIDDLE;  Surgeon: Jose M Yoder MD;  [facility];  Service: Orthopedics   • RETROMASTOID CRANIOTOMY Left 8/7/2023    Procedure: Left middle fossa craniotomy, retromastoid approach, for repair tegeman defect with temporalis muscle fascia graft;  Surgeon: Bria Gary MD;  [facility];  Service: ENT   • RETROMASTOID CRANIOTOMY Left 8/7/2023    Procedure: Endoscopic left middle fossa craniotomy for repair of tegmen defect and CSF leak with temporalis muscle fascia flap, with neuromonitoring (CN 7/8) and intraoperative lumbar drain placement;  Surgeon: Gómez Rodriguez MD;  [facility];  Service: Neurosurgery   [3]  Family History  Problem Relation Name Age of Onset   • Edema Mother Christianne         Cerebral   • Stroke Mother Christianne    • Aneurysm Father Stuart         of the cerebellar artery   • Stroke Father Stuart    • Aneurysm Brother          of the cerebellar artery   [4]  Social History  Tobacco Use   • Smoking status: Never   • Smokeless tobacco: Never   Vaping Use   • Vaping status: Never Used   Substance and Sexual Activity   • Alcohol use: Yes     Alcohol/week: 5.0 standard drinks of alcohol     Types: 5 Shots of liquor per week   • Drug use: No   • Sexual activity: Yes     Partners: Female     Birth control/protection: Male Sterilization   [5]  Current Outpatient Medications on File Prior to Visit   Medication  Sig   • aspirin 81 mg chewable tablet Chew 81 mg daily   • [DISCONTINUED] allopurinol (ZYLOPRIM) 100 mg tablet TAKE ONE TABLET BY MOUTH ONCE DAILY   • [DISCONTINUED] atorvastatin (LIPITOR) 20 mg tablet Take 1 tablet (20 mg total) by mouth daily   • [DISCONTINUED] candesartan-hydrochlorothiazide (ATACAND HCT) 16-12.5 MG per tablet TAKE ONE TABLET BY MOUTH ONCE DAILY.   • [DISCONTINUED] metoprolol tartrate (LOPRESSOR) 25 mg tablet Take 0.5 tablets (12.5 mg total) by mouth every 12 (twelve) hours

## 2025-07-10 NOTE — PATIENT INSTRUCTIONS
Take BP med and cholesterol diet and meds as directed, gout stable, consult orthopedics for b/l hand pain and tingling and rec low sugar diet and exercise to increase hdl and lower trigs. Needs colon screening.

## 2025-07-10 NOTE — ASSESSMENT & PLAN NOTE
Low cholesterol diet encouraged  Orders:  •  atorvastatin (LIPITOR) 20 mg tablet; Take 1 tablet (20 mg total) by mouth daily

## 2025-07-10 NOTE — ASSESSMENT & PLAN NOTE
stable  Orders:  •  candesartan-hydrochlorothiazide (ATACAND HCT) 16-12.5 MG per tablet; Take 1 tablet by mouth daily  •  metoprolol tartrate (LOPRESSOR) 25 mg tablet; Take 0.5 tablets (12.5 mg total) by mouth every 12 (twelve) hours

## 2025-07-31 ENCOUNTER — TELEPHONE (OUTPATIENT)
Age: 65
End: 2025-07-31

## 2025-07-31 ENCOUNTER — PREP FOR PROCEDURE (OUTPATIENT)
Age: 65
End: 2025-07-31

## 2025-07-31 DIAGNOSIS — Z86.0100 HISTORY OF COLON POLYPS: Primary | ICD-10-CM

## 2025-08-18 ENCOUNTER — NURSE TRIAGE (OUTPATIENT)
Age: 65
End: 2025-08-18

## 2025-08-19 ENCOUNTER — ANESTHESIA (OUTPATIENT)
Dept: ANESTHESIOLOGY | Facility: HOSPITAL | Age: 65
End: 2025-08-19

## 2025-08-19 ENCOUNTER — OFFICE VISIT (OUTPATIENT)
Dept: FAMILY MEDICINE CLINIC | Facility: CLINIC | Age: 65
End: 2025-08-19
Payer: COMMERCIAL

## 2025-08-19 ENCOUNTER — ANESTHESIA EVENT (OUTPATIENT)
Dept: ANESTHESIOLOGY | Facility: HOSPITAL | Age: 65
End: 2025-08-19

## 2025-08-19 ENCOUNTER — HOSPITAL ENCOUNTER (OUTPATIENT)
Dept: NON INVASIVE DIAGNOSTICS | Facility: HOSPITAL | Age: 65
Discharge: HOME | End: 2025-08-19
Attending: FAMILY MEDICINE
Payer: COMMERCIAL

## 2025-08-19 VITALS
SYSTOLIC BLOOD PRESSURE: 118 MMHG | TEMPERATURE: 98.6 F | BODY MASS INDEX: 31.25 KG/M2 | WEIGHT: 206.2 LBS | DIASTOLIC BLOOD PRESSURE: 80 MMHG | HEART RATE: 68 BPM | HEIGHT: 68 IN | OXYGEN SATURATION: 99 %

## 2025-08-19 DIAGNOSIS — I10 ESSENTIAL HYPERTENSION: ICD-10-CM

## 2025-08-19 DIAGNOSIS — M79.662 PAIN OF LEFT CALF: ICD-10-CM

## 2025-08-19 DIAGNOSIS — E78.1 HYPERTRIGLYCERIDEMIA: ICD-10-CM

## 2025-08-19 DIAGNOSIS — M79.662 PAIN OF LEFT CALF: Primary | ICD-10-CM

## 2025-08-19 DIAGNOSIS — M10.40 OTHER SECONDARY GOUT, UNSPECIFIED CHRONICITY, UNSPECIFIED SITE: ICD-10-CM

## 2025-08-19 PROCEDURE — 93971 EXTREMITY STUDY: CPT

## 2025-08-19 PROCEDURE — 99214 OFFICE O/P EST MOD 30 MIN: CPT | Performed by: FAMILY MEDICINE

## 2025-08-19 PROCEDURE — 93971 EXTREMITY STUDY: CPT | Performed by: SURGERY

## 2025-08-19 PROCEDURE — G2211 COMPLEX E/M VISIT ADD ON: HCPCS | Performed by: FAMILY MEDICINE

## (undated) DEVICE — CATH URETERAL 5FR X 70 CM FLEX TIP POLYUR BARD

## (undated) DEVICE — SYRINGE 20ML LL

## (undated) DEVICE — TOOL MR8-9MH30 MR8 9CM MATCH 3MM: Brand: MIDAS REX MR8

## (undated) DEVICE — ADHESIVE SKIN HIGH VISCOSITY EXOFIN 1ML

## (undated) DEVICE — GLOVE SRG BIOGEL ECLIPSE 7.5

## (undated) DEVICE — NEEDLE 18 G X 1 1/2

## (undated) DEVICE — BASKET STONE RTRVL PLTN CL 3FR 115CM

## (undated) DEVICE — GLOVE SRG BIOGEL 6.5

## (undated) DEVICE — ELECTRODE BLADE MOD E-Z CLEAN 2.5IN 6.4CM -0012M

## (undated) DEVICE — SUPPLY FEE STD

## (undated) DEVICE — 3M™ IOBAN™ 2 ANTIMICROBIAL INCISE DRAPE 6650EZ: Brand: IOBAN™ 2

## (undated) DEVICE — URETERAL CATHETER POLLACK OPEN ENDED 5FR

## (undated) DEVICE — DISPOSABLE SLIM BIPOLAR FORCEPS, NON-STICK,: Brand: SPETZLER-MALIS

## (undated) DEVICE — 3000CC GUARDIAN II: Brand: GUARDIAN

## (undated) DEVICE — SKIN MARKER DUAL TIP WITH RULER CAP, FLEXIBLE RULER AND LABELS: Brand: DEVON

## (undated) DEVICE — HEMOSTATIC MATRIX SURGIFLO 8ML W/THROMBIN

## (undated) DEVICE — HERMETIC™ LUMBAR CATHETER CLOSED TIP: Brand: HERMETIC™

## (undated) DEVICE — DURASEAL EXTENDED APPLICATOR TIP 15CM

## (undated) DEVICE — SPECIMEN CONTAINER STERILE PEEL PACK

## (undated) DEVICE — INTENDED FOR TISSUE SEPARATION, AND OTHER PROCEDURES THAT REQUIRE A SHARP SURGICAL BLADE TO PUNCTURE OR CUT.: Brand: BARD-PARKER ® CARBON RIB-BACK BLADES

## (undated) DEVICE — SYRINGE 10ML LL

## (undated) DEVICE — SCD SEQUENTIAL COMPRESSION COMFORT SLEEVE MEDIUM KNEE LENGTH: Brand: KENDALL SCD

## (undated) DEVICE — GUIDEWIRE STRGHT TIP 0.035 IN  SOLO PLUS

## (undated) DEVICE — SINGLE PORT MANIFOLD: Brand: NEPTUNE 2

## (undated) DEVICE — UROCATCH BAG

## (undated) DEVICE — TELFA 1/2" X 3": Brand: TELFA

## (undated) DEVICE — PACK CRANIOTOMY PBDS RF

## (undated) DEVICE — NEURO PATTIES 1/4 X 1/4

## (undated) DEVICE — GLOVE SRG BIOGEL 6

## (undated) DEVICE — PREP PAD BNS: Brand: CONVERTORS

## (undated) DEVICE — PERFORATOR CRANIAL 14MM

## (undated) DEVICE — Device: Brand: IQ SYSTEM

## (undated) DEVICE — PACK TUR

## (undated) DEVICE — 3M™ TEGADERM™ TRANSPARENT FILM DRESSING FRAME STYLE, 1624W, 2-3/8 IN X 2-3/4 IN (6 CM X 7 CM), 100/CT 4CT/CASE: Brand: 3M™ TEGADERM™

## (undated) DEVICE — GLOVE SRG BIOGEL 7.5

## (undated) DEVICE — MONITORING SPINAL IMPULSE CASE FEE

## (undated) DEVICE — TUBING SUCTION 5MM X 12 FT

## (undated) DEVICE — DRAPE CAMERA/LASER

## (undated) DEVICE — DURASEAL 5ML

## (undated) DEVICE — LUBRICANT SURGILUBE TUBE 4 OZ  FLIP TOP

## (undated) DEVICE — PENCIL PRESHARPENED: Brand: BIOSEAL INC.

## (undated) DEVICE — 40601 PROLONGED POSITIONING SYSTEM: Brand: 40601 PROLONGED POSITIONING SYSTEM

## (undated) DEVICE — Device

## (undated) DEVICE — PROXIMATE SKIN STAPLERS (35 WIDE) CONTAINS 35 STAINLESS STEEL STAPLES (FIXED HEAD): Brand: PROXIMATE

## (undated) DEVICE — TRAY FOLEY 16FR URIMETER SURESTEP

## (undated) DEVICE — GAUZE SPONGES,16 PLY: Brand: CURITY

## (undated) DEVICE — SURGICEL 4 X 8

## (undated) DEVICE — PREP SURGICAL PURPREP 26ML

## (undated) DEVICE — CATH FOLEY 16FR 5ML 2WAY LUBRICATH

## (undated) DEVICE — IV CATH INTROCAN 18G X 1 1/4 SAFETY

## (undated) DEVICE — GLOVE INDICATOR PI UNDERGLOVE SZ 6.5 BLUE

## (undated) DEVICE — GLOVE INDICATOR UNDERGLOVE SZ 6 BLUE

## (undated) DEVICE — EXIDINE 4 PCT

## (undated) DEVICE — PREMIUM DRY TRAY LF: Brand: MEDLINE INDUSTRIES, INC.

## (undated) DEVICE — GLOVE INDICATOR PI UNDERGLOVE SZ 8 BLUE

## (undated) DEVICE — PETRI DISH STERILE

## (undated) DEVICE — 3M™ STERI-STRIP™ COMPOUND BENZOIN TINCTURE 40 BAGS/CARTON 4 CARTONS/CASE C1544: Brand: 3M™ STERI-STRIP™

## (undated) DEVICE — SUT MONOCRYL PLUS 4-0 PS-2 18 IN MCP496G

## (undated) DEVICE — DRAPE INTESTINAL ISOLATION BAG

## (undated) DEVICE — MARKER REFLECTIVE RADIOPAQUE SPHERE

## (undated) DEVICE — SPONGE PVP SCRUB WING STERILE

## (undated) DEVICE — SUT NUROLON 4-0 TF CR/8 18 IN C584D

## (undated) DEVICE — DRAPE SHEET THREE QUARTER

## (undated) DEVICE — STERILE BETHLEHEM PLASTIC HAND: Brand: CARDINAL HEALTH

## (undated) DEVICE — GUARDIAN LVC: Brand: GUARDIAN

## (undated) DEVICE — GLOVE SRG BIOGEL ORTHOPEDIC 7.5

## (undated) DEVICE — ANTIBACTERIAL VIOLET BRAIDED (POLYGLACTIN 910), SYNTHETIC ABSORBABLE SUTURE: Brand: COATED VICRYL

## (undated) DEVICE — SUT ETHILON 4-0 PS-2 18 IN 1667H

## (undated) DEVICE — INTENDED FOR TISSUE SEPARATION, AND OTHER PROCEDURES THAT REQUIRE A SHARP SURGICAL BLADE TO PUNCTURE OR CUT.: Brand: BARD-PARKER SAFETY BLADES SIZE 10, STERILE

## (undated) DEVICE — HOOK ELASTIC STAY 12MM BLUNT SNGL STRL

## (undated) DEVICE — GLOVE INDICATOR PI UNDERGLOVE SZ 7.5 BLUE

## (undated) DEVICE — MONITORR™ ICP EXTERNAL CSF DRAINAGE AND MONITORING SYSTEM: Brand: MONITORR™

## (undated) DEVICE — 3M™ TEGADERM™ TRANSPARENT FILM DRESSING FRAME STYLE, 1628, 6 IN X 8 IN (15 CM X 20 CM), 10/CT 8CT/CASE: Brand: 3M™ TEGADERM™

## (undated) DEVICE — NEEDLE 22 G X 1 1/2 SAFETY

## (undated) DEVICE — GLOVE SRG BIOGEL 7